# Patient Record
Sex: FEMALE | Race: WHITE | Employment: OTHER | URBAN - METROPOLITAN AREA
[De-identification: names, ages, dates, MRNs, and addresses within clinical notes are randomized per-mention and may not be internally consistent; named-entity substitution may affect disease eponyms.]

---

## 2017-01-11 ENCOUNTER — HOSPITAL ENCOUNTER (OUTPATIENT)
Dept: MAMMOGRAPHY | Age: 67
Discharge: HOME | End: 2017-01-11
Attending: FAMILY MEDICINE
Payer: MEDICARE

## 2017-01-11 DIAGNOSIS — Z12.39 SCREENING FOR MALIGNANT NEOPLASM OF BREAST: ICD-10-CM

## 2017-01-11 PROCEDURE — 77067 SCR MAMMO BI INCL CAD: CPT

## 2017-03-03 PROBLEM — I25.10 CORONARY ARTERY DISEASE INVOLVING NATIVE CORONARY ARTERY OF NATIVE HEART WITHOUT ANGINA PECTORIS: Status: ACTIVE | Noted: 2017-03-03

## 2017-03-13 ENCOUNTER — APPOINTMENT (RX ONLY)
Dept: URBAN - METROPOLITAN AREA CLINIC 349 | Facility: CLINIC | Age: 67
Setting detail: DERMATOLOGY
End: 2017-03-13

## 2017-03-13 DIAGNOSIS — D22 MELANOCYTIC NEVI: ICD-10-CM

## 2017-03-13 DIAGNOSIS — Z85.828 PERSONAL HISTORY OF OTHER MALIGNANT NEOPLASM OF SKIN: ICD-10-CM

## 2017-03-13 DIAGNOSIS — Z71.89 OTHER SPECIFIED COUNSELING: ICD-10-CM

## 2017-03-13 DIAGNOSIS — B00.1 HERPESVIRAL VESICULAR DERMATITIS: ICD-10-CM

## 2017-03-13 DIAGNOSIS — L82.1 OTHER SEBORRHEIC KERATOSIS: ICD-10-CM

## 2017-03-13 DIAGNOSIS — L57.0 ACTINIC KERATOSIS: ICD-10-CM

## 2017-03-13 PROBLEM — D22.62 MELANOCYTIC NEVI OF LEFT UPPER LIMB, INCLUDING SHOULDER: Status: ACTIVE | Noted: 2017-03-13

## 2017-03-13 PROBLEM — D22.71 MELANOCYTIC NEVI OF RIGHT LOWER LIMB, INCLUDING HIP: Status: ACTIVE | Noted: 2017-03-13

## 2017-03-13 PROBLEM — D22.5 MELANOCYTIC NEVI OF TRUNK: Status: ACTIVE | Noted: 2017-03-13

## 2017-03-13 PROBLEM — D22.72 MELANOCYTIC NEVI OF LEFT LOWER LIMB, INCLUDING HIP: Status: ACTIVE | Noted: 2017-03-13

## 2017-03-13 PROBLEM — D22.61 MELANOCYTIC NEVI OF RIGHT UPPER LIMB, INCLUDING SHOULDER: Status: ACTIVE | Noted: 2017-03-13

## 2017-03-13 PROCEDURE — ? COUNSELING

## 2017-03-13 PROCEDURE — ? LIQUID NITROGEN

## 2017-03-13 PROCEDURE — 99203 OFFICE O/P NEW LOW 30 MIN: CPT | Mod: 25

## 2017-03-13 PROCEDURE — 17000 DESTRUCT PREMALG LESION: CPT

## 2017-03-13 PROCEDURE — 17003 DESTRUCT PREMALG LES 2-14: CPT

## 2017-03-13 PROCEDURE — ? BODY PHOTOGRAPHY

## 2017-03-13 ASSESSMENT — LOCATION SIMPLE DESCRIPTION DERM
LOCATION SIMPLE: LEFT POSTERIOR THIGH
LOCATION SIMPLE: LOWER BACK
LOCATION SIMPLE: ABDOMEN
LOCATION SIMPLE: RIGHT CHEEK
LOCATION SIMPLE: CHEST
LOCATION SIMPLE: LEFT LIP
LOCATION SIMPLE: RIGHT FOREARM
LOCATION SIMPLE: RIGHT UPPER BACK
LOCATION SIMPLE: RIGHT POSTERIOR THIGH
LOCATION SIMPLE: LEFT CHEEK
LOCATION SIMPLE: LEFT WRIST
LOCATION SIMPLE: LEFT FOREARM
LOCATION SIMPLE: RIGHT LOWER BACK
LOCATION SIMPLE: UPPER BACK

## 2017-03-13 ASSESSMENT — LOCATION ZONE DERM
LOCATION ZONE: ARM
LOCATION ZONE: LEG
LOCATION ZONE: FACE
LOCATION ZONE: TRUNK
LOCATION ZONE: LIP

## 2017-03-13 ASSESSMENT — LOCATION DETAILED DESCRIPTION DERM
LOCATION DETAILED: LEFT DORSAL WRIST
LOCATION DETAILED: RIGHT PROXIMAL DORSAL FOREARM
LOCATION DETAILED: LEFT MEDIAL SUPERIOR CHEST
LOCATION DETAILED: RIGHT MEDIAL SUPERIOR CHEST
LOCATION DETAILED: RIGHT PROXIMAL POSTERIOR THIGH
LOCATION DETAILED: SUBXIPHOID
LOCATION DETAILED: RIGHT INFERIOR MEDIAL MIDBACK
LOCATION DETAILED: RIGHT SUPERIOR CENTRAL BUCCAL CHEEK
LOCATION DETAILED: SUPERIOR THORACIC SPINE
LOCATION DETAILED: LEFT LOWER CUTANEOUS LIP
LOCATION DETAILED: SUPERIOR LUMBAR SPINE
LOCATION DETAILED: RIGHT MEDIAL UPPER BACK
LOCATION DETAILED: RIGHT DISTAL DORSAL FOREARM
LOCATION DETAILED: RIGHT LATERAL SUPERIOR CHEST
LOCATION DETAILED: LEFT SUPERIOR CENTRAL BUCCAL CHEEK
LOCATION DETAILED: LEFT PROXIMAL POSTERIOR THIGH
LOCATION DETAILED: LEFT PROXIMAL DORSAL FOREARM
LOCATION DETAILED: MIDDLE STERNUM

## 2017-03-13 ASSESSMENT — PAIN INTENSITY VAS: HOW INTENSE IS YOUR PAIN 0 BEING NO PAIN, 10 BEING THE MOST SEVERE PAIN POSSIBLE?: NO PAIN

## 2017-03-13 NOTE — PROCEDURE: LIQUID NITROGEN
Number Of Freeze-Thaw Cycles: 2 freeze-thaw cycles
Duration Of Freeze Thaw-Cycle (Seconds): 0
Detail Level: Detailed
Consent: The patient's consent was obtained including but not limited to risks of crusting, scabbing, blistering, scarring, darker or lighter pigmentary change, recurrence, incomplete removal and infection.
Post-Care Instructions: I reviewed with the patient in detail post-care instructions. Patient is to wear sunprotection, and avoid picking at any of the treated lesions. Pt may apply Vaseline to crusted or scabbing areas.
Render Post-Care Instructions In Note?: no

## 2017-03-13 NOTE — PROCEDURE: BODY PHOTOGRAPHY
Consent: Written consent obtained, risks reviewed for whole body photography. Patient understands that photograph costs may not be covered by insurance, and patient is ultimately responsible for payment.
Was The Entire Body Photographed (Cannot Bill Unless Entire Body Photographed)?: No
Whole Body Statement: The whole body was photographed today.
Detail Level: Detailed
Reason For Photography: The patient is obtaining whole body photography to observe existing suspicious moles and or monitor for the appearance of any new lesions.
Number Of Photographs (Optional- Will Not Render If 0): 1

## 2017-06-12 ENCOUNTER — HOSPITAL ENCOUNTER (OUTPATIENT)
Dept: SURGERY | Age: 67
Discharge: HOME | End: 2017-06-12
Attending: SURGERY

## 2017-06-12 VITALS
TEMPERATURE: 97.6 F | DIASTOLIC BLOOD PRESSURE: 83 MMHG | BODY MASS INDEX: 25.74 KG/M2 | WEIGHT: 164 LBS | SYSTOLIC BLOOD PRESSURE: 131 MMHG | HEIGHT: 67 IN | RESPIRATION RATE: 18 BRPM | HEART RATE: 71 BPM | OXYGEN SATURATION: 97 %

## 2017-06-12 RX ORDER — PREDNISONE 20 MG/1
20 TABLET ORAL
COMMUNITY
End: 2018-08-21 | Stop reason: ALTCHOICE

## 2017-06-12 NOTE — PERIOP NOTES
, anesthesia, reviewed Mag of 2.4 on 5/18/17 and aware pcp decreased daily Mg supplement to once a week. No new orders received. OK to proceed.

## 2017-06-12 NOTE — PERIOP NOTES
Patient verified name, , and surgery as listed in [facility]. Type 1B surgery, PAT walk-in assessment complete. Labs per surgeon: No orders received at this time. Orders will be put in [facility] by surgeon. Labs per anesthesia protocol: None. EKG: None per anesthesia protocol. Patient's Magnesium 2.4 on 17-anesthesia to review result. Patient's PCP subsequently decreased daily Magnesium supplement to once a week. Copy of patient's stent card placed on chart. Hibiclens and instructions given per hospital policy. Patient provided with handouts including Guide to Surgery, Pain Management, Hand Hygiene, Blood Transfusion Education, and [facility] Brochure. Patient answered medical/surgical history questions at their best of ability. All prior to admission medications documented in [facility]. Original medication prescription bottle NOT visualized during patient appointment. Patient instructed to hold all vitamins 7 days prior to surgery and NSAIDS 5 days prior to surgery, patient verbalized understanding. Medications to be held: Vitamin C, Vitamin D, Vitamin B12, and Meloxicam.     Patient instructed to continue previous medications as prescribed prior to surgery and to take the following medications the day of surgery according to anesthesia guidelines with a small sip of water: Amlodipine, 81 mg ASA, Buspar, Omeprazole, Prednisone, and Tramadol if needed. Patient taught back and verbalized understanding.

## 2017-06-13 ENCOUNTER — ANESTHESIA (OUTPATIENT)
Dept: SURGERY | Age: 67
End: 2017-06-13
Payer: MEDICARE

## 2017-06-13 ENCOUNTER — HOSPITAL ENCOUNTER (OUTPATIENT)
Age: 67
Setting detail: OUTPATIENT SURGERY
Discharge: HOME | End: 2017-06-13
Attending: SURGERY | Admitting: SURGERY
Payer: MEDICARE

## 2017-06-13 ENCOUNTER — ANESTHESIA EVENT (OUTPATIENT)
Dept: SURGERY | Age: 67
End: 2017-06-13
Payer: MEDICARE

## 2017-06-13 VITALS
RESPIRATION RATE: 16 BRPM | BODY MASS INDEX: 25.9 KG/M2 | SYSTOLIC BLOOD PRESSURE: 138 MMHG | HEART RATE: 67 BPM | HEIGHT: 67 IN | TEMPERATURE: 97.3 F | OXYGEN SATURATION: 95 % | DIASTOLIC BLOOD PRESSURE: 91 MMHG | WEIGHT: 165 LBS

## 2017-06-13 PROCEDURE — 74011250636 HC RX REV CODE- 250/636

## 2017-06-13 PROCEDURE — 76010000138 HC OR TIME 0.5 TO 1 HR: Performed by: SURGERY

## 2017-06-13 PROCEDURE — 77030031139 HC SUT VCRL2 J&J -A: Performed by: SURGERY

## 2017-06-13 PROCEDURE — 76210000020 HC REC RM PH II FIRST 0.5 HR: Performed by: SURGERY

## 2017-06-13 PROCEDURE — 77030032490 HC SLV COMPR SCD KNE COVD -B: Performed by: SURGERY

## 2017-06-13 PROCEDURE — 77030011640 HC PAD GRND REM COVD -A: Performed by: SURGERY

## 2017-06-13 PROCEDURE — 74011250636 HC RX REV CODE- 250/636: Performed by: ANESTHESIOLOGY

## 2017-06-13 PROCEDURE — 76210000006 HC OR PH I REC 0.5 TO 1 HR: Performed by: SURGERY

## 2017-06-13 PROCEDURE — 74011250637 HC RX REV CODE- 250/637: Performed by: ANESTHESIOLOGY

## 2017-06-13 PROCEDURE — 76060000033 HC ANESTHESIA 1 TO 1.5 HR: Performed by: SURGERY

## 2017-06-13 PROCEDURE — 77030018836 HC SOL IRR NACL ICUM -A: Performed by: SURGERY

## 2017-06-13 PROCEDURE — 77030010514 HC APPL CLP LIG COVD -B: Performed by: SURGERY

## 2017-06-13 PROCEDURE — 77030028843 HC ELECTRD CAUT TIP MEGA -B: Performed by: SURGERY

## 2017-06-13 PROCEDURE — 74011000250 HC RX REV CODE- 250: Performed by: SURGERY

## 2017-06-13 PROCEDURE — 77030020782 HC GWN BAIR PAWS FLX 3M -B: Performed by: ANESTHESIOLOGY

## 2017-06-13 PROCEDURE — 88305 TISSUE EXAM BY PATHOLOGIST: CPT | Performed by: SURGERY

## 2017-06-13 PROCEDURE — 74011000250 HC RX REV CODE- 250: Performed by: ANESTHESIOLOGY

## 2017-06-13 RX ORDER — SODIUM CHLORIDE, SODIUM LACTATE, POTASSIUM CHLORIDE, CALCIUM CHLORIDE 600; 310; 30; 20 MG/100ML; MG/100ML; MG/100ML; MG/100ML
INJECTION, SOLUTION INTRAVENOUS
Status: DISCONTINUED | OUTPATIENT
Start: 2017-06-13 | End: 2017-06-13 | Stop reason: HOSPADM

## 2017-06-13 RX ORDER — SODIUM CHLORIDE 0.9 % (FLUSH) 0.9 %
5-10 SYRINGE (ML) INJECTION AS NEEDED
Status: DISCONTINUED | OUTPATIENT
Start: 2017-06-13 | End: 2017-06-14 | Stop reason: HOSPADM

## 2017-06-13 RX ORDER — LIDOCAINE HYDROCHLORIDE 10 MG/ML
0.1 INJECTION INFILTRATION; PERINEURAL AS NEEDED
Status: DISCONTINUED | OUTPATIENT
Start: 2017-06-13 | End: 2017-06-13 | Stop reason: HOSPADM

## 2017-06-13 RX ORDER — OXYCODONE AND ACETAMINOPHEN 5; 325 MG/1; MG/1
1 TABLET ORAL AS NEEDED
Status: DISCONTINUED | OUTPATIENT
Start: 2017-06-13 | End: 2017-06-14 | Stop reason: HOSPADM

## 2017-06-13 RX ORDER — HYDROMORPHONE HYDROCHLORIDE 2 MG/ML
0.5 INJECTION, SOLUTION INTRAMUSCULAR; INTRAVENOUS; SUBCUTANEOUS
Status: DISCONTINUED | OUTPATIENT
Start: 2017-06-13 | End: 2017-06-14 | Stop reason: HOSPADM

## 2017-06-13 RX ORDER — FAMOTIDINE 20 MG/1
20 TABLET, FILM COATED ORAL ONCE
Status: COMPLETED | OUTPATIENT
Start: 2017-06-13 | End: 2017-06-13

## 2017-06-13 RX ORDER — SODIUM CHLORIDE, SODIUM LACTATE, POTASSIUM CHLORIDE, CALCIUM CHLORIDE 600; 310; 30; 20 MG/100ML; MG/100ML; MG/100ML; MG/100ML
75 INJECTION, SOLUTION INTRAVENOUS CONTINUOUS
Status: DISCONTINUED | OUTPATIENT
Start: 2017-06-13 | End: 2017-06-13 | Stop reason: HOSPADM

## 2017-06-13 RX ORDER — FENTANYL CITRATE 50 UG/ML
INJECTION, SOLUTION INTRAMUSCULAR; INTRAVENOUS AS NEEDED
Status: DISCONTINUED | OUTPATIENT
Start: 2017-06-13 | End: 2017-06-13 | Stop reason: HOSPADM

## 2017-06-13 RX ORDER — BUPIVACAINE HYDROCHLORIDE AND EPINEPHRINE 2.5; 5 MG/ML; UG/ML
INJECTION, SOLUTION EPIDURAL; INFILTRATION; INTRACAUDAL; PERINEURAL AS NEEDED
Status: DISCONTINUED | OUTPATIENT
Start: 2017-06-13 | End: 2017-06-13 | Stop reason: HOSPADM

## 2017-06-13 RX ORDER — SODIUM CHLORIDE, SODIUM LACTATE, POTASSIUM CHLORIDE, CALCIUM CHLORIDE 600; 310; 30; 20 MG/100ML; MG/100ML; MG/100ML; MG/100ML
75 INJECTION, SOLUTION INTRAVENOUS CONTINUOUS
Status: DISCONTINUED | OUTPATIENT
Start: 2017-06-13 | End: 2017-06-14 | Stop reason: HOSPADM

## 2017-06-13 RX ORDER — NALOXONE HYDROCHLORIDE 0.4 MG/ML
0.2 INJECTION, SOLUTION INTRAMUSCULAR; INTRAVENOUS; SUBCUTANEOUS AS NEEDED
Status: DISCONTINUED | OUTPATIENT
Start: 2017-06-13 | End: 2017-06-14 | Stop reason: HOSPADM

## 2017-06-13 RX ORDER — OXYCODONE AND ACETAMINOPHEN 5; 325 MG/1; MG/1
2 TABLET ORAL
Qty: 40 TAB | Refills: 0 | Status: SHIPPED | OUTPATIENT
Start: 2017-06-13 | End: 2017-08-14

## 2017-06-13 RX ORDER — MIDAZOLAM HYDROCHLORIDE 1 MG/ML
2 INJECTION, SOLUTION INTRAMUSCULAR; INTRAVENOUS
Status: DISCONTINUED | OUTPATIENT
Start: 2017-06-13 | End: 2017-06-13 | Stop reason: HOSPADM

## 2017-06-13 RX ORDER — MIDAZOLAM HYDROCHLORIDE 1 MG/ML
INJECTION, SOLUTION INTRAMUSCULAR; INTRAVENOUS AS NEEDED
Status: DISCONTINUED | OUTPATIENT
Start: 2017-06-13 | End: 2017-06-13 | Stop reason: HOSPADM

## 2017-06-13 RX ORDER — PROPOFOL 10 MG/ML
INJECTION, EMULSION INTRAVENOUS
Status: DISCONTINUED | OUTPATIENT
Start: 2017-06-13 | End: 2017-06-13 | Stop reason: HOSPADM

## 2017-06-13 RX ADMIN — MIDAZOLAM HYDROCHLORIDE 2 MG: 1 INJECTION, SOLUTION INTRAMUSCULAR; INTRAVENOUS at 11:26

## 2017-06-13 RX ADMIN — PROPOFOL 50 MCG/KG/MIN: 10 INJECTION, EMULSION INTRAVENOUS at 11:43

## 2017-06-13 RX ADMIN — FENTANYL CITRATE 75 MCG: 50 INJECTION, SOLUTION INTRAMUSCULAR; INTRAVENOUS at 11:39

## 2017-06-13 RX ADMIN — FAMOTIDINE 20 MG: 20 TABLET, FILM COATED ORAL at 10:47

## 2017-06-13 RX ADMIN — SODIUM CHLORIDE, SODIUM LACTATE, POTASSIUM CHLORIDE, CALCIUM CHLORIDE: 600; 310; 30; 20 INJECTION, SOLUTION INTRAVENOUS at 11:34

## 2017-06-13 RX ADMIN — FENTANYL CITRATE 25 MCG: 50 INJECTION, SOLUTION INTRAMUSCULAR; INTRAVENOUS at 11:52

## 2017-06-13 RX ADMIN — SODIUM CHLORIDE, SODIUM LACTATE, POTASSIUM CHLORIDE, AND CALCIUM CHLORIDE 75 ML/HR: 600; 310; 30; 20 INJECTION, SOLUTION INTRAVENOUS at 10:58

## 2017-06-13 RX ADMIN — MIDAZOLAM HYDROCHLORIDE 1 MG: 1 INJECTION, SOLUTION INTRAMUSCULAR; INTRAVENOUS at 11:54

## 2017-06-13 RX ADMIN — MIDAZOLAM HYDROCHLORIDE 1 MG: 1 INJECTION, SOLUTION INTRAMUSCULAR; INTRAVENOUS at 11:52

## 2017-06-13 RX ADMIN — LIDOCAINE HYDROCHLORIDE 0.1 ML: 10 INJECTION, SOLUTION INFILTRATION; PERINEURAL at 10:58

## 2017-06-13 RX ADMIN — OXYCODONE HYDROCHLORIDE AND ACETAMINOPHEN 1 TABLET: 5; 325 TABLET ORAL at 12:51

## 2017-06-13 NOTE — BRIEF OP NOTE
BRIEF OPERATIVE NOTE    Date of Procedure: 6/13/2017   Preoperative Diagnosis: Nonintractable headache, unspecified chronicity pattern, unspecified headache type [R51]  Postoperative Diagnosis: Nonintractable headache, unspecified chronicity pattern, unspecified headache type [R51]    Procedure(s):  TEMPORAL ARTERY BIOPSY RIGHT  Surgeon(s) and Role:     * Chapo Christensen DO - Primary         Assistant Staff:       Surgical Staff:  Circ-1: Cheri Severs, RN  Circ-Relief: Cruz Perea RN  Scrub Tech-1: [facility]  Event Time In   Incision Start 1210   Incision Close 1224     Anesthesia: MAC   Estimated Blood Loss: none  Specimens:   ID Type Source Tests Collected by Time Destination   1 : RIGHT TEMPORAL ARTERY Preservative Artery  Chapo Christensen DO 6/13/2017 1217 Pathology      Findings: Right temporal artery excised.   5cm   Complications: none  Implants: * No implants in log [address]

## 2017-06-13 NOTE — H&P (VIEW-ONLY)
Michael Funez, DO  [address] [address]  Phone [phone]   Fax [phone]      Date of visit: 2017     Primary/Requesting provider: Inga Calabrese MD    Chief Complaint   Patient presents with    Follow-up                 Date: 2017      Name: Jessy Wooten      MRN: [id number]       : 1950       Age: 77 y.o. Sex: female        PCP: Inga Calabrese MD       CC:    Chief Complaint   Patient presents with    Follow-up       HPI:     Jessy Wooten is a 77 y.o. female who presents for evaluation of headaches and to schedule right temporal artery biopsy. The patient complains of headaches that began approximately 2 weeks ago. She was seen by her primary care doctor. Headaches were severe and right-sided. She states that her pain was 4 out of 10. She denies any associated nausea or vomiting or fever. She states that she has been taking prednisone and that seems to make the headaches better. She cannot identify any exacerbating factors that may cause these headaches. Prior to these headaches she denies any previous episodes. She is here to discuss temporal artery biopsy. Isabella Ibarra PMH:    Past Medical History:   Diagnosis Date    Affective psychosis ([facility] [address] 2006    Arthritis     CAD (coronary artery disease)     no angina cp or govea.  no orthopnea or pnd    Chest pain 2006     no cp but feels\" anxious\" for 10 seconds at a time- no further cp. no govea    Cholelithiasis 2006    Chronic depression 10/15/2014    Chronic lumbar pain 10/15/2014    Claudication ([facility] [address] 2013    Coronary atherosclerosis of native coronary vessel 2006    Dyslipidemia 10/15/2014    Fibromyalgia 10/15/2014    HIPOLITO (generalized anxiety disorder) 10/15/2014    GERD (gastroesophageal reflux disease) 10/15/2014    History of complete eye exam 2016    History of dental examination 2016    Hypertension     Malaise and fatigue 2008    Palpitations 2/5/2016    Polyarthralgia 10/15/2014    Shingles 2006    10-12x    Tobacco use disorder 112/2006       PSH:    Past Surgical History:   Procedure Laterality Date    CARDIAC SURG PROCEDURE UNLIST      stent    HX CHOLECYSTECTOMY      IMPLANT BREAST SILICONE/EQ Bilateral     35 yrs silicone       MEDS:    Current Outpatient Prescriptions   Medication Sig    magnesium 250 mg tab Take 1 Tab by mouth daily.  rosuvastatin (CRESTOR) 5 mg tablet Take 1 Tab by mouth daily.  omeprazole (PRILOSEC) 20 mg capsule TAKE 1 CAPSULE TWICE DAILY  Indications: gastroesophageal reflux disease    amLODIPine (NORVASC) 5 mg tablet Take 1 Tab by mouth daily.  meloxicam (MOBIC) 7.5 mg tablet Take  by mouth daily.  aspirin delayed-release 81 mg tablet Take  by mouth daily.  ascorbic acid, vitamin C, (VITAMIN C) 500 mg tablet Take  by mouth.  cholecalciferol (VITAMIN D3) 1,000 unit cap Take  by mouth daily.  cyanocobalamin (VITAMIN B-12) 1,000 mcg tablet Take 1,000 mcg by mouth daily.  busPIRone (BUSPAR) 10 mg tablet Take 10 mg by mouth three (3) times daily.  tramadol (ULTRAM) 50 mg tablet Take 50 mg by mouth every six (6) hours as needed for Pain. No current facility-administered medications for this visit.         ALLERGIES:      Allergies   Allergen Reactions    Antibiotic [Cozmm-Earkp-Hkkhrig-Pramoxine] Unknown (comments)    Biaxin [Clarithromycin] Unknown (comments)    Ceclor [Cefaclor] Unknown (comments)    Cephalexin Unknown (comments)       SH:    Social History   Substance Use Topics    Smoking status: Former Smoker     Packs/day: 0.50     Years: 25.00     Quit date: 9/21/2004    Smokeless tobacco: Never Used      Comment: SMOKED FOR 20 TO 30 YEARS; STOPPED 10 YEARS AGO (TAKEN 10/27/2015)    Alcohol use No       FH:    Family History   Problem Relation Age of Onset    Hypertension Other     High Cholesterol Other     Diabetes Other     Glaucoma Other     Heart Attack Other     Stroke Other     Hypertension Mother     High Cholesterol Mother     Heart Attack Mother     Hypertension Sister     Depression Sister     High Cholesterol Sister     Hypertension Brother     High Cholesterol Brother     Heart Attack Brother     No Known Problems Maternal Aunt     Breast Cancer Neg Hx          Review of Systems   Constitutional: Negative. Eyes: Positive for pain. Right eye pain   Respiratory: Negative. Negative for cough, hemoptysis, sputum production and shortness of breath. Cardiovascular: Negative. Negative for chest pain, palpitations, orthopnea, claudication, leg swelling and PND. Gastrointestinal: Negative. Negative for abdominal pain, blood in stool, constipation, diarrhea, heartburn, nausea and vomiting. Genitourinary: Negative. Musculoskeletal: Positive for joint pain and myalgias. Skin: Negative. Neurological: Positive for focal weakness and headaches. Endo/Heme/Allergies: Negative. Psychiatric/Behavioral: Negative. Negative for depression, hallucinations, substance abuse and suicidal ideas. The patient is not nervous/anxious. Physical Exam:     Visit Vitals    /75    Pulse 77    Ht 5' 7\" (1.702 m)    Wt 164 lb (74.4 kg)    BMI 25.69 kg/m2         Physical Exam   Constitutional: She is oriented to person, place, and time and well-developed, well-nourished, and in no distress. HENT:   Head: Normocephalic and atraumatic. Mouth/Throat: Oropharynx is clear and moist.   Eyes: EOM are normal. Pupils are equal, round, and reactive to light. Neck: Normal range of motion. Neck supple. No tracheal deviation present. No thyromegaly present. Cardiovascular: Normal rate, regular rhythm and normal heart sounds. No murmur heard. Pulmonary/Chest: Effort normal and breath sounds normal. No respiratory distress. She has no wheezes. She has no rales. Abdominal: Soft.  Bowel sounds are normal. She exhibits no distension and no mass. There is no tenderness. There is no rebound and no guarding. Musculoskeletal: Normal range of motion. She exhibits no edema. Neurological: She is alert and oriented to person, place, and time. Skin: Skin is warm and dry. No erythema. Psychiatric: Mood and judgment normal.       Assessment/Plan:  Woody Fonseca is a 77 y.o. female who has signs and symptoms consistent with headaches and possible temporal arteritis. We will plan a temporal artery biopsy tomorrow. I discussed the procedure in detail with the patient today. I explained to her that this is only a biopsy and not a curative procedure for her headaches. I discussed the risks and benefits potential comp occasions including risk and benefits associated with anesthesia. We will proceed with temporal artery biopsy tomorrow. ICD-10-CM ICD-9-CM    1. Temporal arteritis (HCC) M31.6 446.5        I went through the risks of bleeding, infection and anesthesia.      Counseling time:not applicable    Signed: Bautista Nielson DO   6/12/2017  8:17 AM

## 2017-06-13 NOTE — INTERVAL H&P NOTE
H&P Update:  Daryle Mow was seen and examined. History and physical has been reviewed. The patient has been examined.  There have been no significant clinical changes since the completion of the originally dated History and Physical.    Signed By: Abel Randall DO     June 13, 2017 11:15 AM

## 2017-06-13 NOTE — DISCHARGE INSTRUCTIONS
·   .  Post op instructions:  1. May shower only, no tub bathing, no hot tub, no swimming. 2. Keep incision clean with regular soap and water. 3. No lifting over 10 lbs. 4. Regular diet as tolerated. 5. May remove topical dressing on Day #2. Kim Rogers steri strips until seen in Dr. Nielson's office at follow up appointment. 6. No driving on pain medicines.   7. Resume home medicines as usual.    Tyron Nielson, DO

## 2017-06-13 NOTE — ANESTHESIA PREPROCEDURE EVALUATION
Anesthetic History   No history of anesthetic complications            Review of Systems / Medical History  Patient summary reviewed, nursing notes reviewed and pertinent labs reviewed    Pulmonary                Comments: Quit smoking 13 years ago   Neuro/Psych         Headaches     Cardiovascular    Hypertension: well controlled          CAD, cardiac stents and hyperlipidemia    Exercise tolerance: >4 METS  Comments: Stents times two in 2006   GI/Hepatic/Renal     GERD: well controlled           Endo/Other        Arthritis     Other Findings              Physical Exam    Airway  Mallampati: II  TM Distance: 4 - 6 cm  Neck ROM: normal range of motion   Mouth opening: Normal     Cardiovascular               Dental    Dentition: Lower partial plate     Pulmonary  Breath sounds clear to auscultation               Abdominal  GI exam deferred       Other Findings            Anesthetic Plan    ASA: 3  Anesthesia type: total IV anesthesia          Induction: Intravenous  Anesthetic plan and risks discussed with: Patient

## 2017-06-13 NOTE — OP NOTES
[address]   OPERATIVE REPORT       Name:  Diane Grover   MR#:  [id number]   :  1950   Account #:  [de-identified]   Date of Adm:  2017       DATE OF PROCEDURE: 2017     PREOPERATIVE DIAGNOSIS: Temporal arteritis. POSTOPERATIVE DIAGNOSIS: Temporal arteritis. PROCEDURE: Right temporal artery biopsy. ANESTHESIA: MAC. SURGEON: Iva Coleman DO.    ASSISTANT: None. COMPLICATIONS: None. DISPOSITION: Stable. COUNTS: Sponge count, needle count, instrument count, all   correct x3. DESCRIPTION OF PROCEDURE: This is a 69-year-old female with   headaches and temporal arteritis. She is prepared for biopsy of   the right temporal artery. Consent was obtained by describing   the procedure to the patient, including potential complications   to include infection, bleeding and pain. Consent was obtained,   placed upon the chart. She was administered no antibiotics. Preoperatively, she was taken to the operative suite, placed in a   supine position and MAC was administered without complications. Following this, using an arterial Doppler, the temporal artery   was identified and marked. The hair was then shaved back,   exposing the skin and then she was prepped and draped in the   usual sterile fashion. Time-out was taken to confirm the   patient's name, proper procedure. Following this, 0.25% Marcaine with epinephrine was used to   anesthetize the skin and subcutaneous tissue and then a #15   scalpel blade was used to make a 4 cm incision and Bovie   cauterization with a [place] tip cautery was used to dissect   down through subcutaneous tissue where the temporal artery was   identified. We skeletonized the artery and then placed Ligaclips   proximally and distally and divided the temporal artery, taking   approximately 1 1/2 inches or 4-5 cm of artery. This will be   sent to Pathology for analysis.  At this point, we copiously   irrigated with saline until clear. We ensured hemostasis using   spot cauterization. We then reapproximated the subcutaneous tissue with 3-0 Vicryl   in simple running fashion. Mastisol and Steri-Strips placed atop   the incision. Sterile dressing applied. The patient will be extubated, transferred to recovery stable. FINDINGS: A right temporal artery biopsy was performed without   immediate complications. NICOLE PATTERSON  [address] , DO      DD / Nicki Puentes   D:  06/13/2017   12:30   T:  06/13/2017   12:52   Job #:  [id number]

## 2017-06-13 NOTE — IP AVS SNAPSHOT
[address] 
[phone] Patient: Chuy Da Silva MRN: [id number] [facility]:[birth year] You are allergic to the following Allergen Reactions Antibiotic (Ypoxb-Odlxe-Ufesxlt-Pramoxine) Unknown (comments) Patient unsure Biaxin (Clarithromycin) Unknown (comments) Patient unsure Ceclor (Cefaclor) Unknown (comments) Patient unsure Cephalexin Nausea and Vomiting Recent Documentation Height Weight BMI OB Status Smoking Status 1.702 m 74.8 kg 25.84 kg/m2 Postmenopausal Former Smoker Emergency Contacts Name Discharge Info Relation Home Work Mobile Thiago [place] (Niece) DISCHARGE CAREGIVER [3] Other Relative [6] [phone] About your hospitalization You were admitted on:  June 13, 2017 You last received care in the:  [facility] PACU You were discharged on:  June 13, 2017 Unit phone number:  [phone] Why you were hospitalized Your primary diagnosis was:  Not on File Providers Seen During Your Hospitalizations Provider Role Specialty Primary office phone Estuardo Panda DO Attending Provider General Surgery [phone] Your Primary Care Physician (PCP) Primary Care Physician Office Phone Office Fax Daiana Sanabria [phone] Follow-up Information Follow up With Details Comments Contact Info Waqas Bateman MD   [address] [place] [address] 
[address] [facility] [address] 
[phone] Damion Nielson DO Schedule an appointment as soon as possible for a visit in 1 week(s)  [address] [facility] [address] [facility] [id number] [phone] Your Appointments Wednesday June 28, 2017  2:30 PM EDT Global Post Op with Damion Nielson DO  
[facility] ([facility]) [address] [place] [address] [address] [place] [id number] [phone] Current Discharge Medication List  
  
START taking these medications Dose & Instructions Dispensing Information Comments Morning Noon Evening Bedtime  
 oxyCODONE-acetaminophen 5-325 mg per tablet Commonly known as:  PERCOCET Your last dose was: Your next dose is:    
   
   
 Dose:  2 Tab Take 2 Tabs by mouth every four (4) hours as needed for Pain. Max Daily Amount: 12 Tabs. Quantity:  40 Tab Refills:  0 CONTINUE these medications which have CHANGED Dose & Instructions Dispensing Information Comments Morning Noon Evening Bedtime  
 amLODIPine 5 mg tablet Commonly known as:  Laurel Qureshi What changed:  additional instructions Your last dose was: Your next dose is:    
   
   
 Dose:  5 mg Take 1 Tab by mouth daily. Quantity:  90 Tab Refills:  3  
     
   
   
   
  
 magnesium 250 mg Tab What changed:  when to take this Your last dose was: Your next dose is:    
   
   
 Dose:  250 mg Take 1 Tab by mouth daily. Quantity:  30 Tab Refills:  0  
     
   
   
   
  
 omeprazole 20 mg capsule Commonly known as:  PRILOSEC What changed:  additional instructions Your last dose was: Your next dose is: TAKE 1 CAPSULE TWICE DAILY  Indications: gastroesophageal reflux disease Quantity:  180 Cap Refills:  3  
     
   
   
   
  
 rosuvastatin 5 mg tablet Commonly known as:  CRESTOR What changed:  when to take this Your last dose was: Your next dose is:    
   
   
 Dose:  5 mg Take 1 Tab by mouth daily. Quantity:  90 Tab Refills:  3 CONTINUE these medications which have NOT CHANGED Dose & Instructions Dispensing Information Comments Morning Noon Evening Bedtime  
 aspirin delayed-release 81 mg tablet Your last dose was: Your next dose is: Take  by mouth daily. Take DOS per anesthesia protocol. Refills:  0  
     
   
   
   
  
 busPIRone 10 mg tablet Commonly known as:  BUSPAR Your last dose was: Your next dose is:    
   
   
 Dose:  10 mg Take 10 mg by mouth three (3) times daily. Take DOS per anesthesia protocol. Refills:  0  
     
   
   
   
  
 meloxicam 7.5 mg tablet Commonly known as:  MOBIC Your last dose was: Your next dose is:    
   
   
 Dose:  7.5 mg Take 7.5 mg by mouth two (2) times a day. Refills:  0  
     
   
   
   
  
 predniSONE 20 mg tablet Commonly known as:  Rolene Ply Your last dose was: Your next dose is:    
   
   
 Dose:  20 mg Take 20 mg by mouth daily (with breakfast). Two tablets every morning. Take DOS per anesthesia protocol. Refills:  0  
     
   
   
   
  
 traMADol 50 mg tablet Commonly known as:  ULTRAM  
   
Your last dose was: Your next dose is:    
   
   
 Dose:  50 mg Take 50 mg by mouth every six (6) hours as needed for Pain. Take DOS if needed per anesthesia protocol. Refills:  0  
     
   
   
   
  
 VITAMIN B-12 1,000 mcg tablet Generic drug:  cyanocobalamin Your last dose was: Your next dose is:    
   
   
 Dose:  1000 mcg Take 1,000 mcg by mouth daily. Refills:  0  
     
   
   
   
  
 VITAMIN C 500 mg tablet Generic drug:  ascorbic acid (vitamin C) Your last dose was: Your next dose is:    
   
   
 Dose:  500 mg Take 500 mg by mouth daily. Refills:  0  
     
   
   
   
  
 VITAMIN D3 1,000 unit Cap Generic drug:  cholecalciferol Your last dose was: Your next dose is: Take  by mouth daily. Refills:  0 Where to Get Your Medications Information on where to get these meds will be given to you by the nurse or doctor. ! Ask your nurse or doctor about these medications oxyCODONE-acetaminophen 5-325 mg per tablet Discharge Instructions · Jakob Felix Post op instructions: 
1. May shower only, no tub bathing, no hot tub, no swimming. 2. Keep incision clean with regular soap and water. 3. No lifting over 10 lbs. 4. Regular diet as tolerated. 5. May remove topical dressing on Day #2. Katlynmina Brendas steri strips until seen in Dr. Nielson's office at follow up appointment. 6. No driving on pain medicines. 7. Resume home medicines as usual. 
 
Merari Nielson, DO Discharge Orders None Introducing [facility]! [employer] introduces [facility] patient portal. Now you can access parts of your medical record, email your doctor's office, and request medication refills online. 1. In your internet browser, go to [email or web address] 2. Click on the First Time User? Click Here link in the Sign In box. You will see the New Member Sign Up page. 3. Enter your [facility] Access Code exactly as it appears below. You will not need to use this code after youve completed the sign-up process. If you do not sign up before the expiration date, you must request a new code. · [facility] Access Code: [email or web address] Expires: 8/16/2017  9:13 AM 
 
4. Enter the last four digits of your Social Security Number (xxxx) and Date of Birth (mm/dd/yyyy) as indicated and click Submit. You will be taken to the next sign-up page. 5. Create a [facility] ID. This will be your [facility] login ID and cannot be changed, so think of one that is secure and easy to remember. 6. Create a [facility] password. You can change your password at any time. 7. Enter your Password Reset Question and Answer. This can be used at a later time if you forget your password. 8. Enter your e-mail address. You will receive e-mail notification when new information is available in [address] 9. Click Sign Up. You can now view and download portions of your medical record. 10. Click the Download Summary menu link to download a portable copy of your medical information. If you have questions, please visit the Frequently Asked Questions section of the [employer] website. Remember, MyChart is NOT to be used for urgent needs. For medical emergencies, dial 911. Now available from your iPhone and Android! General Information Please provide this summary of care documentation to your next provider. Patient Signature:  ____________________________________________________________ Date:  ____________________________________________________________  
  
[facility] Provider Signature:  ____________________________________________________________ Date:  ____________________________________________________________

## 2017-06-13 NOTE — ANESTHESIA POSTPROCEDURE EVALUATION
Post-Anesthesia Evaluation and Assessment    Patient: Kanu Saenztingham MRN: [id number]  SSN: [id number]    YOB: 1950  Age: 77 y.o. Sex: female       Cardiovascular Function/Vital Signs  Visit Vitals    BP (!) 138/91    Pulse 67    Temp 36.3 °C (97.3 °F)    Resp 16    Ht 5' 7\" (1.702 m)    Wt 74.8 kg (165 lb)    SpO2 95%    BMI 25.84 kg/m2       Patient is status post total IV anesthesia anesthesia for Procedure(s):  TEMPORAL ARTERY BIOPSY RIGHT. Nausea/Vomiting: None    Postoperative hydration reviewed and adequate. Pain:  Pain Scale 1: Numeric (0 - 10) (06/13/17 1305)  Pain Intensity 1: 2 (06/13/17 1305)   Managed    Neurological Status:   Neuro (WDL): Within Defined Limits (06/13/17 1305)  Neuro  Neurologic State: Drowsy (06/13/17 1235)   At baseline    Mental Status and Level of Consciousness: Arousable    Pulmonary Status:   O2 Device: Room air (06/13/17 1305)   Adequate oxygenation and airway patent    Complications related to anesthesia: None    Post-anesthesia assessment completed.  No concerns    Signed By: Carmen Vigil MD     June 13, 2017

## 2017-06-14 NOTE — ANESTHESIA POSTPROCEDURE EVALUATION
Post-Anesthesia Evaluation and Assessment    Patient: Spencer Sal MRN: [id number]  SSN: [id number]    YOB: 1950  Age: 77 y.o. Sex: female       Cardiovascular Function/Vital Signs  Visit Vitals    BP (!) 138/91    Pulse 67    Temp 36.3 °C (97.3 °F)    Resp 16    Ht 5' 7\" (1.702 m)    Wt 74.8 kg (165 lb)    SpO2 95%    BMI 25.84 kg/m2       Patient is status post total IV anesthesia anesthesia for Procedure(s):  TEMPORAL ARTERY BIOPSY RIGHT. Nausea/Vomiting: None    Postoperative hydration reviewed and adequate. Pain:  Pain Scale 1: Numeric (0 - 10) (06/13/17 1305)  Pain Intensity 1: 2 (06/13/17 1305)   Managed    Neurological Status:   Neuro (WDL): Within Defined Limits (06/13/17 1305)  Neuro  Neurologic State: Drowsy (06/13/17 1235)   At baseline    Mental Status and Level of Consciousness: Arousable    Pulmonary Status:   O2 Device: Room air (06/13/17 1305)   Adequate oxygenation and airway patent    Complications related to anesthesia: None    Post-anesthesia assessment completed.  No concerns    Signed By: Blayne De La Rosa MD     June 14, 2017

## 2017-08-22 ENCOUNTER — HOSPITAL ENCOUNTER (OUTPATIENT)
Dept: SURGERY | Age: 67
Discharge: HOME | End: 2017-08-22

## 2017-08-22 VITALS — HEIGHT: 67 IN | WEIGHT: 170 LBS | BODY MASS INDEX: 26.68 KG/M2

## 2017-08-22 NOTE — PERIOP NOTES
Patient verified name, , and procedure. Type: 1a; abbreviated assessment per anesthesia guidelines  Labs per surgeon: none  Labs per anesthesia: none    Instructed pt that they will be notified via office/GI LAB for time of arrival to GI lab. Follow diet and prep instructions per Dr Lydia Edwards instructions as follows: You will be on a clear liquid diet the day before your procedure and you may have any of the following clear liquids:   Water.  Strained fruit juices, without pulp, including apple, orange, white grape, or white cranberry.  Limeade or lemonade.  Coffee or tea (do not use any non-dairy creamer.)   Chicken broth.  Gelatin desserts, without added fruit or toppings (no red or purple gelatin.)  You should NOT:   Do NOT drink any milk products or use any milk products in coffee or tea.  Do NOT drink anything with red or purple dye.  Do NOT drink any alcoholic beverages. Bath or shower the night before and the am of surgery with non-moisturizing soap. No lotions, oils, powders, cologne on skin. No make up, eye make up or jewelry. Wear loose fitting comfortable, clean clothing. Must have adult present in building the entire time and MUST bring someone with you or arrange for someone to drive you home after the test.      You may take medications up to 3 hours prior to your procedure with sips of water only. Medications to take Prilosec, 81 mg Aspirin, Norvasc, Buspar, Prednisone, Tramadol, patient to hold Meloxicam hold for 5 days prior to procedure. The following discharge instructions reviewed with patient: medication given during procedure may cause drowsiness for several hours, therefore, do not drive or operate machinery for remainder of the day, no alcohol on the day of your procedure, resume regular diet and activity unless otherwise directed, you may experience abdominal distention for several hours that is relieved by the passage of gas.  Contact your physician if you have any of the following: fever or chills, severe abdominal pain or excessive amount of bleeding or a large amount when having a bowel movement.  Occasional specks of blood with bowel movement would not be unusual.

## 2017-08-27 ENCOUNTER — ANESTHESIA EVENT (OUTPATIENT)
Dept: ENDOSCOPY | Age: 67
End: 2017-08-27
Payer: MEDICARE

## 2017-08-27 RX ORDER — SODIUM CHLORIDE, SODIUM LACTATE, POTASSIUM CHLORIDE, CALCIUM CHLORIDE 600; 310; 30; 20 MG/100ML; MG/100ML; MG/100ML; MG/100ML
100 INJECTION, SOLUTION INTRAVENOUS CONTINUOUS
Status: CANCELLED | OUTPATIENT
Start: 2017-08-27

## 2017-08-27 RX ORDER — SODIUM CHLORIDE 0.9 % (FLUSH) 0.9 %
5-10 SYRINGE (ML) INJECTION AS NEEDED
Status: CANCELLED | OUTPATIENT
Start: 2017-08-27

## 2017-08-28 ENCOUNTER — HOSPITAL ENCOUNTER (OUTPATIENT)
Age: 67
Setting detail: OUTPATIENT SURGERY
Discharge: HOME | End: 2017-08-28
Attending: SURGERY | Admitting: SURGERY
Payer: MEDICARE

## 2017-08-28 ENCOUNTER — ANESTHESIA (OUTPATIENT)
Dept: ENDOSCOPY | Age: 67
End: 2017-08-28
Payer: MEDICARE

## 2017-08-28 VITALS
TEMPERATURE: 98.2 F | HEART RATE: 88 BPM | OXYGEN SATURATION: 97 % | SYSTOLIC BLOOD PRESSURE: 125 MMHG | DIASTOLIC BLOOD PRESSURE: 73 MMHG | RESPIRATION RATE: 16 BRPM

## 2017-08-28 PROCEDURE — 76040000025: Performed by: SURGERY

## 2017-08-28 PROCEDURE — 76060000032 HC ANESTHESIA 0.5 TO 1 HR: Performed by: SURGERY

## 2017-08-28 PROCEDURE — 77030009426 HC FCPS BIOP ENDOSC BSC -B: Performed by: SURGERY

## 2017-08-28 PROCEDURE — 88305 TISSUE EXAM BY PATHOLOGIST: CPT | Performed by: SURGERY

## 2017-08-28 PROCEDURE — 74011250636 HC RX REV CODE- 250/636

## 2017-08-28 PROCEDURE — 74011250636 HC RX REV CODE- 250/636: Performed by: ANESTHESIOLOGY

## 2017-08-28 PROCEDURE — 74011250637 HC RX REV CODE- 250/637: Performed by: ANESTHESIOLOGY

## 2017-08-28 RX ORDER — GUAIFENESIN 100 MG/5ML
81 LIQUID (ML) ORAL DAILY
Status: DISCONTINUED | OUTPATIENT
Start: 2017-08-28 | End: 2017-08-28 | Stop reason: HOSPADM

## 2017-08-28 RX ORDER — PROPOFOL 10 MG/ML
INJECTION, EMULSION INTRAVENOUS AS NEEDED
Status: DISCONTINUED | OUTPATIENT
Start: 2017-08-28 | End: 2017-08-28 | Stop reason: HOSPADM

## 2017-08-28 RX ORDER — SODIUM CHLORIDE, SODIUM LACTATE, POTASSIUM CHLORIDE, CALCIUM CHLORIDE 600; 310; 30; 20 MG/100ML; MG/100ML; MG/100ML; MG/100ML
100 INJECTION, SOLUTION INTRAVENOUS CONTINUOUS
Status: DISCONTINUED | OUTPATIENT
Start: 2017-08-28 | End: 2017-08-28 | Stop reason: HOSPADM

## 2017-08-28 RX ADMIN — PROPOFOL 50 MG: 10 INJECTION, EMULSION INTRAVENOUS at 11:08

## 2017-08-28 RX ADMIN — ASPIRIN 81 MG CHEWABLE TABLET 81 MG: 81 TABLET CHEWABLE at 09:45

## 2017-08-28 RX ADMIN — PROPOFOL 20 MG: 10 INJECTION, EMULSION INTRAVENOUS at 11:19

## 2017-08-28 RX ADMIN — SODIUM CHLORIDE, SODIUM LACTATE, POTASSIUM CHLORIDE, AND CALCIUM CHLORIDE 100 ML/HR: 600; 310; 30; 20 INJECTION, SOLUTION INTRAVENOUS at 10:12

## 2017-08-28 RX ADMIN — PROPOFOL 20 MG: 10 INJECTION, EMULSION INTRAVENOUS at 11:15

## 2017-08-28 RX ADMIN — PROPOFOL 50 MG: 10 INJECTION, EMULSION INTRAVENOUS at 11:06

## 2017-08-28 RX ADMIN — PROPOFOL 50 MG: 10 INJECTION, EMULSION INTRAVENOUS at 11:13

## 2017-08-28 RX ADMIN — PROPOFOL 50 MG: 10 INJECTION, EMULSION INTRAVENOUS at 11:10

## 2017-08-28 RX ADMIN — PROPOFOL 30 MG: 10 INJECTION, EMULSION INTRAVENOUS at 11:17

## 2017-08-28 NOTE — H&P
[address] [facility]    8/28/2017    Date of Admission:  8/28/2017      Subjective:     Patient is a 77 y.o.  female presents for screening colonoscopy. The patient reports no problems. She has had two previous colonoscopies where two polyps were removed each time. She reports no changes since her last colonoscopy 7 years ago. She denies rectal bleeding. Denies changes in bowel habits. Denies unusual wt loss or appetite changes. Denies family history of colon cancer. Patient Active Problem List    Diagnosis Date Noted    Coronary artery disease involving native coronary artery of native heart without angina pectoris 03/03/2017    Essential hypertension 11/08/2016    Precordial pain 10/25/2016    CAD (coronary artery disease)     Palpitations 02/05/2016    Lumbago 05/11/2015    Chronic depression 10/15/2014    Fibromyalgia 10/15/2014    Dyslipidemia 10/15/2014    HIPOLITO (generalized anxiety disorder) 10/15/2014    Chronic lumbar pain 10/15/2014    Polyarthralgia 10/15/2014    GERD (gastroesophageal reflux disease) 10/15/2014     Past Medical History:   Diagnosis Date    Affective psychosis ([facility] [address] 07/2006    Arthritis     OA    CAD (coronary artery disease)     no angina cp or mosquera. no orthopnea or pnd    Chest pain 07/2006     no cp but feels\" anxious\" for 10 seconds at a time- no further cp. No MOSQUERA. Followed by [facility]-Dr. Warren Jackman.  Cholelithiasis 09/2006    Chronic depression 10/15/2014    Chronic lumbar pain 10/15/2014    Claudication ([facility] [address] 06/2013    Coronary atherosclerosis of native coronary vessel 09/2006    Per Cardiology note (2/8/17) \"Stable. Continue current medical therapy. \"     Dyslipidemia 10/15/2014    managed with medication     Fibromyalgia 10/15/2014    HIPOLITO (generalized anxiety disorder) 10/15/2014    managed with medication     GERD (gastroesophageal reflux disease) 10/15/2014    managed with medication     History of complete eye exam 04/01/2016    History of dental examination 06/01/2016    History of placement of stent in LAD coronary artery 08/29/2006    Two heart stents. Patient takes daily 81 mg ASA.  Hypertension     managed with medication     Low magnesium level     Patient's daily Magnesium supplement decreased to once a week by PCP due to Magnesium of 2.4 on 5/18/17    Malaise and fatigue 05/2008    Palpitations 02/05/2016    Stable     Polyarthralgia 10/15/2014    Shingles 2006    10-12x    Tobacco use disorder 112/2006      Past Surgical History:   Procedure Laterality Date    BREAST SURGERY PROCEDURE UNLISTED      implants    CARDIAC SURG PROCEDURE UNLIST  08/29/2006    2 cardiac stents    HX CHOLECYSTECTOMY  2005    HX TONSILLECTOMY      as child    IMPLANT BREAST SILICONE/EQ Bilateral     35 yrs silicone      Prior to Admission Medications   Prescriptions Last Dose Informant Patient Reported? Taking? amLODIPine (NORVASC) 5 mg tablet 8/26/2017  No No   Sig: Take 1 Tab by mouth daily. Patient taking differently: Take 5 mg by mouth daily. Take / use AM day of surgery  per anesthesia protocols. Indications: hypertension   ascorbic acid, vitamin C, (VITAMIN C) 500 mg tablet   Yes No   Sig: Take 500 mg by mouth daily. aspirin delayed-release 81 mg tablet 8/21/2017 at Unknown time  Yes Yes   Sig: Take  by mouth daily. Take DOS per anesthesia protocol. busPIRone (BUSPAR) 10 mg tablet 8/26/2017  Yes No   Sig: Take 10 mg by mouth as needed. Take DOS per anesthesia protocol. chlorzoxazone (PARAFON FORTE) 500 mg tablet 8/26/2017  Yes No   Sig: Take 500 mg by mouth nightly. Indications: MUSCLE SPASM   cholecalciferol (VITAMIN D3) 1,000 unit cap 8/28/2017  Yes No   Sig: Take 5,000 Units by mouth every other day. cyanocobalamin (VITAMIN B-12) 1,000 mcg tablet 8/21/2017 at Unknown time  Yes Yes   Sig: Take 1,000 mcg by mouth daily. magnesium 250 mg tab 8/21/2017 at Unknown time  No Yes   Sig: Take 1 Tab by mouth daily.    Patient taking differently: Take 250 mg by mouth every other day. meloxicam (MOBIC) 7.5 mg tablet 8/21/2017 at Unknown time  Yes Yes   Sig: Take 7.5 mg by mouth two (2) times a day. omeprazole (PRILOSEC) 20 mg capsule 8/26/2017  No No   Sig: TAKE 1 CAPSULE TWICE DAILY  Indications: gastroesophageal reflux disease   Patient taking differently: Take 20 mg by mouth daily. TAKE 1 CAPSULE TWICE DAILY; Take / use AM day of surgery  per anesthesia protocols. Indications: gastroesophageal reflux disease   predniSONE (DELTASONE) 20 mg tablet 8/26/2017  Yes No   Sig: Take 20 mg by mouth daily (with breakfast). Take 1 tablet daily; Take DOS per anesthesia protocol. rosuvastatin (CRESTOR) 5 mg tablet 8/26/2017  No No   Sig: Take 1 Tab by mouth daily. Patient taking differently: Take 5 mg by mouth nightly. Indications: hyperlipidemia   tramadol (ULTRAM) 50 mg tablet 8/26/2017  Yes No   Sig: Take 50 mg by mouth every six (6) hours as needed for Pain. Take DOS if needed per anesthesia protocol.   Indications: FIBROMYALGIA      Facility-Administered Medications: None     Allergies   Allergen Reactions    Antibiotic [Lzyca-Boptf-Ihuppxm-Pramoxine] Unknown (comments)     Patient unsure     Biaxin [Clarithromycin] Unknown (comments)     Patient unsure     Ceclor [Cefaclor] Unknown (comments)     Patient unsure     Cephalexin Nausea and Vomiting      Social History   Substance Use Topics    Smoking status: Former Smoker     Packs/day: 0.50     Years: 25.00     Quit date: 9/21/2004    Smokeless tobacco: Never Used      Comment: SMOKED FOR 20 TO 30 YEARS; STOPPED 10 YEARS AGO (TAKEN 10/27/2015)    Alcohol use No      Social History     Social History Narrative     Family History   Problem Relation Age of Onset    Hypertension Other     High Cholesterol Other     Diabetes Other     Glaucoma Other     Heart Attack Other     Stroke Other     Hypertension Mother     High Cholesterol Mother     Heart Attack Mother    [facility] Hypertension Sister     Depression Sister     High Cholesterol Sister     Hypertension Brother     High Cholesterol Brother     Heart Attack Brother     No Known Problems Maternal Aunt     Breast Cancer Neg Hx         Current Facility-Administered Medications   Medication Dose Route Frequency    lactated Ringers infusion  100 mL/hr IntraVENous CONTINUOUS    aspirin chewable tablet 81 mg  81 mg Oral DAILY       Review of Systems  A comprehensive review of systems was negative except for that written in the HPI. Objective:     Vitals:    08/28/17 0909   BP: 138/90   Pulse: 88   Temp: 98.2 °F (36.8 °C)   SpO2: 96%     PHYSICAL EXAM   Physical Examination: General appearance - alert, well appearing, and in no distress  Mental status - alert, oriented to person, place, and time  Eyes - pupils equal and reactive, extraocular eye movements intact  Nose - normal and patent, no erythema, discharge or polyps  Neck - supple, no significant adenopathy  Chest - clear to auscultation, no wheezes, rales or rhonchi, symmetric air entry  Heart - normal rate, regular rhythm, normal S1, S2, no murmurs, rubs, clicks or gallops  Abdomen - soft, nontender, nondistended, no masses or organomegaly  Neurological - alert, oriented, normal speech, no focal findings or movement disorder noted  Musculoskeletal - no joint tenderness, deformity or swelling  Extremities - peripheral pulses normal, no pedal edema, no clubbing or cyanosis  Skin - normal coloration and turgor, no rashes, no suspicious skin lesions noted      No results for input(s): WBC, HGB, HCT, PLT, HGBEXT, HCTEXT, PLTEXT in the last 72 hours. No results for input(s): NA, K, CL, GLU, CO2, BUN, CREA, MG, PHOS, TROIQ, INR, BNPP in the last 72 hours. No lab exists for component: TROIP, INREXT  No results for input(s): PH, PCO2, PO2, HCO3 in the last 72 hours.     Assessment:     Hospital Problems  Date Reviewed: 8/15/2017    None        Plan:   Screening colonoscopy.       Vimal Nielson, DO

## 2017-08-28 NOTE — IP AVS SNAPSHOT
[address] [facility] 
 
 
 [address] 
[phone] Patient: Laura Boles MRN: [id number] NBU:76/2/[id number] You are allergic to the following Allergen Reactions Antibiotic (Btwdz-Uyaak-Brnyyit-Pramoxine) Unknown (comments) Patient unsure Biaxin (Clarithromycin) Unknown (comments) Patient unsure Ceclor (Cefaclor) Unknown (comments) Patient unsure Cephalexin Nausea and Vomiting Recent Documentation OB Status Smoking Status Postmenopausal Former Smoker Emergency Contacts Name Discharge Info Relation Home Work Mobile Jasbir Rivera (Niece) DISCHARGE CAREGIVER [3] Other Relative [6] [phone] About your hospitalization You were admitted on:  August 28, 2017 You last received care in the:  [facility] [place] 1 PREOP You were discharged on:  August 28, 2017 Unit phone number:  [phone] Why you were hospitalized Your primary diagnosis was:  Not on File Providers Seen During Your Hospitalizations Provider Role Specialty Primary office phone Sheba Lindsey DO Attending Provider General Surgery [phone] Your Primary Care Physician (PCP) Primary Care Physician Office Phone Office Fax Diazmilly Castro [phone] Follow-up Information Follow up With Details Comments Contact Info Truman Nielson DO   [facility] [address] [id number] [phone] Your Appointments Monday August 28, 2017 COLONOSCOPY with Truman Nielson DO  
[facility] ENDOSCOPY ([facility] [address] [facility]) [address]  
[phone] Current Discharge Medication List  
  
ASK your doctor about these medications Dose & Instructions Dispensing Information Comments Morning Noon Evening Bedtime amLODIPine 5 mg tablet Commonly known as:  Carl Amaya Your last dose was: Your next dose is:    
   
   
 Dose:  5 mg Take 1 Tab by mouth daily. Quantity:  90 Tab Refills:  3  
     
   
   
   
  
 aspirin delayed-release 81 mg tablet Your last dose was: Your next dose is: Take  by mouth daily. Take DOS per anesthesia protocol. Refills:  0  
     
   
   
   
  
 busPIRone 10 mg tablet Commonly known as:  BUSPAR Your last dose was: Your next dose is:    
   
   
 Dose:  10 mg Take 10 mg by mouth as needed. Take DOS per anesthesia protocol. Refills:  0  
     
   
   
   
  
 chlorzoxazone 500 mg tablet Commonly known as:  Vanessa Chappell Your last dose was: Your next dose is:    
   
   
 Dose:  500 mg Take 500 mg by mouth nightly. Indications: MUSCLE SPASM Refills:  0  
     
   
   
   
  
 magnesium 250 mg Tab Your last dose was: Your next dose is:    
   
   
 Dose:  250 mg Take 1 Tab by mouth daily. Quantity:  30 Tab Refills:  0  
     
   
   
   
  
 meloxicam 7.5 mg tablet Commonly known as:  MOBIC Your last dose was: Your next dose is:    
   
   
 Dose:  7.5 mg Take 7.5 mg by mouth two (2) times a day. Refills:  0  
     
   
   
   
  
 omeprazole 20 mg capsule Commonly known as:  PRILOSEC Your last dose was: Your next dose is: TAKE 1 CAPSULE TWICE DAILY  Indications: gastroesophageal reflux disease Quantity:  180 Cap Refills:  3  
     
   
   
   
  
 predniSONE 20 mg tablet Commonly known as:  Lisa Smoker Your last dose was: Your next dose is:    
   
   
 Dose:  20 mg Take 20 mg by mouth daily (with breakfast). Take 1 tablet daily; Take DOS per anesthesia protocol. Refills:  0  
     
   
   
   
  
 rosuvastatin 5 mg tablet Commonly known as:  CRESTOR Your last dose was: Your next dose is: Dose:  5 mg Take 1 Tab by mouth daily. Quantity:  90 Tab Refills:  3  
     
   
   
   
  
 traMADol 50 mg tablet Commonly known as:  ULTRAM  
   
Your last dose was: Your next dose is:    
   
   
 Dose:  50 mg Take 50 mg by mouth every six (6) hours as needed for Pain. Take DOS if needed per anesthesia protocol. Indications: FIBROMYALGIA Refills:  0  
     
   
   
   
  
 VITAMIN B-12 1,000 mcg tablet Generic drug:  cyanocobalamin Your last dose was: Your next dose is:    
   
   
 Dose:  1000 mcg Take 1,000 mcg by mouth daily. Refills:  0  
     
   
   
   
  
 VITAMIN C 500 mg tablet Generic drug:  ascorbic acid (vitamin C) Your last dose was: Your next dose is:    
   
   
 Dose:  500 mg Take 500 mg by mouth daily. Refills:  0  
     
   
   
   
  
 VITAMIN D3 1,000 unit Cap Generic drug:  cholecalciferol Your last dose was: Your next dose is:    
   
   
 Dose:  5000 Units Take 5,000 Units by mouth every other day. Refills:  0 Discharge Instructions Gastrointestinal Colonoscopy/Flexible Sigmoidoscopy Lower Exam Discharge Instructions 1. Call your doctor for any problems or questions. 2. Contact the doctors office for follow up appointment as directed 3. Medication may cause drowsiness for several hours, therefore, do not drive or operate machinery for remainder of the day. 4. No alcohol today. 5. Ordinarily, you may resume regular diet and activity after exam unless otherwise specified by your physician. 6. Because of air put into your colon during exam, you may experience some abdominal distension, relieved by the passage of gas, for several hours. 7. Contact your physician if you have any of the following: 
a. Excessive amount of bleeding  large amount when having a bowel movement.   Occasional specks of blood with bowel movement would not be unusual. 
 b. Severe abdominal pain 
c. Fever or Chills 8. Polyp Removal  follow these additional instructions 
a. Clear liquid diet for the next meal (jell-o, broth, clear drinks) b. Soft diet for 24 hours, then resume regular diet  
c. Take Metamucil  1 tablespoon in juice every morning for 3 days 
d. No Aspirin, Advil, Aleve, Nuprin, Ibuprofen, or medications that contain these drugs for 2 weeks. Discharge Orders None Introducing [facility]! [employer] introduces [employer] patient portal. Now you can access parts of your medical record, email your doctor's office, and request medication refills online. 1. In your internet browser, go to [email or web address] 2. Click on the First Time User? Click Here link in the Sign In box. You will see the New Member Sign Up page. 3. Enter your [employer] Access Code exactly as it appears below. You will not need to use this code after youve completed the sign-up process. If you do not sign up before the expiration date, you must request a new code. · [employer] Access Code: [email or web address] Expires: 11/21/2017 12:00 PM 
 
4. Enter the last four digits of your Social Security Number (xxxx) and Date of Birth (mm/dd/yyyy) as indicated and click Submit. You will be taken to the next sign-up page. 5. Create a [employer] ID. This will be your [employer] login ID and cannot be changed, so think of one that is secure and easy to remember. 6. Create a [employer] password. You can change your password at any time. 7. Enter your Password Reset Question and Answer. This can be used at a later time if you forget your password. 8. Enter your e-mail address. You will receive e-mail notification when new information is available in [address] 9. Click Sign Up. You can now view and download portions of your medical record. 10. Click the Download Summary menu link to download a portable copy of your medical information. If you have questions, please visit the Frequently Asked Questions section of the MyChart website. Remember, MyChart is NOT to be used for urgent needs. For medical emergencies, dial 911. Now available from your iPhone and Android! General Information Please provide this summary of care documentation to your next provider. Patient Signature:  ____________________________________________________________ Date:  ____________________________________________________________  
  
Hudson Mince Provider Signature:  ____________________________________________________________ Date:  ____________________________________________________________

## 2017-08-28 NOTE — ANESTHESIA POSTPROCEDURE EVALUATION
Post-Anesthesia Evaluation and Assessment    Patient: Cole Maria MRN: [id number]  SSN: [id number]    YOB: 1950  Age: 77 y.o. Sex: female       Cardiovascular Function/Vital Signs  Visit Vitals    /73    Pulse 88    Temp 36.8 °C (98.2 °F)    Resp 16    SpO2 97%       Patient is status post total IV anesthesia anesthesia for Procedure(s):  COLONOSCOPY/ BMI=26  ENDOSCOPIC POLYPECTOMY. Nausea/Vomiting: None    Postoperative hydration reviewed and adequate. Pain:  Pain Scale 1: Numeric (0 - 10) (08/28/17 1150)  Pain Intensity 1: 2 (08/28/17 1150)   Managed    Neurological Status: At baseline    Mental Status and Level of Consciousness: Arousable    Pulmonary Status:   O2 Device: Room air (08/28/17 1150)   Adequate oxygenation and airway patent    Complications related to anesthesia: None    Post-anesthesia assessment completed.  No concerns    Signed By: Hector Self MD     August 28, 2017

## 2017-08-28 NOTE — PROCEDURES
Procedure in Detail:  Informed consent was obtained for the procedure. The patient was placed in the left lateral decubitus position and sedation was induced by anesthesia. The [id number] was inserted into the rectum and advanced under direct vision to the cecum, which was identified by the ileocecal valve and appendiceal orifice. The quality of the colonic preparation was excellent. A careful inspection was made as the colonoscope was withdrawn, including a retroflexed view of the rectum; findings and interventions are described below. Appropriate photodocumentation was obtained. Findings:   ANUS: Anal exam reveals no masses or hemorrhoids, sphincter tone is normal.   RECTUM: Rectal exam reveals no masses or hemorrhoids. SIGMOID COLON: The mucosa is normal with good vascular pattern and without ulcers, diverticula, and polyps. DESCENDING COLON: The mucosa is normal with good vascular pattern and without ulcers, diverticula, and polyps. SPLENIC FLEXURE: The splenic flexure is normal.   TRANSVERSE COLON: The mucosa is normal with good vascular pattern and without ulcers, diverticula, and polyps. HEPATIC FLEXURE: The hepatic flexure is normal.   ASCENDING COLON: The mucosa is normal with good vascular pattern and without ulcers, diverticula, and polyps. CECUM: The appendiceal orifice appears normal. The ileocecal valve appears normal.   TERMINAL ILEUM: The terminal ileum was not entered. Ascending Colon:   - Protruding lesions:     -Sessile Polyp(s) size 3 mm removed by polypectomy (cold biopsy)      Specimens: Specimens were collected and sent to pathology. Complications: None; patient tolerated the procedure well. \    EBL - none    Recommendations:   - Await pathology.      Signed By: Agueda Graff DO                        August 28, 2017

## 2017-08-28 NOTE — DISCHARGE INSTRUCTIONS
Gastrointestinal Colonoscopy/Flexible Sigmoidoscopy  Lower Exam Discharge Instructions    1. Call your doctor for any problems or questions. 2. Contact the doctors office for follow up appointment as directed  3. Medication may cause drowsiness for several hours, therefore, do not drive or operate machinery for remainder of the day. 4. No alcohol today. 5. Ordinarily, you may resume regular diet and activity after exam unless otherwise specified by your physician. 6. Because of air put into your colon during exam, you may experience some abdominal distension, relieved by the passage of gas, for several hours. 7. Contact your physician if you have any of the following:  a. Excessive amount of bleeding - large amount when having a bowel movement. Occasional specks of blood with bowel movement would not be unusual.  b. Severe abdominal pain  c. Fever or Chills  8. Polyp Removal - follow these additional instructions  a. Clear liquid diet for the next meal (jell-o, broth, clear drinks)  b. Soft diet for 24 hours, then resume regular diet   c. Take Metamucil - 1 tablespoon in juice every morning for 3 days  d. No Aspirin, Advil, Aleve, Nuprin, Ibuprofen, or medications that contain these drugs for 2 weeks.

## 2017-08-28 NOTE — PROGRESS NOTES
's pre-procedural visit requested by patient. Conveyed care and concern for patient. Offered prayer as requested.      Radha Catherine MDiv, BS  Board Certified

## 2017-09-13 ENCOUNTER — APPOINTMENT (RX ONLY)
Dept: URBAN - METROPOLITAN AREA CLINIC 349 | Facility: CLINIC | Age: 67
Setting detail: DERMATOLOGY
End: 2017-09-13

## 2017-09-13 DIAGNOSIS — L82.1 OTHER SEBORRHEIC KERATOSIS: ICD-10-CM

## 2017-09-13 DIAGNOSIS — D22 MELANOCYTIC NEVI: ICD-10-CM | Status: STABLE

## 2017-09-13 DIAGNOSIS — Z85.828 PERSONAL HISTORY OF OTHER MALIGNANT NEOPLASM OF SKIN: ICD-10-CM

## 2017-09-13 DIAGNOSIS — Z85.820 PERSONAL HISTORY OF MALIGNANT MELANOMA OF SKIN: ICD-10-CM

## 2017-09-13 PROBLEM — D22.62 MELANOCYTIC NEVI OF LEFT UPPER LIMB, INCLUDING SHOULDER: Status: ACTIVE | Noted: 2017-09-13

## 2017-09-13 PROBLEM — D22.72 MELANOCYTIC NEVI OF LEFT LOWER LIMB, INCLUDING HIP: Status: ACTIVE | Noted: 2017-09-13

## 2017-09-13 PROBLEM — D22.71 MELANOCYTIC NEVI OF RIGHT LOWER LIMB, INCLUDING HIP: Status: ACTIVE | Noted: 2017-09-13

## 2017-09-13 PROBLEM — D22.61 MELANOCYTIC NEVI OF RIGHT UPPER LIMB, INCLUDING SHOULDER: Status: ACTIVE | Noted: 2017-09-13

## 2017-09-13 PROBLEM — D22.5 MELANOCYTIC NEVI OF TRUNK: Status: ACTIVE | Noted: 2017-09-13

## 2017-09-13 PROBLEM — D04.62 CARCINOMA IN SITU OF SKIN OF LEFT UPPER LIMB, INCLUDING SHOULDER: Status: ACTIVE | Noted: 2017-09-13

## 2017-09-13 PROCEDURE — ? BIOPSY BY SHAVE METHOD

## 2017-09-13 PROCEDURE — A4550 SURGICAL TRAYS: HCPCS

## 2017-09-13 PROCEDURE — ? COUNSELING

## 2017-09-13 PROCEDURE — ? MEDICAL PHOTOGRAPHY REVIEW

## 2017-09-13 PROCEDURE — 88305 TISSUE EXAM BY PATHOLOGIST: CPT

## 2017-09-13 PROCEDURE — 11100: CPT

## 2017-09-13 PROCEDURE — 99214 OFFICE O/P EST MOD 30 MIN: CPT | Mod: 25

## 2017-09-13 PROCEDURE — ? PATHOLOGY BILLING

## 2017-09-13 PROCEDURE — ? OTHER

## 2017-09-13 ASSESSMENT — LOCATION DETAILED DESCRIPTION DERM
LOCATION DETAILED: RIGHT INFERIOR MEDIAL MIDBACK
LOCATION DETAILED: RIGHT MEDIAL UPPER BACK
LOCATION DETAILED: SUPERIOR LUMBAR SPINE
LOCATION DETAILED: NASAL DORSUM
LOCATION DETAILED: LEFT PROXIMAL DORSAL FOREARM
LOCATION DETAILED: LEFT PROXIMAL POSTERIOR THIGH
LOCATION DETAILED: RIGHT PROXIMAL POSTERIOR THIGH
LOCATION DETAILED: RIGHT PROXIMAL DORSAL FOREARM
LOCATION DETAILED: RIGHT INFERIOR MEDIAL UPPER BACK
LOCATION DETAILED: RIGHT DISTAL CALF

## 2017-09-13 ASSESSMENT — LOCATION SIMPLE DESCRIPTION DERM
LOCATION SIMPLE: RIGHT POSTERIOR THIGH
LOCATION SIMPLE: LOWER BACK
LOCATION SIMPLE: LEFT POSTERIOR THIGH
LOCATION SIMPLE: RIGHT LOWER BACK
LOCATION SIMPLE: RIGHT UPPER BACK
LOCATION SIMPLE: NOSE
LOCATION SIMPLE: LEFT FOREARM
LOCATION SIMPLE: RIGHT CALF
LOCATION SIMPLE: RIGHT FOREARM

## 2017-09-13 ASSESSMENT — LOCATION ZONE DERM
LOCATION ZONE: NOSE
LOCATION ZONE: ARM
LOCATION ZONE: LEG
LOCATION ZONE: TRUNK

## 2017-09-13 NOTE — HPI: MELANOMA F/U (HISTORY OF MALIGNANT MELANOMA)
What Is The Reason For Today's Visit?: History of Melanoma
Year Excised?: 30 years ago per patient
Additional History: History provided by the patient. Patient stated this was treated by Dr. Chadwick.

## 2017-09-13 NOTE — PROCEDURE: OTHER
Other (Free Text): History provided by the patient. Will request records today from Dr. Chadwick.
Detail Level: Detailed
Note Text (......Xxx Chief Complaint.): This diagnosis correlates with the

## 2017-09-13 NOTE — PROCEDURE: BIOPSY BY SHAVE METHOD
Bill 65251 For Specimen Handling/Conveyance To Laboratory?: no
Notification Instructions: Patient will be notified of biopsy results. However, patient instructed to call the office if not contacted within 2 weeks. After the procedure, the patient was oriented to person, place, and time. Patient denied feeling dizzy, queasy, and and declined further observation after initial 5 minute observation time.
Bill For Surgical Tray: yes
Billing Type: Third-Party Bill
Type Of Destruction Used: Curettage
Anesthesia Type: 1% lidocaine with epinephrine
Electrodesiccation Text: The wound bed was treated with electrodesiccation after the biopsy was performed.
Hemostasis: Aluminum Chloride
Curettage Text: The wound bed was treated with curettage after the biopsy was performed.
Size Of Lesion In Cm: 0
Detail Level: Detailed
Biopsy Method: Dermablade
Dressing: bandage
Cryotherapy Text: The wound bed was treated with cryotherapy after the biopsy was performed.
Consent: Written consent was obtained and risks were reviewed including but not limited to scarring, infection, bleeding, scabbing, incomplete removal, nerve damage and allergy to anesthesia.
Biopsy Type: H and E
Electrodesiccation And Curettage Text: The wound bed was treated with electrodesiccation and curettage after the biopsy was performed.
Post-Care Instructions: I reviewed with the patient in detail post-care instructions. Patient is to keep the biopsy site dry overnight, and then apply Vaseline  daily until healed. Patient may apply hydrogen peroxide soaks to remove any crusting. After the procedure, the patient was oriented to person, place, and time. Patient denied feeling dizzy, queasy, and and declined further observation after initial 5 minute observation time.
Accession #: [id number]
Silver Nitrate Text: The wound bed was treated with silver nitrate after the biopsy was performed.
Wound Care: Vaseline
Anesthesia Volume In Cc (Will Not Render If 0): 0.5

## 2017-09-13 NOTE — PROCEDURE: MEDICAL PHOTOGRAPHY REVIEW
Detail Level: Generalized
Number Of Photographs Reviewed: 1
Review Findings: no new or changing lesions

## 2017-10-18 ENCOUNTER — APPOINTMENT (RX ONLY)
Dept: URBAN - METROPOLITAN AREA CLINIC 349 | Facility: CLINIC | Age: 67
Setting detail: DERMATOLOGY
End: 2017-10-18

## 2017-10-18 DIAGNOSIS — L57.0 ACTINIC KERATOSIS: ICD-10-CM

## 2017-10-18 DIAGNOSIS — L70.0 ACNE VULGARIS: ICD-10-CM

## 2017-10-18 DIAGNOSIS — Z41.9 ENCOUNTER FOR PROCEDURE FOR PURPOSES OTHER THAN REMEDYING HEALTH STATE, UNSPECIFIED: ICD-10-CM

## 2017-10-18 DIAGNOSIS — L82.0 INFLAMED SEBORRHEIC KERATOSIS: ICD-10-CM

## 2017-10-18 PROBLEM — D04.62 CARCINOMA IN SITU OF SKIN OF LEFT UPPER LIMB, INCLUDING SHOULDER: Status: ACTIVE | Noted: 2017-10-18

## 2017-10-18 PROCEDURE — ? LIQUID NITROGEN

## 2017-10-18 PROCEDURE — 17110 DESTRUCTION B9 LES UP TO 14: CPT

## 2017-10-18 PROCEDURE — 99213 OFFICE O/P EST LOW 20 MIN: CPT | Mod: 25

## 2017-10-18 PROCEDURE — 17263 DSTRJ MAL LES T/A/L 2.1-3.0: CPT | Mod: 59

## 2017-10-18 PROCEDURE — ? CURETTAGE AND DESTRUCTION

## 2017-10-18 PROCEDURE — ? PRESCRIPTION

## 2017-10-18 PROCEDURE — A4550 SURGICAL TRAYS: HCPCS

## 2017-10-18 PROCEDURE — 17000 DESTRUCT PREMALG LESION: CPT | Mod: 59

## 2017-10-18 PROCEDURE — ? COUNSELING

## 2017-10-18 PROCEDURE — 17003 DESTRUCT PREMALG LES 2-14: CPT

## 2017-10-18 RX ORDER — CLINDAMYCIN PHOSPHATE 1 %
GEL (GRAM) TOPICAL
Qty: 1 | Refills: 2 | Status: ERX | COMMUNITY
Start: 2017-10-18

## 2017-10-18 RX ORDER — EFLORNITHINE HYDROCHLORIDE 139 MG/G
CREAM TOPICAL
Qty: 1 | Refills: 2 | Status: ERX | COMMUNITY
Start: 2017-10-18

## 2017-10-18 RX ADMIN — EFLORNITHINE HYDROCHLORIDE: 139 CREAM TOPICAL at 12:26

## 2017-10-18 RX ADMIN — Medication: at 12:30

## 2017-10-18 ASSESSMENT — LOCATION DETAILED DESCRIPTION DERM
LOCATION DETAILED: LEFT MEDIAL FOREHEAD
LOCATION DETAILED: LEFT INFERIOR CENTRAL MALAR CHEEK
LOCATION DETAILED: LEFT PROXIMAL DORSAL FOREARM
LOCATION DETAILED: RIGHT DISTAL CALF
LOCATION DETAILED: RIGHT INFERIOR CENTRAL MALAR CHEEK
LOCATION DETAILED: LEFT DISTAL DORSAL FOREARM

## 2017-10-18 ASSESSMENT — PAIN INTENSITY VAS: HOW INTENSE IS YOUR PAIN 0 BEING NO PAIN, 10 BEING THE MOST SEVERE PAIN POSSIBLE?: NO PAIN

## 2017-10-18 ASSESSMENT — LOCATION ZONE DERM
LOCATION ZONE: LEG
LOCATION ZONE: FACE
LOCATION ZONE: ARM

## 2017-10-18 ASSESSMENT — LOCATION SIMPLE DESCRIPTION DERM
LOCATION SIMPLE: LEFT FOREHEAD
LOCATION SIMPLE: LEFT FOREARM
LOCATION SIMPLE: LEFT CHEEK
LOCATION SIMPLE: RIGHT CHEEK
LOCATION SIMPLE: RIGHT CALF

## 2017-10-18 ASSESSMENT — SEVERITY ASSESSMENT OVERALL AMONG ALL PATIENTS
IN YOUR EXPERIENCE, AMONG ALL PATIENTS YOU HAVE SEEN WITH THIS CONDITION, HOW SEVERE IS THIS PATIENT'S CONDITION?: FEW INFLAMMATORY LESIONS, SOME NONINFLAMMATORY

## 2017-10-18 NOTE — PROCEDURE: LIQUID NITROGEN
Medical Necessity Clause: This procedure was medically necessary because the lesions that were treated were:
Post-Care Instructions: I reviewed with the patient in detail post-care instructions. Patient is to wear sunprotection, and avoid picking at any of the treated lesions. Pt may apply Vaseline to crusted or scabbing areas.
Render Post-Care Instructions In Note?: yes
Duration Of Freeze Thaw-Cycle (Seconds): 3
Render Post-Care Instructions In Note?: no
Medical Necessity Information: It is in your best interest to select a reason for this procedure from the list below. All of these items fulfill various CMS LCD requirements except the new and changing color options.
Detail Level: Detailed
Number Of Freeze-Thaw Cycles: 2 freeze-thaw cycles
Consent: The patient's consent was obtained including but not limited to risks of crusting, scabbing, blistering, scarring, darker or lighter pigmentary change, recurrence, incomplete removal and infection.

## 2017-10-18 NOTE — PROCEDURE: CURETTAGE AND DESTRUCTION
Post-Care Instructions: I reviewed with the patient in detail post-care instructions. Patient is to keep the area dry for 48 hours, and not to engage in any swimming until the area is healed. Should the patient develop any fevers, chills, bleeding, severe pain patient will contact the office immediately.
Anesthesia Type: 1% lidocaine with epinephrine
What Was Performed First?: Curettage
Cautery Type: electrodesiccation
Size Of Lesion In Cm: 2.2
Bill For Surgical Tray: yes
Detail Level: Detailed
Number Of Curettages: 3
Consent was obtained from the patient. The risks, benefits and alternatives to therapy were discussed in detail. Specifically, the risks of infection, scarring, bleeding, prolonged wound healing, nerve injury, incomplete removal, allergy to anesthesia and recurrence were addressed. Alternatives to ED&C, such as: surgical removal and XRT were also discussed.  Prior to the procedure, the treatment site was clearly identified and confirmed by the patient. All components of Universal Protocol/PAUSE Rule completed.
Bill As A Line Item Or As Units: Line Item
Additional Information: (Optional): The wound was cleaned, and a pressure dressing was applied.  The patient received detailed post-op instructions.

## 2018-01-22 ENCOUNTER — HOSPITAL ENCOUNTER (OUTPATIENT)
Dept: CT IMAGING | Age: 68
Discharge: HOME | End: 2018-01-22
Attending: FAMILY MEDICINE
Payer: MEDICARE

## 2018-01-22 DIAGNOSIS — R10.13 EPIGASTRIC PAIN: ICD-10-CM

## 2018-01-22 PROCEDURE — 74011000258 HC RX REV CODE- 258: Performed by: FAMILY MEDICINE

## 2018-01-22 PROCEDURE — 74011636320 HC RX REV CODE- 636/320: Performed by: FAMILY MEDICINE

## 2018-01-22 PROCEDURE — 74177 CT ABD & PELVIS W/CONTRAST: CPT

## 2018-01-22 PROCEDURE — 82565 ASSAY OF CREATININE: CPT | Performed by: FAMILY MEDICINE

## 2018-01-22 RX ORDER — SODIUM CHLORIDE 0.9 % (FLUSH) 0.9 %
10 SYRINGE (ML) INJECTION
Status: COMPLETED | OUTPATIENT
Start: 2018-01-22 | End: 2018-01-22

## 2018-01-22 RX ADMIN — IOPAMIDOL 100 ML: 755 INJECTION, SOLUTION INTRAVENOUS at 10:49

## 2018-01-22 RX ADMIN — DIATRIZOATE MEGLUMINE AND DIATRIZOATE SODIUM 15 ML: 660; 100 LIQUID ORAL; RECTAL at 10:49

## 2018-01-22 RX ADMIN — SODIUM CHLORIDE 100 ML: 900 INJECTION, SOLUTION INTRAVENOUS at 10:49

## 2018-01-22 RX ADMIN — Medication 10 ML: at 10:49

## 2018-01-23 LAB — CREAT BLD-MCNC: 0.7 MG/DL

## 2018-01-31 PROBLEM — M31.6 TEMPORAL ARTERITIS (HCC): Status: ACTIVE | Noted: 2018-01-31

## 2018-03-13 RX ORDER — CLINDAMYCIN PHOSPHATE 1 %
GEL (GRAM) TOPICAL
Qty: 1 | Refills: 2 | Status: ERX

## 2018-03-27 ENCOUNTER — HOSPITAL ENCOUNTER (OUTPATIENT)
Dept: GENERAL RADIOLOGY | Age: 68
Discharge: HOME | End: 2018-03-27
Payer: MEDICARE

## 2018-03-27 DIAGNOSIS — M25.561 PAIN, JOINT, KNEE, RIGHT: ICD-10-CM

## 2018-03-27 PROCEDURE — 73560 X-RAY EXAM OF KNEE 1 OR 2: CPT

## 2018-04-09 ENCOUNTER — HOSPITAL ENCOUNTER (OUTPATIENT)
Dept: MAMMOGRAPHY | Age: 68
Discharge: HOME | End: 2018-04-09
Attending: FAMILY MEDICINE
Payer: MEDICARE

## 2018-04-09 DIAGNOSIS — Z12.31 ENCOUNTER FOR SCREENING MAMMOGRAM FOR MALIGNANT NEOPLASM OF BREAST: ICD-10-CM

## 2018-04-09 PROCEDURE — 77067 SCR MAMMO BI INCL CAD: CPT

## 2018-04-17 ENCOUNTER — APPOINTMENT (RX ONLY)
Dept: URBAN - METROPOLITAN AREA CLINIC 349 | Facility: CLINIC | Age: 68
Setting detail: DERMATOLOGY
End: 2018-04-17

## 2018-04-17 DIAGNOSIS — D22 MELANOCYTIC NEVI: ICD-10-CM | Status: STABLE

## 2018-04-17 DIAGNOSIS — Z85.828 PERSONAL HISTORY OF OTHER MALIGNANT NEOPLASM OF SKIN: ICD-10-CM

## 2018-04-17 DIAGNOSIS — L57.0 ACTINIC KERATOSIS: ICD-10-CM

## 2018-04-17 DIAGNOSIS — L91.0 HYPERTROPHIC SCAR: ICD-10-CM

## 2018-04-17 DIAGNOSIS — F42.4 EXCORIATION (SKIN-PICKING) DISORDER: ICD-10-CM

## 2018-04-17 DIAGNOSIS — Z41.9 ENCOUNTER FOR PROCEDURE FOR PURPOSES OTHER THAN REMEDYING HEALTH STATE, UNSPECIFIED: ICD-10-CM

## 2018-04-17 DIAGNOSIS — Z85.820 PERSONAL HISTORY OF MALIGNANT MELANOMA OF SKIN: ICD-10-CM

## 2018-04-17 DIAGNOSIS — L70.0 ACNE VULGARIS: ICD-10-CM | Status: WELL CONTROLLED

## 2018-04-17 PROBLEM — D22.62 MELANOCYTIC NEVI OF LEFT UPPER LIMB, INCLUDING SHOULDER: Status: ACTIVE | Noted: 2018-04-17

## 2018-04-17 PROBLEM — D22.72 MELANOCYTIC NEVI OF LEFT LOWER LIMB, INCLUDING HIP: Status: ACTIVE | Noted: 2018-04-17

## 2018-04-17 PROBLEM — D22.71 MELANOCYTIC NEVI OF RIGHT LOWER LIMB, INCLUDING HIP: Status: ACTIVE | Noted: 2018-04-17

## 2018-04-17 PROBLEM — S80.922A UNSPECIFIED SUPERFICIAL INJURY OF LEFT LOWER LEG, INITIAL ENCOUNTER: Status: ACTIVE | Noted: 2018-04-17

## 2018-04-17 PROBLEM — D22.5 MELANOCYTIC NEVI OF TRUNK: Status: ACTIVE | Noted: 2018-04-17

## 2018-04-17 PROBLEM — D22.61 MELANOCYTIC NEVI OF RIGHT UPPER LIMB, INCLUDING SHOULDER: Status: ACTIVE | Noted: 2018-04-17

## 2018-04-17 PROCEDURE — ? MEDICAL PHOTOGRAPHY REVIEW

## 2018-04-17 PROCEDURE — 17000 DESTRUCT PREMALG LESION: CPT

## 2018-04-17 PROCEDURE — 11900 INJECT SKIN LESIONS </W 7: CPT | Mod: 59

## 2018-04-17 PROCEDURE — 17003 DESTRUCT PREMALG LES 2-14: CPT

## 2018-04-17 PROCEDURE — ? INTRALESIONAL KENALOG

## 2018-04-17 PROCEDURE — 99214 OFFICE O/P EST MOD 30 MIN: CPT | Mod: 25

## 2018-04-17 PROCEDURE — ? LIQUID NITROGEN

## 2018-04-17 PROCEDURE — ? PRESCRIPTION

## 2018-04-17 PROCEDURE — ? COUNSELING

## 2018-04-17 PROCEDURE — ? OTHER

## 2018-04-17 ASSESSMENT — LOCATION SIMPLE DESCRIPTION DERM
LOCATION SIMPLE: LEFT PRETIBIAL REGION
LOCATION SIMPLE: RIGHT POSTERIOR THIGH
LOCATION SIMPLE: RIGHT CHEEK
LOCATION SIMPLE: LEFT FOREARM
LOCATION SIMPLE: RIGHT UPPER BACK
LOCATION SIMPLE: NOSE
LOCATION SIMPLE: LEFT CHEEK
LOCATION SIMPLE: RIGHT FOREARM
LOCATION SIMPLE: LEFT POSTERIOR THIGH
LOCATION SIMPLE: RIGHT CLAVICULAR SKIN
LOCATION SIMPLE: LOWER BACK
LOCATION SIMPLE: RIGHT LOWER BACK
LOCATION SIMPLE: LEFT HAND
LOCATION SIMPLE: LEFT UPPER ARM

## 2018-04-17 ASSESSMENT — LOCATION DETAILED DESCRIPTION DERM
LOCATION DETAILED: RIGHT PROXIMAL DORSAL FOREARM
LOCATION DETAILED: RIGHT MEDIAL UPPER BACK
LOCATION DETAILED: LEFT INFERIOR CENTRAL MALAR CHEEK
LOCATION DETAILED: RIGHT CLAVICULAR SKIN
LOCATION DETAILED: LEFT RADIAL DORSAL HAND
LOCATION DETAILED: LEFT LATERAL PROXIMAL PRETIBIAL REGION
LOCATION DETAILED: RIGHT INFERIOR MEDIAL MIDBACK
LOCATION DETAILED: LEFT ANTERIOR DISTAL UPPER ARM
LOCATION DETAILED: LEFT PROXIMAL DORSAL FOREARM
LOCATION DETAILED: LEFT DORSAL MIDDLE FINGER METACARPOPHALANGEAL JOINT
LOCATION DETAILED: RIGHT INFERIOR CENTRAL MALAR CHEEK
LOCATION DETAILED: RIGHT PROXIMAL POSTERIOR THIGH
LOCATION DETAILED: SUPERIOR LUMBAR SPINE
LOCATION DETAILED: LEFT PROXIMAL POSTERIOR THIGH
LOCATION DETAILED: NASAL DORSUM

## 2018-04-17 ASSESSMENT — LOCATION ZONE DERM
LOCATION ZONE: FACE
LOCATION ZONE: ARM
LOCATION ZONE: LEG
LOCATION ZONE: NOSE
LOCATION ZONE: HAND
LOCATION ZONE: TRUNK

## 2018-04-17 NOTE — PROCEDURE: OTHER
Detail Level: Detailed
Other (Free Text): Patient is also using tretinoin
Note Text (......Xxx Chief Complaint.): This diagnosis correlates with the
Other (Free Text): History provided by the patient. Will request records today from Dr. Chadwick.
Other (Free Text): Patient did not get vaniqa due to cost

## 2018-04-17 NOTE — PROCEDURE: COUNSELING
Topical Clindamycin Counseling: Patient counseled that this medication may cause skin irritation or allergic reactions.  In the event of skin irritation, the patient was advised to reduce the amount of the drug applied or use it less frequently.   The patient verbalized understanding of the proper use and possible adverse effects of clindamycin.  All of the patient's questions and concerns were addressed.
Isotretinoin Counseling: Patient should get monthly blood tests, not donate blood, not drive at night if vision affected, not share medication, and not undergo elective surgery for 6 months after tx completed. Side effects reviewed, pt to contact office should one occur.
Minocycline Pregnancy And Lactation Text: This medication is Pregnancy Category D and not consider safe during pregnancy. It is also excreted in breast milk.
Tetracycline Counseling: Patient counseled regarding possible photosensitivity and increased risk for sunburn.  Patient instructed to avoid sunlight, if possible.  When exposed to sunlight, patients should wear protective clothing, sunglasses, and sunscreen.  The patient was instructed to call the office immediately if the following severe adverse effects occur:  hearing changes, easy bruising/bleeding, severe headache, or vision changes.  The patient verbalized understanding of the proper use and possible adverse effects of tetracycline.  All of the patient's questions and concerns were addressed. Patient understands to avoid pregnancy while on therapy due to potential birth defects.
Azithromycin Pregnancy And Lactation Text: This medication is considered safe during pregnancy and is also secreted in breast milk.
Azithromycin Counseling:  I discussed with the patient the risks of azithromycin including but not limited to GI upset, allergic reaction, drug rash, diarrhea, and yeast infections.
High Dose Vitamin A Pregnancy And Lactation Text: High dose vitamin A therapy is contraindicated during pregnancy and breast feeding.
Erythromycin Counseling:  I discussed with the patient the risks of erythromycin including but not limited to GI upset, allergic reaction, drug rash, diarrhea, increase in liver enzymes, and yeast infections.
Spironolactone Counseling: Patient advised regarding risks of diarrhea, abdominal pain, hyperkalemia, birth defects (for female patients), liver toxicity and renal toxicity. The patient may need blood work to monitor liver and kidney function and potassium levels while on therapy. The patient verbalized understanding of the proper use and possible adverse effects of spironolactone.  All of the patient's questions and concerns were addressed.
Isotretinoin Pregnancy And Lactation Text: This medication is Pregnancy Category X and is considered extremely dangerous during pregnancy. It is unknown if it is excreted in breast milk.
Detail Level: Generalized
Tazorac Counseling:  Patient advised that medication is irritating and drying.  Patient may need to apply sparingly and wash off after an hour before eventually leaving it on overnight.  The patient verbalized understanding of the proper use and possible adverse effects of tazorac.  All of the patient's questions and concerns were addressed.
Detail Level: Detailed
Tazorac Pregnancy And Lactation Text: This medication is not safe during pregnancy. It is unknown if this medication is excreted in breast milk.
Topical Sulfur Applications Pregnancy And Lactation Text: This medication is Pregnancy Category C and has an unknown safety profile during pregnancy. It is unknown if this topical medication is excreted in breast milk.
Minocycline Counseling: Patient advised regarding possible photosensitivity and discoloration of the teeth, skin, lips, tongue and gums.  Patient instructed to avoid sunlight, if possible.  When exposed to sunlight, patients should wear protective clothing, sunglasses, and sunscreen.  The patient was instructed to call the office immediately if the following severe adverse effects occur:  hearing changes, easy bruising/bleeding, severe headache, or vision changes.  The patient verbalized understanding of the proper use and possible adverse effects of minocycline.  All of the patient's questions and concerns were addressed.
Topical Retinoid counseling:  Patient advised to apply a pea-sized amount only at bedtime and wait 30 minutes after washing their face before applying.  If too drying, patient may add a non-comedogenic moisturizer. The patient verbalized understanding of the proper use and possible adverse effects of retinoids.  All of the patient's questions and concerns were addressed.
Benzoyl Peroxide Counseling: Patient counseled that medicine may cause skin irritation and bleach clothing.  In the event of skin irritation, the patient was advised to reduce the amount of the drug applied or use it less frequently.   The patient verbalized understanding of the proper use and possible adverse effects of benzoyl peroxide.  All of the patient's questions and concerns were addressed.
Dapsone Counseling: I discussed with the patient the risks of dapsone including but not limited to hemolytic anemia, agranulocytosis, rashes, methemoglobinemia, kidney failure, peripheral neuropathy, headaches, GI upset, and liver toxicity.  Patients who start dapsone require monitoring including baseline LFTs and weekly CBCs for the first month, then every month thereafter.  The patient verbalized understanding of the proper use and possible adverse effects of dapsone.  All of the patient's questions and concerns were addressed.
Doxycycline Counseling:  Patient counseled regarding possible photosensitivity and increased risk for sunburn.  Patient instructed to avoid sunlight, if possible.  When exposed to sunlight, patients should wear protective clothing, sunglasses, and sunscreen.  The patient was instructed to call the office immediately if the following severe adverse effects occur:  hearing changes, easy bruising/bleeding, severe headache, or vision changes.  The patient verbalized understanding of the proper use and possible adverse effects of doxycycline.  All of the patient's questions and concerns were addressed.
Erythromycin Pregnancy And Lactation Text: This medication is Pregnancy Category B and is considered safe during pregnancy. It is also excreted in breast milk.
High Dose Vitamin A Counseling: Side effects reviewed, pt to contact office should one occur.
Birth Control Pills Pregnancy And Lactation Text: This medication should be avoided if pregnant and for the first 30 days post-partum.
Spironolactone Pregnancy And Lactation Text: This medication can cause feminization of the male fetus and should be avoided during pregnancy. The active metabolite is also found in breast milk.
Bactrim Pregnancy And Lactation Text: This medication is Pregnancy Category D and is known to cause fetal risk.  It is also excreted in breast milk.
Benzoyl Peroxide Pregnancy And Lactation Text: This medication is Pregnancy Category C. It is unknown if benzoyl peroxide is excreted in breast milk.
Dapsone Pregnancy And Lactation Text: This medication is Pregnancy Category C and is not considered safe during pregnancy or breast feeding.
Topical Clindamycin Pregnancy And Lactation Text: This medication is Pregnancy Category B and is considered safe during pregnancy. It is unknown if it is excreted in breast milk.
Doxycycline Pregnancy And Lactation Text: This medication is Pregnancy Category D and not consider safe during pregnancy. It is also excreted in breast milk but is considered safe for shorter treatment courses.
Bactrim Counseling:  I discussed with the patient the risks of sulfa antibiotics including but not limited to GI upset, allergic reaction, drug rash, diarrhea, dizziness, photosensitivity, and yeast infections.  Rarely, more serious reactions can occur including but not limited to aplastic anemia, agranulocytosis, methemoglobinemia, blood dyscrasias, liver or kidney failure, lung infiltrates or desquamative/blistering drug rashes.
Topical Sulfur Applications Counseling: Topical Sulfur Counseling: Patient counseled that this medication may cause skin irritation or allergic reactions.  In the event of skin irritation, the patient was advised to reduce the amount of the drug applied or use it less frequently.   The patient verbalized understanding of the proper use and possible adverse effects of topical sulfur application.  All of the patient's questions and concerns were addressed.
Topical Retinoid Pregnancy And Lactation Text: This medication is Pregnancy Category C. It is unknown if this medication is excreted in breast milk.
Birth Control Pills Counseling: Birth Control Pill Counseling: I discussed with the patient the potential side effects of OCPs including but not limited to increased risk of stroke, heart attack, thrombophlebitis, deep venous thrombosis, hepatic adenomas, breast changes, GI upset, headaches, and depression.  The patient verbalized understanding of the proper use and possible adverse effects of OCPs. All of the patient's questions and concerns were addressed.

## 2018-05-03 ENCOUNTER — HOSPITAL ENCOUNTER (OUTPATIENT)
Dept: MRI IMAGING | Age: 68
Discharge: HOME | End: 2018-05-03
Attending: INTERNAL MEDICINE
Payer: MEDICARE

## 2018-05-03 DIAGNOSIS — M25.561 PAIN, JOINT, KNEE, RIGHT: ICD-10-CM

## 2018-05-03 PROCEDURE — 73721 MRI JNT OF LWR EXTRE W/O DYE: CPT

## 2018-06-04 ENCOUNTER — HOSPITAL ENCOUNTER (OUTPATIENT)
Dept: MRI IMAGING | Age: 68
Discharge: HOME | End: 2018-06-04
Attending: INTERNAL MEDICINE
Payer: MEDICARE

## 2018-06-04 DIAGNOSIS — M54.2 CERVICALGIA: ICD-10-CM

## 2018-06-04 PROCEDURE — 72141 MRI NECK SPINE W/O DYE: CPT

## 2018-06-20 ENCOUNTER — HOSPITAL ENCOUNTER (OUTPATIENT)
Dept: PHYSICAL THERAPY | Age: 68
Discharge: HOME | End: 2018-06-20
Payer: MEDICARE

## 2018-06-20 PROCEDURE — 97140 MANUAL THERAPY 1/> REGIONS: CPT

## 2018-06-20 PROCEDURE — 97014 ELECTRIC STIMULATION THERAPY: CPT

## 2018-06-20 PROCEDURE — 97161 PT EVAL LOW COMPLEX 20 MIN: CPT

## 2018-06-20 PROCEDURE — G8984 CARRY CURRENT STATUS: HCPCS

## 2018-06-20 PROCEDURE — G8985 CARRY GOAL STATUS: HCPCS

## 2018-06-20 NOTE — THERAPY EVALUATION
Manju Roy  : 1950  Payor: CARE IMPROVEMENT PLUS / [place] / Product Type: Managed Care Medicare /  [address] [place] [address]  at [address] , [address], [address] [place] [address]  [phone]   [phone]       OUTPATIENT PHYSICAL THERAPY:Initial Assessment 2018    ICD-10: Treatment Diagnosis: Pain in right shoulder (M25.511), Stiffness of right shoulder, not elsewhere classified (M25.611), Cervicalgia (M54.2)  Precautions/Allergies:   Antibiotic [kwoyg-uhmod-kbiiueh-pramoxine]; Biaxin [clarithromycin]; Ceclor [cefaclor]; and Cephalexin   Fall Risk Score: 0 (? 5 = High Risk)  MD Orders: PT eval and treat MEDICAL/REFERRING DIAGNOSIS:  Other cervical disc degeneration, unspecified cervical region [M50.30]   DATE OF ONSET: 2-3 months  REFERRING PHYSICIAN: Margareth Vasquez MD  RETURN PHYSICIAN APPOINTMENT: none specified     ASSESSMENT:  Ms. Montez Villanueva presents with 2-3 month hx of R shoulder and neck area pain, insidious onset. Pt demonstrates decreased ROM and strength, mild postural dysfunction, muscle tightness/tenderness and pain with end-range shoulder motions. Pt is likely experiencing joint arthritis, cervical arthritis and foraminal narrowing were found on MRI, and chronic RTC tendinosis is another possibility. Pt's pain and impairments are causing her to have difficulty sleeping and using her arm to lift anything overhead. Pt will benefit from skilled PT to address impairments and maximize her function. PROBLEM LIST (Impacting functional limitations):  1. Decreased Strength  2. Decreased ADL/Functional Activities  3. Increased Pain  4. Decreased Flexibility/Joint Mobility INTERVENTIONS PLANNED:  1. Cold  2. Cryotherapy  3. Electrical Stimulation  4. Heat  5. Home Exercise Program (HEP)  6. Manual Therapy  7. Neuromuscular Re-education/Strengthening  8. Range of Motion (ROM)  9. Therapeutic Activites  10.  Therapeutic Exercise/Strengthening   TREATMENT PLAN:  Effective Dates: 6/20/2018 TO 9/18/2018 (90 days). Frequency/Duration: 2 times a week for 6 weeks    GOALS: (Goals have been discussed and agreed upon with patient.)  Short-Term Functional Goals: Time Frame: 3 weeks  1. Pt will be I and compliant with initial HEP  2. Pt will report at least 25% reduction in symptoms  3. Pt will report at least 25% improvement in sleep quality  Discharge Goals: Time Frame: 6 weeks  1. Pt will demo at least 150 degrees of shoulder flexion and abduction with minimal pain for ability to reach for objects overhead  2. Pt will demo 5/5 B UE strength for ability to carry heavy objects and perform yardwork  3. Pt will be able to perform housework and yardwork with max 15% modification or restriction  4. Pt will score no more than 19/55 on Quick DASH disability scale indicating improved function  5. Pt will report sleep disturbances directly related to shoulder pain no more than 25% of the time  6. Pt will be I and compliant with long term HEP and symptom management  Rehabilitation Potential For Stated Goals: Good  Regarding Francisca Olmos's therapy, I certify that the treatment plan above will be carried out by a therapist or under their direction. Thank you for this referral,  Michael Freed, PT                 The information in this section was collected on 6/20/18 (except where otherwise noted). HISTORY:   History of Present Injury/Illness (Reason for Referral): Christy Johnson presents with R sided neck and shoulder pain for about the past 2-3 months, insidious onset. Pt reports is painful all the time like a toothache, but the intensity fluctuates. Doing housework and yardwork can make it worse, however it is always there.   Pt had MRI, results copied from chart as follows:   \"Multilevel cervical spondylosis with moderate right neural foraminal narrowing  at C4-5, severe left and mild right neural foraminal narrowing at C5-6, and  severe left and mild right neural foraminal stenosis at C6-7.\"    Pain location: R shoulder, and side of neck  Pain levels - Current: 5/10  At worst: 8/10 Description: Throbbing, aching, constant   Increases pain: Using arm   Decreases pain: medication    Past Medical History/Comorbidities:   Ms. Mela Harada  has a past medical history of Affective psychosis ([facility] [address] (07/2006); Arthritis; CAD (coronary artery disease); Chest pain (07/2006); Cholelithiasis (09/2006); Chronic depression (10/15/2014); Chronic lumbar pain (10/15/2014); Claudication [facility]) (06/2013); Coronary atherosclerosis of native coronary vessel (09/2006); Dyslipidemia (10/15/2014); Fibromyalgia (10/15/2014); HIPOLITO (generalized anxiety disorder) (10/15/2014); GERD (gastroesophageal reflux disease) (10/15/2014); History of complete eye exam (04/01/2016); History of dental examination (06/01/2016); History of placement of stent in LAD coronary artery (08/29/2006); Hypertension; Low magnesium level; Malaise and fatigue (05/2008); Palpitations (02/05/2016); Polyarthralgia (10/15/2014); Shingles (2006); and Tobacco use disorder (112/2006). Ms. Mela Harada  has a past surgical history that includes hx cholecystectomy (2005); pr cardiac surg procedure unlist (08/29/2006); hx tonsillectomy; pr breast surgery procedure unlisted; colonoscopy (N/A, 8/28/2017); and implant breast silicone/eq (Bilateral). Social History/Living Environment:   Lives alone    Prior Level of Function/Work/Activity: January Brito, helps take care of her sister    Current Medications:       Current Outpatient Prescriptions:     rosuvastatin (CRESTOR) 5 mg tablet, TAKE 1 TABLET BY MOUTH  DAILY, Disp: 90 Tab, Rfl: 3    omeprazole (PRILOSEC) 20 mg capsule, Take 1 Cap by mouth two (2) times a day., Disp: 180 Cap, Rfl: 3    amLODIPine (NORVASC) 5 mg tablet, Take 1 Tab by mouth daily. , Disp: 90 Tab, Rfl: 3    clonazePAM (KLONOPIN) 0.5 mg tablet, Take 1 Tab by mouth nightly as needed.  Max Daily Amount: 0.5 mg., Disp: 90 Tab, Rfl: 1    predniSONE (DELTASONE) 20 mg tablet, Take 20 mg by mouth daily (with breakfast). Take 1 tablet daily; Take DOS per anesthesia protocol., Disp: , Rfl:     meloxicam (MOBIC) 7.5 mg tablet, Take 7.5 mg by mouth two (2) times a day., Disp: , Rfl:     aspirin delayed-release 81 mg tablet, Take  by mouth daily. Take DOS per anesthesia protocol., Disp: , Rfl:     ascorbic acid, vitamin C, (VITAMIN C) 500 mg tablet, Take 500 mg by mouth daily. , Disp: , Rfl:     cholecalciferol (VITAMIN D3) 1,000 unit cap, Take 5,000 Units by mouth daily. , Disp: , Rfl:     busPIRone (BUSPAR) 10 mg tablet, Take 10 mg by mouth as needed. Take DOS per anesthesia protocol., Disp: , Rfl:     tramadol (ULTRAM) 50 mg tablet, Take 10 mg by mouth every six (6) hours as needed for Pain. Take DOS if needed per anesthesia protocol. Indications: FIBROMYALGIA, Disp: , Rfl:    Date Last Reviewed:  6/20/2018     Number of Personal Factors/Comorbidities that affect the Plan of Care: 0: LOW COMPLEXITY   EXAMINATION:   Observation/Postural Assessment: Forward head, increased thoracic kyphosis    Palpation: Tender to R upper trap and scapular border, tightened and shortened sub-occipitals noted    ROM:   Shoulder AROM R L   Flexion 145 160   Abduction 135 160   ER 60 80   IR WNL WNL   Extension 58 70     Strength:   Shoulder  R L   Flexion 4+ 5   Abduction 4+ 5   ER 4+ 5   IR 5 5     Elbow  R L   Flexion 5 5   Extension 5 5       Special Tests: Barry-Kg and Neer's test painful on R grossly in the R shoulder    Neurological Screen: Sensation intact and equal bilaterally UEs       Body Structures Involved:  1. Joints  2. Muscles Body Functions Affected:  1. Sensory/Pain  2. Neuromusculoskeletal  3. Movement Related Activities and Participation Affected:  1. General Tasks and Demands  2. Self Care  3. Domestic Life  4. Interpersonal Interactions and Relationships  5.  Community, Social and [place] Number of elements that affect the Plan of Care: 4+: HIGH COMPLEXITY   CLINICAL PRESENTATION:   Presentation: Stable and uncomplicated: LOW COMPLEXITY   CLINICAL DECISION MAKING:   Outcome Measure: Tool Used: Disabilities of the Arm, Shoulder and Hand (DASH) Questionnaire - Quick Version  Score:  Initial: 28/55  Most Recent: X/55 (Date: -- )   Interpretation of Score: The DASH is designed to measure the activities of daily living in person's with upper extremity dysfunction or pain. Each section is scored on a 1-5 scale, 5 representing the greatest disability. The scores of each section are added together for a total score of 55. Score 11 12-19 20-28 29-37 38-45 46-54 55   Modifier CH CI CJ CK CL CM CN     ? Carrying, Moving, and Handling Objects:     - CURRENT STATUS: CJ - 20%-39% impaired, limited or restricted    - GOAL STATUS: CI - 1%-19% impaired, limited or restricted    - D/C STATUS:  ---------------To be determined---------------    Medical Necessity:   · Patient demonstrates good rehab potential due to higher previous functional level. Reason for Services/Other Comments:  · Patient continues to require skilled intervention due to decreased range of motion and increased R shoulder pain contributing to decreased functional status. Use of outcome tool(s) and clinical judgement create a POC that gives a: Clear prediction of patient's progress: LOW COMPLEXITY            TREATMENT:   (In addition to Assessment/Re-Assessment sessions the following treatments were rendered)      Present Symptoms/Complaints - 6/20/2018:  See hx  Pain: Initial:   Pain Intensity 1: 5  Post Session:  Decreased slightly       Therapeutic Exercise: (0 minutes):  Exercises per grid below to improve mobility, strength and balance. Required moderate visual, verbal and tactile cues to promote proper body alignment, promote proper body posture and promote proper body mechanics.   Progressed resistance, range and repetitions as indicated. Date:  6/20/18 Date:   Date:     Activity/Exercise Parameters Parameters Parameters   Pt education Findings, anatomy/pathology, POC, HEP                                             Manual Therapy: (10 minutes): was utilized and necessary because of the patient's restricted joint motion and restricted motion of soft tissue.   - Manual stretching shoulder abduction on R  - Gentle joint mobs inferior/posterior grade I - II R shoulder  - Sub-occipital and R upper trap release    Modalities (15 min): Performed to increase circulation, decrease pain and muscle spasm and improve mobility.  - H-wave e-stim to R shoulder joint, high frequency, intensity 2.0    Treatment/Session Assessment:  Pt understood educational interventions and agreed with plan. · Compliance with Program/Exercises: Will assess as treatment progresses. · Recommendations/Intent for next treatment session: Next visit will focus on advancements to more challenging activities.   · Variance from plan of care: None    Total Treatment Duration:  PT Patient Time In/Time Out  Time In: 1500  Time Out: [facility], PT

## 2018-06-20 NOTE — PROGRESS NOTES
Ambulatory/Rehab Services H2 Model Falls Risk Assessment    Risk Factor Pts. ·   Confusion/Disorientation/Impulsivity  []    4 ·   Symptomatic Depression  []   2 ·   Altered Elimination  []   1 ·   Dizziness/Vertigo  []   1 ·   Gender (Male)  []   1 ·   Any administered antiepileptics (anticonvulsants):  []   2 ·   Any administered benzodiazepines:  []   1 ·   Visual Impairment (specify):  []   1 ·   Portable Oxygen Use  []   1 ·   Orthostatic ? BP  []   1 ·   History of Recent Falls (within 3 mos.)  []   5     Ability to Rise from Chair (choose one) Pts. ·   Ability to rise in a single movement  []   0 ·   Pushes up, successful in one attempt  []   1 ·   Multiple attempts, but successful  []   3 ·   Unable to rise without assistance  []   4   Total: (5 or greater = High Risk) 0     Falls Prevention Plan:   []                Physical Limitations to Exercise (specify):   []                Mobility Assistance Device (type):   []                Exercise/Equipment Adaptation (specify):    ©2010 [facility] [address] Patent #[id number].  Federal Law prohibits the replication, distribution or use without written permission from [facility] [employer]

## 2018-06-27 ENCOUNTER — HOSPITAL ENCOUNTER (OUTPATIENT)
Dept: PHYSICAL THERAPY | Age: 68
Discharge: HOME | End: 2018-06-27
Payer: MEDICARE

## 2018-06-27 PROCEDURE — 97140 MANUAL THERAPY 1/> REGIONS: CPT

## 2018-06-27 PROCEDURE — 97110 THERAPEUTIC EXERCISES: CPT

## 2018-06-27 NOTE — PROGRESS NOTES
Lukasz Qureshi  : 1950  Payor: CARE IMPROVEMENT PLUS [place] / Product Type: Managed Care Medicare /  [address] [place] [address]  at [address] , [address], [address] [facility] [address]  [phone]   [phone]       OUTPATIENT PHYSICAL THERAPY:Daily Note 2018    ICD-10: Treatment Diagnosis: Pain in right shoulder (M25.511), Stiffness of right shoulder, not elsewhere classified (M25.611), Cervicalgia (M54.2)  Precautions/Allergies:   Antibiotic [uvrqk-zgudh-ybzftlf-pramoxine]; Biaxin [clarithromycin]; Ceclor [cefaclor]; and Cephalexin   Fall Risk Score: 0 (? 5 = High Risk)  MD Orders: PT eval and treat MEDICAL/REFERRING DIAGNOSIS:  Other cervical disc degeneration, unspecified cervical region [M50.30]   DATE OF ONSET: 2-3 months  REFERRING PHYSICIAN: Victorina Hilliard MD  RETURN PHYSICIAN APPOINTMENT: none specified     ASSESSMENT:  Ms. Flory Miner presents with 2-3 month hx of R shoulder and neck area pain, insidious onset. Pt demonstrates decreased ROM and strength, mild postural dysfunction, muscle tightness/tenderness and pain with end-range shoulder motions. Pt is likely experiencing joint arthritis, cervical arthritis and foraminal narrowing were found on MRI, and chronic RTC tendinosis is another possibility. Pt's pain and impairments are causing her to have difficulty sleeping and using her arm to lift anything overhead. Pt will benefit from skilled PT to address impairments and maximize her function. PROBLEM LIST (Impacting functional limitations):  1. Decreased Strength  2. Decreased ADL/Functional Activities  3. Increased Pain  4. Decreased Flexibility/Joint Mobility INTERVENTIONS PLANNED:  1. Cold  2. Cryotherapy  3. Electrical Stimulation  4. Heat  5. Home Exercise Program (HEP)  6. Manual Therapy  7. Neuromuscular Re-education/Strengthening  8. Range of Motion (ROM)  9. Therapeutic Activites  10.  Therapeutic Exercise/Strengthening TREATMENT PLAN:  Effective Dates: 6/20/2018 TO 9/18/2018 (90 days). Frequency/Duration: 2 times a week for 6 weeks    GOALS: (Goals have been discussed and agreed upon with patient.)  Short-Term Functional Goals: Time Frame: 3 weeks  1. Pt will be I and compliant with initial HEP  2. Pt will report at least 25% reduction in symptoms  3. Pt will report at least 25% improvement in sleep quality  Discharge Goals: Time Frame: 6 weeks  1. Pt will demo at least 150 degrees of shoulder flexion and abduction with minimal pain for ability to reach for objects overhead  2. Pt will demo 5/5 B UE strength for ability to carry heavy objects and perform yardwork  3. Pt will be able to perform housework and yardwork with max 15% modification or restriction  4. Pt will score no more than 19/55 on Quick DASH disability scale indicating improved function  5. Pt will report sleep disturbances directly related to shoulder pain no more than 25% of the time  6. Pt will be I and compliant with long term HEP and symptom management  Rehabilitation Potential For Stated Goals: Good                The information in this section was collected on 6/20/18 (except where otherwise noted). HISTORY:   History of Present Injury/Illness (Reason for Referral): Sandra Mcdowell presents with R sided neck and shoulder pain for about the past 2-3 months, insidious onset. Pt reports is painful all the time like a toothache, but the intensity fluctuates. Doing housework and yardwork can make it worse, however it is always there.   Pt had MRI, results copied from chart as follows:   \"Multilevel cervical spondylosis with moderate right neural foraminal narrowing  at C4-5, severe left and mild right neural foraminal narrowing at C5-6, and  severe left and mild right neural foraminal stenosis at C6-7.\"    Pain location: R shoulder, and side of neck  Pain levels - Current: 5/10  At worst: 8/10 Description: Throbbing, aching, constant   Increases pain: Using arm   Decreases pain: medication    Past Medical History/Comorbidities:   Ms. Yordan Hebert  has a past medical history of Affective psychosis ([facility] [address] (07/2006); Arthritis; CAD (coronary artery disease); Chest pain (07/2006); Cholelithiasis (09/2006); Chronic depression (10/15/2014); Chronic lumbar pain (10/15/2014); Claudication [facility]) (06/2013); Coronary atherosclerosis of native coronary vessel (09/2006); Dyslipidemia (10/15/2014); Fibromyalgia (10/15/2014); HIPOLITO (generalized anxiety disorder) (10/15/2014); GERD (gastroesophageal reflux disease) (10/15/2014); History of complete eye exam (04/01/2016); History of dental examination (06/01/2016); History of placement of stent in LAD coronary artery (08/29/2006); Hypertension; Low magnesium level; Malaise and fatigue (05/2008); Palpitations (02/05/2016); Polyarthralgia (10/15/2014); Shingles (2006); and Tobacco use disorder (112/2006). Ms. Yordan Hebert  has a past surgical history that includes hx cholecystectomy (2005); pr cardiac surg procedure unlist (08/29/2006); hx tonsillectomy; pr breast surgery procedure unlisted; colonoscopy (N/A, 8/28/2017); and implant breast silicone/eq (Bilateral). Social History/Living Environment:   Lives alone    Prior Level of Function/Work/Activity: Inna Ramírez, helps take care of her sister    Current Medications:       Current Outpatient Prescriptions:     rosuvastatin (CRESTOR) 5 mg tablet, TAKE 1 TABLET BY MOUTH  DAILY, Disp: 90 Tab, Rfl: 3    omeprazole (PRILOSEC) 20 mg capsule, Take 1 Cap by mouth two (2) times a day., Disp: 180 Cap, Rfl: 3    amLODIPine (NORVASC) 5 mg tablet, Take 1 Tab by mouth daily. , Disp: 90 Tab, Rfl: 3    clonazePAM (KLONOPIN) 0.5 mg tablet, Take 1 Tab by mouth nightly as needed. Max Daily Amount: 0.5 mg., Disp: 90 Tab, Rfl: 1    predniSONE (DELTASONE) 20 mg tablet, Take 20 mg by mouth daily (with breakfast). Take 1 tablet daily;  Take DOS per anesthesia protocol., Disp: , Rfl:     meloxicam (MOBIC) 7.5 mg tablet, Take 7.5 mg by mouth two (2) times a day., Disp: , Rfl:     aspirin delayed-release 81 mg tablet, Take  by mouth daily. Take DOS per anesthesia protocol., Disp: , Rfl:     ascorbic acid, vitamin C, (VITAMIN C) 500 mg tablet, Take 500 mg by mouth daily. , Disp: , Rfl:     cholecalciferol (VITAMIN D3) 1,000 unit cap, Take 5,000 Units by mouth daily. , Disp: , Rfl:     busPIRone (BUSPAR) 10 mg tablet, Take 10 mg by mouth as needed. Take DOS per anesthesia protocol., Disp: , Rfl:     tramadol (ULTRAM) 50 mg tablet, Take 10 mg by mouth every six (6) hours as needed for Pain. Take DOS if needed per anesthesia protocol. Indications: FIBROMYALGIA, Disp: , Rfl:    Date Last Reviewed:  6/27/2018     EXAMINATION:   Observation/Postural Assessment: Forward head, increased thoracic kyphosis    Palpation: Tender to R upper trap and scapular border, tightened and shortened sub-occipitals noted    ROM:   Shoulder AROM R L   Flexion 145 160   Abduction 135 160   ER 60 80   IR WNL WNL   Extension 58 70     Strength:   Shoulder  R L   Flexion 4+ 5   Abduction 4+ 5   ER 4+ 5   IR 5 5     Elbow  R L   Flexion 5 5   Extension 5 5       Special Tests: Barry-Kg and Neer's test painful on R grossly in the R shoulder    Neurological Screen: Sensation intact and equal bilaterally UEs       CLINICAL DECISION MAKING:   Outcome Measure: Tool Used: Disabilities of the Arm, Shoulder and Hand (DASH) Questionnaire - Quick Version  Score:  Initial: 28/55  Most Recent: X/55 (Date: -- )   Interpretation of Score: The DASH is designed to measure the activities of daily living in person's with upper extremity dysfunction or pain. Each section is scored on a 1-5 scale, 5 representing the greatest disability. The scores of each section are added together for a total score of 55. Score 11 12-19 20-28 29-37 38-45 46-54 55   Modifier CH CI CJ CK CL CM CN     ?  Carrying, Moving, and Handling Objects:     - CURRENT STATUS: CJ - 20%-39% impaired, limited or restricted    - GOAL STATUS: CI - 1%-19% impaired, limited or restricted    - D/C STATUS:  ---------------To be determined---------------    Medical Necessity:   · Patient demonstrates good rehab potential due to higher previous functional level. Reason for Services/Other Comments:  · Patient continues to require skilled intervention due to decreased range of motion and increased R shoulder pain contributing to decreased functional status. TREATMENT:   (In addition to Assessment/Re-Assessment sessions the following treatments were rendered)      Present Symptoms/Complaints - 6/27/2018:  Pt reports she thinks she feels a little better. Pain: Initial:   Pain Intensity 1: 6  Post Session:  Decreased slightly       Therapeutic Exercise: (20 minutes):  Exercises per grid below to improve mobility, strength and balance. Required moderate visual, verbal and tactile cues to promote proper body alignment, promote proper body posture and promote proper body mechanics. Progressed resistance, range and repetitions as indicated.      Date:  6/20/18 Date:  6/27/18 Date:     Activity/Exercise Parameters Parameters Parameters   Pt education Findings, anatomy/pathology, POC, HEP     UBE  5/5    Sidelying abduction  30x    Sidelying shld ER  30x                          Manual Therapy: (25 minutes): was utilized and necessary because of the patient's restricted joint motion and restricted motion of soft tissue.   - Manual stretching shoulder abduction on R  - Gentle joint mobs inferior/posterior grade I - II R shoulder  - Gentle distraction R shoulder  - Sub-occipital and R upper trap release    Modalities (0 min): Performed to increase circulation, decrease pain and muscle spasm and improve mobility.  - H-wave e-stim to R shoulder joint, high frequency, intensity 2.0    Treatment/Session Assessment:  Pt reported she felt better with less pain after tx although she reported 5/10 pain and said 5-6/10 pain at the beginning of the session. Pt demonstrates full PROM of R shoulder abduction though active ROM is limited. · Compliance with Program/Exercises: Will assess as treatment progresses. · Recommendations/Intent for next treatment session: Next visit will focus on advancements to more challenging activities.   · Variance from plan of care: None    Total Treatment Duration:  PT Patient Time In/Time Out  Time In: 1115  Time Out: 3601 Kevin Fam PT

## 2018-07-02 ENCOUNTER — HOSPITAL ENCOUNTER (OUTPATIENT)
Dept: PHYSICAL THERAPY | Age: 68
Discharge: HOME | End: 2018-07-02
Payer: MEDICARE

## 2018-07-02 PROCEDURE — 97140 MANUAL THERAPY 1/> REGIONS: CPT

## 2018-07-02 PROCEDURE — 97110 THERAPEUTIC EXERCISES: CPT

## 2018-07-02 NOTE — PROGRESS NOTES
Mell Shah  : 1950  Payor: CARE IMPROVEMENT PLUS / [place] / Product Type: Managed Care Medicare /  [facility] [place]  at [facility]  [address], [address], [address] [facility] [address]  [phone]   [phone]       OUTPATIENT PHYSICAL THERAPY:Daily Note 2018    ICD-10: Treatment Diagnosis: Pain in right shoulder (M25.511), Stiffness of right shoulder, not elsewhere classified (M25.611), Cervicalgia (M54.2)  Precautions/Allergies:   Antibiotic [xizdm-cfups-vhztcbl-pramoxine]; Biaxin [clarithromycin]; Ceclor [cefaclor]; and Cephalexin   Fall Risk Score: 0 (? 5 = High Risk)  MD Orders: PT eval and treat MEDICAL/REFERRING DIAGNOSIS:  Other cervical disc degeneration, unspecified cervical region [M50.30]   DATE OF ONSET: 2-3 months  REFERRING PHYSICIAN: Fabien Hampton MD  RETURN PHYSICIAN APPOINTMENT: none specified     ASSESSMENT:  Ms. Omar Crane presents with 2-3 month hx of R shoulder and neck area pain, insidious onset. Pt demonstrates decreased ROM and strength, mild postural dysfunction, muscle tightness/tenderness and pain with end-range shoulder motions. Pt is likely experiencing joint arthritis, cervical arthritis and foraminal narrowing were found on MRI, and chronic RTC tendinosis is another possibility. Pt's pain and impairments are causing her to have difficulty sleeping and using her arm to lift anything overhead. Pt will benefit from skilled PT to address impairments and maximize her function. PROBLEM LIST (Impacting functional limitations):  1. Decreased Strength  2. Decreased ADL/Functional Activities  3. Increased Pain  4. Decreased Flexibility/Joint Mobility INTERVENTIONS PLANNED:  1. Cold  2. Cryotherapy  3. Electrical Stimulation  4. Heat  5. Home Exercise Program (HEP)  6. Manual Therapy  7. Neuromuscular Re-education/Strengthening  8. Range of Motion (ROM)  9. Therapeutic Activites  10.  Therapeutic Exercise/Strengthening TREATMENT PLAN:  Effective Dates: 6/20/2018 TO 9/18/2018 (90 days). Frequency/Duration: 2 times a week for 6 weeks    GOALS: (Goals have been discussed and agreed upon with patient.)  Short-Term Functional Goals: Time Frame: 3 weeks  1. Pt will be I and compliant with initial HEP  2. Pt will report at least 25% reduction in symptoms  3. Pt will report at least 25% improvement in sleep quality  Discharge Goals: Time Frame: 6 weeks  1. Pt will demo at least 150 degrees of shoulder flexion and abduction with minimal pain for ability to reach for objects overhead  2. Pt will demo 5/5 B UE strength for ability to carry heavy objects and perform yardwork  3. Pt will be able to perform housework and yardwork with max 15% modification or restriction  4. Pt will score no more than 19/55 on Quick DASH disability scale indicating improved function  5. Pt will report sleep disturbances directly related to shoulder pain no more than 25% of the time  6. Pt will be I and compliant with long term HEP and symptom management  Rehabilitation Potential For Stated Goals: Good                The information in this section was collected on 6/20/18 (except where otherwise noted). HISTORY:   History of Present Injury/Illness (Reason for Referral): Yomaira Byers presents with R sided neck and shoulder pain for about the past 2-3 months, insidious onset. Pt reports is painful all the time like a toothache, but the intensity fluctuates. Doing housework and yardwork can make it worse, however it is always there.   Pt had MRI, results copied from chart as follows:   \"Multilevel cervical spondylosis with moderate right neural foraminal narrowing  at C4-5, severe left and mild right neural foraminal narrowing at C5-6, and  severe left and mild right neural foraminal stenosis at C6-7.\"    Pain location: R shoulder, and side of neck  Pain levels - Current: 5/10  At worst: 8/10 Description: Throbbing, aching, constant   Increases pain: Using arm   Decreases pain: medication    Past Medical History/Comorbidities:   Ms. Juan Diego Bernal  has a past medical history of Affective psychosis ([facility] [address] (07/2006); Arthritis; CAD (coronary artery disease); Chest pain (07/2006); Cholelithiasis (09/2006); Chronic depression (10/15/2014); Chronic lumbar pain (10/15/2014); Claudication [facility]) (06/2013); Coronary atherosclerosis of native coronary vessel (09/2006); Dyslipidemia (10/15/2014); Fibromyalgia (10/15/2014); HIPOLIOT (generalized anxiety disorder) (10/15/2014); GERD (gastroesophageal reflux disease) (10/15/2014); History of complete eye exam (04/01/2016); History of dental examination (06/01/2016); History of placement of stent in LAD coronary artery (08/29/2006); Hypertension; Low magnesium level; Malaise and fatigue (05/2008); Palpitations (02/05/2016); Polyarthralgia (10/15/2014); Shingles (2006); and Tobacco use disorder (112/2006). Ms. Juan Diego Bernal  has a past surgical history that includes hx cholecystectomy (2005); pr cardiac surg procedure unlist (08/29/2006); hx tonsillectomy; pr breast surgery procedure unlisted; colonoscopy (N/A, 8/28/2017); and implant breast silicone/eq (Bilateral). Social History/Living Environment:   Lives alone    Prior Level of Function/Work/Activity: [employer], helps take care of her sister    Current Medications:       Current Outpatient Prescriptions:     rosuvastatin (CRESTOR) 5 mg tablet, TAKE 1 TABLET BY MOUTH  DAILY, Disp: 90 Tab, Rfl: 3    omeprazole (PRILOSEC) 20 mg capsule, Take 1 Cap by mouth two (2) times a day., Disp: 180 Cap, Rfl: 3    amLODIPine (NORVASC) 5 mg tablet, Take 1 Tab by mouth daily. , Disp: 90 Tab, Rfl: 3    clonazePAM (KLONOPIN) 0.5 mg tablet, Take 1 Tab by mouth nightly as needed. Max Daily Amount: 0.5 mg., Disp: 90 Tab, Rfl: 1    predniSONE (DELTASONE) 20 mg tablet, Take 20 mg by mouth daily (with breakfast). Take 1 tablet daily;  Take DOS per anesthesia protocol., Disp: , Rfl:     meloxicam (MOBIC) 7.5 mg tablet, Take 7.5 mg by mouth two (2) times a day., Disp: , Rfl:     aspirin delayed-release 81 mg tablet, Take  by mouth daily. Take DOS per anesthesia protocol., Disp: , Rfl:     ascorbic acid, vitamin C, (VITAMIN C) 500 mg tablet, Take 500 mg by mouth daily. , Disp: , Rfl:     cholecalciferol (VITAMIN D3) 1,000 unit cap, Take 5,000 Units by mouth daily. , Disp: , Rfl:     busPIRone (BUSPAR) 10 mg tablet, Take 10 mg by mouth as needed. Take DOS per anesthesia protocol., Disp: , Rfl:     tramadol (ULTRAM) 50 mg tablet, Take 10 mg by mouth every six (6) hours as needed for Pain. Take DOS if needed per anesthesia protocol. Indications: FIBROMYALGIA, Disp: , Rfl:    Date Last Reviewed:  7/2/2018     EXAMINATION:   Observation/Postural Assessment: Forward head, increased thoracic kyphosis    Palpation: Tender to R upper trap and scapular border, tightened and shortened sub-occipitals noted    ROM:   Shoulder AROM R L   Flexion 145 160   Abduction 135 160   ER 60 80   IR WNL WNL   Extension 58 70     Strength:   Shoulder  R L   Flexion 4+ 5   Abduction 4+ 5   ER 4+ 5   IR 5 5     Elbow  R L   Flexion 5 5   Extension 5 5       Special Tests: Barry-Kg and Neer's test painful on R grossly in the R shoulder    Neurological Screen: Sensation intact and equal bilaterally UEs       CLINICAL DECISION MAKING:   Outcome Measure: Tool Used: Disabilities of the Arm, Shoulder and Hand (DASH) Questionnaire - Quick Version  Score:  Initial: 28/55  Most Recent: X/55 (Date: -- )   Interpretation of Score: The DASH is designed to measure the activities of daily living in person's with upper extremity dysfunction or pain. Each section is scored on a 1-5 scale, 5 representing the greatest disability. The scores of each section are added together for a total score of 55. Score 11 12-19 20-28 29-37 38-45 46-54 55   Modifier CH CI CJ CK CL CM CN     ?  Carrying, Moving, and Handling Objects:     - CURRENT STATUS: CJ - 20%-39% impaired, limited or restricted    - GOAL STATUS: CI - 1%-19% impaired, limited or restricted    - D/C STATUS:  ---------------To be determined---------------    Medical Necessity:   · Patient demonstrates good rehab potential due to higher previous functional level. Reason for Services/Other Comments:  · Patient continues to require skilled intervention due to decreased range of motion and increased R shoulder pain contributing to decreased functional status. TREATMENT:   (In addition to Assessment/Re-Assessment sessions the following treatments were rendered)      Present Symptoms/Complaints - 7/2/2018:  Pt reports her arm hurts. Pain: Initial:   Pain Intensity 1: 5  Post Session:  Decreased slightly       Therapeutic Exercise: (15 minutes):  Exercises per grid below to improve mobility, strength and balance. Required moderate visual, verbal and tactile cues to promote proper body alignment, promote proper body posture and promote proper body mechanics. Progressed resistance, range and repetitions as indicated.      Date:  6/20/18 Date:  6/27/18 Date:  7/2/18   Activity/Exercise Parameters Parameters Parameters   Pt education Findings, anatomy/pathology, POC, HEP     UBE  5/5 L2.0 5/5   Sidelying abduction  30x 30x   Sidelying shld ER  30x 30x                         Manual Therapy: (25 minutes): was utilized and necessary because of the patient's restricted joint motion and restricted motion of soft tissue.   - Manual stretching shoulder abduction on R  - Gentle joint mobs inferior/posterior grade I - II R shoulder  - Gentle distraction R shoulder  - Sub-occipital and R upper trap release  - 1st rib mobs on R 3 x 5     Modalities (0 min): Performed to increase circulation, decrease pain and muscle spasm and improve mobility.  - H-wave e-stim to R shoulder joint, high frequency, intensity 2.0    Treatment/Session Assessment:  Pt reported her upper trap felt better after treatment. 1st rib was very tender and somewhat hypomobile therefore rib mobs were added today. · Compliance with Program/Exercises: Will assess as treatment progresses. · Recommendations/Intent for next treatment session: Next visit will focus on advancements to more challenging activities.   · Variance from plan of care: None    Total Treatment Duration:  PT Patient Time In/Time Out  Time In: 1115  Time Out: 3601 Kevin Fam PT

## 2018-07-05 ENCOUNTER — HOSPITAL ENCOUNTER (OUTPATIENT)
Dept: PHYSICAL THERAPY | Age: 68
Discharge: HOME | End: 2018-07-05
Payer: MEDICARE

## 2018-07-05 PROCEDURE — 97140 MANUAL THERAPY 1/> REGIONS: CPT

## 2018-07-05 PROCEDURE — 97110 THERAPEUTIC EXERCISES: CPT

## 2018-07-05 NOTE — PROGRESS NOTES
Yomaira Byers  : 1950  Payor: CARE IMPROVEMENT PLUS [place] / Product Type: Managed Care Medicare /  [address] [place] [address]  at [address]  [address], [address] [facility] [address]  [phone]   [phone]       OUTPATIENT PHYSICAL THERAPY:Daily Note 2018    ICD-10: Treatment Diagnosis: Pain in right shoulder (M25.511), Stiffness of right shoulder, not elsewhere classified (M25.611), Cervicalgia (M54.2)  Precautions/Allergies:   Antibiotic [mzlur-gmwol-xdxkiow-pramoxine]; Biaxin [clarithromycin]; Ceclor [cefaclor]; and Cephalexin   Fall Risk Score: 0 (? 5 = High Risk)  MD Orders: PT eval and treat MEDICAL/REFERRING DIAGNOSIS:  Other cervical disc degeneration, unspecified cervical region [M50.30]   DATE OF ONSET: 2-3 months  REFERRING PHYSICIAN: Damion Mcdonnell MD  RETURN PHYSICIAN APPOINTMENT: none specified     ASSESSMENT:  Ms. Cox  presents with 2-3 month hx of R shoulder and neck area pain, insidious onset. Pt demonstrates decreased ROM and strength, mild postural dysfunction, muscle tightness/tenderness and pain with end-range shoulder motions. Pt is likely experiencing joint arthritis, cervical arthritis and foraminal narrowing were found on MRI, and chronic RTC tendinosis is another possibility. Pt's pain and impairments are causing her to have difficulty sleeping and using her arm to lift anything overhead. Pt will benefit from skilled PT to address impairments and maximize her function. PROBLEM LIST (Impacting functional limitations):  1. Decreased Strength  2. Decreased ADL/Functional Activities  3. Increased Pain  4. Decreased Flexibility/Joint Mobility INTERVENTIONS PLANNED:  1. Cold  2. Cryotherapy  3. Electrical Stimulation  4. Heat  5. Home Exercise Program (HEP)  6. Manual Therapy  7. Neuromuscular Re-education/Strengthening  8. Range of Motion (ROM)  9. Therapeutic Activites  10.  Therapeutic Exercise/Strengthening TREATMENT PLAN:  Effective Dates: 6/20/2018 TO 9/18/2018 (90 days). Frequency/Duration: 2 times a week for 6 weeks    GOALS: (Goals have been discussed and agreed upon with patient.)  Short-Term Functional Goals: Time Frame: 3 weeks  1. Pt will be I and compliant with initial HEP  2. Pt will report at least 25% reduction in symptoms  3. Pt will report at least 25% improvement in sleep quality  Discharge Goals: Time Frame: 6 weeks  1. Pt will demo at least 150 degrees of shoulder flexion and abduction with minimal pain for ability to reach for objects overhead  2. Pt will demo 5/5 B UE strength for ability to carry heavy objects and perform yardwork  3. Pt will be able to perform housework and yardwork with max 15% modification or restriction  4. Pt will score no more than 19/55 on Quick DASH disability scale indicating improved function  5. Pt will report sleep disturbances directly related to shoulder pain no more than 25% of the time  6. Pt will be I and compliant with long term HEP and symptom management  Rehabilitation Potential For Stated Goals: Good                The information in this section was collected on 6/20/18 (except where otherwise noted). HISTORY:   History of Present Injury/Illness (Reason for Referral): Yomaira Byers presents with R sided neck and shoulder pain for about the past 2-3 months, insidious onset. Pt reports is painful all the time like a toothache, but the intensity fluctuates. Doing housework and yardwork can make it worse, however it is always there.   Pt had MRI, results copied from chart as follows:   \"Multilevel cervical spondylosis with moderate right neural foraminal narrowing  at C4-5, severe left and mild right neural foraminal narrowing at C5-6, and  severe left and mild right neural foraminal stenosis at C6-7.\"    Pain location: R shoulder, and side of neck  Pain levels - Current: 5/10  At worst: 8/10 Description: Throbbing, aching, constant   Increases pain: Using arm   Decreases pain: medication    Past Medical History/Comorbidities:   Ms. Santhosh Suarez  has a past medical history of Affective psychosis ([facility] [address] (07/2006); Arthritis; CAD (coronary artery disease); Chest pain (07/2006); Cholelithiasis (09/2006); Chronic depression (10/15/2014); Chronic lumbar pain (10/15/2014); Claudication [facility]) (06/2013); Coronary atherosclerosis of native coronary vessel (09/2006); Dyslipidemia (10/15/2014); Fibromyalgia (10/15/2014); HIPOLITO (generalized anxiety disorder) (10/15/2014); GERD (gastroesophageal reflux disease) (10/15/2014); History of complete eye exam (04/01/2016); History of dental examination (06/01/2016); History of placement of stent in LAD coronary artery (08/29/2006); Hypertension; Low magnesium level; Malaise and fatigue (05/2008); Palpitations (02/05/2016); Polyarthralgia (10/15/2014); Shingles (2006); and Tobacco use disorder (112/2006). Ms. Santhosh Suarez  has a past surgical history that includes hx cholecystectomy (2005); pr cardiac surg procedure unlist (08/29/2006); hx tonsillectomy; pr breast surgery procedure unlisted; colonoscopy (N/A, 8/28/2017); and implant breast silicone/eq (Bilateral). Social History/Living Environment:   Lives alone    Prior Level of Function/Work/Activity: [employer], helps take care of her sister    Current Medications:       Current Outpatient Prescriptions:     rosuvastatin (CRESTOR) 5 mg tablet, TAKE 1 TABLET BY MOUTH  DAILY, Disp: 90 Tab, Rfl: 3    omeprazole (PRILOSEC) 20 mg capsule, Take 1 Cap by mouth two (2) times a day., Disp: 180 Cap, Rfl: 3    amLODIPine (NORVASC) 5 mg tablet, Take 1 Tab by mouth daily. , Disp: 90 Tab, Rfl: 3    clonazePAM (KLONOPIN) 0.5 mg tablet, Take 1 Tab by mouth nightly as needed. Max Daily Amount: 0.5 mg., Disp: 90 Tab, Rfl: 1    predniSONE (DELTASONE) 20 mg tablet, Take 20 mg by mouth daily (with breakfast). Take 1 tablet daily;  Take DOS per anesthesia protocol., Disp: , Rfl:     meloxicam (MOBIC) 7.5 mg tablet, Take 7.5 mg by mouth two (2) times a day., Disp: , Rfl:     aspirin delayed-release 81 mg tablet, Take  by mouth daily. Take DOS per anesthesia protocol., Disp: , Rfl:     ascorbic acid, vitamin C, (VITAMIN C) 500 mg tablet, Take 500 mg by mouth daily. , Disp: , Rfl:     cholecalciferol (VITAMIN D3) 1,000 unit cap, Take 5,000 Units by mouth daily. , Disp: , Rfl:     busPIRone (BUSPAR) 10 mg tablet, Take 10 mg by mouth as needed. Take DOS per anesthesia protocol., Disp: , Rfl:     tramadol (ULTRAM) 50 mg tablet, Take 10 mg by mouth every six (6) hours as needed for Pain. Take DOS if needed per anesthesia protocol. Indications: FIBROMYALGIA, Disp: , Rfl:    Date Last Reviewed:  7/5/2018     EXAMINATION:   Observation/Postural Assessment: Forward head, increased thoracic kyphosis    Palpation: Tender to R upper trap and scapular border, tightened and shortened sub-occipitals noted    ROM:   Shoulder AROM R L   Flexion 145 160   Abduction 135 160   ER 60 80   IR WNL WNL   Extension 58 70     Strength:   Shoulder  R L   Flexion 4+ 5   Abduction 4+ 5   ER 4+ 5   IR 5 5     Elbow  R L   Flexion 5 5   Extension 5 5       Special Tests: Barry-Kg and Neer's test painful on R grossly in the R shoulder    Neurological Screen: Sensation intact and equal bilaterally UEs       CLINICAL DECISION MAKING:   Outcome Measure: Tool Used: Disabilities of the Arm, Shoulder and Hand (DASH) Questionnaire - Quick Version  Score:  Initial: 28/55  Most Recent: X/55 (Date: -- )   Interpretation of Score: The DASH is designed to measure the activities of daily living in person's with upper extremity dysfunction or pain. Each section is scored on a 1-5 scale, 5 representing the greatest disability. The scores of each section are added together for a total score of 55. Score 11 12-19 20-28 29-37 38-45 46-54 55   Modifier CH CI CJ CK CL CM CN     ?  Carrying, Moving, and Handling Objects:     - CURRENT STATUS: CJ - 20%-39% impaired, limited or restricted    - GOAL STATUS: CI - 1%-19% impaired, limited or restricted    - D/C STATUS:  ---------------To be determined---------------    Medical Necessity:   · Patient demonstrates good rehab potential due to higher previous functional level. Reason for Services/Other Comments:  · Patient continues to require skilled intervention due to decreased range of motion and increased R shoulder pain contributing to decreased functional status. TREATMENT:   (In addition to Assessment/Re-Assessment sessions the following treatments were rendered)      Present Symptoms/Complaints - 7/5/2018:  Pt reports her shoulder still aches. Treatment last time helped a little. Pain: Initial:   Pain Intensity 1: 5  Post Session:  Decreased slightly       Therapeutic Exercise: (30 minutes):  Exercises per grid below to improve mobility, strength and balance. Required moderate visual, verbal and tactile cues to promote proper body alignment, promote proper body posture and promote proper body mechanics. Progressed resistance, range and repetitions as indicated.      Date:  6/20/18 Date:  6/27/18 Date:  7/2/18 Date:  7/5/18   Activity/Exercise Parameters Parameters Parameters    Pt education Findings, anatomy/pathology, POC, HEP      UBE  5/5 L2.0 5/5 L2.5 5/5   Sidelying abduction  30x 30x    Sidelying shld ER  30x 30x    Wand flexion    1# 20x   Pulley    10s holds x10   Shld ER with band    RTB 30x   Standing shld abduction    1# 3 x 10       Manual Therapy: (15 minutes): was utilized and necessary because of the patient's restricted joint motion and restricted motion of soft tissue.   - Manual stretching shoulder abduction on R (NT)  - Gentle joint mobs inferior/posterior grade I - II R shoulder (NT)  - Gentle distraction R shoulder (NT)  - R upper trap release  - 1st rib mobs on R 3 x 5     Modalities (0 min): Performed to increase circulation, decrease pain and muscle spasm and improve mobility.  - H-wave e-stim to R shoulder joint, high frequency, intensity 2.0    Treatment/Session Assessment:  Pt can reach full shoulder ROM with pulley and close to full flexion ROM with wand exercise. Gave handout with HEP and instructed to do every day. · Compliance with Program/Exercises: Will assess as treatment progresses. · Recommendations/Intent for next treatment session: Next visit will focus on advancements to more challenging activities.   · Variance from plan of care: None    Total Treatment Duration:  PT Patient Time In/Time Out  Time In: 1115  Time Out: 1041 Matt Lindo, PT

## 2018-07-09 ENCOUNTER — HOSPITAL ENCOUNTER (OUTPATIENT)
Dept: PHYSICAL THERAPY | Age: 68
Discharge: HOME | End: 2018-07-09
Payer: MEDICARE

## 2018-07-09 PROCEDURE — 97012 MECHANICAL TRACTION THERAPY: CPT

## 2018-07-09 PROCEDURE — 97140 MANUAL THERAPY 1/> REGIONS: CPT

## 2018-07-09 PROCEDURE — 97110 THERAPEUTIC EXERCISES: CPT

## 2018-07-09 NOTE — PROGRESS NOTES
Eliseo Friedman  : 1950  Payor: CARE IMPROVEMENT PLUS / [place] / Product Type: Managed Care Medicare /  [address]  at [facility]  [address], [address], [address] [address]  [phone]   [phone]       OUTPATIENT PHYSICAL THERAPY:Daily Note 2018    ICD-10: Treatment Diagnosis: Pain in right shoulder (M25.511), Stiffness of right shoulder, not elsewhere classified (M25.611), Cervicalgia (M54.2)  Precautions/Allergies:   Antibiotic [[id number]-ixezlut-pramoxine]; Biaxin [clarithromycin]; Ceclor [cefaclor]; and Cephalexin   Fall Risk Score: 0 (? 5 = High Risk)  MD Orders: PT eval and treat MEDICAL/REFERRING DIAGNOSIS:  Other cervical disc degeneration, unspecified cervical region [M50.30]   DATE OF ONSET: 2-3 months  REFERRING PHYSICIAN: Gustavo Gutierrez MD  RETURN PHYSICIAN APPOINTMENT: none specified     ASSESSMENT:  Ms. Santhosh Suarez presents with 2-3 month hx of R shoulder and neck area pain, insidious onset. Pt demonstrates decreased ROM and strength, mild postural dysfunction, muscle tightness/tenderness and pain with end-range shoulder motions. Pt is likely experiencing joint arthritis, cervical arthritis and foraminal narrowing were found on MRI, and chronic RTC tendinosis is another possibility. Pt's pain and impairments are causing her to have difficulty sleeping and using her arm to lift anything overhead. Pt will benefit from skilled PT to address impairments and maximize her function. PROBLEM LIST (Impacting functional limitations):  1. Decreased Strength  2. Decreased ADL/Functional Activities  3. Increased Pain  4. Decreased Flexibility/Joint Mobility INTERVENTIONS PLANNED:  1. Cold  2. Cryotherapy  3. Electrical Stimulation  4. Heat  5. Home Exercise Program (HEP)  6. Manual Therapy  7. Neuromuscular Re-education/Strengthening  8. Range of Motion (ROM)  9. Therapeutic Activites  10.  Therapeutic Exercise/Strengthening TREATMENT PLAN:  Effective Dates: 6/20/2018 TO 9/18/2018 (90 days). Frequency/Duration: 2 times a week for 6 weeks    GOALS: (Goals have been discussed and agreed upon with patient.)  Short-Term Functional Goals: Time Frame: 3 weeks  1. Pt will be I and compliant with initial HEP  2. Pt will report at least 25% reduction in symptoms  3. Pt will report at least 25% improvement in sleep quality  Discharge Goals: Time Frame: 6 weeks  1. Pt will demo at least 150 degrees of shoulder flexion and abduction with minimal pain for ability to reach for objects overhead  2. Pt will demo 5/5 B UE strength for ability to carry heavy objects and perform yardwork  3. Pt will be able to perform housework and yardwork with max 15% modification or restriction  4. Pt will score no more than 19/55 on Quick DASH disability scale indicating improved function  5. Pt will report sleep disturbances directly related to shoulder pain no more than 25% of the time  6. Pt will be I and compliant with long term HEP and symptom management  Rehabilitation Potential For Stated Goals: Good                The information in this section was collected on 6/20/18 (except where otherwise noted). HISTORY:   History of Present Injury/Illness (Reason for Referral): Lukasz Qureshi presents with R sided neck and shoulder pain for about the past 2-3 months, insidious onset. Pt reports is painful all the time like a toothache, but the intensity fluctuates. Doing housework and yardwork can make it worse, however it is always there.   Pt had MRI, results copied from chart as follows:   \"Multilevel cervical spondylosis with moderate right neural foraminal narrowing  at C4-5, severe left and mild right neural foraminal narrowing at C5-6, and  severe left and mild right neural foraminal stenosis at C6-7.\"    Pain location: R shoulder, and side of neck  Pain levels - Current: 5/10  At worst: 8/10 Description: Throbbing, aching, constant   Increases pain: Using arm   Decreases pain: medication    Past Medical History/Comorbidities:   Ms. Jamila Koch  has a past medical history of Affective psychosis ([facility] [address] (07/2006); Arthritis; CAD (coronary artery disease); Chest pain (07/2006); Cholelithiasis (09/2006); Chronic depression (10/15/2014); Chronic lumbar pain (10/15/2014); Claudication [facility]) (06/2013); Coronary atherosclerosis of native coronary vessel (09/2006); Dyslipidemia (10/15/2014); Fibromyalgia (10/15/2014); HIPOLITO (generalized anxiety disorder) (10/15/2014); GERD (gastroesophageal reflux disease) (10/15/2014); History of complete eye exam (04/01/2016); History of dental examination (06/01/2016); History of placement of stent in LAD coronary artery (08/29/2006); Hypertension; Low magnesium level; Malaise and fatigue (05/2008); Palpitations (02/05/2016); Polyarthralgia (10/15/2014); Shingles (2006); and Tobacco use disorder (112/2006). Ms. Jamila Koch  has a past surgical history that includes hx cholecystectomy (2005); pr cardiac surg procedure unlist (08/29/2006); hx tonsillectomy; pr breast surgery procedure unlisted; colonoscopy (N/A, 8/28/2017); and implant breast silicone/eq (Bilateral). 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 , Disp: 90 Tab, Rfl: 3    clonazePAM (KLONOPIN) 0.5 mg tablet, Take 1 Tab by mouth nightly as needed. Max Daily Amount: 0.5 mg., Disp: 90 Tab, Rfl: 1    predniSONE (DELTASONE) 20 mg tablet, Take 20 mg by mouth daily (with breakfast). Take 1 tablet daily;  Take DOS per anesthesia protocol., Disp: , Rfl:     meloxicam (MOBIC) 7.5 mg tablet, Take 7.5 mg by mouth two (2) times a day., Disp: , Rfl:     aspirin delayed-release 81 mg tablet, Take  by mouth daily. Take DOS per anesthesia protocol., Disp: , Rfl:     ascorbic acid, vitamin C, (VITAMIN C) 500 mg tablet, Take 500 mg by mouth daily. , Disp: , Rfl:     cholecalciferol (VITAMIN D3) 1,000 unit cap, Take 5,000 Units by mouth daily. , Disp: , Rfl:     busPIRone (BUSPAR) 10 mg tablet, Take 10 mg by mouth as needed. Take DOS per anesthesia protocol., Disp: , Rfl:     tramadol (ULTRAM) 50 mg tablet, Take 10 mg by mouth every six (6) hours as needed for Pain. Take DOS if needed per anesthesia protocol. Indications: FIBROMYALGIA, Disp: , Rfl:    Date Last Reviewed:  7/9/2018     EXAMINATION:   Observation/Postural Assessment: Forward head, increased thoracic kyphosis    Palpation: Tender to R upper trap and scapular border, tightened and shortened sub-occipitals noted    ROM:   Shoulder AROM R L   Flexion 145 160   Abduction 135 160   ER 60 80   IR WNL WNL   Extension 58 70     Strength:   Shoulder  R L   Flexion 4+ 5   Abduction 4+ 5   ER 4+ 5   IR 5 5     Elbow  R L   Flexion 5 5   Extension 5 5       Special Tests: Barry-Kg and Neer's test painful on R grossly in the R shoulder    Neurological Screen: Sensation intact and equal bilaterally UEs       CLINICAL DECISION MAKING:   Outcome Measure: Tool Used: Disabilities of the Arm, Shoulder and Hand (DASH) Questionnaire - Quick Version  Score:  Initial: 28/55  Most Recent: X/55 (Date: -- )   Interpretation of Score: The DASH is designed to measure the activities of daily living in person's with upper extremity dysfunction or pain. Each section is scored on a 1-5 scale, 5 representing the greatest disability. The scores of each section are added together for a total score of 55. Score 11 12-19 20-28 29-37 38-45 46-54 55   Modifier CH CI CJ CK CL CM CN     ?  Carrying, Moving, and Handling Objects:     - CURRENT STATUS: CJ - 20%-39% impaired, limited or restricted    - GOAL STATUS: CI - 1%-19% impaired, limited or restricted    - D/C STATUS:  ---------------To be determined---------------    Medical Necessity:   · Patient demonstrates good rehab potential due to higher previous functional level. Reason for Services/Other Comments:  · Patient continues to require skilled intervention due to decreased range of motion and increased R shoulder pain contributing to decreased functional status. TREATMENT:   (In addition to Assessment/Re-Assessment sessions the following treatments were rendered)      Present Symptoms/Complaints - 7/9/2018:  Pt reports that for a few days after last session she felt good. But yesterday her R side of her neck, shoulder, shoulder blade area, and the side of her face have been very painful. She reports that this is how her pain is; it will be okay for a while and then all of a sudden it will come on again. Pain: Initial:   Pain Intensity 1: 5  Post Session:  Decreased slightly       Therapeutic Exercise: (10 minutes):  Exercises per grid below to improve mobility, strength and balance. Required moderate visual, verbal and tactile cues to promote proper body alignment, promote proper body posture and promote proper body mechanics. Progressed resistance, range and repetitions as indicated.      Date:  6/20/18 Date:  6/27/18 Date:  7/2/18 Date:  7/5/18 Date:  7/9/18   Activity/Exercise Parameters Parameters Parameters     Pt education Findings, anatomy/pathology, POC, HEP       UBE  5/5 L2.0 5/5 L2.5 5/5 L1 5/5   Sidelying abduction  30x 30x     Sidelying shld ER  30x 30x     Wand flexion    1# 20x    Pulley    10s holds x10    Shld ER with band    RTB 30x    Standing shld abduction    1# 3 x 10        Manual Therapy: (15 minutes): was utilized and necessary because of the patient's restricted joint motion and restricted motion of soft tissue.   - Sub-occipital release with manual traction    Traction (20 min): Cervical mechanical traction, 15-18 psi    Treatment/Session Assessment:  Pt's MRI shows degenerative changes particularly in lower C-spine. Her shoulder blade symptoms correlate with referred pain pattern of C6 and C7, and her pain may be associated with her neck. Traction was performed today to address this and we will assess the efficacy next session. · Compliance with Program/Exercises: Will assess as treatment progresses. · Recommendations/Intent for next treatment session: Next visit will focus on advancements to more challenging activities.   · Variance from plan of care: None    Total Treatment Duration:  PT Patient Time In/Time Out  Time In: 1100  Time Out: [address], PT

## 2018-07-12 ENCOUNTER — HOSPITAL ENCOUNTER (OUTPATIENT)
Dept: PHYSICAL THERAPY | Age: 68
Discharge: HOME | End: 2018-07-12
Payer: MEDICARE

## 2018-07-12 PROCEDURE — 97012 MECHANICAL TRACTION THERAPY: CPT

## 2018-07-12 PROCEDURE — 97140 MANUAL THERAPY 1/> REGIONS: CPT

## 2018-07-12 PROCEDURE — 97110 THERAPEUTIC EXERCISES: CPT

## 2018-07-12 NOTE — PROGRESS NOTES
Hernando Kang  : 1950  Payor: CARE IMPROVEMENT PLUS / [place] / Product Type: Managed Care Medicare /  [address] [place] [address]  at [address]  [address], [address], [address] [place] [address]  [phone]   [phone]       OUTPATIENT PHYSICAL THERAPY:Daily Note 2018    ICD-10: Treatment Diagnosis: Pain in right shoulder (M25.511), Stiffness of right shoulder, not elsewhere classified (M25.611), Cervicalgia (M54.2)  Precautions/Allergies:   Antibiotic [[id number]-sqloi-eihxxcg-pramoxine]; Biaxin [clarithromycin]; Ceclor [cefaclor]; and Cephalexin   Fall Risk Score: 0 (? 5 = High Risk)  MD Orders: PT eval and treat MEDICAL/REFERRING DIAGNOSIS:  Other cervical disc degeneration, unspecified cervical region [M50.30]   DATE OF ONSET: 2-3 months  REFERRING PHYSICIAN: Tigre Scott MD  RETURN PHYSICIAN APPOINTMENT: none specified     ASSESSMENT:  Ms. Abel Sarmiento presents with 2-3 month hx of R shoulder and neck area pain, insidious onset. Pt demonstrates decreased ROM and strength, mild postural dysfunction, muscle tightness/tenderness and pain with end-range shoulder motions. Pt is likely experiencing joint arthritis, cervical arthritis and foraminal narrowing were found on MRI, and chronic RTC tendinosis is another possibility. Pt's pain and impairments are causing her to have difficulty sleeping and using her arm to lift anything overhead. Pt will benefit from skilled PT to address impairments and maximize her function. PROBLEM LIST (Impacting functional limitations):  1. Decreased Strength  2. Decreased ADL/Functional Activities  3. Increased Pain  4. Decreased Flexibility/Joint Mobility INTERVENTIONS PLANNED:  1. Cold  2. Cryotherapy  3. Electrical Stimulation  4. Heat  5. Home Exercise Program (HEP)  6. Manual Therapy  7. Neuromuscular Re-education/Strengthening  8. Range of Motion (ROM)  9. Therapeutic Activites  10.  Therapeutic Exercise/Strengthening TREATMENT PLAN:  Effective Dates: 6/20/2018 TO 9/18/2018 (90 days). Frequency/Duration: 2 times a week for 6 weeks    GOALS: (Goals have been discussed and agreed upon with patient.)  Short-Term Functional Goals: Time Frame: 3 weeks  1. Pt will be I and compliant with initial HEP  2. Pt will report at least 25% reduction in symptoms  3. Pt will report at least 25% improvement in sleep quality  Discharge Goals: Time Frame: 6 weeks  1. Pt will demo at least 150 degrees of shoulder flexion and abduction with minimal pain for ability to reach for objects overhead  2. Pt will demo 5/5 B UE strength for ability to carry heavy objects and perform yardwork  3. Pt will be able to perform housework and yardwork with max 15% modification or restriction  4. Pt will score no more than 19/55 on Quick DASH disability scale indicating improved function  5. Pt will report sleep disturbances directly related to shoulder pain no more than 25% of the time  6. Pt will be I and compliant with long term HEP and symptom management  Rehabilitation Potential For Stated Goals: Good                The information in this section was collected on 6/20/18 (except where otherwise noted). HISTORY:   History of Present Injury/Illness (Reason for Referral): Norbert Chris presents with R sided neck and shoulder pain for about the past 2-3 months, insidious onset. Pt reports is painful all the time like a toothache, but the intensity fluctuates. Doing housework and yardwork can make it worse, however it is always there.   Pt had MRI, results copied from chart as follows:   \"Multilevel cervical spondylosis with moderate right neural foraminal narrowing  at C4-5, severe left and mild right neural foraminal narrowing at C5-6, and  severe left and mild right neural foraminal stenosis at C6-7.\"    Pain location: R shoulder, and side of neck  Pain levels - Current: 5/10  At worst: 8/10 Description: Throbbing, aching, constant   Increases pain: Using arm   Decreases pain: medication    Past Medical History/Comorbidities:   Ms. Santhosh Suarez  has a past medical history of Affective psychosis ([facility] [address] (07/2006); Arthritis; CAD (coronary artery disease); Chest pain (07/2006); Cholelithiasis (09/2006); Chronic depression (10/15/2014); Chronic lumbar pain (10/15/2014); Claudication [facility]) (06/2013); Coronary atherosclerosis of native coronary vessel (09/2006); Dyslipidemia (10/15/2014); Fibromyalgia (10/15/2014); HIPOLITO (generalized anxiety disorder) (10/15/2014); GERD (gastroesophageal reflux disease) (10/15/2014); History of complete eye exam (04/01/2016); History of dental examination (06/01/2016); History of placement of stent in LAD coronary artery (08/29/2006); Hypertension; Low magnesium level; Malaise and fatigue (05/2008); Palpitations (02/05/2016); Polyarthralgia (10/15/2014); Shingles (2006); and Tobacco use disorder (112/2006). Ms. Santhosh Suarez  has a past surgical history that includes hx cholecystectomy (2005); pr cardiac surg procedure unlist (08/29/2006); hx tonsillectomy; pr breast surgery procedure unlisted; colonoscopy (N/A, 8/28/2017); and implant breast silicone/eq (Bilateral). Social History/Living Environment:   Lives alone    Prior Level of Function/Work/Activity: Cameron Alcantar, helps take care of her sister    Current Medications:       Current Outpatient Prescriptions:     rosuvastatin (CRESTOR) 5 mg tablet, TAKE 1 TABLET BY MOUTH  DAILY, Disp: 90 Tab, Rfl: 3    omeprazole (PRILOSEC) 20 mg capsule, Take 1 Cap by mouth two (2) times a day., Disp: 180 Cap, Rfl: 3    amLODIPine (NORVASC) 5 mg tablet, Take 1 Tab by mouth daily. , Disp: 90 Tab, Rfl: 3    clonazePAM (KLONOPIN) 0.5 mg tablet, Take 1 Tab by mouth nightly as needed. Max Daily Amount: 0.5 mg., Disp: 90 Tab, Rfl: 1    predniSONE (DELTASONE) 20 mg tablet, Take 20 mg by mouth daily (with breakfast). Take 1 tablet daily;  Take DOS per anesthesia protocol., Disp: , Rfl:     meloxicam (MOBIC) 7.5 mg tablet, Take 7.5 mg by mouth two (2) times a day., Disp: , Rfl:     aspirin delayed-release 81 mg tablet, Take  by mouth daily. Take DOS per anesthesia protocol., Disp: , Rfl:     ascorbic acid, vitamin C, (VITAMIN C) 500 mg tablet, Take 500 mg by mouth daily. , Disp: , Rfl:     cholecalciferol (VITAMIN D3) 1,000 unit cap, Take 5,000 Units by mouth daily. , Disp: , Rfl:     busPIRone (BUSPAR) 10 mg tablet, Take 10 mg by mouth as needed. Take DOS per anesthesia protocol., Disp: , Rfl:     tramadol (ULTRAM) 50 mg tablet, Take 10 mg by mouth every six (6) hours as needed for Pain. Take DOS if needed per anesthesia protocol. Indications: FIBROMYALGIA, Disp: , Rfl:    Date Last Reviewed:  7/12/2018     EXAMINATION:   Observation/Postural Assessment: Forward head, increased thoracic kyphosis    Palpation: Tender to R upper trap and scapular border, tightened and shortened sub-occipitals noted    ROM:   Shoulder AROM R L   Flexion 145 160   Abduction 135 160   ER 60 80   IR WNL WNL   Extension 58 70     Strength:   Shoulder  R L   Flexion 4+ 5   Abduction 4+ 5   ER 4+ 5   IR 5 5     Elbow  R L   Flexion 5 5   Extension 5 5       Special Tests: Barry-Kg and Neer's test painful on R grossly in the R shoulder    Neurological Screen: Sensation intact and equal bilaterally UEs       CLINICAL DECISION MAKING:   Outcome Measure: Tool Used: Disabilities of the Arm, Shoulder and Hand (DASH) Questionnaire - Quick Version  Score:  Initial: 28/55  Most Recent: X/55 (Date: -- )   Interpretation of Score: The DASH is designed to measure the activities of daily living in person's with upper extremity dysfunction or pain. Each section is scored on a 1-5 scale, 5 representing the greatest disability. The scores of each section are added together for a total score of 55. Score 11 12-19 20-28 29-37 38-45 46-54 55   Modifier CH CI CJ CK CL CM CN     ?  Carrying, Moving, and Handling Objects:     - CURRENT STATUS: CJ - 20%-39% impaired, limited or restricted    - GOAL STATUS: CI - 1%-19% impaired, limited or restricted    - D/C STATUS:  ---------------To be determined---------------    Medical Necessity:   · Patient demonstrates good rehab potential due to higher previous functional level. Reason for Services/Other Comments:  · Patient continues to require skilled intervention due to decreased range of motion and increased R shoulder pain contributing to decreased functional status. TREATMENT:   (In addition to Assessment/Re-Assessment sessions the following treatments were rendered)      Present Symptoms/Complaints - 7/12/2018:  Pt reports her neck and shoulder are feeling better since last session. Pain: Initial:   Pain Intensity 1: 2  Post Session:  Decreased slightly       Therapeutic Exercise: (10 minutes):  Exercises per grid below to improve mobility, strength and balance. Required moderate visual, verbal and tactile cues to promote proper body alignment, promote proper body posture and promote proper body mechanics. Progressed resistance, range and repetitions as indicated. Date:  6/20/18 Date:  6/27/18 Date:  7/2/18 Date:  7/5/18 Date:  7/9/18 Date:  7/12/18   Activity/Exercise Parameters Parameters Parameters      Pt education Findings, anatomy/pathology, POC, HEP        UBE  5/5 L2.0 5/5 L2.5 5/5 L1 5/5 L2.5 5/5   Sidelying abduction  30x 30x      Sidelying shld ER  30x 30x      Wand flexion    1# 20x     Pulley    10s holds x10     Shld ER with band    RTB 30x     Standing shld abduction    1# 3 x 10         Manual Therapy: (15 minutes): was utilized and necessary because of the patient's restricted joint motion and restricted motion of soft tissue.   - Sub-occipital release with manual traction    Traction (20 min): Cervical mechanical traction, 15-18 psi    Treatment/Session Assessment:  Pt cont to have good response to manual and traction interventions.   Shoulder mobility is good; neck mobility is improving. · Compliance with Program/Exercises: Will assess as treatment progresses. · Recommendations/Intent for next treatment session: Next visit will focus on advancements to more challenging activities.   · Variance from plan of care: None    Total Treatment Duration:  PT Patient Time In/Time Out  Time In: 1115  Time Out: [address] PT

## 2018-07-16 ENCOUNTER — HOSPITAL ENCOUNTER (OUTPATIENT)
Dept: PHYSICAL THERAPY | Age: 68
Discharge: HOME | End: 2018-07-16
Payer: MEDICARE

## 2018-07-16 PROCEDURE — 97140 MANUAL THERAPY 1/> REGIONS: CPT

## 2018-07-16 PROCEDURE — 97012 MECHANICAL TRACTION THERAPY: CPT

## 2018-07-16 PROCEDURE — 97110 THERAPEUTIC EXERCISES: CPT

## 2018-07-16 NOTE — PROGRESS NOTES
Sherwin Multani  : 1950  Payor: CARE IMPROVEMENT PLUS / [place] / Product Type: Managed Care Medicare /  [facility] [place]  at [facility]  [address], [address], [address] [place] [id number]  [phone]   [phone]       OUTPATIENT PHYSICAL THERAPY:Daily Note 2018    ICD-10: Treatment Diagnosis: Pain in right shoulder (M25.511), Stiffness of right shoulder, not elsewhere classified (M25.611), Cervicalgia (M54.2)  Precautions/Allergies:   Antibiotic [bqflp-hxdwr-pwiufhb-pramoxine]; Biaxin [clarithromycin]; Ceclor [cefaclor]; and Cephalexin   Fall Risk Score: 0 (? 5 = High Risk)  MD Orders: PT eval and treat MEDICAL/REFERRING DIAGNOSIS:  Other cervical disc degeneration, unspecified cervical region [M50.30]   DATE OF ONSET: 2-3 months  REFERRING PHYSICIAN: Olivia Chcako MD  RETURN PHYSICIAN APPOINTMENT: none specified     ASSESSMENT:  Ms. Yo Clements presents with 2-3 month hx of R shoulder and neck area pain, insidious onset. Pt demonstrates decreased ROM and strength, mild postural dysfunction, muscle tightness/tenderness and pain with end-range shoulder motions. Pt is likely experiencing joint arthritis, cervical arthritis and foraminal narrowing were found on MRI, and chronic RTC tendinosis is another possibility. Pt's pain and impairments are causing her to have difficulty sleeping and using her arm to lift anything overhead. Pt will benefit from skilled PT to address impairments and maximize her function. PROBLEM LIST (Impacting functional limitations):  1. Decreased Strength  2. Decreased ADL/Functional Activities  3. Increased Pain  4. Decreased Flexibility/Joint Mobility INTERVENTIONS PLANNED:  1. Cold  2. Cryotherapy  3. Electrical Stimulation  4. Heat  5. Home Exercise Program (HEP)  6. Manual Therapy  7. Neuromuscular Re-education/Strengthening  8. Range of Motion (ROM)  9. Therapeutic Activites  10.  Therapeutic Exercise/Strengthening TREATMENT PLAN:  Effective Dates: 6/20/2018 TO 9/18/2018 (90 days). Frequency/Duration: 2 times a week for 6 weeks    GOALS: (Goals have been discussed and agreed upon with patient.)  Short-Term Functional Goals: Time Frame: 3 weeks  1. Pt will be I and compliant with initial HEP  2. Pt will report at least 25% reduction in symptoms  3. Pt will report at least 25% improvement in sleep quality  Discharge Goals: Time Frame: 6 weeks  1. Pt will demo at least 150 degrees of shoulder flexion and abduction with minimal pain for ability to reach for objects overhead  2. Pt will demo 5/5 B UE strength for ability to carry heavy objects and perform yardwork  3. Pt will be able to perform housework and yardwork with max 15% modification or restriction  4. Pt will score no more than 19/55 on Quick DASH disability scale indicating improved function  5. Pt will report sleep disturbances directly related to shoulder pain no more than 25% of the time  6. Pt will be I and compliant with long term HEP and symptom management  Rehabilitation Potential For Stated Goals: Good                The information in this section was collected on 6/20/18 (except where otherwise noted). HISTORY:   History of Present Injury/Illness (Reason for Referral): Sherwin Multani presents with R sided neck and shoulder pain for about the past 2-3 months, insidious onset. Pt reports is painful all the time like a toothache, but the intensity fluctuates. Doing housework and yardwork can make it worse, however it is always there.   Pt had MRI, results copied from chart as follows:   \"Multilevel cervical spondylosis with moderate right neural foraminal narrowing  at C4-5, severe left and mild right neural foraminal narrowing at C5-6, and  severe left and mild right neural foraminal stenosis at C6-7.\"    Pain location: R shoulder, and side of neck  Pain levels - Current: 5/10  At worst: 8/10 Description: Throbbing, aching, constant   Increases pain: Using arm   Decreases pain: medication    Past Medical History/Comorbidities:   Ms. Zac Carrion  has a past medical history of Affective psychosis ([facility] [address] (07/2006); Arthritis; CAD (coronary artery disease); Chest pain (07/2006); Cholelithiasis (09/2006); Chronic depression (10/15/2014); Chronic lumbar pain (10/15/2014); Claudication [facility]) (06/2013); Coronary atherosclerosis of native coronary vessel (09/2006); Dyslipidemia (10/15/2014); Fibromyalgia (10/15/2014); HIPOLITO (generalized anxiety disorder) (10/15/2014); GERD (gastroesophageal reflux disease) (10/15/2014); History of complete eye exam (04/01/2016); History of dental examination (06/01/2016); History of placement of stent in LAD coronary artery (08/29/2006); Hypertension; Low magnesium level; Malaise and fatigue (05/2008); Palpitations (02/05/2016); Polyarthralgia (10/15/2014); Shingles (2006); and Tobacco use disorder (112/2006). Ms. Zac Carrion  has a past surgical history that includes hx cholecystectomy (2005); pr cardiac surg procedure unlist (08/29/2006); hx tonsillectomy; pr breast surgery procedure unlisted; colonoscopy (N/A, 8/28/2017); and implant breast silicone/eq (Bilateral). Social History/Living Environment:   Lives alone    Prior Level of Function/Work/Activity: [employer], helps take care of her sister    Current Medications:       Current Outpatient Prescriptions:     rosuvastatin (CRESTOR) 5 mg tablet, TAKE 1 TABLET BY MOUTH  DAILY, Disp: 90 Tab, Rfl: 3    omeprazole (PRILOSEC) 20 mg capsule, Take 1 Cap by mouth two (2) times a day., Disp: 180 Cap, Rfl: 3    amLODIPine (NORVASC) 5 mg tablet, Take 1 Tab by mouth daily. , Disp: 90 Tab, Rfl: 3    clonazePAM (KLONOPIN) 0.5 mg tablet, Take 1 Tab by mouth nightly as needed. Max Daily Amount: 0.5 mg., Disp: 90 Tab, Rfl: 1    predniSONE (DELTASONE) 20 mg tablet, Take 20 mg by mouth daily (with breakfast). Take 1 tablet daily;  Take DOS per anesthesia protocol., Disp: , Rfl:     meloxicam (MOBIC) 7.5 mg tablet, Take 7.5 mg by mouth two (2) times a day., Disp: , Rfl:     aspirin delayed-release 81 mg tablet, Take  by mouth daily. Take DOS per anesthesia protocol., Disp: , Rfl:     ascorbic acid, vitamin C, (VITAMIN C) 500 mg tablet, Take 500 mg by mouth daily. , Disp: , Rfl:     cholecalciferol (VITAMIN D3) 1,000 unit cap, Take 5,000 Units by mouth daily. , Disp: , Rfl:     busPIRone (BUSPAR) 10 mg tablet, Take 10 mg by mouth as needed. Take DOS per anesthesia protocol., Disp: , Rfl:     tramadol (ULTRAM) 50 mg tablet, Take 10 mg by mouth every six (6) hours as needed for Pain. Take DOS if needed per anesthesia protocol. Indications: FIBROMYALGIA, Disp: , Rfl:    Date Last Reviewed:  7/16/2018     EXAMINATION:   Observation/Postural Assessment: Forward head, increased thoracic kyphosis    Palpation: Tender to R upper trap and scapular border, tightened and shortened sub-occipitals noted    ROM:   Shoulder AROM R L   Flexion 145 160   Abduction 135 160   ER 60 80   IR WNL WNL   Extension 58 70     Strength:   Shoulder  R L   Flexion 4+ 5   Abduction 4+ 5   ER 4+ 5   IR 5 5     Elbow  R L   Flexion 5 5   Extension 5 5       Special Tests: Barry-Kg and Neer's test painful on R grossly in the R shoulder    Neurological Screen: Sensation intact and equal bilaterally UEs       CLINICAL DECISION MAKING:   Outcome Measure: Tool Used: Disabilities of the Arm, Shoulder and Hand (DASH) Questionnaire - Quick Version  Score:  Initial: 28/55  Most Recent: X/55 (Date: -- )   Interpretation of Score: The DASH is designed to measure the activities of daily living in person's with upper extremity dysfunction or pain. Each section is scored on a 1-5 scale, 5 representing the greatest disability. The scores of each section are added together for a total score of 55. Score 11 12-19 20-28 29-37 38-45 46-54 55   Modifier CH CI CJ CK CL CM CN     ?  Carrying, Moving, and Handling Objects:     - CURRENT STATUS: CJ - 20%-39% impaired, limited or restricted    - GOAL STATUS: CI - 1%-19% impaired, limited or restricted    - D/C STATUS:  ---------------To be determined---------------    Medical Necessity:   · Patient demonstrates good rehab potential due to higher previous functional level. Reason for Services/Other Comments:  · Patient continues to require skilled intervention due to decreased range of motion and increased R shoulder pain contributing to decreased functional status. TREATMENT:   (In addition to Assessment/Re-Assessment sessions the following treatments were rendered)      Present Symptoms/Complaints - 7/16/2018:  Pt reports her neck and shoulder continue to feel feel better. Pain: Initial:   Pain Intensity 1: 1  Post Session:  Decreased slightly       Therapeutic Exercise: (10 minutes):  Exercises per grid below to improve mobility, strength and balance. Required moderate visual, verbal and tactile cues to promote proper body alignment, promote proper body posture and promote proper body mechanics. Progressed resistance, range and repetitions as indicated. Date:  6/20/18 Date:  6/27/18 Date:  7/2/18 Date:  7/5/18 Date:  7/9/18 Date:  7/12/18 Date:  7/16/18   Activity/Exercise Parameters Parameters Parameters       Pt education Findings, anatomy/pathology, POC, HEP         UBE  5/5 L2.0 5/5 L2.5 5/5 L1 5/5 L2.5 5/5 L4.0 5/5   Sidelying abduction  30x 30x       Sidelying shld ER  30x 30x       Wand flexion    1# 20x      Pulley    10s holds x10      Shld ER with band    RTB 30x      Standing shld abduction    1# 3 x 10          Manual Therapy: (15 minutes): was utilized and necessary because of the patient's restricted joint motion and restricted motion of soft tissue.   - Sub-occipital release with manual traction    Traction (20 min): Cervical mechanical traction, 15-18 psi    Treatment/Session Assessment:  Pt cont to have good response to manual and traction interventions. Shoulder mobility is good; neck mobility is improving. · Compliance with Program/Exercises: Will assess as treatment progresses. · Recommendations/Intent for next treatment session: Next visit will focus on advancements to more challenging activities.   · Variance from plan of care: None    Total Treatment Duration:  PT Patient Time In/Time Out  Time In: 1115  Time Out: 1041 Matt Lindo, PT

## 2018-07-18 ENCOUNTER — APPOINTMENT (OUTPATIENT)
Dept: PHYSICAL THERAPY | Age: 68
End: 2018-07-18
Payer: MEDICARE

## 2018-07-19 ENCOUNTER — HOSPITAL ENCOUNTER (OUTPATIENT)
Dept: PHYSICAL THERAPY | Age: 68
Discharge: HOME | End: 2018-07-19
Payer: MEDICARE

## 2018-07-19 PROCEDURE — 97140 MANUAL THERAPY 1/> REGIONS: CPT

## 2018-07-19 PROCEDURE — 97014 ELECTRIC STIMULATION THERAPY: CPT

## 2018-07-19 PROCEDURE — 97110 THERAPEUTIC EXERCISES: CPT

## 2018-07-19 NOTE — PROGRESS NOTES
Kashmir Cornejo  : 1950  Payor: CARE IMPROVEMENT PLUS / [place] / Product Type: Managed Care Medicare /  [address] [place] [address]  at [address]  [facility], [address], [address] [address]  [phone]   [phone]       OUTPATIENT PHYSICAL THERAPY:Daily Note 2018    ICD-10: Treatment Diagnosis: Pain in right shoulder (M25.511), Stiffness of right shoulder, not elsewhere classified (M25.611), Cervicalgia (M54.2)  Precautions/Allergies:   Antibiotic [loccj-dlssn-lioypue-pramoxine]; Biaxin [clarithromycin]; Ceclor [cefaclor]; and Cephalexin   Fall Risk Score: 0 (? 5 = High Risk)  MD Orders: PT eval and treat MEDICAL/REFERRING DIAGNOSIS:  Other cervical disc degeneration, unspecified cervical region [M50.30]   DATE OF ONSET: 2-3 months  REFERRING PHYSICIAN: Verner Keys, MD  RETURN PHYSICIAN APPOINTMENT: none specified     ASSESSMENT:  Ms. Broderick Mondragon presents with 2-3 month hx of R shoulder and neck area pain, insidious onset. Pt demonstrates decreased ROM and strength, mild postural dysfunction, muscle tightness/tenderness and pain with end-range shoulder motions. Pt is likely experiencing joint arthritis, cervical arthritis and foraminal narrowing were found on MRI, and chronic RTC tendinosis is another possibility. Pt's pain and impairments are causing her to have difficulty sleeping and using her arm to lift anything overhead. Pt will benefit from skilled PT to address impairments and maximize her function. PROBLEM LIST (Impacting functional limitations):  1. Decreased Strength  2. Decreased ADL/Functional Activities  3. Increased Pain  4. Decreased Flexibility/Joint Mobility INTERVENTIONS PLANNED:  1. Cold  2. Cryotherapy  3. Electrical Stimulation  4. Heat  5. Home Exercise Program (HEP)  6. Manual Therapy  7. Neuromuscular Re-education/Strengthening  8. Range of Motion (ROM)  9. Therapeutic Activites  10.  Therapeutic Exercise/Strengthening TREATMENT PLAN:  Effective Dates: 6/20/2018 TO 9/18/2018 (90 days). Frequency/Duration: 2 times a week for 6 weeks    GOALS: (Goals have been discussed and agreed upon with patient.)  Short-Term Functional Goals: Time Frame: 3 weeks  1. Pt will be I and compliant with initial HEP  2. Pt will report at least 25% reduction in symptoms  3. Pt will report at least 25% improvement in sleep quality  Discharge Goals: Time Frame: 6 weeks  1. Pt will demo at least 150 degrees of shoulder flexion and abduction with minimal pain for ability to reach for objects overhead  2. Pt will demo 5/5 B UE strength for ability to carry heavy objects and perform yardwork  3. Pt will be able to perform housework and yardwork with max 15% modification or restriction  4. Pt will score no more than 19/55 on Quick DASH disability scale indicating improved function  5. Pt will report sleep disturbances directly related to shoulder pain no more than 25% of the time  6. Pt will be I and compliant with long term HEP and symptom management  Rehabilitation Potential For Stated Goals: Good                The information in this section was collected on 6/20/18 (except where otherwise noted). HISTORY:   History of Present Injury/Illness (Reason for Referral): Nubia Garcia presents with R sided neck and shoulder pain for about the past 2-3 months, insidious onset. Pt reports is painful all the time like a toothache, but the intensity fluctuates. Doing housework and yardwork can make it worse, however it is always there.   Pt had MRI, results copied from chart as follows:   \"Multilevel cervical spondylosis with moderate right neural foraminal narrowing  at C4-5, severe left and mild right neural foraminal narrowing at C5-6, and  severe left and mild right neural foraminal stenosis at C6-7.\"    Pain location: R shoulder, and side of neck  Pain levels - Current: 5/10  At worst: 8/10 Description: Throbbing, aching, constant   Increases pain: Using arm   Decreases pain: medication    Past Medical History/Comorbidities:   Ms. Jamila Koch  has a past medical history of Affective psychosis ([facility] [address] (07/2006); Arthritis; CAD (coronary artery disease); Chest pain (07/2006); Cholelithiasis (09/2006); Chronic depression (10/15/2014); Chronic lumbar pain (10/15/2014); Claudication [facility]) (06/2013); Coronary atherosclerosis of native coronary vessel (09/2006); Dyslipidemia (10/15/2014); Fibromyalgia (10/15/2014); HIPOLITO (generalized anxiety disorder) (10/15/2014); GERD (gastroesophageal reflux disease) (10/15/2014); History of complete eye exam (04/01/2016); History of dental examination (06/01/2016); History of placement of stent in LAD coronary artery (08/29/2006); Hypertension; Low magnesium level; Malaise and fatigue (05/2008); Palpitations (02/05/2016); Polyarthralgia (10/15/2014); Shingles (2006); and Tobacco use disorder (112/2006). Ms. Jamila Koch  has a past surgical history that includes hx cholecystectomy (2005); pr cardiac surg procedure unlist (08/29/2006); hx tonsillectomy; pr breast surgery procedure unlisted; colonoscopy (N/A, 8/28/2017); and implant breast silicone/eq (Bilateral). 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 , Disp: 90 Tab, Rfl: 3    clonazePAM (KLONOPIN) 0.5 mg tablet, Take 1 Tab by mouth nightly as needed. Max Daily Amount: 0.5 mg., Disp: 90 Tab, Rfl: 1    predniSONE (DELTASONE) 20 mg tablet, Take 20 mg by mouth daily (with breakfast). Take 1 tablet daily;  Take DOS per anesthesia protocol., Disp: , Rfl:     meloxicam (MOBIC) 7.5 mg tablet, Take 7.5 mg by mouth two (2) times a day., Disp: , Rfl:     aspirin delayed-release 81 mg tablet, Take  by mouth daily. Take DOS per anesthesia protocol., Disp: , Rfl:     ascorbic acid, vitamin C, (VITAMIN C) 500 mg tablet, Take 500 mg by mouth daily. , Disp: , Rfl:     cholecalciferol (VITAMIN D3) 1,000 unit cap, Take 5,000 Units by mouth daily. , Disp: , Rfl:     busPIRone (BUSPAR) 10 mg tablet, Take 10 mg by mouth as needed. Take DOS per anesthesia protocol., Disp: , Rfl:     tramadol (ULTRAM) 50 mg tablet, Take 10 mg by mouth every six (6) hours as needed for Pain. Take DOS if needed per anesthesia protocol. Indications: FIBROMYALGIA, Disp: , Rfl:    Date Last Reviewed:  7/19/2018     EXAMINATION:   Observation/Postural Assessment: Forward head, increased thoracic kyphosis    Palpation: Tender to R upper trap and scapular border, tightened and shortened sub-occipitals noted    ROM:   Shoulder AROM R L   Flexion 145 160   Abduction 135 160   ER 60 80   IR WNL WNL   Extension 58 70     Strength:   Shoulder  R L   Flexion 4+ 5   Abduction 4+ 5   ER 4+ 5   IR 5 5     Elbow  R L   Flexion 5 5   Extension 5 5       Special Tests: Barry-Kg and Neer's test painful on R grossly in the R shoulder    Neurological Screen: Sensation intact and equal bilaterally UEs       CLINICAL DECISION MAKING:   Outcome Measure: Tool Used: Disabilities of the Arm, Shoulder and Hand (DASH) Questionnaire - Quick Version  Score:  Initial: 28/55  Most Recent: X/55 (Date: -- )   Interpretation of Score: The DASH is designed to measure the activities of daily living in person's with upper extremity dysfunction or pain. Each section is scored on a 1-5 scale, 5 representing the greatest disability. The scores of each section are added together for a total score of 55. Score 11 12-19 20-28 29-37 38-45 46-54 55   Modifier CH CI CJ CK CL CM CN     ?  Carrying, Moving, and Handling Objects:     - CURRENT STATUS: CJ - 20%-39% impaired, limited or restricted    - GOAL STATUS: CI - 1%-19% impaired, limited or restricted    - D/C STATUS:  ---------------To be determined---------------    Medical Necessity:   · Patient demonstrates good rehab potential due to higher previous functional level. Reason for Services/Other Comments:  · Patient continues to require skilled intervention due to decreased range of motion and increased R shoulder pain contributing to decreased functional status. TREATMENT:   (In addition to Assessment/Re-Assessment sessions the following treatments were rendered)      Present Symptoms/Complaints - 7/19/2018:  Pt reports her neck and shoulder continue to feel feel better. Pain: Initial:   Pain Intensity 1: 1  Post Session:  Decreased slightly       Therapeutic Exercise: (10 minutes):  Exercises per grid below to improve mobility, strength and balance. Required moderate visual, verbal and tactile cues to promote proper body alignment, promote proper body posture and promote proper body mechanics. Progressed resistance, range and repetitions as indicated. Date:  6/27/18 Date:  7/2/18 Date:  7/5/18 Date:  7/9/18 Date:  7/12/18 Date:  7/16/18 Date:  7/19/18   Activity/Exercise Parameters Parameters        Pt education          UBE 5/5 L2.0 5/5 L2.5 5/5 L1 5/5 L2.5 5/5 L4.0 5/5 L4.0 5/5   Sidelying abduction 30x 30x        Sidelying shld ER 30x 30x        Wand flexion   1# 20x       Pulley   10s holds x10       Shld ER with band   RTB 30x       Standing shld abduction   1# 3 x 10           Manual Therapy: (15 minutes): was utilized and necessary because of the patient's restricted joint motion and restricted motion of soft tissue.   - STM R upper trap with trigger point release    Traction (20 min): Cervical mechanical traction, 15-18 psi    Treatment/Session Assessment:  Pt cont to have good response to manual and traction interventions. · Compliance with Program/Exercises:  Will assess as treatment progresses. · Recommendations/Intent for next treatment session: Next visit will focus on advancements to more challenging activities.   · Variance from plan of care: None    Total Treatment Duration:  PT Patient Time In/Time Out  Time In: 1110  Time Out: 3601 Kevin Fam, PT

## 2018-07-23 ENCOUNTER — HOSPITAL ENCOUNTER (OUTPATIENT)
Dept: PHYSICAL THERAPY | Age: 68
Discharge: HOME | End: 2018-07-23
Payer: MEDICARE

## 2018-07-23 PROCEDURE — 97110 THERAPEUTIC EXERCISES: CPT

## 2018-07-23 PROCEDURE — 97012 MECHANICAL TRACTION THERAPY: CPT

## 2018-07-23 PROCEDURE — 97140 MANUAL THERAPY 1/> REGIONS: CPT

## 2018-07-23 NOTE — PROGRESS NOTES
Nubia Garcia  : 1950  Payor: CARE IMPROVEMENT PLUS / [place] / Product Type: Managed Care Medicare /  [address] [place] [address]  at [address] , [address], [address] [place] [address]  [phone]   [phone]       OUTPATIENT PHYSICAL THERAPY:Daily Note 2018    ICD-10: Treatment Diagnosis: Pain in right shoulder (M25.511), Stiffness of right shoulder, not elsewhere classified (M25.611), Cervicalgia (M54.2)  Precautions/Allergies:   Antibiotic [lhyeo-lxjkq-yxjkbzv-pramoxine]; Biaxin [clarithromycin]; Ceclor [cefaclor]; and Cephalexin   Fall Risk Score: 0 (? 5 = High Risk)  MD Orders: PT eval and treat MEDICAL/REFERRING DIAGNOSIS:  Other cervical disc degeneration, unspecified cervical region [M50.30]   DATE OF ONSET: 2-3 months  REFERRING PHYSICIAN: Kailey Wood MD  RETURN PHYSICIAN APPOINTMENT: none specified     ASSESSMENT:  Ms. Brielle Mckeon presents with 2-3 month hx of R shoulder and neck area pain, insidious onset. Pt demonstrates decreased ROM and strength, mild postural dysfunction, muscle tightness/tenderness and pain with end-range shoulder motions. Pt is likely experiencing joint arthritis, cervical arthritis and foraminal narrowing were found on MRI, and chronic RTC tendinosis is another possibility. Pt's pain and impairments are causing her to have difficulty sleeping and using her arm to lift anything overhead. Pt will benefit from skilled PT to address impairments and maximize her function. PROBLEM LIST (Impacting functional limitations):  1. Decreased Strength  2. Decreased ADL/Functional Activities  3. Increased Pain  4. Decreased Flexibility/Joint Mobility INTERVENTIONS PLANNED:  1. Cold  2. Cryotherapy  3. Electrical Stimulation  4. Heat  5. Home Exercise Program (HEP)  6. Manual Therapy  7. Neuromuscular Re-education/Strengthening  8. Range of Motion (ROM)  9. Therapeutic Activites  10.  Therapeutic Exercise/Strengthening TREATMENT PLAN:  Effective Dates: 6/20/2018 TO 9/18/2018 (90 days). Frequency/Duration: 2 times a week for 6 weeks    GOALS: (Goals have been discussed and agreed upon with patient.)  Short-Term Functional Goals: Time Frame: 3 weeks  1. Pt will be I and compliant with initial HEP  2. Pt will report at least 25% reduction in symptoms  3. Pt will report at least 25% improvement in sleep quality  Discharge Goals: Time Frame: 6 weeks  1. Pt will demo at least 150 degrees of shoulder flexion and abduction with minimal pain for ability to reach for objects overhead  2. Pt will demo 5/5 B UE strength for ability to carry heavy objects and perform yardwork  3. Pt will be able to perform housework and yardwork with max 15% modification or restriction  4. Pt will score no more than 19/55 on Quick DASH disability scale indicating improved function  5. Pt will report sleep disturbances directly related to shoulder pain no more than 25% of the time  6. Pt will be I and compliant with long term HEP and symptom management  Rehabilitation Potential For Stated Goals: Good                The information in this section was collected on 6/20/18 (except where otherwise noted). HISTORY:   History of Present Injury/Illness (Reason for Referral): Richard Otero presents with R sided neck and shoulder pain for about the past 2-3 months, insidious onset. Pt reports is painful all the time like a toothache, but the intensity fluctuates. Doing housework and yardwork can make it worse, however it is always there.   Pt had MRI, results copied from chart as follows:   \"Multilevel cervical spondylosis with moderate right neural foraminal narrowing  at C4-5, severe left and mild right neural foraminal narrowing at C5-6, and  severe left and mild right neural foraminal stenosis at C6-7.\"    Pain location: R shoulder, and side of neck  Pain levels - Current: 5/10  At worst: 8/10 Description: Throbbing, aching, constant   Increases pain: Using arm   Decreases pain: medication    Past Medical History/Comorbidities:   Ms. Sajan Echeverria  has a past medical history of Affective psychosis ([facility] [address] (07/2006); Arthritis; CAD (coronary artery disease); Chest pain (07/2006); Cholelithiasis (09/2006); Chronic depression (10/15/2014); Chronic lumbar pain (10/15/2014); Claudication [facility]) (06/2013); Coronary atherosclerosis of native coronary vessel (09/2006); Dyslipidemia (10/15/2014); Fibromyalgia (10/15/2014); HIPOLITO (generalized anxiety disorder) (10/15/2014); GERD (gastroesophageal reflux disease) (10/15/2014); History of complete eye exam (04/01/2016); History of dental examination (06/01/2016); History of placement of stent in LAD coronary artery (08/29/2006); Hypertension; Low magnesium level; Malaise and fatigue (05/2008); Palpitations (02/05/2016); Polyarthralgia (10/15/2014); Shingles (2006); and Tobacco use disorder (112/2006). Ms. Sajan Echeverria  has a past surgical history that includes hx cholecystectomy (2005); pr cardiac surg procedure unlist (08/29/2006); hx tonsillectomy; pr breast surgery procedure unlisted; colonoscopy (N/A, 8/28/2017); and implant breast silicone/eq (Bilateral). Social History/Living Environment:   Lives alone    Prior Level of Function/Work/Activity: Delores Arash, helps take care of her sister    Current Medications:       Current Outpatient Prescriptions:     rosuvastatin (CRESTOR) 5 mg tablet, TAKE 1 TABLET BY MOUTH  DAILY, Disp: 90 Tab, Rfl: 3    omeprazole (PRILOSEC) 20 mg capsule, Take 1 Cap by mouth two (2) times a day., Disp: 180 Cap, Rfl: 3    amLODIPine (NORVASC) 5 mg tablet, Take 1 Tab by mouth daily. , Disp: 90 Tab, Rfl: 3    clonazePAM (KLONOPIN) 0.5 mg tablet, Take 1 Tab by mouth nightly as needed. Max Daily Amount: 0.5 mg., Disp: 90 Tab, Rfl: 1    predniSONE (DELTASONE) 20 mg tablet, Take 20 mg by mouth daily (with breakfast). Take 1 tablet daily;  Take DOS per anesthesia protocol., Disp: , Rfl:     meloxicam (MOBIC) 7.5 mg tablet, Take 7.5 mg by mouth two (2) times a day., Disp: , Rfl:     aspirin delayed-release 81 mg tablet, Take  by mouth daily. Take DOS per anesthesia protocol., Disp: , Rfl:     ascorbic acid, vitamin C, (VITAMIN C) 500 mg tablet, Take 500 mg by mouth daily. , Disp: , Rfl:     cholecalciferol (VITAMIN D3) 1,000 unit cap, Take 5,000 Units by mouth daily. , Disp: , Rfl:     busPIRone (BUSPAR) 10 mg tablet, Take 10 mg by mouth as needed. Take DOS per anesthesia protocol., Disp: , Rfl:     tramadol (ULTRAM) 50 mg tablet, Take 10 mg by mouth every six (6) hours as needed for Pain. Take DOS if needed per anesthesia protocol. Indications: FIBROMYALGIA, Disp: , Rfl:    Date Last Reviewed:  7/23/2018     EXAMINATION:   Observation/Postural Assessment: Forward head, increased thoracic kyphosis    Palpation: Tender to R upper trap and scapular border, tightened and shortened sub-occipitals noted    ROM:   Shoulder AROM R L   Flexion 145 160   Abduction 135 160   ER 60 80   IR WNL WNL   Extension 58 70     Strength:   Shoulder  R L   Flexion 4+ 5   Abduction 4+ 5   ER 4+ 5   IR 5 5     Elbow  R L   Flexion 5 5   Extension 5 5       Special Tests: Barry-Kg and Neer's test painful on R grossly in the R shoulder    Neurological Screen: Sensation intact and equal bilaterally UEs       CLINICAL DECISION MAKING:   Outcome Measure: Tool Used: Disabilities of the Arm, Shoulder and Hand (DASH) Questionnaire - Quick Version  Score:  Initial: 28/55  Most Recent: X/55 (Date: -- )   Interpretation of Score: The DASH is designed to measure the activities of daily living in person's with upper extremity dysfunction or pain. Each section is scored on a 1-5 scale, 5 representing the greatest disability. The scores of each section are added together for a total score of 55. Score 11 12-19 20-28 29-37 38-45 46-54 55   Modifier CH CI CJ CK CL CM CN     ?  Carrying, Moving, and Handling Objects:     - CURRENT STATUS: CJ - 20%-39% impaired, limited or restricted    - GOAL STATUS: CI - 1%-19% impaired, limited or restricted    - D/C STATUS:  ---------------To be determined---------------    Medical Necessity:   · Patient demonstrates good rehab potential due to higher previous functional level. Reason for Services/Other Comments:  · Patient continues to require skilled intervention due to decreased range of motion and increased R shoulder pain contributing to decreased functional status. TREATMENT:   (In addition to Assessment/Re-Assessment sessions the following treatments were rendered)      Present Symptoms/Complaints - 7/23/2018:  Pt reports her neck and shoulder continue to feel feel better. She says she is having some pain/pulling in her L upper arm but she thinks it was due to lifting a heavy case of soda at the store last week. Pain: Initial:   Pain Intensity 1: 1  Post Session:  Decreased slightly       Therapeutic Exercise: (12 minutes):  Exercises per grid below to improve mobility, strength and balance. Required moderate visual, verbal and tactile cues to promote proper body alignment, promote proper body posture and promote proper body mechanics. Progressed resistance, range and repetitions as indicated.      Date:  7/2/18 Date:  7/5/18 Date:  7/9/18 Date:  7/12/18 Date:  7/16/18 Date:  7/19/18 Date:  7/23/18   Activity/Exercise Parameters         Pt education       Instructions for HEP   UBE L2.0 5/5 L2.5 5/5 L1 5/5 L2.5 5/5 L4.0 5/5 L4.0 5/5 L4.0 5/5   Sidelying abduction 30x         Sidelying shld ER 30x         Wand flexion  1# 20x        Pulley  10s holds x10        Shld ER with band  RTB 30x        Standing shld abduction  1# 3 x 10            Manual Therapy: (13 minutes): was utilized and necessary because of the patient's restricted joint motion and restricted motion of soft tissue.   - STM R upper trap with trigger point release    Traction (20 min): Cervical mechanical traction, 15-18 psi    Treatment/Session Assessment:  Pt has good full range of motion in cervical spine and shoulder elevation. Slightly decreased in IR behind her back on the R and extension. Gave pt handout for HEP for shoulders. Will plan to DC next session. · Compliance with Program/Exercises: Will assess as treatment progresses. · Recommendations/Intent for next treatment session: DC to HEP  · Variance from plan of care: None    Access Code: [email or web address]   URL: [email or web address]   Exercises   Shoulder Horizontal Abduction - Thumbs Up - 10 reps - 3 sets - 1x daily   Standing Shoulder Internal Rotation Stretch Behind Back - 10 reps - 10s hold - 1x daily   Standing Shoulder Extension with Dowel - 10 reps - 1x daily     Total Treatment Duration:  PT Patient Time In/Time Out  Time In: 1110  Time Out: Naheed Flanagan

## 2018-07-25 ENCOUNTER — HOSPITAL ENCOUNTER (OUTPATIENT)
Dept: PHYSICAL THERAPY | Age: 68
Discharge: HOME | End: 2018-07-25
Payer: MEDICARE

## 2018-07-25 PROCEDURE — G8986 CARRY D/C STATUS: HCPCS

## 2018-07-25 PROCEDURE — 97110 THERAPEUTIC EXERCISES: CPT

## 2018-07-25 PROCEDURE — G8985 CARRY GOAL STATUS: HCPCS

## 2018-07-25 NOTE — THERAPY DISCHARGE
Abdiaziz Barragan  : 1950  Payor: CARE IMPROVEMENT PLUS [place] / Product Type: Managed Care Medicare /  [address]  at [address]  [address], [address], [facility] [address]  [phone]   [phone]       OUTPATIENT PHYSICAL [address] Note and Discharge 2018    ICD-10: Treatment Diagnosis: Pain in right shoulder (M25.511), Stiffness of right shoulder, not elsewhere classified (M25.611), Cervicalgia (M54.2)  Precautions/Allergies:   Antibiotic [ehprx-fawev-hegdxhs-pramoxine]; Biaxin [clarithromycin]; Ceclor [cefaclor]; and Cephalexin   Fall Risk Score: 0 (? 5 = High Risk)  MD Orders: PT eval and treat MEDICAL/REFERRING DIAGNOSIS:  Other cervical disc degeneration, unspecified cervical region [M50.30]   DATE OF ONSET: 2-3 months  REFERRING PHYSICIAN: Alphonso Garcia MD  RETURN PHYSICIAN APPOINTMENT: none specified     ASSESSMENT:  Ms. Lilly Heath participated in 10 visits of PT meeting most of her goals. She reports she is over 80% better and her pain and discomfort is drastically reduced. She has improved strength and range of motion in the neck and R shoulder. She reports still having some pain, not every day, in the side of her cheek and face and she is going to mention it to her doctor. Pt understands HEP well and is discharged from PT. TREATMENT PLAN:  Effective Dates: 2018 TO 2018 (90 days). Frequency/Duration: 2 times a week for 6 weeks    GOALS: (Goals have been discussed and agreed upon with patient.)  Short-Term Functional Goals: Time Frame: 3 weeks  1. Pt will be I and compliant with initial HEP - met  2. Pt will report at least 25% reduction in symptoms - met  3. Pt will report at least 25% improvement in sleep quality - met  Discharge Goals: Time Frame: 6 weeks  1. Pt will demo at least 150 degrees of shoulder flexion and abduction with minimal pain for ability to reach for objects overhead - met  2.  Pt will demo 5/5 B UE strength for ability to carry heavy objects and perform yardwork - met  3. Pt will be able to perform housework and yardwork with max 15% modification or restriction - met  4. Pt will score no more than 19/55 on Quick DASH disability scale indicating improved function - partially met, decreased from 28/55 to 24/55  5. Pt will report sleep disturbances directly related to shoulder pain no more than 25% of the time - met  6. Pt will be I and compliant with long term HEP and symptom management - met              The information in this section was collected on 6/20/18 (except where otherwise noted). HISTORY:   History of Present Injury/Illness (Reason for Referral): Marta Lucero presents with R sided neck and shoulder pain for about the past 2-3 months, insidious onset. Pt reports is painful all the time like a toothache, but the intensity fluctuates. Doing housework and yardwork can make it worse, however it is always there. Pt had MRI, results copied from chart as follows:   \"Multilevel cervical spondylosis with moderate right neural foraminal narrowing  at C4-5, severe left and mild right neural foraminal narrowing at C5-6, and  severe left and mild right neural foraminal stenosis at C6-7.\"    Pain location: R shoulder, and side of neck  Pain levels - Current: 5/10  At worst: 8/10 Description: Throbbing, aching, constant   Increases pain: Using arm   Decreases pain: medication    Past Medical History/Comorbidities:   Ms. Sajan Echeverria  has a past medical history of Affective psychosis ([facility] [address] (07/2006); Arthritis; CAD (coronary artery disease); Chest pain (07/2006); Cholelithiasis (09/2006); Chronic depression (10/15/2014); Chronic lumbar pain (10/15/2014); Claudication [facility]) (06/2013); Coronary atherosclerosis of native coronary vessel (09/2006); Dyslipidemia (10/15/2014);  Fibromyalgia (10/15/2014); HIPOLITO (generalized anxiety disorder) (10/15/2014); GERD (gastroesophageal reflux disease) (10/15/2014); History of complete eye exam (04/01/2016); History of dental examination (06/01/2016); History of placement of stent in LAD coronary artery (08/29/2006); Hypertension; Low magnesium level; Malaise and fatigue (05/2008); Palpitations (02/05/2016); Polyarthralgia (10/15/2014); Shingles (2006); and Tobacco use disorder (112/2006). Ms. Serene Hodges  has a past surgical history that includes hx cholecystectomy (2005); pr cardiac surg procedure unlist (08/29/2006); hx tonsillectomy; pr breast surgery procedure unlisted; colonoscopy (N/A, 8/28/2017); and implant breast silicone/eq (Bilateral). Social History/Living Environment:   Lives alone    Prior Level of Function/Work/Activity: Carolina Martinez, helps take care of her sister    Current Medications:       Current Outpatient Prescriptions:     rosuvastatin (CRESTOR) 5 mg tablet, TAKE 1 TABLET BY MOUTH  DAILY, Disp: 90 Tab, Rfl: 3    omeprazole (PRILOSEC) 20 mg capsule, Take 1 Cap by mouth two (2) times a day., Disp: 180 Cap, Rfl: 3    amLODIPine (NORVASC) 5 mg tablet, Take 1 Tab by mouth daily. , Disp: 90 Tab, Rfl: 3    clonazePAM (KLONOPIN) 0.5 mg tablet, Take 1 Tab by mouth nightly as needed. Max Daily Amount: 0.5 mg., Disp: 90 Tab, Rfl: 1    predniSONE (DELTASONE) 20 mg tablet, Take 20 mg by mouth daily (with breakfast). Take 1 tablet daily; Take DOS per anesthesia protocol., Disp: , Rfl:     meloxicam (MOBIC) 7.5 mg tablet, Take 7.5 mg by mouth two (2) times a day., Disp: , Rfl:     aspirin delayed-release 81 mg tablet, Take  by mouth daily. Take DOS per anesthesia protocol., Disp: , Rfl:     ascorbic acid, vitamin C, (VITAMIN C) 500 mg tablet, Take 500 mg by mouth daily. , Disp: , Rfl:     cholecalciferol (VITAMIN D3) 1,000 unit cap, Take 5,000 Units by mouth daily. , Disp: , Rfl:     busPIRone (BUSPAR) 10 mg tablet, Take 10 mg by mouth as needed.  Take DOS per anesthesia protocol., Disp: , Rfl:     tramadol (ULTRAM) 50 mg tablet, Take 10 mg by mouth every six (6) hours as needed for Pain. Take DOS if needed per anesthesia protocol. Indications: FIBROMYALGIA, Disp: , Rfl:    Date Last Reviewed:  7/25/2018     EXAMINATION:   Observation/Postural Assessment: Forward head, increased thoracic kyphosis    Palpation: Tender to R upper trap and scapular border, tightened and shortened sub-occipitals noted    ROM:   Shoulder AROM R L   Flexion 145 160   Abduction 135 160   ER 60 80   IR WNL WNL   Extension 58 70     Strength:   Shoulder  R L   Flexion 4+ 5   Abduction 4+ 5   ER 4+ 5   IR 5 5     Elbow  R L   Flexion 5 5   Extension 5 5       Special Tests: Barry-Kg and Neer's test painful on R grossly in the R shoulder    Neurological Screen: Sensation intact and equal bilaterally UEs       CLINICAL DECISION MAKING:   Outcome Measure: Tool Used: Disabilities of the Arm, Shoulder and Hand (DASH) Questionnaire - Quick Version  Score:  Initial: 28/55  Most Recent: 24/55 (Date: 7/25/18 )   Interpretation of Score: The DASH is designed to measure the activities of daily living in person's with upper extremity dysfunction or pain. Each section is scored on a 1-5 scale, 5 representing the greatest disability. The scores of each section are added together for a total score of 55. Score 11 12-19 20-28 29-37 38-45 46-54 55   Modifier CH CI CJ CK CL CM CN     ? Carrying, Moving, and Handling Objects:     - CURRENT STATUS: CJ - 20%-39% impaired, limited or restricted    - GOAL STATUS: CI - 1%-19% impaired, limited or restricted    - D/C STATUS:  CJ - 20%-39% impaired, limited or restricted     **Pt reports feeling over 80% improved despite only improving 4 points on Quick DASH. **              TREATMENT:   (In addition to Assessment/Re-Assessment sessions the following treatments were rendered)      Present Symptoms/Complaints - 7/25/2018:  Pt reports she is at least 80% better since starting therapy.   She has a little bit of remaining soreness in her shoulder and sometimes her neck but she thinks it is because of lifting/carrying things and doing things around the house. Pain: Initial:   Pain Intensity 1: 1  Post Session: 1/10       Therapeutic Exercise: (40 minutes):  Exercises per grid below to improve mobility, strength and balance. Required moderate visual, verbal and tactile cues to promote proper body alignment, promote proper body posture and promote proper body mechanics. Progressed resistance, range and repetitions as indicated. Date:  7/5/18 Date:  7/9/18 Date:  7/12/18 Date:  7/16/18 Date:  7/19/18 Date:  7/23/18 Date:  7/25/18   Activity/Exercise          Pt education      Instructions for HEP    UBE L2.5 5/5 L1 5/5 L2.5 5/5 L4.0 5/5 L4.0 5/5 L4.0 5/5 L4.0 5/5   Wand flexion 1# 20x         Pulley 10s holds x10         Shld ER with band RTB 30x         Standing shld abduction 1# 3 x 10      Full AROM 10x B   Shld extension       10x B   Shld IR behind back       AAROM w/ other hand 10x, R   Cerv rotation       5s holds x10 B   UT stretch       30s hold B     Treatment/Session Assessment:  Pt understands HEP well. Access Code: [email or web address]   URL: [email or web address]   Exercises   Shoulder Horizontal Abduction - Thumbs Up - 10 reps - 3 sets - 1x daily   Standing Shoulder Internal Rotation Stretch Behind Back - 10 reps - 10s hold - 1x daily   Standing Shoulder Extension with Dowel - 10 reps - 1x daily   Standing Cervical Rotation AROM - 10 reps - 5s hold   Seated Cervical Sidebending Stretch - 1 reps - 20s hold       Total Treatment Duration: 40 min  PT Patient Time In/Time Out  Time In: 1105  Time Out: [address] [facility]

## 2018-07-30 ENCOUNTER — APPOINTMENT (OUTPATIENT)
Dept: PHYSICAL THERAPY | Age: 68
End: 2018-07-30
Payer: MEDICARE

## 2018-08-27 ENCOUNTER — HOSPITAL ENCOUNTER (OUTPATIENT)
Dept: LAB | Age: 68
Discharge: HOME | End: 2018-08-27
Payer: MEDICARE

## 2018-08-27 DIAGNOSIS — I25.10 CORONARY ARTERY DISEASE INVOLVING NATIVE CORONARY ARTERY OF NATIVE HEART WITHOUT ANGINA PECTORIS: ICD-10-CM

## 2018-08-27 LAB
ANION GAP SERPL CALC-SCNC: 11 MMOL/L
BASOPHILS # BLD: 0.1 K/UL (ref 0–0.2)
BASOPHILS NFR BLD: 1 % (ref 0–2)
BUN SERPL-MCNC: 10 MG/DL (ref 8–23)
CALCIUM SERPL-MCNC: 8.8 MG/DL (ref 8.3–10.4)
CHLORIDE SERPL-SCNC: 103 MMOL/L (ref 98–107)
CO2 SERPL-SCNC: 27 MMOL/L (ref 21–32)
CREAT SERPL-MCNC: 0.8 MG/DL (ref 0.6–1)
DIFFERENTIAL METHOD BLD: ABNORMAL
EOSINOPHIL # BLD: 0.1 K/UL (ref 0–0.8)
EOSINOPHIL NFR BLD: 1 % (ref 0.5–7.8)
ERYTHROCYTE [DISTWIDTH] IN BLOOD BY AUTOMATED COUNT: 11.9 %
GLUCOSE SERPL-MCNC: 115 MG/DL (ref 65–100)
HCT VFR BLD AUTO: 42.3 % (ref 35.8–46.3)
HGB BLD-MCNC: 14.2 G/DL (ref 11.7–15.4)
IMM GRANULOCYTES # BLD: 0 K/UL (ref 0–0.5)
IMM GRANULOCYTES NFR BLD AUTO: 0 % (ref 0–5)
INR PPP: 1
LYMPHOCYTES # BLD: 1.7 K/UL (ref 0.5–4.6)
LYMPHOCYTES NFR BLD: 17 % (ref 13–44)
MCH RBC QN AUTO: 31.4 PG (ref 26.1–32.9)
MCHC RBC AUTO-ENTMCNC: 33.6 G/DL (ref 31.4–35)
MCV RBC AUTO: 93.6 FL (ref 79.6–97.8)
MONOCYTES # BLD: 0.7 K/UL (ref 0.1–1.3)
MONOCYTES NFR BLD: 7 % (ref 4–12)
NEUTS SEG # BLD: 7.4 K/UL (ref 1.7–8.2)
NEUTS SEG NFR BLD: 73 % (ref 43–78)
NRBC # BLD: 0 K/UL (ref 0–0.2)
PLATELET # BLD AUTO: 395 K/UL (ref 150–450)
PMV BLD AUTO: 9.2 FL (ref 9.4–12.3)
POTASSIUM SERPL-SCNC: 4.1 MMOL/L (ref 3.5–5.1)
PROTHROMBIN TIME: 12.9 SEC (ref 11.5–14.5)
RBC # BLD AUTO: 4.52 M/UL (ref 4.05–5.2)
SODIUM SERPL-SCNC: 141 MMOL/L (ref 136–145)
WBC # BLD AUTO: 10.1 K/UL (ref 4.3–11.1)

## 2018-08-27 PROCEDURE — 36415 COLL VENOUS BLD VENIPUNCTURE: CPT

## 2018-08-27 PROCEDURE — 80048 BASIC METABOLIC PNL TOTAL CA: CPT

## 2018-08-27 PROCEDURE — 85025 COMPLETE CBC W/AUTO DIFF WBC: CPT

## 2018-08-27 PROCEDURE — 85610 PROTHROMBIN TIME: CPT

## 2018-09-11 RX ORDER — PREDNISONE 5 MG/1
1 TABLET ORAL 3 TIMES DAILY
COMMUNITY
End: 2019-03-27 | Stop reason: ALTCHOICE

## 2018-09-11 NOTE — PROGRESS NOTES
Patient pre-assessment complete for [facility] poss with Dr Luc Coker scheduled for 18 at 8am, arrival time 6am. Patient verified using . Patient instructed to bring all home medications in labeled bottles on the day of procedure. NPO status reinforced. Patient informed to take a full dose aspirin 325mg  or 81 mg x 4 on the day of procedure. Instructed they can take all other medications excluding vitamins & supplements. Patient verbalizes understanding of all instructions & denies any questions at this time. Zoë Sequeira

## 2018-09-12 ENCOUNTER — HOSPITAL ENCOUNTER (OUTPATIENT)
Dept: CARDIAC CATH/INVASIVE PROCEDURES | Age: 68
Discharge: HOME | End: 2018-09-12
Attending: INTERNAL MEDICINE | Admitting: INTERNAL MEDICINE
Payer: MEDICARE

## 2018-09-12 VITALS
SYSTOLIC BLOOD PRESSURE: 157 MMHG | WEIGHT: 165 LBS | BODY MASS INDEX: 25.9 KG/M2 | OXYGEN SATURATION: 97 % | DIASTOLIC BLOOD PRESSURE: 85 MMHG | HEART RATE: 73 BPM | HEIGHT: 67 IN | RESPIRATION RATE: 18 BRPM

## 2018-09-12 LAB
ANION GAP SERPL CALC-SCNC: 7 MMOL/L (ref 7–16)
ATRIAL RATE: 71 BPM
BUN SERPL-MCNC: 9 MG/DL (ref 8–23)
CALCIUM SERPL-MCNC: 8.8 MG/DL (ref 8.3–10.4)
CALCULATED P AXIS, ECG09: 61 DEGREES
CALCULATED R AXIS, ECG10: 50 DEGREES
CALCULATED T AXIS, ECG11: 16 DEGREES
CHLORIDE SERPL-SCNC: 103 MMOL/L (ref 98–107)
CO2 SERPL-SCNC: 31 MMOL/L (ref 21–32)
CREAT SERPL-MCNC: 0.76 MG/DL (ref 0.6–1)
DIAGNOSIS, 93000: NORMAL
ERYTHROCYTE [DISTWIDTH] IN BLOOD BY AUTOMATED COUNT: 11.9 %
GLUCOSE SERPL-MCNC: 96 MG/DL (ref 65–100)
HCT VFR BLD AUTO: 41.7 % (ref 35.8–46.3)
HGB BLD-MCNC: 13.7 G/DL (ref 11.7–15.4)
INR PPP: 1
MCH RBC QN AUTO: 31.5 PG (ref 26.1–32.9)
MCHC RBC AUTO-ENTMCNC: 32.9 G/DL (ref 31.4–35)
MCV RBC AUTO: 95.9 FL (ref 79.6–97.8)
NRBC # BLD: 0 K/UL (ref 0–0.2)
P-R INTERVAL, ECG05: 134 MS
PLATELET # BLD AUTO: 399 K/UL (ref 150–450)
PMV BLD AUTO: 9.5 FL (ref 9.4–12.3)
POTASSIUM SERPL-SCNC: 3.5 MMOL/L (ref 3.5–5.1)
PROTHROMBIN TIME: 12.8 SEC (ref 11.5–14.5)
Q-T INTERVAL, ECG07: 428 MS
QRS DURATION, ECG06: 82 MS
QTC CALCULATION (BEZET), ECG08: 465 MS
RBC # BLD AUTO: 4.35 M/UL (ref 4.05–5.2)
SODIUM SERPL-SCNC: 141 MMOL/L (ref 136–145)
VENTRICULAR RATE, ECG03: 71 BPM
WBC # BLD AUTO: 9 K/UL (ref 4.3–11.1)

## 2018-09-12 PROCEDURE — 92928 PRQ TCAT PLMT NTRAC ST 1 LES: CPT

## 2018-09-12 PROCEDURE — 74011250636 HC RX REV CODE- 250/636

## 2018-09-12 PROCEDURE — C1769 GUIDE WIRE: HCPCS

## 2018-09-12 PROCEDURE — 74011250636 HC RX REV CODE- 250/636: Performed by: INTERNAL MEDICINE

## 2018-09-12 PROCEDURE — 74011000250 HC RX REV CODE- 250: Performed by: INTERNAL MEDICINE

## 2018-09-12 PROCEDURE — C1874 STENT, COATED/COV W/DEL SYS: HCPCS

## 2018-09-12 PROCEDURE — 74011636320 HC RX REV CODE- 636/320: Performed by: INTERNAL MEDICINE

## 2018-09-12 PROCEDURE — C1894 INTRO/SHEATH, NON-LASER: HCPCS

## 2018-09-12 PROCEDURE — 99153 MOD SED SAME PHYS/QHP EA: CPT

## 2018-09-12 PROCEDURE — 77030015766

## 2018-09-12 PROCEDURE — 85610 PROTHROMBIN TIME: CPT

## 2018-09-12 PROCEDURE — C1725 CATH, TRANSLUMIN NON-LASER: HCPCS

## 2018-09-12 PROCEDURE — 93005 ELECTROCARDIOGRAM TRACING: CPT | Performed by: INTERNAL MEDICINE

## 2018-09-12 PROCEDURE — C1887 CATHETER, GUIDING: HCPCS

## 2018-09-12 PROCEDURE — 99152 MOD SED SAME PHYS/QHP 5/>YRS: CPT

## 2018-09-12 PROCEDURE — 93458 L HRT ARTERY/VENTRICLE ANGIO: CPT

## 2018-09-12 PROCEDURE — 74011250637 HC RX REV CODE- 250/637: Performed by: INTERNAL MEDICINE

## 2018-09-12 PROCEDURE — 77030019569 HC BND COMPR RAD TERU -B

## 2018-09-12 PROCEDURE — 77030004534 HC CATH ANGI DX INFN CARD -A

## 2018-09-12 PROCEDURE — 85027 COMPLETE CBC AUTOMATED: CPT

## 2018-09-12 PROCEDURE — 74011000258 HC RX REV CODE- 258: Performed by: INTERNAL MEDICINE

## 2018-09-12 PROCEDURE — 80048 BASIC METABOLIC PNL TOTAL CA: CPT

## 2018-09-12 RX ORDER — SODIUM CHLORIDE 9 MG/ML
75 INJECTION, SOLUTION INTRAVENOUS CONTINUOUS
Status: DISCONTINUED | OUTPATIENT
Start: 2018-09-12 | End: 2018-09-12 | Stop reason: HOSPADM

## 2018-09-12 RX ORDER — ONDANSETRON 2 MG/ML
4 INJECTION INTRAMUSCULAR; INTRAVENOUS
Status: DISCONTINUED | OUTPATIENT
Start: 2018-09-12 | End: 2018-09-12 | Stop reason: HOSPADM

## 2018-09-12 RX ORDER — MIDAZOLAM HYDROCHLORIDE 1 MG/ML
.5-2 INJECTION, SOLUTION INTRAMUSCULAR; INTRAVENOUS
Status: DISCONTINUED | OUTPATIENT
Start: 2018-09-12 | End: 2018-09-12 | Stop reason: HOSPADM

## 2018-09-12 RX ORDER — ATORVASTATIN CALCIUM 40 MG/1
40 TABLET, FILM COATED ORAL
Qty: 30 TAB | Refills: 11 | OUTPATIENT
Start: 2018-09-12 | End: 2018-09-12

## 2018-09-12 RX ORDER — GUAIFENESIN 100 MG/5ML
81 LIQUID (ML) ORAL DAILY
Status: DISCONTINUED | OUTPATIENT
Start: 2018-09-12 | End: 2018-09-12 | Stop reason: SDUPTHER

## 2018-09-12 RX ORDER — GUAIFENESIN 100 MG/5ML
81-324 LIQUID (ML) ORAL ONCE
Status: DISCONTINUED | OUTPATIENT
Start: 2018-09-12 | End: 2018-09-12 | Stop reason: HOSPADM

## 2018-09-12 RX ORDER — ATORVASTATIN CALCIUM 40 MG/1
40 TABLET, FILM COATED ORAL
Status: DISCONTINUED | OUTPATIENT
Start: 2018-09-12 | End: 2018-09-12

## 2018-09-12 RX ORDER — SODIUM CHLORIDE 0.9 % (FLUSH) 0.9 %
5-10 SYRINGE (ML) INJECTION EVERY 8 HOURS
Status: DISCONTINUED | OUTPATIENT
Start: 2018-09-12 | End: 2018-09-12 | Stop reason: HOSPADM

## 2018-09-12 RX ORDER — LORAZEPAM 1 MG/1
1 TABLET ORAL
Status: DISCONTINUED | OUTPATIENT
Start: 2018-09-12 | End: 2018-09-12 | Stop reason: HOSPADM

## 2018-09-12 RX ORDER — NITROGLYCERIN 0.4 MG/1
0.4 TABLET SUBLINGUAL
Qty: 25 TAB | Refills: 11 | OUTPATIENT
Start: 2018-09-12 | End: 2021-01-28 | Stop reason: SDUPTHER

## 2018-09-12 RX ORDER — DIAZEPAM 5 MG/1
5 TABLET ORAL ONCE
Status: DISCONTINUED | OUTPATIENT
Start: 2018-09-12 | End: 2018-09-12 | Stop reason: HOSPADM

## 2018-09-12 RX ORDER — LIDOCAINE HYDROCHLORIDE 10 MG/ML
10-30 INJECTION INFILTRATION; PERINEURAL ONCE
Status: COMPLETED | OUTPATIENT
Start: 2018-09-12 | End: 2018-09-12

## 2018-09-12 RX ORDER — MORPHINE SULFATE 2 MG/ML
2 INJECTION, SOLUTION INTRAMUSCULAR; INTRAVENOUS
Status: DISCONTINUED | OUTPATIENT
Start: 2018-09-12 | End: 2018-09-12 | Stop reason: HOSPADM

## 2018-09-12 RX ORDER — HEPARIN SODIUM 200 [USP'U]/100ML
3 INJECTION, SOLUTION INTRAVENOUS CONTINUOUS
Status: DISCONTINUED | OUTPATIENT
Start: 2018-09-12 | End: 2018-09-12 | Stop reason: HOSPADM

## 2018-09-12 RX ORDER — FENTANYL CITRATE 50 UG/ML
25-75 INJECTION, SOLUTION INTRAMUSCULAR; INTRAVENOUS
Status: DISCONTINUED | OUTPATIENT
Start: 2018-09-12 | End: 2018-09-12 | Stop reason: HOSPADM

## 2018-09-12 RX ORDER — SODIUM CHLORIDE 0.9 % (FLUSH) 0.9 %
5-10 SYRINGE (ML) INJECTION AS NEEDED
Status: DISCONTINUED | OUTPATIENT
Start: 2018-09-12 | End: 2018-09-12 | Stop reason: HOSPADM

## 2018-09-12 RX ORDER — BIVALIRUDIN 250 MG/5ML
0.75 INJECTION, POWDER, LYOPHILIZED, FOR SOLUTION INTRAVENOUS ONCE
Status: COMPLETED | OUTPATIENT
Start: 2018-09-12 | End: 2018-09-12

## 2018-09-12 RX ORDER — GUAIFENESIN 100 MG/5ML
81 LIQUID (ML) ORAL DAILY
Status: DISCONTINUED | OUTPATIENT
Start: 2018-09-13 | End: 2018-09-12 | Stop reason: HOSPADM

## 2018-09-12 RX ORDER — NITROGLYCERIN 0.4 MG/1
0.4 TABLET SUBLINGUAL
Status: DISCONTINUED | OUTPATIENT
Start: 2018-09-12 | End: 2018-09-12 | Stop reason: HOSPADM

## 2018-09-12 RX ORDER — ACETAMINOPHEN 325 MG/1
650 TABLET ORAL
Status: DISCONTINUED | OUTPATIENT
Start: 2018-09-12 | End: 2018-09-12 | Stop reason: HOSPADM

## 2018-09-12 RX ORDER — MAG HYDROX/ALUMINUM HYD/SIMETH 200-200-20
30 SUSPENSION, ORAL (FINAL DOSE FORM) ORAL
Status: COMPLETED | OUTPATIENT
Start: 2018-09-12 | End: 2018-09-12

## 2018-09-12 RX ORDER — ROSUVASTATIN CALCIUM 5 MG/1
5 TABLET, COATED ORAL
COMMUNITY
End: 2018-09-25 | Stop reason: SDUPTHER

## 2018-09-12 RX ADMIN — FENTANYL CITRATE 25 MCG: 50 INJECTION, SOLUTION INTRAMUSCULAR; INTRAVENOUS at 09:58

## 2018-09-12 RX ADMIN — HEPARIN SODIUM 3 UNITS/HR: 200 INJECTION, SOLUTION INTRAVENOUS at 09:47

## 2018-09-12 RX ADMIN — HEPARIN SODIUM 2 ML: 10000 INJECTION, SOLUTION INTRAVENOUS; SUBCUTANEOUS at 10:01

## 2018-09-12 RX ADMIN — TICAGRELOR 180 MG: 90 TABLET ORAL at 10:25

## 2018-09-12 RX ADMIN — IOPAMIDOL 114 ML: 755 INJECTION, SOLUTION INTRAVENOUS at 10:24

## 2018-09-12 RX ADMIN — LIDOCAINE HYDROCHLORIDE 5 ML: 10 INJECTION, SOLUTION INFILTRATION; PERINEURAL at 10:00

## 2018-09-12 RX ADMIN — FENTANYL CITRATE 25 MCG: 50 INJECTION, SOLUTION INTRAMUSCULAR; INTRAVENOUS at 09:00

## 2018-09-12 RX ADMIN — BIVALIRUDIN 56 MG: 250 INJECTION, POWDER, LYOPHILIZED, FOR SOLUTION INTRAVENOUS at 10:11

## 2018-09-12 RX ADMIN — SODIUM CHLORIDE 75 ML/HR: 900 INJECTION, SOLUTION INTRAVENOUS at 07:37

## 2018-09-12 RX ADMIN — ONDANSETRON 4 MG: 2 INJECTION INTRAMUSCULAR; INTRAVENOUS at 12:05

## 2018-09-12 RX ADMIN — MIDAZOLAM HYDROCHLORIDE 1 MG: 1 INJECTION, SOLUTION INTRAMUSCULAR; INTRAVENOUS at 11:00

## 2018-09-12 RX ADMIN — NITROGLYCERIN 0.1 MG: 200 INJECTION, SOLUTION INTRAVENOUS at 10:22

## 2018-09-12 RX ADMIN — BIVALIRUDIN 1.75 MG/KG/HR: 250 INJECTION, POWDER, LYOPHILIZED, FOR SOLUTION INTRAVENOUS at 10:11

## 2018-09-12 RX ADMIN — MIDAZOLAM HYDROCHLORIDE 1 MG: 1 INJECTION, SOLUTION INTRAMUSCULAR; INTRAVENOUS at 10:00

## 2018-09-12 RX ADMIN — ALUMINUM HYDROXIDE, MAGNESIUM HYDROXIDE, AND SIMETHICONE 30 ML: 200; 200; 20 SUSPENSION ORAL at 10:25

## 2018-09-12 NOTE — DISCHARGE SUMMARY
[facility] Cardiology Discharge Summary     Patient ID:  Kashmir Cornejo  [id number]  79 y.o.  1950    Admit date: 9/12/2018    Discharge date and time:  9-    Admitting Physician: Tk Rodrigez MD     Discharge Physician: Leah Miranda PA-C/Dr. Timothy Walton    Admission Diagnoses: CAD (coronary artery disease) [I25.10]    Discharge Diagnoses:   Patient Active Problem List    Diagnosis Date Noted    Temporal arteritis ([address] 01/31/2018    Coronary artery disease involving native coronary artery of native heart without angina pectoris 03/03/2017    Essential hypertension 11/08/2016    Chronic depression 10/15/2014    Fibromyalgia 10/15/2014    Dyslipidemia 10/15/2014    HIPOLITO (generalized anxiety disorder) 10/15/2014    Chronic lumbar pain 10/15/2014    Polyarthralgia 10/15/2014    GERD (gastroesophageal reflux disease) 10/15/2014       Cardiology Procedures this admission:  Left heart catheterization with PCI  Consults: None    Hospital Course: Pt was admitted with complaints of chest pain. [place] [facility] [place] [facility] [place]. [place] [facility] [place] taken to the cath lab by Dr. Timothy Walton. Pt was found to have a OM2 stenosis that was stented with a 2.25x18 mm Resolute Morley JILLIAN with 0% residual stenosis. Pt tolerated the procedure well and was taken to the recovery area for same day DC per Dr. Timothy Walton. After TR band was removed, Pt was up feeling well without any complaints of CP, SOB or palpitations. Pt's right radial cath site was clean, dry and intact without hematoma or bruit. Pt's labs were WNL. Pt was seen and examined by Dr. Timothy Walton and determined stable and ready for discharge. Pt was instructed on the importance of taking aspirin and Brilinta everyday without missing a dose. Pt will need to take dual antiplatelet therapy for at least one year. Pt was also started on Statin.   DISPOSITION: The patient is being discharged home in stable condition on a low saturated fat, low cholesterol and low salt diet. Pt is instructed to advance activities as tolerated limited to fatigue or shortness of breath. Pt is instructed to do no heavy lifting, straining, stooping or squatting for 5 days. Pt is instructed to watch cath site for bleeding/oozing, if seen Pt is instructed to apply firm pressure with clean cloth and call office at [phone]. Pt is instructed to watch for signs of infection which include increasing area of redness around site, fever/hot to touch or purulent drainage. Pt is instructed not to soak in a tub bath for 1 week, but it is okay to shower. Pt is instructed to call office or return to ER for immediate evaluation of any shortness of breath or chest pain not relieved by NTG. Follow up with [address] [facility] [address] Carlos Ca on Wednesday 10/10 at 3:30 PM [place] office  Pt is being referred to out patient cardiac rehab. Discharge Exam:   Visit Vitals    /77    Pulse 70    Resp 16    Ht 5' 7\" (1.702 m)    Wt 74.8 kg (165 lb)    SpO2 94%    Breastfeeding No    BMI 25.84 kg/m2   Pt has been seen by Dr. Carlos Ca: see his progress note for exam details.     Recent Results (from the past 24 hour(s))   CBC W/O DIFF    Collection Time: 09/12/18  7:26 AM   Result Value Ref Range    WBC 9.0 4.3 - 11.1 K/uL    RBC 4.35 4.05 - 5.2 M/uL    HGB 13.7 11.7 - 15.4 g/dL    HCT 41.7 35.8 - 46.3 %    MCV 95.9 79.6 - 97.8 FL    MCH 31.5 26.1 - 32.9 PG    MCHC 32.9 31.4 - 35.0 g/dL    RDW 11.9 %    PLATELET 980 872 - 713 K/uL    MPV 9.5 9.4 - 12.3 FL    ABSOLUTE NRBC 0.00 0.0 - 0.2 K/uL   METABOLIC PANEL, BASIC    Collection Time: 09/12/18  7:26 AM   Result Value Ref Range    Sodium 141 136 - 145 mmol/L    Potassium 3.5 3.5 - 5.1 mmol/L    Chloride 103 98 - 107 mmol/L    CO2 31 21 - 32 mmol/L    Anion gap 7 7 - 16 mmol/L    Glucose 96 65 - 100 mg/dL    BUN 9 8 - 23 MG/DL    Creatinine 0.76 0.6 - 1.0 MG/DL    GFR est AA >60 >60 ml/min/1.73m2    GFR est non-AA >60 >60 ml/min/1.73m2 Calcium 8.8 8.3 - 10.4 MG/DL   PROTHROMBIN TIME + INR    Collection Time: 09/12/18  7:26 AM   Result Value Ref Range    Prothrombin time 12.8 11.5 - 14.5 sec    INR 1.0     EKG, 12 LEAD, INITIAL    Collection Time: 09/12/18 11:01 AM   Result Value Ref Range    Ventricular Rate 71 BPM    Atrial Rate 71 BPM    P-R Interval 134 ms    QRS Duration 82 ms    Q-T Interval 428 ms    QTC Calculation (Bezet) 465 ms    Calculated P Axis 61 degrees    Calculated R Axis 50 degrees    Calculated T Axis 16 degrees    Diagnosis       Normal sinus rhythm  Nonspecific T wave abnormality  Abnormal ECG  When compared with ECG of 21-SEP-2014 10:03,  Nonspecific T wave abnormality now evident in Lateral leads           Patient Instructions:   Current Discharge Medication List      START taking these medications    Details   nitroglycerin (NITROSTAT) 0.4 mg SL tablet 1 Tab by SubLINGual route every five (5) minutes as needed for Chest Pain. Up to 3 doses. Qty: 25 Tab, Refills: 11      ticagrelor (BRILINTA) 90 mg tablet Take 1 Tab by mouth every twelve (12) hours every twelve (12) hours. Qty: 60 Tab, Refills: 11      atorvastatin (LIPITOR) 40 mg tablet Take 1 Tab by mouth nightly. Qty: 30 Tab, Refills: 11         CONTINUE these medications which have NOT CHANGED    Details   predniSONE (DELTASONE) 5 mg tablet Take 5 mg by mouth daily. omeprazole (PRILOSEC) 20 mg capsule Take 1 Cap by mouth two (2) times a day. Qty: 180 Cap, Refills: 3      amLODIPine (NORVASC) 5 mg tablet Take 1 Tab by mouth daily. Qty: 90 Tab, Refills: 3      aspirin delayed-release 81 mg tablet Take  by mouth daily. Take DOS per anesthesia protocol. busPIRone (BUSPAR) 10 mg tablet Take 10 mg by mouth as needed. clonazePAM (KLONOPIN) 0.5 mg tablet Take 1 Tab by mouth nightly as needed.  Max Daily Amount: 0.5 mg.  Qty: 90 Tab, Refills: 1    Associated Diagnoses: Chronic depression               Signed:  Ephraim Mike PA-C  9/12/2018  11:05 AM

## 2018-09-12 NOTE — PROGRESS NOTES
Report received from Mayelin Malik Cath Lab RN. Procedural findings communicated. Intra procedural  medication administration reviewed. Progression of care discussed. Patient received into [address] post sheath removal.  
 
Access site without bleeding or swelling yes Dressing dry and intact yes Patient instructed to limit movement to right upper extremity Routine post procedural vital signs and site assessment initiated yes

## 2018-09-12 NOTE — PROCEDURES
Brief Cardiac Procedure Note    Patient: Abdiaziz Barragan MRN: [id number]  SSN: [id number]    YOB: 1950  Age: 79 y.o. Sex: female      Date of Procedure: 9/12/2018     Pre-procedure Diagnosis: Typical Angina    Post-procedure Diagnosis: Coronary Artery Disease    Reason for Procedure: New Onset Angina < or = 2 Months    Procedure: Left Heart Catheterization with Percutaneous Coronary Intervention    Brief Description of Procedure: lhc via right radial, pci om, tr    Performed By: Sharon Aquino MD     Assistants: none    Anesthesia: Moderate Sedation    Estimated Blood Loss: Less than 10 mL      Specimens: None    Implants: None    Findings: ada om2, nl lvef    Complications: None    Recommendations: Continue medical therapy.  Alexandre, same day dc      Signed By: Sharon Aquino MD     September 12, 2018

## 2018-09-12 NOTE — ROUTINE PROCESS
TRANSFER - OUT REPORT: 
 
[facility] with PCI Dr. Andrés Jennings 
RRA access Versed 2mg, fentanyl 50mcg, brilinta 180mg, mylanta 30ml 
angiomax for anticoagulation, stopped @ 1030 One stent in OM 
TR band placed right wrist with 12ml air @ 1025. No bleeding or hematoma noted Verbal report given to [facility] RN(name) on Yomaira Byers  being transferred to cpru(unit) for routine progression of care Report consisted of patients Situation, Background, Assessment and  
Recommendations(SBAR). Information from the following report(s) Procedure Summary was reviewed with the receiving nurse. Lines:  
Peripheral IV 09/12/18 Left Antecubital (Active) Peripheral IV 09/12/18 Right Antecubital (Active) Opportunity for questions and clarification was provided. Patient transported with: 
 Epuls

## 2018-09-12 NOTE — DISCHARGE INSTRUCTIONS
POST PCI/STENT DISCHARGE INSTRUCTIONS    1. Check puncture site frequently for swelling or bleeding. If there is any bleeding, lie down and apply pressure over the area with a clean towel or washcloth. Notify your doctor for any redness, swelling, drainage, or oozing from the puncture site and call 911. Notify your doctor for any fever or chills. 1. If the extremity becomes cold, numb, or painful call [facility] Cardiology at [phone].    2. Activity should be limited for the next 48-72 hours. No heavy lifting (anything over 10 pounds) for 3 days. No pushing or pulling with your right arm for the next three days. 3. You may resume your usual diet. Drink more fluids than usual.    4. Have a responsible person drive you home and stay with you for at least 24 hours after your heart catheterization/angiography. No driving for 24 hours. 5. You may remove bandage from your right wrist in 24 hours. You may shower in 24 hours. No tub baths, hot tubs, or swimming for 1 week. Do not place any lotions, creams, powders, or ointments over puncture site for 1 week. You may place a clean band-aid over the puncture site each day for 5 days. Change daily. YOU ARE BEING DISCHARGED ON TWO ANTI-PLATELET MEDICATIONS    1- Aspirin 81 mg    2- Brilinta 90 mg    THESE MEDICATIONS  ARE VERY IMPORTANT TO YOUR RECOVERY AND  MUST BE TAKEN EXACTLY AS PRESCRIBED & NOT STOPPED FOR ANY REASON. THESE MAY ONLY BE STOPPED WITH AN ORDER FROM YOUR CARDIOLOGIST. PLEASE HAVE THESE FILLED IMMEDIATELY AND START TAKING tonight    YOU ARE ALSO BEING DISCHARGED ON A MEDICATION FOR CHOLESTEROL MANAGEMENT. 1- Crestor 5 mg      You have also been referred to [facility]. Someone from Cardiac Rehab will be calling you to set up a follow up appointment. If they have not called you within a week,  please call [phone].               Percutaneous Coronary Intervention: What to Expect at [facility]     Percutaneous coronary intervention (PCI) is the name for procedures that are used to open a narrowed or blocked coronary artery. The two most common PCI procedures are coronary angioplasty and coronary stent placement. Your groin or arm may have a bruise and feel sore for a day or two after a percutaneous coronary intervention (PCI). You can do light activities around the house, but nothing strenuous for several days. This care sheet gives you a general idea about how long it will take for you to recover. But each person recovers at a different pace. Follow the steps below to get better as quickly as possible. How can you care for yourself at home? Activity  · Do not do strenuous exercise and do not lift, pull, or push anything heavy until your doctor says it is okay. This may be for a day or two. You can walk around the house and do light activity, such as cooking. · You may shower 24 to 48 hours after the procedure, if your doctor okays it. Pat the incision dry. Do not take a bath for 1 week, or until your doctor tells you it is okay. · If the catheter was placed in your groin, try not to walk up stairs for the first couple of days. · If the catheter was placed in your arm near your wrist, do not bend your wrist deeply for the first couple of days. Be careful using your hand to get into and out of a chair or bed. · If your doctor recommends it, get more exercise. Walking is a good choice. Bit by bit, increase the amount you walk every day. Try for at least 30 minutes on most days of the week. Diet  · Drink plenty of fluids to help your body flush out the dye. If you have kidney, heart, or liver disease and have to limit fluids, talk with your doctor before you increase the amount of fluids you drink. · Keep eating a heart-healthy diet that has lots of fruits, vegetables, and whole grains. If you have not been eating this way, talk to your doctor. You also may want to talk to a dietitian.  This expert can help you to learn about healthy foods and plan meals. Medicines  · Your doctor will tell you if and when you can restart your medicines. He or she will also give you instructions about taking any new medicines. · If you take blood thinners, such as warfarin (Coumadin), clopidogrel (Plavix), or aspirin, be sure to talk to your doctor. He or she will tell you if and when to start taking those medicines again. Make sure that you understand exactly what your doctor wants you to do. · Your doctor will prescribe blood-thinning medicines. You will likely take aspirin plus another antiplatelet, such as clopidogrel (Plavix). It is very important that you take these medicines exactly as directed. These medicines help keep the coronary artery open and reduce your risk of a heart attack. · Call your doctor if you think you are having a problem with your medicine. Care of the catheter site  · For 1 or 2 days, keep a bandage over the spot where the catheter was inserted. The bandage probably will fall off in this time. · Put ice or a cold pack on the area for 10 to 20 minutes at a time to help with soreness or swelling. Put a thin cloth between the ice and your skin. Follow-up care is a key part of your treatment and safety. Be sure to make and go to all appointments, and call your doctor if you are having problems. It's also a good idea to know your test results and keep a list of the medicines you take. When should you call for help? Call 911 anytime you think you may need emergency care. For example, call if:  · You passed out (lost consciousness). · You have severe trouble breathing. · You have sudden chest pain and shortness of breath, or you cough up blood. · You have symptoms of a heart attack, such as:  ¨ Chest pain or pressure. ¨ Sweating. ¨ Shortness of breath. ¨ Nausea or vomiting. ¨ Pain that spreads from the chest to the neck, jaw, or one or both shoulders or arms. ¨ Dizziness or lightheadedness.   ¨ A fast or uneven pulse. After calling 911, chew 1 adult-strength aspirin. Wait for an ambulance. Do not try to drive yourself. · You have been diagnosed with angina, and you have angina symptoms that do not go away with rest or are not getting better within 5 minutes after you take one dose of nitroglycerin. Call your doctor now or seek immediate medical care if:  · You are bleeding from the area where the catheter was put in your artery. · You have a fast-growing, painful lump at the catheter site. · You have signs of infection, such as:  ¨ Increased pain, swelling, warmth, or redness. ¨ Red streaks leading from the catheter site. ¨ Pus draining from the catheter site. ¨ A fever. · Your leg or arm looks blue or feels cold, numb, or tingly. Watch closely for changes in your health, and be sure to contact your doctor if you have any problems. Where can you learn more? Go to CISSOID.be  Enter K348 in the search box to learn more about \"Percutaneous Coronary Intervention: What to Expect at Home. \"   © 9584-9771 Healthwise, Incorporated. Care instructions adapted under license by Gina Sims (which disclaims liability or warranty for this information). This care instruction is for use with your licensed healthcare professional. If you have questions about a medical condition or this instruction, always ask your healthcare professional. [address] any warranty or liability for your use of this information. Content Version: 36.0.225568; Current as of: May 22, 2015         Cardiac Rehabilitation: Care Instructions  Your Care Instructions    Cardiac rehabilitation is a program for people who have a heart problem, such as a heart attack, heart failure, or a heart valve disease. The program includes exercise, lifestyle changes, education, and emotional support. Cardiac rehab can help you improve the quality of your life through better overall health.  It can help you lose weight and feel better about yourself. On your cardiac rehab team, you may have your doctor, a nurse specialist, a dietitian, and a physical therapist. They will design your cardiac rehab program specifically for you. You will learn how to reduce your risk for heart problems, how to manage stress, and how to eat a heart-healthy diet. By the end of the program, you will be ready to maintain a healthier lifestyle on your own. Follow-up care is a key part of your treatment and safety. Be sure to make and go to all appointments, and call your doctor if you are having problems. It's also a good idea to know your test results and keep a list of the medicines you take. How can you care for yourself at home? · Take your medicines exactly as prescribed. Call your doctor if you think you are having a problem with your medicine. You will get more details on the specific medicines your doctor prescribes. · Weigh yourself every day if your doctor tells you to. Watch for sudden weight gain. Weigh yourself on the same scale with the same amount of clothing at the same time of day. · Plan your meals so that you are eating heart-healthy foods. ¨ Eat a variety of foods daily. Fresh fruits and vegetables and whole-grains are good choices. ¨ Limit your fat intake, especially saturated and trans fat. ¨ Limit salt (sodium). ¨ Increase fiber in your diet. ¨ Limit alcohol. · Learn how to take your pulse so that you can track your heart rate during exercise. · Always check with your doctor before you begin a new exercise program.  · Warm up before you exercise and cool down afterward for at least 15 minutes each. This will help your heart gradually prepare for and recover from exercise and avoid pushing your heart too hard. · Stop exercising if you have any unusual discomfort, such as chest pain. · Do not smoke. Smoking can make heart problems worse.  If you need help quitting, talk to your doctor about stop-smoking programs and medicines. These can increase your chances of quitting for good. When should you call for help? Call 911 anytime you think you may need emergency care. For example, call if:  · You have severe trouble breathing. · You cough up pink, foamy mucus and you have trouble breathing. · You have symptoms of a heart attack. These may include:  ¨ Chest pain or pressure, or a strange feeling in the chest.  ¨ Sweating. ¨ Shortness of breath. ¨ Nausea or vomiting. ¨ Pain, pressure, or a strange feeling in the back, neck, jaw, or upper belly or in one or both shoulders or arms. ¨ Lightheadedness or sudden weakness. ¨ A fast or irregular heartbeat. After you call 911, the  may tell you to chew 1 adult-strength or 2 to 4 low-dose aspirin. Wait for an ambulance. Do not try to drive yourself. · You have angina symptoms (such as chest pain or pressure) that do not go away with rest or are not getting better within 5 minutes after you take a dose of nitroglycerin. · You have symptoms of a stroke. These may include:  ¨ Sudden numbness, tingling, weakness, or loss of movement in your face, arm, or leg, especially on only one side of your body. ¨ Sudden vision changes. ¨ Sudden trouble speaking. ¨ Sudden confusion or trouble understanding simple statements. ¨ Sudden problems with walking or balance. ¨ A sudden, severe headache that is different from past headaches. · You passed out (lost consciousness). Call your doctor now or seek immediate medical care if:  · You have new or increased shortness of breath. · You are dizzy or lightheaded, or you feel like you may faint. · You gain weight suddenly, such as 3 pounds or more in 2 to 3 days. (Your doctor may suggest a different range of weight gain.)  · You have increased swelling in your legs, ankles, or feet. Watch closely for changes in your health, and be sure to contact your doctor if you have any problems. Where can you learn more?   Go to http://nicolás-gonsalo.info/. Enter R743 in the search box to learn more about \"Cardiac Rehabilitation: Care Instructions. \"  Current as of: May 5, 2016  Content Version: 11.1  © 5135-2501 [employer]. Care instructions adapted under license by Stream (which disclaims liability or warranty for this information). If you have questions about a medical condition or this instruction, always ask your healthcare professional. [address] any warranty or liability for your use of this information. MyChart Activation    Thank you for requesting access to [employer]. Please follow the instructions below to securely access and download your online medical record. [employer] allows you to send messages to your doctor, view your test results, renew your prescriptions, schedule appointments, and more. How Do I Sign Up? 1. In your internet browser, go to [email or web address]. 2. Click on the First Time User? Click Here link in the Sign In box. You will see the New Member Sign Up page. 3. Enter your [employer] Access Code exactly as it appears below. You will not need to use this code after youve completed the sign-up process. If you do not sign up before the expiration date, you must request a new code. [employer] Access Code: [email or web address]  Expires: 10/17/2018 10:45 AM (This is the date your [employer] access code will )    4. Enter the last four digits of your Social Security Number (xxxx) and Date of Birth (mm/dd/yyyy) as indicated and click Submit. You will be taken to the next sign-up page. 5. Create a [employer] ID. This will be your [employer] login ID and cannot be changed, so think of one that is secure and easy to remember. 6. Create a [employer] password. You can change your password at any time. 7. Enter your Password Reset Question and Answer. This can be used at a later time if you forget your password.    8. Enter your e-mail address. You will receive e-mail notification when new information is available in [address] 9. Click Sign Up. You can now view and download portions of your medical record. 10. Click the Download Summary menu link to download a portable copy of your medical information. Additional Information    If you have questions, please visit the Frequently Asked Questions section of the [employer] website at [email or web address]. [employer]. [email or web address] Remember, [employer] is NOT to be used for urgent needs. For medical emergencies, dial 911.

## 2018-09-12 NOTE — PROGRESS NOTES
Right radial band removed after deflation completed. No bleeding or hematoma. Site redressed with sterile gauze covered with tegaderm.

## 2018-09-12 NOTE — PROGRESS NOTES
Discharge Same Day as PCI Teaching Discharge teaching completed. Patient instructed on right radial  site care, including symptoms to report to MD, medication changes & Follow up Care to include: 1- Patient is being treated with 2 separate anti-platelet medications including aspirin 81 mg & Brilinta 90 mg. It is very important that these medications are filled today started tonight. Patient instructed on the importance of these medications & that these medications must not be stopped for any reason or without talking to the doctor first. 
2-  Patient is also being discharged on a medication for cholesterol management (ie-statin) Crestor 5 mg and instructed on the importance of continuing this medication as well. 3- Patient received a referral for Cardiac [facility]hab [facility], contact information was faxed to Cardiac rehab & patient given telephone number to contact ([phone]) Cardiac Rehab if they have not heard from them within 10 days. yes

## 2018-09-12 NOTE — PROGRESS NOTES
Discharge instructions reviewed with patient and sister. Verbalize understanding. Written instructions given.

## 2018-09-12 NOTE — PROCEDURES
[address] [facility] CATH    Faustino Paniagua  MR#: [id number]  : 1950  ACCOUNT #: [de-identified]   DATE OF SERVICE: 2018    CLINICAL INFORMATION:  Patient with worsening chest pain consistent with [place] class 4 angina with known coronary artery disease, referred for catheterization. PROCEDURE DETAILS:  After informed consent was obtained, a 6-Irish sheath was placed in the right radial artery. Radial cocktail was given by protocol. A 5-Irish Tiger catheter and angled pigtail were used for diagnostic angiography. Angioplasty and stenting of the second obtuse marginal branch was performed with a 6-Irish XB 3.0 guide, a Prowater wire, and Angiomax for anticoagulation. A TR band was placed at the end of the procedure. Conscious sedation was given beginning at 9:55, concluding at 10:20, with a total of 2 mg of Versed and 50 mcg of fentanyl, by Marco A Alex RN. Vital signs and saturation were stable throughout. FINDINGS:  The left main coronary artery is in normal anatomic position and free of significant disease, and bifurcates into the LAD and circumflex system. There is no disease in left main. The LAD has a 50% smooth narrowing in a tortuous segment of the proximal vessel, then there is a 40-50% narrowing. There is a stent in the distal segment that has mild in-stent restenosis up to 20-30%. The apical LAD is free of significant disease. The circumflex is a left dominant system with 2 mid-vessel obtuse marginal branches, the first of which has a stent with in-stent restenosis up to 50%, and then the second vessel has a 95% narrowing within the proximal segment of the vessel. The small distal obtuse marginals and left PDA are small. The left PDA has a 60-70% narrowing, but is too small for percutaneous intervention.     The right coronary artery is a small nondominant vessel with 70-80% narrowing, too small for percutaneous intervention and felt best managed medically. Left ventriculogram reveals normal LV systolic function. Ejection fraction is 55%. Left ventricular end-diastolic pressure is 15 mmHg with an opening aortic pressure of 129/73 and no gradient across the aortic valve. After Angiomax was given, a Prowater wire was placed in the distal obtuse marginal branch. The lesion was predilated with a 2.25 x 12 NC Trek, stented with a 2.25 x 18 Gurinder drug-eluting stent and then postdilated with the same 2.25 balloon with inflations to 12 atmospheres. There was 0% residual and TREVOR 3 flow. CONCLUSION:  1. Successful angioplasty and stenting of the second obtuse marginal branch. 2.  Diffuse small-vessel disease not amenable to percutaneous intervention. 3.  Preserved left ventricular systolic function. 4.  Patient was loaded with Brilinta and will be considered for same-day discharge protocol if she is stable after the procedure.       MD ARMANDO [place] / RAJANI  D: 09/12/2018 10:44     T: 09/12/2018 11:49  JOB #: [id number]

## 2018-09-12 NOTE — DISCHARGE SUMMARY
[facility] Discharge Summary     Patient ID:  Carroll Burnett  [id number]  79 y.o.  1950    Admit date: 9/12/2018    Discharge date and time:  9-    Admitting Physician: Ysabel Dutton MD     Discharge Physician: Lori Kebede PA-C/Dr. Bharti Wilcox    Admission Diagnoses: CAD (coronary artery disease) [I25.10]    Discharge Diagnoses:   Patient Active Problem List    Diagnosis Date Noted    Temporal arteritis ([address] 01/31/2018    Coronary artery disease involving native coronary artery of native heart without angina pectoris 03/03/2017    Essential hypertension 11/08/2016    Chronic depression 10/15/2014    Fibromyalgia 10/15/2014    Dyslipidemia 10/15/2014    HIPOLITO (generalized anxiety disorder) 10/15/2014    Chronic lumbar pain 10/15/2014    Polyarthralgia 10/15/2014    GERD (gastroesophageal reflux disease) 10/15/2014       Cardiology Procedures this admission:  Left heart catheterization with PCI  Consults: None    Hospital Course: Pt was admitted with complaints of chest pain. Pt was scheduled for an AM Admission LHC at [facility] on 9-12-18. Pt came in to [facility] and was taken to the cath lab by Dr. Bharti Wilcox. Pt was found to have a OM2 stenosis that was stented with a 2.25x18 mm Resolute [place] JILLIAN with 0% residual stenosis. Pt tolerated the procedure well and was taken to the recovery area for same day DC per Dr. Bharti Wilcox. After TR band was removed, Pt was up feeling well without any complaints of CP, SOB or palpitations. Pt's right radial cath site was clean, dry and intact without hematoma or bruit. Pt's labs were WNL. Pt was seen and examined by Dr. Bharti Wilcox and determined stable and ready for discharge. Pt was instructed on the importance of taking aspirin and Brilinta everyday without missing a dose. Pt will need to take dual antiplatelet therapy for at least one year. Pt was continued on home Crestor.   DISPOSITION: The patient is being discharged home in stable condition on a low saturated fat, low cholesterol and low salt diet. Pt is instructed to advance activities as tolerated limited to fatigue or shortness of breath. Pt is instructed to do no heavy lifting, straining, stooping or squatting for 5 days. Pt is instructed to watch cath site for bleeding/oozing, if seen Pt is instructed to apply firm pressure with clean cloth and call office at [phone]. Pt is instructed to watch for signs of infection which include increasing area of redness around site, fever/hot to touch or purulent drainage. Pt is instructed not to soak in a tub bath for 1 week, but it is okay to shower. Pt is instructed to call office or return to ER for immediate evaluation of any shortness of breath or chest pain not relieved by NTG. Follow up with [facility] Dr. Osvaldo Joel on Wednesday 10/10 at 3:30 PM [place] office  Pt is being referred to out patient cardiac rehab. Discharge Exam:   Visit Vitals    /70    Pulse 72    Resp 23    Ht 5' 7\" (1.702 m)    Wt 74.8 kg (165 lb)    SpO2 96%    Breastfeeding No    BMI 25.84 kg/m2   Pt has been seen by Dr. Osvaldo Joel: see his progress note for exam details.     Recent Results (from the past 24 hour(s))   CBC W/O DIFF    Collection Time: 09/12/18  7:26 AM   Result Value Ref Range    WBC 9.0 4.3 - 11.1 K/uL    RBC 4.35 4.05 - 5.2 M/uL    HGB 13.7 11.7 - 15.4 g/dL    HCT 41.7 35.8 - 46.3 %    MCV 95.9 79.6 - 97.8 FL    MCH 31.5 26.1 - 32.9 PG    MCHC 32.9 31.4 - 35.0 g/dL    RDW 11.9 %    PLATELET 216 518 - 810 K/uL    MPV 9.5 9.4 - 12.3 FL    ABSOLUTE NRBC 0.00 0.0 - 0.2 K/uL   METABOLIC PANEL, BASIC    Collection Time: 09/12/18  7:26 AM   Result Value Ref Range    Sodium 141 136 - 145 mmol/L    Potassium 3.5 3.5 - 5.1 mmol/L    Chloride 103 98 - 107 mmol/L    CO2 31 21 - 32 mmol/L    Anion gap 7 7 - 16 mmol/L    Glucose 96 65 - 100 mg/dL    BUN 9 8 - 23 MG/DL    Creatinine 0.76 0.6 - 1.0 MG/DL    GFR est AA >60 >60 ml/min/1.73m2    GFR est non-AA >60 >60 ml/min/1.73m2 Calcium 8.8 8.3 - 10.4 MG/DL   PROTHROMBIN TIME + INR    Collection Time: 09/12/18  7:26 AM   Result Value Ref Range    Prothrombin time 12.8 11.5 - 14.5 sec    INR 1.0     EKG, 12 LEAD, INITIAL    Collection Time: 09/12/18 11:01 AM   Result Value Ref Range    Ventricular Rate 71 BPM    Atrial Rate 71 BPM    P-R Interval 134 ms    QRS Duration 82 ms    Q-T Interval 428 ms    QTC Calculation (Bezet) 465 ms    Calculated P Axis 61 degrees    Calculated R Axis 50 degrees    Calculated T Axis 16 degrees    Diagnosis       Normal sinus rhythm  Nonspecific T wave abnormality  Abnormal ECG  When compared with ECG of 21-SEP-2014 10:03,  Nonspecific T wave abnormality now evident in Lateral leads  Confirmed by TRESSA SALINAS (), Ly Munoz ([id number]) on 9/12/2018 12:58:47 PM           Patient Instructions:   Current Discharge Medication List      START taking these medications    Details   nitroglycerin (NITROSTAT) 0.4 mg SL tablet 1 Tab by SubLINGual route every five (5) minutes as needed for Chest Pain. Up to 3 doses. Qty: 25 Tab, Refills: 11      ticagrelor (BRILINTA) 90 mg tablet Take 1 Tab by mouth every twelve (12) hours every twelve (12) hours. Qty: 60 Tab, Refills: 11         CONTINUE these medications which have NOT CHANGED    Details   rosuvastatin (CRESTOR) 5 mg tablet Take 5 mg by mouth nightly. predniSONE (DELTASONE) 5 mg tablet Take 5 mg by mouth daily. omeprazole (PRILOSEC) 20 mg capsule Take 1 Cap by mouth two (2) times a day. Qty: 180 Cap, Refills: 3      amLODIPine (NORVASC) 5 mg tablet Take 1 Tab by mouth daily. Qty: 90 Tab, Refills: 3      aspirin delayed-release 81 mg tablet Take  by mouth daily. Take DOS per anesthesia protocol. busPIRone (BUSPAR) 10 mg tablet Take 10 mg by mouth as needed. clonazePAM (KLONOPIN) 0.5 mg tablet Take 1 Tab by mouth nightly as needed.  Max Daily Amount: 0.5 mg.  Qty: 90 Tab, Refills: 1    Associated Diagnoses: Chronic depression             Signed:  Laly Mckeon FLAKITA  9/12/2018  11:05 AM

## 2018-09-12 NOTE — IP AVS SNAPSHOT
[address] [id number] Patient: Norbert Chris MRN: [id number] Discharge Summary 9/12/2018 Norbert Chris MRN[de-identified]  [id number] Admission Information Provider Pager Service Admission Date Expected D/C Date Nate Honeycutt MD  CARDIAC CATH LAB 9/12/2018 9/12/2018 Actual LOS Patient Class 0 days OUTPATIENT Follow-up Information Follow up With Details Comments Contact Info Nate Honeycutt MD On 10/10/2018 Wednesday 10/10 at 3:30 PM [place] office [address] [facility] [address] 
[phone] My Medications TAKE these medications as instructed Instructions Each Dose to Equal  
 Morning Noon Evening Bedtime  
 amLODIPine 5 mg tablet Commonly known as:  [place] Citron Your last dose was: Your next dose is: Take 1 Tab by mouth daily. 5 mg  
    
   
   
   
  
 aspirin delayed-release 81 mg tablet Your last dose was: Your next dose is: Take  by mouth daily. Take DOS per anesthesia protocol. busPIRone 10 mg tablet Commonly known as:  BUSPAR Your last dose was: Your next dose is: Take 10 mg by mouth as needed. 10 mg  
    
   
   
   
  
 clonazePAM 0.5 mg tablet Commonly known as:  Jansen Altes Your last dose was: Your next dose is: Take 1 Tab by mouth nightly as needed. Max Daily Amount: 0.5 mg.  
 0.5 mg  
    
   
   
   
  
 CRESTOR 5 mg tablet Generic drug:  rosuvastatin Your last dose was: Your next dose is: Take 5 mg by mouth nightly. 5 mg  
    
   
   
   
  
 nitroglycerin 0.4 mg SL tablet Commonly known as:  NITROSTAT Your last dose was: Your next dose is: 1 Tab by SubLINGual route every five (5) minutes as needed for Chest Pain. Up to 3 doses. 0.4 mg  
    
   
   
   
  
 omeprazole 20 mg capsule Commonly known as:  PRILOSEC Your last dose was: Your next dose is: Take 1 Cap by mouth two (2) times a day. 20 mg  
    
   
   
   
  
 predniSONE 5 mg tablet Commonly known as:  Cynthia Guillermo Your last dose was: Your next dose is: Take 5 mg by mouth daily. 5 mg  
    
   
   
   
  
 ticagrelor 90 mg tablet Commonly known as:  Racquel-Paz [employer] Your last dose was: Your next dose is: Take 1 Tab by mouth every twelve (12) hours every twelve (12) hours. 90 mg Where to Get Your Medications Information on where to get these meds will be given to you by the nurse or doctor. ! Ask your nurse or doctor about these medications  
  nitroglycerin 0.4 mg SL tablet  
 ticagrelor 90 mg tablet General Information Please provide this summary of care documentation to your next provider. Allergies Unspecified:  Antibiotic [Hikqm-uwdgf-skbfyhg-pramoxine]; Biaxin [Clarithromycin]; Ceclor [Cefaclor]; Cephalexin Current Immunizations  Never Reviewed Name Date Influenza Vaccine 10/6/2016, 10/1/2015, 10/3/2014, 12/17/2013, 10/5/2011 Influenza Vaccine (Quad) Mdck Pf 10/5/2017 Pneumococcal Conjugate (PCV-13) 3/7/2018 Pneumococcal Polysaccharide (PPSV-23) 2/10/2017 Discharge Instructions Discharge Instructions POST PCI/STENT DISCHARGE INSTRUCTIONS 1. Check puncture site frequently for swelling or bleeding. If there is any bleeding, lie down and apply pressure over the area with a clean towel or washcloth. Notify your doctor for any redness, swelling, drainage, or oozing from the puncture site and call 911. Notify your doctor for any fever or chills. 1. If the extremity becomes cold, numb, or painful call [facility] at [phone]. 
 
2. Activity should be limited for the next 48-72 hours. No heavy lifting (anything over 10 pounds) for 3 days. No pushing or pulling with your right arm for the next three days. 3. You may resume your usual diet. Drink more fluids than usual. 
 
4. Have a responsible person drive you home and stay with you for at least 24 hours after your heart catheterization/angiography. No driving for 24 hours. 5. You may remove bandage from your right wrist in 24 hours. You may shower in 24 hours. No tub baths, hot tubs, or swimming for 1 week. Do not place any lotions, creams, powders, or ointments over puncture site for 1 week. You may place a clean band-aid over the puncture site each day for 5 days. Change daily. YOU ARE BEING DISCHARGED ON TWO ANTI-PLATELET MEDICATIONS 1- Aspirin 81 mg 
 
2- Brilinta 90 mg 
 
THESE MEDICATIONS  ARE VERY IMPORTANT TO YOUR RECOVERY AND  MUST BE TAKEN EXACTLY AS PRESCRIBED & NOT STOPPED FOR ANY REASON. THESE MAY ONLY BE STOPPED WITH AN ORDER FROM YOUR CARDIOLOGIST. PLEASE HAVE THESE FILLED IMMEDIATELY AND START TAKING tonight YOU ARE ALSO BEING DISCHARGED ON A MEDICATION FOR CHOLESTEROL MANAGEMENT. 1- Crestor 5 mg You have also been referred to [facility]. Someone from Cardiac Rehab will be calling you to set up a follow up appointment. If they have not called you within a week,  please call [phone]. Percutaneous Coronary Intervention: What to Expect at [facility] Your Recovery Percutaneous coronary intervention (PCI) is the name for procedures that are used to open a narrowed or blocked coronary artery. The two most common PCI procedures are coronary angioplasty and coronary stent placement. Your groin or arm may have a bruise and feel sore for a day or two after a percutaneous coronary intervention (PCI).  You can do light activities around the house, but nothing strenuous for several days. This care sheet gives you a general idea about how long it will take for you to recover. But each person recovers at a different pace. Follow the steps below to get better as quickly as possible. How can you care for yourself at home? Activity · Do not do strenuous exercise and do not lift, pull, or push anything heavy until your doctor says it is okay. This may be for a day or two. You can walk around the house and do light activity, such as cooking. · You may shower 24 to 48 hours after the procedure, if your doctor okays it. Pat the incision dry. Do not take a bath for 1 week, or until your doctor tells you it is okay. · If the catheter was placed in your groin, try not to walk up stairs for the first couple of days. · If the catheter was placed in your arm near your wrist, do not bend your wrist deeply for the first couple of days. Be careful using your hand to get into and out of a chair or bed. · If your doctor recommends it, get more exercise. Walking is a good choice. Bit by bit, increase the amount you walk every day. Try for at least 30 minutes on most days of the week. Diet · Drink plenty of fluids to help your body flush out the dye. If you have kidney, heart, or liver disease and have to limit fluids, talk with your doctor before you increase the amount of fluids you drink. · Keep eating a heart-healthy diet that has lots of fruits, vegetables, and whole grains. If you have not been eating this way, talk to your doctor. You also may want to talk to a dietitian. This expert can help you to learn about healthy foods and plan meals. Medicines · Your doctor will tell you if and when you can restart your medicines. He or she will also give you instructions about taking any new medicines. · If you take blood thinners, such as warfarin (Coumadin), clopidogrel (Plavix), or aspirin, be sure to talk to your doctor.  He or she will tell you if and when to start taking those medicines again. Make sure that you understand exactly what your doctor wants you to do. · Your doctor will prescribe blood-thinning medicines. You will likely take aspirin plus another antiplatelet, such as clopidogrel (Plavix). It is very important that you take these medicines exactly as directed. These medicines help keep the coronary artery open and reduce your risk of a heart attack. · Call your doctor if you think you are having a problem with your medicine. Care of the catheter site · For 1 or 2 days, keep a bandage over the spot where the catheter was inserted. The bandage probably will fall off in this time. · Put ice or a cold pack on the area for 10 to 20 minutes at a time to help with soreness or swelling. Put a thin cloth between the ice and your skin. Follow-up care is a key part of your treatment and safety. Be sure to make and go to all appointments, and call your doctor if you are having problems. It's also a good idea to know your test results and keep a list of the medicines you take. When should you call for help? Call 911 anytime you think you may need emergency care. For example, call if: 
· You passed out (lost consciousness). · You have severe trouble breathing. · You have sudden chest pain and shortness of breath, or you cough up blood. · You have symptoms of a heart attack, such as: ¨ Chest pain or pressure. ¨ Sweating. ¨ Shortness of breath. ¨ Nausea or vomiting. ¨ Pain that spreads from the chest to the neck, jaw, or one or both shoulders or arms. ¨ Dizziness or lightheadedness. ¨ A fast or uneven pulse. After calling 911, chew 1 adult-strength aspirin. Wait for an ambulance. Do not try to drive yourself. · You have been diagnosed with angina, and you have angina symptoms that do not go away with rest or are not getting better within 5 minutes after you take one dose of nitroglycerin. Call your doctor now or seek immediate medical care if: 
· You are bleeding from the area where the catheter was put in your artery. · You have a fast-growing, painful lump at the catheter site. · You have signs of infection, such as: 
¨ Increased pain, swelling, warmth, or redness. ¨ Red streaks leading from the catheter site. ¨ Pus draining from the catheter site. ¨ A fever. · Your leg or arm looks blue or feels cold, numb, or tingly. Watch closely for changes in your health, and be sure to contact your doctor if you have any problems. Where can you learn more? Go to LoanLogics.be Enter H275 in the search box to learn more about \"Percutaneous Coronary Intervention: What to Expect at Home. \"  
© 9417-2170 Healthwise, Incorporated. Care instructions adapted under license by Dina Peña (which disclaims liability or warranty for this information). This care instruction is for use with your licensed healthcare professional. If you have questions about a medical condition or this instruction, always ask your healthcare professional. [address] [address] any warranty or liability for your use of this information. Content Version: 84.9.151763; Current as of: May 22, 2015 Cardiac Rehabilitation: Care Instructions Your Care Instructions Cardiac rehabilitation is a program for people who have a heart problem, such as a heart attack, heart failure, or a heart valve disease. The program includes exercise, lifestyle changes, education, and emotional support. Cardiac rehab can help you improve the quality of your life through better overall health. It can help you lose weight and feel better about yourself. On your cardiac rehab team, you may have your doctor, a nurse specialist, a dietitian, and a physical therapist. They will design your cardiac rehab program specifically for you.  You will learn how to reduce your risk for heart problems, how to manage stress, and how to eat a heart-healthy diet. By the end of the program, you will be ready to maintain a healthier lifestyle on your own. Follow-up care is a key part of your treatment and safety. Be sure to make and go to all appointments, and call your doctor if you are having problems. It's also a good idea to know your test results and keep a list of the medicines you take. How can you care for yourself at home? · Take your medicines exactly as prescribed. Call your doctor if you think you are having a problem with your medicine. You will get more details on the specific medicines your doctor prescribes. · Weigh yourself every day if your doctor tells you to. Watch for sudden weight gain. Weigh yourself on the same scale with the same amount of clothing at the same time of day. · Plan your meals so that you are eating heart-healthy foods. ¨ Eat a variety of foods daily. Fresh fruits and vegetables and whole-grains are good choices. ¨ Limit your fat intake, especially saturated and trans fat. ¨ Limit salt (sodium). ¨ Increase fiber in your diet. ¨ Limit alcohol. · Learn how to take your pulse so that you can track your heart rate during exercise. · Always check with your doctor before you begin a new exercise program. 
· Warm up before you exercise and cool down afterward for at least 15 minutes each. This will help your heart gradually prepare for and recover from exercise and avoid pushing your heart too hard. · Stop exercising if you have any unusual discomfort, such as chest pain. · Do not smoke. Smoking can make heart problems worse. If you need help quitting, talk to your doctor about stop-smoking programs and medicines. These can increase your chances of quitting for good. When should you call for help? Call 911 anytime you think you may need emergency care. For example, call if: 
· You have severe trouble breathing. · You cough up pink, foamy mucus and you have trouble breathing. · You have symptoms of a heart attack. These may include: ¨ Chest pain or pressure, or a strange feeling in the chest. 
¨ Sweating. ¨ Shortness of breath. ¨ Nausea or vomiting. ¨ Pain, pressure, or a strange feeling in the back, neck, jaw, or upper belly or in one or both shoulders or arms. ¨ Lightheadedness or sudden weakness. ¨ A fast or irregular heartbeat. After you call 911, the  may tell you to chew 1 adult-strength or 2 to 4 low-dose aspirin. Wait for an ambulance. Do not try to drive yourself. · You have angina symptoms (such as chest pain or pressure) that do not go away with rest or are not getting better within 5 minutes after you take a dose of nitroglycerin. · You have symptoms of a stroke. These may include: 
¨ Sudden numbness, tingling, weakness, or loss of movement in your face, arm, or leg, especially on only one side of your body. ¨ Sudden vision changes. ¨ Sudden trouble speaking. ¨ Sudden confusion or trouble understanding simple statements. ¨ Sudden problems with walking or balance. ¨ A sudden, severe headache that is different from past headaches. · You passed out (lost consciousness). Call your doctor now or seek immediate medical care if: 
· You have new or increased shortness of breath. · You are dizzy or lightheaded, or you feel like you may faint. · You gain weight suddenly, such as 3 pounds or more in 2 to 3 days. (Your doctor may suggest a different range of weight gain.) · You have increased swelling in your legs, ankles, or feet. Watch closely for changes in your health, and be sure to contact your doctor if you have any problems. Where can you learn more? Go to http://nicolás-gonsalo.info/. Enter H415 in the search box to learn more about \"Cardiac Rehabilitation: Care Instructions. \" Current as of: May 5, 2016 Content Version: 11.1 © 0285-6642 Healthwise, Incorporated. Care instructions adapted under license by [employer] (which disclaims liability or warranty for this information). If you have questions about a medical condition or this instruction, always ask your healthcare professional. [address] any warranty or liability for your use of this information. [employer] Activation Thank you for requesting access to [employer]. Please follow the instructions below to securely access and download your online medical record. [employer] allows you to send messages to your doctor, view your test results, renew your prescriptions, schedule appointments, and more. How Do I Sign Up? 1. In your internet browser, go to [email or web address]. 2. Click on the First Time User? Click Here link in the Sign In box. You will see the New Member Sign Up page. 3. Enter your [employer] Access Code exactly as it appears below. You will not need to use this code after youve completed the sign-up process. If you do not sign up before the expiration date, you must request a new code. [employer] Access Code: [email or web address] Expires: 10/17/2018 10:45 AM (This is the date your [employer] access code will ) 4. Enter the last four digits of your Social Security Number (xxxx) and Date of Birth (mm/dd/yyyy) as indicated and click Submit. You will be taken to the next sign-up page. 5. Create a [employer] ID. This will be your [employer] login ID and cannot be changed, so think of one that is secure and easy to remember. 6. Create a [employer] password. You can change your password at any time. 7. Enter your Password Reset Question and Answer. This can be used at a later time if you forget your password. 8. Enter your e-mail address. You will receive e-mail notification when new information is available in [address] 9. Click Sign Up. You can now view and download portions of your medical record. 10. Click the Download Summary menu link to download a portable copy of your medical information. Additional Information If you have questions, please visit the Frequently Asked Questions section of the [employer] website at [email or web address]. Remember, MyChart is NOT to be used for urgent needs. For medical emergencies, dial 911. Discharge Orders Procedure Order Date Status Priority Quantity Spec Type Associated Dx REFERRAL TO CARDIAC REHAB 09/12/18 1103 Normal Routine 1    
  
` Patient Signature:  ____________________________________________________________ Date:  ____________________________________________________________  
  
 Katie Hero Provider Signature:  ____________________________________________________________ Date:  ____________________________________________________________

## 2018-09-12 NOTE — PROGRESS NOTES
Patient received to [address] [place] [address] room # 10  Ambulatory from [facility]. Patient scheduled for [facility] today with Dr Amelie Narayan. Procedure reviewed & questions answered, voiced good understanding consent obtained & placed on chart. All medications and medical history reviewed. Will prep patient per orders. Patient & family updated on plan of care. The patient has a fraility score of 3-MANAGING WELL, based on patient A&Ox3, patient able to perform ADL's independently, patient able to ambulate to room without difficulty.

## 2018-09-12 NOTE — PROGRESS NOTES
Discharged to home accompanied by sister. Left unit via wheelchair. Right radial site without bleeding or hematoma.

## 2018-09-13 LAB
ATRIAL RATE: 74 BPM
CALCULATED P AXIS, ECG09: 57 DEGREES
CALCULATED R AXIS, ECG10: 43 DEGREES
CALCULATED T AXIS, ECG11: 20 DEGREES
DIAGNOSIS, 93000: NORMAL
P-R INTERVAL, ECG05: 130 MS
Q-T INTERVAL, ECG07: 422 MS
QRS DURATION, ECG06: 82 MS
QTC CALCULATION (BEZET), ECG08: 468 MS
VENTRICULAR RATE, ECG03: 74 BPM

## 2018-09-13 NOTE — PROGRESS NOTES
SAME DAY DISCHARGE FOLLOW UP PHONE CALL 
 
 
     NAME : Nori Leyva           : 1950 DATE/TIME:   18 at  9:30am                          MRN# [id number] 1. Ensure that the patient has had their new prescriptions filled & ask if they have taken their anti-platelet & aspirin that day. Pt reports that she took her brilinta last night & this am & has also taken her asa. Reinforced the importance of continuing these medications & to not stop them for any reason without talking to her cardiologist first. Voiced good understanding 2. Ask about access site. Have the patient assess for any signs of a hematoma. Ask about any pain at access site. Reports right radial without bleeding, hematoma, bruising, swelling, pain, numbness or tingling. Was in process of removing bandage & cleaning the site when I called. 3. Ask the patient if they had experienced any chest pain or shortness of breath. States \"I feel good\" denies any chest pain or shortness of breath, reports mild headache but states \"overall I feel good\" Reinforced management of chest pain, NTG use, and when to notify MD vs Call 911. Voiced good understanding.  
 
 
 
 
Samuel Sinclair RN 
18 
1:18pm

## 2018-09-18 ENCOUNTER — HOSPITAL ENCOUNTER (OUTPATIENT)
Dept: CARDIAC REHAB | Age: 68
Discharge: HOME | End: 2018-09-18

## 2018-09-18 NOTE — CARDIO/PULMONARY
Dear Dr. Jean Ward: Thank you for referring your patient, Theresa Juarez (: 1950), to the Cardiopulmonary Rehabilitation Program at [facility].  Mrs. Olmos is a good candidate for the Cardiac Rehab Program and should see improvements with regular participation. We will be addressing appropriate interventions for modifiable risk factors with your patient during the next 12 weeks. We will contact you with any issues or concerns that may arise, or you can follow your patients progress through [address] [place] [address] at any time. A final summary will be available on [facility] when the program is completed. Again, thank you for your referral. If we can be of further assistance, please feel free to contact the Cardiopulmonary Rehab staff at [phone].     Sincerely,    ANTHONY Mayers, RN  Cardiopulmonary Rehabilitation Nurse  [facility]

## 2018-09-18 NOTE — CARDIO/PULMONARY
Dear Dr. Chiquita Flores:    Your patient, Ivanna Gtz (: 1950), has taken the [facility] Depression Scale (DEVONTE-D) as part of her admission to the Cardiac Rehab program. The results of this test indicate that she may be experiencing stress and/or depression. In our discussion, when asked if she was experiencing anxiety, depression, panic attacks or poor coping with daily life, she said all of those. She also indicated that she is interested in discussing this with you and in attending our stress management class. Our medication list shows that she is taking Buspar daily and Klonopin as needed. She states that when she feels anxious, taking Klonopin as needed has been helpful for her. It is our program protocol to contact the patient's PCP if the DEVONTE-D score is greater than 16; her score was 19. We want you to be aware of her score and our discussion today. Thank you for your support and attention to this matter.     Angie Medina, SUMANTHN, RN  Cardiopulmonary Rehabilitation

## 2018-09-25 ENCOUNTER — HOSPITAL ENCOUNTER (OUTPATIENT)
Dept: CARDIAC REHAB | Age: 68
End: 2018-09-25

## 2018-09-25 PROBLEM — E78.00 PURE HYPERCHOLESTEROLEMIA: Status: ACTIVE | Noted: 2018-09-25

## 2018-09-26 ENCOUNTER — APPOINTMENT (OUTPATIENT)
Dept: CARDIAC REHAB | Age: 68
End: 2018-09-26

## 2018-09-28 ENCOUNTER — APPOINTMENT (OUTPATIENT)
Dept: CARDIAC REHAB | Age: 68
End: 2018-09-28

## 2018-10-01 ENCOUNTER — APPOINTMENT (OUTPATIENT)
Dept: CARDIAC REHAB | Age: 68
End: 2018-10-01
Payer: MEDICARE

## 2018-10-02 ENCOUNTER — HOSPITAL ENCOUNTER (OUTPATIENT)
Dept: CARDIAC REHAB | Age: 68
Discharge: HOME | End: 2018-10-02
Payer: MEDICARE

## 2018-10-02 ENCOUNTER — HOSPITAL ENCOUNTER (OUTPATIENT)
Dept: PHYSICAL THERAPY | Age: 68
Discharge: HOME | End: 2018-10-02
Attending: FAMILY MEDICINE
Payer: MEDICARE

## 2018-10-02 VITALS — BODY MASS INDEX: 26.43 KG/M2 | HEIGHT: 67 IN | WEIGHT: 168.4 LBS

## 2018-10-02 DIAGNOSIS — M54.50 ACUTE MIDLINE LOW BACK PAIN WITHOUT SCIATICA: ICD-10-CM

## 2018-10-02 PROCEDURE — 97161 PT EVAL LOW COMPLEX 20 MIN: CPT

## 2018-10-02 PROCEDURE — 97110 THERAPEUTIC EXERCISES: CPT

## 2018-10-02 PROCEDURE — G8978 MOBILITY CURRENT STATUS: HCPCS

## 2018-10-02 PROCEDURE — 93798 PHYS/QHP OP CAR RHAB W/ECG: CPT

## 2018-10-02 PROCEDURE — G8979 MOBILITY GOAL STATUS: HCPCS

## 2018-10-02 NOTE — PROGRESS NOTES
Ambulatory/Rehab Services H2 Model Falls Risk Assessment    Risk Factor Pts. ·   Confusion/Disorientation/Impulsivity  []    4 ·   Symptomatic Depression  []   2 ·   Altered Elimination  []   1 ·   Dizziness/Vertigo  []   1 ·   Gender (Male)  []   1 ·   Any administered antiepileptics (anticonvulsants):  []   2 ·   Any administered benzodiazepines:  []   1 ·   Visual Impairment (specify):  []   1 ·   Portable Oxygen Use  []   1 ·   Orthostatic ? BP  []   1 ·   History of Recent Falls (within 3 mos.)  []   5     Ability to Rise from Chair (choose one) Pts. ·   Ability to rise in a single movement  []   0 ·   Pushes up, successful in one attempt  [x]   1 ·   Multiple attempts, but successful  []   3 ·   Unable to rise without assistance  []   4   Total: (5 or greater = High Risk) 1     Falls Prevention Plan:   []                Physical Limitations to Exercise (specify):   []                Mobility Assistance Device (type):   []                Exercise/Equipment Adaptation (specify):    ©2010 [facility] Patent #[id number].  Federal Law prohibits the replication, distribution or use without written permission from [facility] PF

## 2018-10-02 NOTE — THERAPY EVALUATION
Taylor Casey  : 1950  Payor: CARE IMPROVEMENT PLUS / Plan[place] [place] / Product Type: Managed Care Medicare /    [address] [place] [address]  at [address]  [address], [address], [place]  [phone]   [phone]         OUTPATIENT PHYSICAL THERAPY:Initial Assessment and Daily Note 10/2/2018    ICD-10: Treatment Diagnosis: Low back pain (M54.5); Unsteadiness on feet (R26.81)  Precautions/Allergies:   Antibiotic [npbxf-zcaen-qbvzrwh-pramoxine]; Biaxin [clarithromycin]; Ceclor [cefaclor]; and Cephalexin   Fall Risk Score: 2 (? 5 = High Risk)  MD Orders: PT eval and tx MEDICAL/REFERRING DIAGNOSIS:  Acute midline low back pain without sciatica [M54.5]   DATE OF ONSET: Not sure  REFERRING PHYSICIAN: Eric Augustin MD  RETURN PHYSICIAN APPOINTMENT: None scheduled     INITIAL ASSESSMENT:  Ms. Megan Diaz presents to PT eval w/ c/o LB pain and LE weakness. 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 She displayed tightness in her hips, decreased LE strength, and decreased balance. She will benefit from continued skilled PT services to improve her deficits listed below and to progress towards her PLOF. PROBLEM LIST (Impacting functional limitations):  1. Decreased Strength  2. Decreased ADL/Functional Activities  3. Decreased Transfer Abilities  4. Decreased Balance  5. Increased Pain  6. Decreased Flexibility/Joint Mobility  7. Decreased [place] with Home Exercise Program INTERVENTIONS PLANNED:  1. Balance Exercise  2. Cold  3. Heat  4. Home Exercise Program (HEP)  5. Manual Therapy  6. Range of Motion (ROM)  7. Therapeutic Exercise/Strengthening   TREATMENT PLAN:  Effective Dates: 10/2/2018 TO 2018 (60 days). Frequency/Duration: 2 times a week for 60 Days  GOALS: (Goals have been discussed and agreed upon with patient.)  Discharge Goals: Time Frame: 5 weeks  1.  Pt will be independent w/ HEP in order to improve outcomes and decrease pain. 2. Pt will report pain of 3/10 or less while performing ADLs in order to improve functional mobility. 3. Pt will tolerate walking for 10 minutes w/ pain of 3/10 or less in order to return to PLOF. 4. Pt will have Oswestry score of 16 or less in order to report decreased pain and decreased functional impairments. 5. Pt will have L LE strength of 4+/5 or greater in order to increase her safety w/ functional mobility. Rehabilitation Potential For Stated Goals: Good  Regarding Gold Olmos's therapy, I certify that the treatment plan above will be carried out by a therapist or under their direction. Thank you for this referral,  Bebe Christine PT     Referring Physician Signature: Samuel Traore MD              Date                    The information in this section was collected on 10/2/18 (except where otherwise noted). HISTORY:   History of Present Injury/Illness (Reason for Referral):  Pt is unsure of how her back pain started. 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 Past MRI shows disc bulges in lumbar spine. Pt also notes leg pain. It is of note that she is doing cardiac rehab. Past Medical History/Comorbidities:   Ms. Ria Han  has a past medical history of Affective psychosis ([facility] [address] (07/2006); Arthritis; CAD (coronary artery disease); CAD (coronary artery disease) (09/12/2018); Chest pain (07/2006); Cholelithiasis (09/2006); Chronic depression (10/15/2014); Chronic lumbar pain (10/15/2014); Claudication [facility]) (06/2013); Coronary atherosclerosis of native coronary vessel (09/2006); Dyslipidemia (10/15/2014); Fibromyalgia (10/15/2014); HIPOLITO (generalized anxiety disorder) (10/15/2014); GERD (gastroesophageal reflux disease) (10/15/2014); History of complete eye exam (04/01/2016); History of dental examination (06/01/2016); History of placement of stent in LAD coronary artery (08/29/2006); Hypertension;  Low magnesium level; Malaise and fatigue (05/2008); Palpitations (02/05/2016); Polyarthralgia (10/15/2014); Shingles (2006); and Tobacco use disorder (112/2006). She also has no past medical history of Adverse effect of anesthesia; Aneurysm ([address] Arrhythmia; Asthma; Autoimmune disease ([address] Cancer ([address] Chronic kidney disease; Chronic obstructive pulmonary disease ([address] Chronic pain; Coagulation disorder ([address] Diabetes ([address] Difficult intubation; Endocarditis; Heart failure ([address] Liver disease; Malignant hyperthermia due to anesthesia; Morbid obesity ([address] Nausea & vomiting; Nicotine vapor product user; Non-nicotine vapor product user; Pseudocholinesterase deficiency; PUD (peptic ulcer disease); Rheumatic fever; Seizures ([address] Sleep apnea; Stroke [facility]); Thromboembolus ([address] or Thyroid disease. Ms. Bundy Adjutant  has a past surgical history that includes hx cholecystectomy (2005); colonoscopy (N/A, 8/28/2017); implant breast silicone/eq (Bilateral); pr cardiac surg procedure unlist (08/29/2006); pr left heart cath,percutaneous (09/12/2018); pr breast surgery procedure unlisted (1980); hx tonsillectomy (1976); and hx other surgical.  Social History/Living Environment:    Pt lives alone in 1 story home w/ walk in shower  Prior Level of Function/Work/Activity:  Pt is retired, Does house work at home, is independent   Dominant Side:         RIGHT  Current Medications:       Current Outpatient Prescriptions:     rosuvastatin (CRESTOR) 10 mg tablet, Take 0.5 Tabs by mouth nightly., Disp: 90 Tab, Rfl: 3    ezetimibe (ZETIA) 10 mg tablet, Take 1 Tab by mouth daily. , Disp: 90 Tab, Rfl: 3    nitroglycerin (NITROSTAT) 0.4 mg SL tablet, 1 Tab by SubLINGual route every five (5) minutes as needed for Chest Pain. Up to 3 doses. , Disp: 25 Tab, Rfl: 11    ticagrelor (BRILINTA) 90 mg tablet, Take 1 Tab by mouth every twelve (12) hours every twelve (12) hours. , Disp: 60 Tab, Rfl: 11    predniSONE (DELTASONE) 5 mg tablet, Take 5 mg by mouth daily. , Disp: , Rfl:    omeprazole (PRILOSEC) 20 mg capsule, Take 1 Cap by mouth two (2) times a day. (Patient taking differently: Take 20 mg by mouth daily.), Disp: 180 Cap, Rfl: 3    amLODIPine (NORVASC) 5 mg tablet, Take 1 Tab by mouth daily. , Disp: 90 Tab, Rfl: 3    clonazePAM (KLONOPIN) 0.5 mg tablet, Take 1 Tab by mouth nightly as needed. Max Daily Amount: 0.5 mg., Disp: 90 Tab, Rfl: 1    aspirin delayed-release 81 mg tablet, Take  by mouth daily. Take DOS per anesthesia protocol., Disp: , Rfl:     busPIRone (BUSPAR) 10 mg tablet, Take 10 mg by mouth as needed. , Disp: , Rfl:    Date Last Reviewed:  10/2/2018    Number of Personal Factors/Comorbidities that affect the Plan of Care: 3+: HIGH COMPLEXITY   EXAMINATION:   Observation/Orthostatic Postural Assessment: Forward head, decreased lordosis, rounded shoulders   Palpation:          Tender to B piriformis muscles and LB muscles   Special Tests:           - tender to entire LB vertebrae   Functional Mobility:         Gait/Ambulation:  Independent, antalgic        Transfers:  Pushes up        Bed Mobility:  Independent  Balance:          Decreased on BLEs   Sensation:         Intact to BLEs w/ light touch    Joint/Muscle ROM Strength Updates   Hip flexion WFL 4-/5 L, 4/5 R    Hip extension Decreased 4-/5 B    Hip abduction WFL 4-/5 L, 4/5 R    Knee extension [facility]    Knee flexion [facility]    DF [facility] WFL         Body Structures Involved:  1. Nerves  2. Bones  3. Joints  4. Muscles Body Functions Affected:  1. Sensory/Pain  2. Neuromusculoskeletal  3. Movement Related Activities and Participation Affected:  1. General Tasks and Demands  2. Mobility  3. Self Care  4. Domestic Life  5. Interpersonal Interactions and Relationships  6.  Community, Social and [place]   Number of elements (examined above) that affect the Plan of Care: 3: MODERATE COMPLEXITY   CLINICAL PRESENTATION:   Presentation: Stable and uncomplicated: LOW COMPLEXITY   CLINICAL DECISION MAKING: Outcome Measure: Tool Used: Modified Oswestry Low Back Pain Questionnaire  Score:  Initial: 20/50  Most Recent: X/50 (Date: -- )   Interpretation of Score: Each section is scored on a 0-5 scale, 5 representing the greatest disability. The scores of each section are added together for a total score of 50. Score 0 1-10 11-20 21-30 31-40 41-49 50   Modifier CH CI CJ CK CL CM CN     ? Mobility - Walking and Moving Around:     - CURRENT STATUS: CJ - 20%-39% impaired, limited or restricted    - GOAL STATUS: CJ - 20%-39% impaired, limited or restricted    - D/C STATUS:  ---------------To be determined---------------    Medical Necessity:   · Patient is expected to demonstrate progress in strength, range of motion, balance and coordination to increase independence with functional tasks. Reason for Services/Other Comments:  · Patient continues to demonstrate capacity to improve strength, ROM, balance, mobility which will increase independence. Use of outcome tool(s) and clinical judgement create a POC that gives a: Clear prediction of patient's progress: LOW COMPLEXITY            TREATMENT:   (In addition to Assessment/Re-Assessment sessions the following treatments were rendered)  Pre-treatment Symptoms/Complaints:  See above. Pain: Initial:     5/10 Post Session:  4/10     THERAPEUTIC EXERCISE: (25 minutes):  Exercises per grid below to improve mobility, strength, balance and coordination. Required minimal verbal and tactile cues to promote proper body alignment, promote proper body posture and promote proper body mechanics. Progressed resistance, range, repetitions and complexity of movement as indicated.    Date:  10/2/18 Date:   Date:     Activity/Exercise Parameters Parameters Parameters   Bridge 10x2     Cat camel 10x     Lumbar extension 10x5     Prone lying 2 mins     NuStep 3 mins, 1.5                       Treatment/Session Assessment:    · Response to Treatment:  Pt had decreased pain following therex this session. She had increased strength and decreased pain following lumbar extension. · Compliance with Program/Exercises: Will assess as treatment progresses. · Recommendations/Intent for next treatment session: \"Next visit will focus on advancements to more challenging activities\". [employer] Portal  Access Code: [email or web address]   URL: [email or web address]   Date: 10/02/2018   Prepared by: Raheem Lowry     Exercises   Cat-Camel - 10 reps - 2 sets - 5 hold - 1x daily - 3x weekly   Supine Bridge - 10 reps - 2 sets - 5 hold - 1x daily - 3x weekly   Standing Lumbar Extension with Counter - 10 reps - 1 sets - 5 hold - 5x daily - 7x weekly     Variance from POC: none  PT Patient Time In/Time Out  Time In: 1115  Time Out: 1157  Treatment time: 8 [facility],

## 2018-10-03 ENCOUNTER — HOSPITAL ENCOUNTER (OUTPATIENT)
Dept: CARDIAC REHAB | Age: 68
Discharge: HOME | End: 2018-10-03
Payer: MEDICARE

## 2018-10-03 PROCEDURE — 93798 PHYS/QHP OP CAR RHAB W/ECG: CPT

## 2018-10-05 ENCOUNTER — HOSPITAL ENCOUNTER (OUTPATIENT)
Dept: CARDIAC REHAB | Age: 68
Discharge: HOME | End: 2018-10-05
Payer: MEDICARE

## 2018-10-05 PROCEDURE — 93798 PHYS/QHP OP CAR RHAB W/ECG: CPT

## 2018-10-08 ENCOUNTER — HOSPITAL ENCOUNTER (OUTPATIENT)
Dept: CARDIAC REHAB | Age: 68
Discharge: HOME | End: 2018-10-08
Payer: MEDICARE

## 2018-10-08 PROCEDURE — 93798 PHYS/QHP OP CAR RHAB W/ECG: CPT

## 2018-10-12 ENCOUNTER — HOSPITAL ENCOUNTER (OUTPATIENT)
Dept: CARDIAC REHAB | Age: 68
Discharge: HOME | End: 2018-10-12
Payer: MEDICARE

## 2018-10-12 VITALS — WEIGHT: 168.4 LBS | BODY MASS INDEX: 26.38 KG/M2

## 2018-10-12 PROCEDURE — 93798 PHYS/QHP OP CAR RHAB W/ECG: CPT

## 2018-10-15 ENCOUNTER — HOSPITAL ENCOUNTER (OUTPATIENT)
Dept: CARDIAC REHAB | Age: 68
Discharge: HOME | End: 2018-10-15
Payer: MEDICARE

## 2018-10-15 ENCOUNTER — HOSPITAL ENCOUNTER (OUTPATIENT)
Dept: PHYSICAL THERAPY | Age: 68
Discharge: HOME | End: 2018-10-15
Attending: FAMILY MEDICINE
Payer: MEDICARE

## 2018-10-15 PROCEDURE — 97110 THERAPEUTIC EXERCISES: CPT | Performed by: PHYSICAL THERAPIST

## 2018-10-15 PROCEDURE — 93798 PHYS/QHP OP CAR RHAB W/ECG: CPT

## 2018-10-15 PROCEDURE — 97140 MANUAL THERAPY 1/> REGIONS: CPT | Performed by: PHYSICAL THERAPIST

## 2018-10-15 NOTE — PROGRESS NOTES
Brien Archer  : 1950  Payor: CARE IMPROVEMENT PLUS / Plan[place] / Product Type: Managed Care Medicare /    [address] [place] [address]  at [address]  [address], [address], [address]  [phone]   [phone]         OUTPATIENT PHYSICAL THERAPY:Daily Note 10/15/2018    ICD-10: Treatment Diagnosis: Low back pain (M54.5); Unsteadiness on feet (R26.81)  Precautions/Allergies:   Antibiotic [lefxs-zfpxo-acugdev-pramoxine]; Biaxin [clarithromycin]; Ceclor [cefaclor]; and Cephalexin   Fall Risk Score: 2 (? 5 = High Risk)  MD Orders: PT eval and tx MEDICAL/REFERRING DIAGNOSIS:  Low back pain [M54.5]   DATE OF ONSET: Not sure  REFERRING PHYSICIAN: Saba Zuluaga MD  RETURN PHYSICIAN APPOINTMENT: None scheduled     INITIAL ASSESSMENT:  Ms. Susie Hurd presents to PT eval w/ c/o LB pain and LE weakness. 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 She displayed tightness in her hips, decreased LE strength, and decreased balance. She will benefit from continued skilled PT services to improve her deficits listed below and to progress towards her PLOF. PROBLEM LIST (Impacting functional limitations):  1. Decreased Strength  2. Decreased ADL/Functional Activities  3. Decreased Transfer Abilities  4. Decreased Balance  5. Increased Pain  6. Decreased Flexibility/Joint Mobility  7. Decreased [place] with Home Exercise Program INTERVENTIONS PLANNED:  1. Balance Exercise  2. Cold  3. Heat  4. Home Exercise Program (HEP)  5. Manual Therapy  6. Range of Motion (ROM)  7. Therapeutic Exercise/Strengthening   TREATMENT PLAN:  Effective Dates: 10/2/2018 TO 2018 (60 days). Frequency/Duration: 2 times a week for 60 Days  GOALS: (Goals have been discussed and agreed upon with patient.)  Discharge Goals: Time Frame: 5 weeks  1. Pt will be independent w/ HEP in order to improve outcomes and decrease pain.    2. Pt will report pain of 3/10 or less while performing ADLs in order to improve functional mobility. 3. Pt will tolerate walking for 10 minutes w/ pain of 3/10 or less in order to return to PLOF. 4. Pt will have Oswestry score of 16 or less in order to report decreased pain and decreased functional impairments. 5. Pt will have L LE strength of 4+/5 or greater in order to increase her safety w/ functional mobility. Rehabilitation Potential For Stated Goals: Good  Regarding Martha Olmos's therapy, I certify that the treatment plan above will be carried out by a therapist or under their direction. Thank you for this referral,  Valente Rodriguez, PT                 The information in this section was collected on 10/2/18 (except where otherwise noted). HISTORY:   History of Present Injury/Illness (Reason for Referral):  Pt is unsure of how her back pain started. 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 Past MRI shows disc bulges in lumbar spine. Pt also notes leg pain. It is of note that she is doing cardiac rehab. Past Medical History/Comorbidities:   Ms. Merian Aschoff  has a past medical history of Affective psychosis ([facility] [address] (07/2006); Arthritis; CAD (coronary artery disease); CAD (coronary artery disease) (09/12/2018); Chest pain (07/2006); Cholelithiasis (09/2006); Chronic depression (10/15/2014); Chronic lumbar pain (10/15/2014); Claudication [facility]) (06/2013); Coronary atherosclerosis of native coronary vessel (09/2006); Dyslipidemia (10/15/2014); Fibromyalgia (10/15/2014); HIPOLITO (generalized anxiety disorder) (10/15/2014); GERD (gastroesophageal reflux disease) (10/15/2014); History of complete eye exam (04/01/2016); History of dental examination (06/01/2016); History of placement of stent in LAD coronary artery (08/29/2006); Hypertension; Low magnesium level; Malaise and fatigue (05/2008); Palpitations (02/05/2016); Polyarthralgia (10/15/2014); Shingles (2006); and Tobacco use disorder (112/2006).  She also has no past medical history of Adverse effect of anesthesia; Aneurysm ([facility] [address] Arrhythmia; Asthma; Autoimmune disease ([facility] [address] Cancer ([facility] [address] Chronic kidney disease; Chronic obstructive pulmonary disease ([facility] [address] Chronic pain; Coagulation disorder ([facility] [address] Diabetes ([facility] [address] Difficult intubation; Endocarditis; Heart failure ([facility] [address] Liver disease; Malignant hyperthermia due to anesthesia; Morbid obesity ([facility] [address] Nausea & vomiting; Nicotine vapor product user; Non-nicotine vapor product user; Pseudocholinesterase deficiency; PUD (peptic ulcer disease); Rheumatic fever; Seizures ([facility] [address] Sleep apnea; Stroke [facility]); Thromboembolus ([facility] [address] or Thyroid disease. Ms. Jeri Arredondo  has a past surgical history that includes hx cholecystectomy (2005); colonoscopy (N/A, 8/28/2017); implant breast silicone/eq (Bilateral); pr cardiac surg procedure unlist (08/29/2006); pr left heart cath,percutaneous (09/12/2018); pr breast surgery procedure unlisted (1980); hx tonsillectomy (1976); and hx other surgical.  Social History/Living Environment:    Pt lives alone in 1 story home w/ walk in shower  Prior Level of Function/Work/Activity:  Pt is retired, Does house work at home, is independent   Dominant Side:         RIGHT  Current Medications:       Current Outpatient Prescriptions:     rosuvastatin (CRESTOR) 10 mg tablet, Take 0.5 Tabs by mouth nightly. (Patient taking differently: Take 5 mg by mouth two (2) times a day.), Disp: 90 Tab, Rfl: 3    ezetimibe (ZETIA) 10 mg tablet, Take 1 Tab by mouth daily. , Disp: 90 Tab, Rfl: 3    nitroglycerin (NITROSTAT) 0.4 mg SL tablet, 1 Tab by SubLINGual route every five (5) minutes as needed for Chest Pain. Up to 3 doses. , Disp: 25 Tab, Rfl: 11    ticagrelor (BRILINTA) 90 mg tablet, Take 1 Tab by mouth every twelve (12) hours every twelve (12) hours. , Disp: 60 Tab, Rfl: 11    predniSONE (DELTASONE) 5 mg tablet, Take 5 mg by mouth daily. , Disp: , Rfl:     omeprazole (PRILOSEC) 20 mg capsule, Take 1 Cap by mouth two (2) times a day. (Patient taking differently: Take 20 mg by mouth daily.), Disp: 180 Cap, Rfl: 3    amLODIPine (NORVASC) 5 mg tablet, Take 1 Tab by mouth daily. , Disp: 90 Tab, Rfl: 3    clonazePAM (KLONOPIN) 0.5 mg tablet, Take 1 Tab by mouth nightly as needed. Max Daily Amount: 0.5 mg., Disp: 90 Tab, Rfl: 1    aspirin delayed-release 81 mg tablet, Take  by mouth daily. Take DOS per anesthesia protocol., Disp: , Rfl:     busPIRone (BUSPAR) 10 mg tablet, Take 10 mg by mouth as needed. , Disp: , Rfl:    Date Last Reviewed:  10/15/2018    EXAMINATION:   Observation/Orthostatic Postural Assessment: Forward head, decreased lordosis, rounded shoulders   Palpation:          Tender to B piriformis muscles and LB muscles   Special Tests:           - tender to entire LB vertebrae   Functional Mobility:         Gait/Ambulation:  Independent, antalgic        Transfers:  Pushes up        Bed Mobility:  Independent  Balance:          Decreased on BLEs   Sensation:         Intact to BLEs w/ light touch    Joint/Muscle ROM Strength Updates   Hip flexion WFL 4-/5 L, 4/5 R    Hip extension Decreased 4-/5 B    Hip abduction WFL 4-/5 L, 4/5 R    Knee extension [facility]    Knee flexion [facility]    DF [facility] WFL         Body Structures Involved:  1. Nerves  2. Bones  3. Joints  4. Muscles Body Functions Affected:  1. Sensory/Pain  2. Neuromusculoskeletal  3. Movement Related Activities and Participation Affected:  1. General Tasks and Demands  2. Mobility  3. Self Care  4. Domestic Life  5. Interpersonal Interactions and Relationships  6. Community, Social and Civic Life   CLINICAL PRESENTATION:   CLINICAL DECISION MAKING:   Outcome Measure: Tool Used: Modified Oswestry Low Back Pain Questionnaire  Score:  Initial: 20/50  Most Recent: X/50 (Date: -- )   Interpretation of Score: Each section is scored on a 0-5 scale, 5 representing the greatest disability. The scores of each section are added together for a total score of 50.     Score 0 1-10 11-20 21-30 31-40 41-49 50   Modifier CH CI CJ CK CL CM CN     ? Mobility - Walking and Moving Around:     - CURRENT STATUS: CJ - 20%-39% impaired, limited or restricted    - GOAL STATUS: CJ - 20%-39% impaired, limited or restricted    - D/C STATUS:  ---------------To be determined---------------    Medical Necessity:   · Patient is expected to demonstrate progress in strength, range of motion, balance and coordination to increase independence with functional tasks. Reason for Services/Other Comments:  · Patient continues to demonstrate capacity to improve strength, ROM, balance, mobility which will increase independence. TREATMENT:   (In addition to Assessment/Re-Assessment sessions the following treatments were rendered)  Pre-treatment Symptoms/Complaints:  Pt reports frustration that her back pain continues and is painful with standing and walking. Pain: Initial:     4-5/10 Post Session:  4/10     THERAPEUTIC EXERCISE: (35 minutes):  Exercises per grid below to improve mobility, strength, balance and coordination. Required minimal verbal and tactile cues to promote proper body alignment, promote proper body posture and promote proper body mechanics. Progressed resistance, range, repetitions and complexity of movement as indicated. MANUAL THERAPY: (15minutes):Muscle Energy Technique (MET) utilized to correct pelvic obliquity (R HS/L quads) x 2 rounds x 8 reps, STM/MFR w/ use of the stick to B posterior hips (piriformis) and B TFL/GT/ITB to aide with improving tissue mobility to decrease pain.        Date:  10/2/18 Date:  10/15/18 Date:     Activity/Exercise Parameters Parameters Parameters   Bridge 10x2 W/ TAs and add sq, 10 x 5sec    Cat camel 10x     Lumbar extension 10x5     Prone lying 2 mins     NuStep 3 mins, 1.5 10min, L2    Seated forward flexion  5x    TAs  10 x 5sec    TAs w/ add sq  10 x 5sec    Supine hip ABD  RTB, 20x    LTR  20x B in pain free range    SKTC   10 x 10sec B                Treatment/Session Assessment: Pt with R anterior innominate rotation on entry today that was easily corrected with HEP. B piriformis/TFL/ITB remain tight and tender, L > R today. This is due likely to muscle imbalances caused from malalignment. · Response to Treatment:  Pt with improved standing posture and gait mobility/fluidity and a min decrease in LB pain/stiffness after treatment. · Compliance with Program/Exercises: Pt reports compliance with HEP 2-3x/week. · Recommendations/Intent for next treatment session: \"Next visit will focus on advancements to more challenging activities\". Try foam rolling activities to promote extension. [employer] Portal  Access Code: [email or web address]   URL: [email or web address]   Date: 10/02/2018   Prepared by: Raheem Lowry     Exercises   Cat-Camel - 10 reps - 2 sets - 5 hold - 1x daily - 3x weekly   Supine Bridge - 10 reps - 2 sets - 5 hold - 1x daily - 3x weekly   Standing Lumbar Extension with Counter - 10 reps - 1 sets - 5 hold - 5x daily - 7x weekly     Variance from POC: none    Future Appointments  Date Time Provider Sarina Cunningham   10/17/2018 10:00 AM U[facility]   10/17/2018 11:00 AM [facility]   10/19/2018 10:00 AM GUS [facility]   10/22/2018 10:00 AM GUS [facility]   10/22/2018 11:00 AM [facility]   10/24/2018 10:00 AM GUS [facility]   10/24/2018 11:00 AM [facility] Inches, [facility]   10/26/2018 10:00 AM GUS [facility]   10/29/2018 10:00 AM GUS [facility]   10/29/2018 11:00 AM Raheem Inches[facility]   10/31/2018 10:00 AM GUS [facility]   10/31/2018 11:00 AM Raheem Inches, PT [facility]   11/2/2018 10:00 [facility]   11/5/2018 10:00 AM [facility]   11/5/2018 11:00 AM [facility]   11/7/2018 9:00 AM Jake Hicks RD [facility]   11/7/2018 10:00 AM Maudine Reveal, RN [facility]   11/7/2018 11:00 AM [facility]   11/9/2018 10:00 AM Maudine Reveal, RN [facility]   11/12/2018 10:00 AM [facility]   11/12/2018 11:00 AM [facility] Cres[facility]   11/14/2018 10:00 AM [facility]   11/14/2018 11:00 AM [facility] Cresco[facility]   11/16/2018 10:00 AM [facility]audine Reveal[facility]   11/19/2018 10:00 AM Maudine Reveal[facility]   11/21/2018 10:00 AM [facility]ine Reveal[facility]   11/26/2018 10:00 AM [facility] Reveal[facility]   11/28/2018 10:00 AM Maudine Reveal, [facility]   11/30/2018 10:00 AM Maudine Reveal[facility]   12/3/2018 10:00 AM Maudine Reveal[facility]   12/5/2018 10:00 AM [facility] Reveal[facility]   12/7/2018 10:00 AM [facility]   12/10/2018 10:00 AM Maudine Reveal, RN [facility]   12/12/2018 10:00 AM Maudine Reveal, RN [facility]   12/14/2018 10:00 AM Maudine Reveal, RN [facility]   12/18/2018 8:20 AM PST LAB [facility] PST PST   12/28/2018 10:00 AM Maudine Reveal, RN [facility]   1/8/2019 3:00 PM Geetha Choi MD [facility] PST PST   1/23/2019 11:45 AM MD NIKOLAS Perry       PT Patient Time In/Time Out  Time In: 1105  Time Out: 1155  Treatment time: 50 minutes  Rickey Rosales, PT

## 2018-10-17 ENCOUNTER — HOSPITAL ENCOUNTER (OUTPATIENT)
Dept: CARDIAC REHAB | Age: 68
Discharge: HOME | End: 2018-10-17
Payer: MEDICARE

## 2018-10-17 ENCOUNTER — HOSPITAL ENCOUNTER (OUTPATIENT)
Dept: PHYSICAL THERAPY | Age: 68
Discharge: HOME | End: 2018-10-17
Attending: FAMILY MEDICINE
Payer: MEDICARE

## 2018-10-17 VITALS — WEIGHT: 169 LBS | BODY MASS INDEX: 26.47 KG/M2

## 2018-10-17 PROCEDURE — 97140 MANUAL THERAPY 1/> REGIONS: CPT | Performed by: PHYSICAL THERAPIST

## 2018-10-17 PROCEDURE — 93798 PHYS/QHP OP CAR RHAB W/ECG: CPT

## 2018-10-17 PROCEDURE — 97110 THERAPEUTIC EXERCISES: CPT | Performed by: PHYSICAL THERAPIST

## 2018-10-17 NOTE — PROGRESS NOTES
Betsy Colon  : 1950  Payor: CARE IMPROVEMENT PLUS / Plan[place] / Product Type: Managed Care Medicare /    [facility]  at [facility]  [address], [address], [place]  [phone]   [phone]         OUTPATIENT PHYSICAL THERAPY:Daily Note 10/17/2018    ICD-10: Treatment Diagnosis: Low back pain (M54.5); Unsteadiness on feet (R26.81)  Precautions/Allergies:   Antibiotic [xqbpl-givhe-obnzuml-pramoxine]; Biaxin [clarithromycin]; Ceclor [cefaclor]; and Cephalexin   Fall Risk Score: 2 (? 5 = High Risk)  MD Orders: PT eval and tx MEDICAL/REFERRING DIAGNOSIS:  Low back pain [M54.5]   DATE OF ONSET: Not sure  REFERRING PHYSICIAN: Fly Hayden MD  RETURN PHYSICIAN APPOINTMENT: None scheduled     INITIAL ASSESSMENT:  Ms. Memo Davis presents to PT eval w/ c/o LB pain and LE weakness. 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 She displayed tightness in her hips, decreased LE strength, and decreased balance. She will benefit from continued skilled PT services to improve her deficits listed below and to progress towards her PLOF. PROBLEM LIST (Impacting functional limitations):  1. Decreased Strength  2. Decreased ADL/Functional Activities  3. Decreased Transfer Abilities  4. Decreased Balance  5. Increased Pain  6. Decreased Flexibility/Joint Mobility  7. Decreased [place] with Home Exercise Program INTERVENTIONS PLANNED:  1. Balance Exercise  2. Cold  3. Heat  4. Home Exercise Program (HEP)  5. Manual Therapy  6. Range of Motion (ROM)  7. Therapeutic Exercise/Strengthening   TREATMENT PLAN:  Effective Dates: 10/2/2018 TO 2018 (60 days). Frequency/Duration: 2 times a week for 60 Days  GOALS: (Goals have been discussed and agreed upon with patient.)  Discharge Goals: Time Frame: 5 weeks  1. Pt will be independent w/ HEP in order to improve outcomes and decrease pain.    2. Pt will report pain of 3/10 or less while performing ADLs in order to improve functional mobility. 3. Pt will tolerate walking for 10 minutes w/ pain of 3/10 or less in order to return to PLOF. 4. Pt will have Oswestry score of 16 or less in order to report decreased pain and decreased functional impairments. 5. Pt will have L LE strength of 4+/5 or greater in order to increase her safety w/ functional mobility. Rehabilitation Potential For Stated Goals: Good  Regarding Paige Olmos's therapy, I certify that the treatment plan above will be carried out by a therapist or under their direction. Thank you for this referral,  Maryanne Lovell PT                 The information in this section was collected on 10/2/18 (except where otherwise noted). HISTORY:   History of Present Injury/Illness (Reason for Referral):  Pt is unsure of how her back pain started. 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 Past MRI shows disc bulges in lumbar spine. Pt also notes leg pain. It is of note that she is doing cardiac rehab. Past Medical History/Comorbidities:   Ms. Nan Carmona  has a past medical history of Affective psychosis ([facility] [address] Arthritis, CAD (coronary artery disease), CAD (coronary artery disease), Chest pain, Cholelithiasis, Chronic depression, Chronic lumbar pain, Claudication ([facility] [address] Coronary atherosclerosis of native coronary vessel, Dyslipidemia, Fibromyalgia, HIPOLITO (generalized anxiety disorder), GERD (gastroesophageal reflux disease), History of complete eye exam, History of dental examination, History of placement of stent in LAD coronary artery, Hypertension, Low magnesium level, Malaise and fatigue, Palpitations, Polyarthralgia, Shingles, and Tobacco use disorder.   Ms. Nan Carmona  has a past surgical history that includes hx cholecystectomy (2005); implant breast silicone/eq (Bilateral); pr cardiac surg procedure unlist (08/29/2006); pr left heart cath,percutaneous (09/12/2018); pr breast surgery procedure unlisted (1980); hx tonsillectomy (1976); hx other surgical; COLONOSCOPY/ BMI=26 (N/A, 8/28/2017); ENDOSCOPIC POLYPECTOMY (N/A, 8/28/2017); and TEMPORAL ARTERY BIOPSY RIGHT (Right, 6/13/2017). Social History/Living Environment:    Pt lives alone in 1 story home w/ walk in shower  Prior Level of Function/Work/Activity:  Pt is retired, Does house work at home, is independent   Dominant Side:         RIGHT  Current Medications:       Current Outpatient Medications:     rosuvastatin (CRESTOR) 10 mg tablet, Take 0.5 Tabs by mouth nightly. (Patient taking differently: Take 5 mg by mouth two (2) times a day.), Disp: 90 Tab, Rfl: 3    ezetimibe (ZETIA) 10 mg tablet, Take 1 Tab by mouth daily. , Disp: 90 Tab, Rfl: 3    nitroglycerin (NITROSTAT) 0.4 mg SL tablet, 1 Tab by SubLINGual route every five (5) minutes as needed for Chest Pain. Up to 3 doses. , Disp: 25 Tab, Rfl: 11    ticagrelor (BRILINTA) 90 mg tablet, Take 1 Tab by mouth every twelve (12) hours every twelve (12) hours. , Disp: 60 Tab, Rfl: 11    predniSONE (DELTASONE) 5 mg tablet, Take 5 mg by mouth daily. , Disp: , Rfl:     omeprazole (PRILOSEC) 20 mg capsule, Take 1 Cap by mouth two (2) times a day. (Patient taking differently: Take 20 mg by mouth daily.), Disp: 180 Cap, Rfl: 3    amLODIPine (NORVASC) 5 mg tablet, Take 1 Tab by mouth daily. , Disp: 90 Tab, Rfl: 3    clonazePAM (KLONOPIN) 0.5 mg tablet, Take 1 Tab by mouth nightly as needed. Max Daily Amount: 0.5 mg., Disp: 90 Tab, Rfl: 1    aspirin delayed-release 81 mg tablet, Take  by mouth daily. Take DOS per anesthesia protocol., Disp: , Rfl:     busPIRone (BUSPAR) 10 mg tablet, Take 10 mg by mouth as needed. , Disp: , Rfl:    Date Last Reviewed:  10/17/2018    EXAMINATION:   Observation/Orthostatic Postural Assessment:           Forward head, decreased lordosis, rounded shoulders   Palpation:          Tender to B piriformis muscles and LB muscles   Special Tests:           - tender to entire LB vertebrae Functional Mobility:         Gait/Ambulation:  Independent, antalgic        Transfers:  Pushes up        Bed Mobility:  Independent  Balance:          Decreased on BLEs   Sensation:         Intact to BLEs w/ light touch    Joint/Muscle ROM Strength Updates   Hip flexion WFL 4-/5 L, 4/5 R    Hip extension Decreased 4-/5 B    Hip abduction WFL 4-/5 L, 4/5 R    Knee extension [facility]    Knee flexion [facility]    DF [facility] WFL         Body Structures Involved:  1. Nerves  2. Bones  3. Joints  4. Muscles Body Functions Affected:  1. Sensory/Pain  2. Neuromusculoskeletal  3. Movement Related Activities and Participation Affected:  1. General Tasks and Demands  2. Mobility  3. Self Care  4. Domestic Life  5. Interpersonal Interactions and Relationships  6. Community, Social and Civic Life   CLINICAL PRESENTATION:   CLINICAL DECISION MAKING:   Outcome Measure: Tool Used: Modified Oswestry Low Back Pain Questionnaire  Score:  Initial: 20/50  Most Recent: X/50 (Date: -- )   Interpretation of Score: Each section is scored on a 0-5 scale, 5 representing the greatest disability. The scores of each section are added together for a total score of 50. Score 0 1-10 11-20 21-30 31-40 41-49 50   Modifier CH CI CJ CK CL CM CN     ? Mobility - Walking and Moving Around:     - CURRENT STATUS: CJ - 20%-39% impaired, limited or restricted    - GOAL STATUS: CJ - 20%-39% impaired, limited or restricted    - D/C STATUS:  ---------------To be determined---------------    Medical Necessity:   · Patient is expected to demonstrate progress in strength, range of motion, balance and coordination to increase independence with functional tasks. Reason for Services/Other Comments:  · Patient continues to demonstrate capacity to improve strength, ROM, balance, mobility which will increase independence.             TREATMENT:   (In addition to Assessment/Re-Assessment sessions the following treatments were rendered)  Pre-treatment Symptoms/Complaints:  Pt reports her back pain and tightness continues. She reports daily compliance with HEP. Pain: Initial:     4-5/10 Post Session:  4/10     THERAPEUTIC EXERCISE: (45 minutes):  Exercises per grid below to improve mobility, strength, balance and coordination. Required minimal verbal and tactile cues to promote proper body alignment, promote proper body posture and promote proper body mechanics. Progressed resistance, range, repetitions and complexity of movement as indicated. MANUAL THERAPY: (15minutes):Muscle Energy Technique (MET) utilized to correct pelvic obliquity (R HS/L quads) x 2 rounds x 8 reps, STM/MFR w/ use of the foam roller to B posterior hips (piriformis) and B TFL/GT/ITB to aide with improving tissue mobility to decrease pain. Date:  10/2/18 Date:  10/15/18 Date:  10/17/18   Activity/Exercise Parameters Parameters Parameters   Bridge 10x2 W/ TAs and add sq, 10 x 5sec 20 x 5sec   Cat camel 10x  10x   Lumbar extension 10x5     Prone lying 2 mins painful    NuStep 3 mins, 1.5 10min, L2 10min, L2                           Supine pec stretch/ chest expansion on 1/2 foam roll   3min   Seated R side trunk stretching w/ orange ball   5 x 20sec   Seated forward flexion and each side w/ large ball   5x each dir   Seated forward flexion  5x 5x   TAs  10 x 5sec    TAs w/ add sq  10 x 5sec 20 x 5sec   Supine hip ABD  RTB, 20x RTB, 20x   LTR  20x B in pain free range 20x B in pain free range   SKTC   10 x 10sec B 5 x 10sec B   DKTC   5 x 10sec         Treatment/Session Assessment: Pt with mild R anterior innominate rotation on entry today that was easily corrected with MET as noted above. Pt less painful with STM/MFR to B hips with foam roller, rather than the stick. Pt requires verbal and visual cueing to slow activities and maintain good form and control, working in a pain free range. Pt with improved form with stabilization exercises.    · Response to Treatment:  Pt with improved standing posture and gait mobility/fluidity and a min decrease in LB pain/stiffness after treatment. · Compliance with Program/Exercises: Pt reports compliance with HEP 2-3x/week. · Recommendations/Intent for next treatment session: \"Next visit will focus on advancements to more challenging activities\". Try open book activity next week. [employer] Portal  Access Code: [email or web address]   URL: [email or web address]   Date: 10/02/2018   Prepared by: Bebe Christine     Exercises   Cat-Camel - 10 reps - 2 sets - 5 hold - 1x daily - 3x weekly   Supine Bridge - 10 reps - 2 sets - 5 hold - 1x daily - 3x weekly   Standing Lumbar Extension with Counter - 10 reps - 1 sets - 5 hold - 5x daily - 7x weekly     Variance from POC: none    Future Appointments   Date Time Provider Sarina Cunningham   10[facility]19[facility]2018 10:[facility]   10/22/2018 10:00 [facility]   10/22/2018 11:00 AM Russella Smoke[facility]   10/24/2018 10:00 AM [facility]   10/24/2018 11:00 AM Russella Smoke, [facility]   10/26/2018 10:00 AM [facility]US [facility]   10/29/2018 10:00 AM [facility]US [facility]   10/29/2018 11:00 AM Russella Smoke, PT [facility]   10/31/2018 10:00 AM [facility]US [facility]   10/31/2018 11:00 AM Russella Smoke, PT [facility]   11/2/2018 10:00 [facility]   11/5/2018 10:00 AM [facility]   11/5/2018 11:00 AM Russella Smoke, PT [facility]   11/7/2018  9:00 AM Soraida Nolasco RD [facility]   11/7/2018 10:00 AM Oliver Villarreal RN [facility]   11/7/2018 11:00 AM Daljit Sheppard PT [facility]   11/9/2018 10:00 AM Cammy Lopez, GUS [facility] 11/12/2018 10:00 AM Oliver Villarreal RN [facility]   11/12/2018 11:00 AM Russella Smoke, PT [facility]   11/14/2018 10:00 AM Oliver Villarreal RN [facility]   11/14/2018 11:00 AM Russella Smoke, PT [facility]   11/16/2018 10:00 AM [facility]   11/19/2018 10:00 AM Dg Barcenas[facility]   11/21/2018 10:00 AM Dg [facility]enas[facility]   11/26/2018 10:00 AM [facility]g [facility]   11/28/2018 10:00 AM [facility]g [facility]enas [facility]   11/30/2018 10:00 AM [facility]g [facility]enas [facility]   12/3/2018 10:00 AM Dg Barcenas, [facility]   12/5/2018 10:00 AM Dg Barcenas, [facility]   12/7/2018 10:00 AM [facility]g [facility]enas [facility]   12/10/2018 10:00 [facility]   12/12/2018 10:00 AM Dg Barcenas, [facility]   12/14/2018 10:00 [facility]   12/18/2018  8:20 AM PST LAB [facility] PST PST   12/28/2018 10:00 AM [facility]   1/8/2019  3:00 PM Samuel Traore MD [facility] PST PST   1/23/2019 11:45 AM Lissa Sethi MD [facility] Andre Pino       PT Patient Time In/Time Out  Time In: 1045  Time Out: 1145  Treatment time: 60 minutes  Brenna Bee PT

## 2018-10-19 ENCOUNTER — APPOINTMENT (OUTPATIENT)
Dept: CARDIAC REHAB | Age: 68
End: 2018-10-19
Payer: MEDICARE

## 2018-10-22 ENCOUNTER — HOSPITAL ENCOUNTER (OUTPATIENT)
Dept: PHYSICAL THERAPY | Age: 68
Discharge: HOME | End: 2018-10-22
Attending: FAMILY MEDICINE
Payer: MEDICARE

## 2018-10-22 ENCOUNTER — HOSPITAL ENCOUNTER (OUTPATIENT)
Dept: CARDIAC REHAB | Age: 68
Discharge: HOME | End: 2018-10-22
Payer: MEDICARE

## 2018-10-22 PROCEDURE — 93798 PHYS/QHP OP CAR RHAB W/ECG: CPT

## 2018-10-22 PROCEDURE — 97110 THERAPEUTIC EXERCISES: CPT

## 2018-10-22 NOTE — PROGRESS NOTES
Matthew Panda  : 1950  Payor: CARE IMPROVEMENT PLUS / [place] / Product Type: Managed Care Medicare /    [facility]  at [facility]  [address], [address], [place] [address]  [phone]   [phone]         OUTPATIENT PHYSICAL THERAPY:Daily Note 10/22/2018    ICD-10: Treatment Diagnosis: Low back pain (M54.5); Unsteadiness on feet (R26.81)  Precautions/Allergies:   Antibiotic [abdet-bjtai-leuxllf-pramoxine]; Biaxin [clarithromycin]; Ceclor [cefaclor]; and Cephalexin   Fall Risk Score: 2 (? 5 = High Risk)  MD Orders: PT eval and tx MEDICAL/REFERRING DIAGNOSIS:  Low back pain [M54.5]   DATE OF ONSET: Not sure  REFERRING PHYSICIAN: Carlos Enrique Gatica MD  RETURN PHYSICIAN APPOINTMENT: None scheduled     INITIAL ASSESSMENT:  Ms. Chava Anton presents to PT eval w/ c/o LB pain and LE weakness. 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 She displayed tightness in her hips, decreased LE strength, and decreased balance. She will benefit from continued skilled PT services to improve her deficits listed below and to progress towards her PLOF. PROBLEM LIST (Impacting functional limitations):  1. Decreased Strength  2. Decreased ADL/Functional Activities  3. Decreased Transfer Abilities  4. Decreased Balance  5. Increased Pain  6. Decreased Flexibility/Joint Mobility  7. Decreased [place] with Home Exercise Program INTERVENTIONS PLANNED:  1. Balance Exercise  2. Cold  3. Heat  4. Home Exercise Program (HEP)  5. Manual Therapy  6. Range of Motion (ROM)  7. Therapeutic Exercise/Strengthening   TREATMENT PLAN:  Effective Dates: 10/2/2018 TO 2018 (60 days). Frequency/Duration: 2 times a week for 60 Days  GOALS: (Goals have been discussed and agreed upon with patient.)  Discharge Goals: Time Frame: 5 weeks  1. Pt will be independent w/ HEP in order to improve outcomes and decrease pain.    2. Pt will report pain of 3/10 or less while performing ADLs in order to improve functional mobility. 3. Pt will tolerate walking for 10 minutes w/ pain of 3/10 or less in order to return to PLOF. 4. Pt will have Oswestry score of 16 or less in order to report decreased pain and decreased functional impairments. 5. Pt will have L LE strength of 4+/5 or greater in order to increase her safety w/ functional mobility. Rehabilitation Potential For Stated Goals: Good  Regarding Paige Olmos's therapy, I certify that the treatment plan above will be carried out by a therapist or under their direction. Thank you for this referral,  Cassie Lovell PT                 The information in this section was collected on 10/2/18 (except where otherwise noted). HISTORY:   History of Present Injury/Illness (Reason for Referral):  Pt is unsure of how her back pain started. 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 Past MRI shows disc bulges in lumbar spine. Pt also notes leg pain. It is of note that she is doing cardiac rehab. Past Medical History/Comorbidities:   Ms. Nan Carmona  has a past medical history of Affective psychosis ([facility] [address] Arthritis, CAD (coronary artery disease), CAD (coronary artery disease), Chest pain, Cholelithiasis, Chronic depression, Chronic lumbar pain, Claudication ([facility] [address] Coronary atherosclerosis of native coronary vessel, Dyslipidemia, Fibromyalgia, HIPOLITO (generalized anxiety disorder), GERD (gastroesophageal reflux disease), History of complete eye exam, History of dental examination, History of placement of stent in LAD coronary artery, Hypertension, Low magnesium level, Malaise and fatigue, Palpitations, Polyarthralgia, Shingles, and Tobacco use disorder.   Ms. Nan Carmona  has a past surgical history that includes hx cholecystectomy (2005); implant breast silicone/eq (Bilateral); pr cardiac surg procedure unlist (08/29/2006); pr left heart cath,percutaneous (09/12/2018); pr breast surgery procedure unlisted (1980); hx tonsillectomy (1976); hx other surgical; COLONOSCOPY/ BMI=26 (N/A, 8/28/2017); ENDOSCOPIC POLYPECTOMY (N/A, 8/28/2017); and TEMPORAL ARTERY BIOPSY RIGHT (Right, 6/13/2017). Social History/Living Environment:    Pt lives alone in 1 story home w/ walk in shower  Prior Level of Function/Work/Activity:  Pt is retired, Does house work at home, is independent   Dominant Side:         RIGHT  Current Medications:       Current Outpatient Medications:     rosuvastatin (CRESTOR) 10 mg tablet, Take 0.5 Tabs by mouth nightly. (Patient taking differently: Take 5 mg by mouth two (2) times a day.), Disp: 90 Tab, Rfl: 3    ezetimibe (ZETIA) 10 mg tablet, Take 1 Tab by mouth daily. , Disp: 90 Tab, Rfl: 3    nitroglycerin (NITROSTAT) 0.4 mg SL tablet, 1 Tab by SubLINGual route every five (5) minutes as needed for Chest Pain. Up to 3 doses. , Disp: 25 Tab, Rfl: 11    ticagrelor (BRILINTA) 90 mg tablet, Take 1 Tab by mouth every twelve (12) hours every twelve (12) hours. , Disp: 60 Tab, Rfl: 11    predniSONE (DELTASONE) 5 mg tablet, Take 5 mg by mouth daily. , Disp: , Rfl:     omeprazole (PRILOSEC) 20 mg capsule, Take 1 Cap by mouth two (2) times a day. (Patient taking differently: Take 20 mg by mouth daily.), Disp: 180 Cap, Rfl: 3    amLODIPine (NORVASC) 5 mg tablet, Take 1 Tab by mouth daily. , Disp: 90 Tab, Rfl: 3    clonazePAM (KLONOPIN) 0.5 mg tablet, Take 1 Tab by mouth nightly as needed. Max Daily Amount: 0.5 mg., Disp: 90 Tab, Rfl: 1    aspirin delayed-release 81 mg tablet, Take  by mouth daily. Take DOS per anesthesia protocol., Disp: , Rfl:     busPIRone (BUSPAR) 10 mg tablet, Take 10 mg by mouth as needed. , Disp: , Rfl:    Date Last Reviewed:  10/22/2018    EXAMINATION:   Observation/Orthostatic Postural Assessment:           Forward head, decreased lordosis, rounded shoulders   Palpation:          Tender to B piriformis muscles and LB muscles   Special Tests:           - tender to entire LB vertebrae Functional Mobility:         Gait/Ambulation:  Independent, antalgic        Transfers:  Pushes up        Bed Mobility:  Independent  Balance:          Decreased on BLEs   Sensation:         Intact to BLEs w/ light touch    Joint/Muscle ROM Strength Updates   Hip flexion WFL 4-/5 L, 4/5 R    Hip extension Decreased 4-/5 B    Hip abduction WFL 4-/5 L, 4/5 R    Knee extension [facility]    Knee flexion [facility]    DF [facility] WFL         Body Structures Involved:  1. Nerves  2. Bones  3. Joints  4. Muscles Body Functions Affected:  1. Sensory/Pain  2. Neuromusculoskeletal  3. Movement Related Activities and Participation Affected:  1. General Tasks and Demands  2. Mobility  3. Self Care  4. Domestic Life  5. Interpersonal Interactions and Relationships  6. Community, Social and Civic Life   CLINICAL PRESENTATION:   CLINICAL DECISION MAKING:   Outcome Measure: Tool Used: Modified Oswestry Low Back Pain Questionnaire  Score:  Initial: 20/50  Most Recent: X/50 (Date: -- )   Interpretation of Score: Each section is scored on a 0-5 scale, 5 representing the greatest disability. The scores of each section are added together for a total score of 50. Score 0 1-10 11-20 21-30 31-40 41-49 50   Modifier CH CI CJ CK CL CM CN     ? Mobility - Walking and Moving Around:     - CURRENT STATUS: CJ - 20%-39% impaired, limited or restricted    - GOAL STATUS: CJ - 20%-39% impaired, limited or restricted    - D/C STATUS:  ---------------To be determined---------------    Medical Necessity:   · Patient is expected to demonstrate progress in strength, range of motion, balance and coordination to increase independence with functional tasks. Reason for Services/Other Comments:  · Patient continues to demonstrate capacity to improve strength, ROM, balance, mobility which will increase independence.             TREATMENT:   (In addition to Assessment/Re-Assessment sessions the following treatments were rendered)  Pre-treatment Symptoms/Complaints:  Pt reports her back pain and tightness continues. She reports daily compliance with HEP. Pain: Initial:     4-5/10 Post Session:  3/10     THERAPEUTIC EXERCISE: (50 minutes ) cues for proper body posture and promote proper body mechanics. Progressed resistance, range, repetitions and complexity of movement as indicated. MANUAL THERAPY: (0 minutes):Muscle Energy Technique (MET) utilized to correct pelvic obliquity (R HS/L quads) x 2 rounds x 8 reps, STM/MFR w/ use of the foam roller to B posterior hips (piriformis) and B TFL/GT/ITB to aide with improving tissue mobility to decrease pain. Date:  10/2/18 Date:  10/15/18 Date:  10/17/18 Date:  10/22/18   Activity/Exercise Parameters Parameters Parameters Parameters   Bridge 10x2 W/ TAs and add sq, 10 x 5sec 20 x 5sec 20x RTB   Cat camel 10x  10x 10x   Lumbar extension 10x5   10x prone, 10x5 at //   Prone lying 2 mins painful     NuStep 3 mins, 1.5 10min, L2 10min, L2 L 2 5mins   Clamshell    20x RTB B   Hip extension    10x BLEs prone, 15x YTB in stand at //   Juliaette Rummage dog    5x just legs each   Primal push up    10x   Supine pec stretch/ chest expansion on 1/2 foam roll   3min    Seated R side trunk stretching w/ orange ball   5 x 20sec    Seated forward flexion and each side w/ large ball   5x each dir    Seated forward flexion  5x 5x    TAs  10 x 5sec  20x 5 sec holds   TAs w/ add sq  10 x 5sec 20 x 5sec    Supine hip ABD  RTB, 20x RTB, 20x 15x standing in //   LTR  20x B in pain free range 20x B in pain free range    SKTC   10 x 10sec B 5 x 10sec B    DKTC   5 x 10sec    Mini squat w/ ball    15x    Step ups    15x R and L 6 inch box   Side step ups    30x 6 inch box   Side steps banded     YTB 40ft x2                Treatment/Session Assessment: Pt had some decrease in pain w/ prone lumbar extension. She struggled w/ proximal hip strengthening. Pt complains about every exercise and had to be educated on proper performance and posture. · Response to Treatment:  Pt with improved standing posture and gait mobility/fluidity and a min decrease in LB pain/stiffness after treatment. · Compliance with Program/Exercises: Pt reports compliance with HEP 2-3x/week. · Recommendations/Intent for next treatment session: \"Next visit will focus on advancements to more challenging activities\". Try open book activity next week. [employer] Portal  Access Code: [email or web address]   URL: [email or web address] Textbook Time/   Date: 10/02/2018   Prepared by: Alyce Elena     Exercises   Cat-Camel - 10 reps - 2 sets - 5 hold - 1x daily - 3x weekly   Supine Bridge - 10 reps - 2 sets - 5 hold - 1x daily - 3x weekly   Standing Lumbar Extension with Counter - 10 reps - 1 sets - 5 hold - 5x daily - 7x weekly     Variance from POC: none    Future Appointments   Date Time Provider Sarina Cunningham   10/24/2018 10:00 AM GUS Hunt [facility]   10/24/2018 11:00 AM Diana Manzo, PT [facility]   10/26/2018 10:00 AM GUS Hunt [facility]   10/29/2018 10:00 AM GUS Hunt [facility]   10/29/2018 11:00 AM Diana Manzo, PT [facility]   10/31/2018 10:00 AM GUS Hunt [facility]   10/31/2018 11:00 AM Diana Manzo, PT [facility]   11/2/2018 10:00 AM GUS Hunt [facility]   11/5/2018 10:00 AM GUS Hunt [facility]   11/5/2018 11:00 AM Diana Manzo, PT [facility]   11/7/2018  9:00 AM SHARMILA Frankel [facility]   11/7/2018 [facility]   11/7/2018 11:00 AM Diana Manzo, PT [facility]   11/9/2018 10:00 AM GUS Hunt [facility]   11/12/2018 10:00 AM [facility]   11/12/2018 11:00 [facility]   11/14/2018 10:00 AM Inna Love, [place], RN [facility]   11/14/2018 11:00 AM Eldon Frey, PT [facility]   11/16/2018 10:00 AM Thea Gonzáles, RN [facility]   11/19/2018 10:00 AM Thea Gonzáles, RN [facility]   11/21/2018 10:00 AM [place] Labrum [place], RN [facility]   11/26/2018 10:00 AM [place] Labrum [place], RN [facility]   11/28/2018 10:00 AM [place] Labrum [place], RN [facility]   11/30/2018 10:00 AM [place] Labrum [place], RN [facility]   12/3/2018 10:00 AM Tylor Labrum [place], RN [facility]   12/5/2018 10:00 AM [place] Labrum [place], RN [facility]   12/7/2018 10:00 AM [place] Labrum [place], RN [facility]   12/10/2018 10:00 AM Thea Gonzáles, RN [facility]   12/12/2018 10:00 AM [place] Labrum [place], RN [facility]   12/14/2018 10:00 AM [facility]   12/18/2018  8:20 AM PST LAB [facility] PST PST   12/28/2018 10:00 AM Thea Gonzáles, [facility]   1/8/2019  3:00 PM Laci Blair MD [facility] PST PST   1/23/2019 11:45 AM Bishnu Mack MD [facility]       PT Patient Time In/Time Out  Time In: 1056  Time Out: 1146  Treatment time: 50 minutes  Nathalie Morris, PT

## 2018-10-24 ENCOUNTER — HOSPITAL ENCOUNTER (OUTPATIENT)
Dept: PHYSICAL THERAPY | Age: 68
Discharge: HOME | End: 2018-10-24
Attending: FAMILY MEDICINE
Payer: MEDICARE

## 2018-10-24 ENCOUNTER — APPOINTMENT (OUTPATIENT)
Dept: CARDIAC REHAB | Age: 68
End: 2018-10-24
Payer: MEDICARE

## 2018-10-24 NOTE — PROGRESS NOTES
Tyler Magallanes  : 1950  [place] [employer]  Secondary: [facility] at [place] [address], [address], [place] [address]  [phone]   [phone]      OUTPATIENT DAILY NOTE    NAME/AGE/GENDER: Tyler Magallanes is a 79 y.o. female. DATE: 10/24/2018    Patient cancelled appointment today due to illness. Will plan to follow up on next scheduled visit.     Nilam De La Rosa, PT

## 2018-10-26 ENCOUNTER — APPOINTMENT (OUTPATIENT)
Dept: CARDIAC REHAB | Age: 68
End: 2018-10-26
Payer: MEDICARE

## 2018-10-29 ENCOUNTER — HOSPITAL ENCOUNTER (OUTPATIENT)
Dept: PHYSICAL THERAPY | Age: 68
Discharge: HOME | End: 2018-10-29
Attending: FAMILY MEDICINE
Payer: MEDICARE

## 2018-10-29 ENCOUNTER — HOSPITAL ENCOUNTER (OUTPATIENT)
Dept: CARDIAC REHAB | Age: 68
Discharge: HOME | End: 2018-10-29
Payer: MEDICARE

## 2018-10-29 PROCEDURE — 97140 MANUAL THERAPY 1/> REGIONS: CPT

## 2018-10-29 PROCEDURE — 97110 THERAPEUTIC EXERCISES: CPT

## 2018-10-29 PROCEDURE — 93798 PHYS/QHP OP CAR RHAB W/ECG: CPT

## 2018-10-29 NOTE — PROGRESS NOTES
Belvie Nageotte  : 1950  Payor: CARE IMPROVEMENT PLUS / [place] [place] / Product Type: Managed Care Medicare /    [facility] [place]  at [facility]  [address], [address], [place]  [phone]   [phone]         OUTPATIENT PHYSICAL THERAPY:Daily Note 10/29/2018    ICD-10: Treatment Diagnosis: Low back pain (M54.5); Unsteadiness on feet (R26.81)  Precautions/Allergies:   Antibiotic [tmvrj-hzkda-hetelsc-pramoxine]; Biaxin [clarithromycin]; Ceclor [cefaclor]; and Cephalexin   Fall Risk Score: 2 (? 5 = High Risk)  MD Orders: PT eval and tx MEDICAL/REFERRING DIAGNOSIS:  Low back pain [M54.5]   DATE OF ONSET: Not sure  REFERRING PHYSICIAN: Froylan Clark MD  RETURN PHYSICIAN APPOINTMENT: None scheduled     INITIAL ASSESSMENT:  Ms. Deveron Mortimer presents to PT eval w/ c/o LB pain and LE weakness. 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 She displayed tightness in her hips, decreased LE strength, and decreased balance. She will benefit from continued skilled PT services to improve her deficits listed below and to progress towards her PLOF. PROBLEM LIST (Impacting functional limitations):  1. Decreased Strength  2. Decreased ADL/Functional Activities  3. Decreased Transfer Abilities  4. Decreased Balance  5. Increased Pain  6. Decreased Flexibility/Joint Mobility  7. Decreased Glenn with Home Exercise Program INTERVENTIONS PLANNED:  1. Balance Exercise  2. Cold  3. Heat  4. Home Exercise Program (HEP)  5. Manual Therapy  6. Range of Motion (ROM)  7. Therapeutic Exercise/Strengthening   TREATMENT PLAN:  Effective Dates: 10/2/2018 TO 2018 (60 days). Frequency/Duration: 2 times a week for 60 Days  GOALS: (Goals have been discussed and agreed upon with patient.)  Discharge Goals: Time Frame: 5 weeks  1. Pt will be independent w/ HEP in order to improve outcomes and decrease pain.    2. Pt will report pain of 3/10 or less while performing ADLs in order to improve functional mobility. 3. Pt will tolerate walking for 10 minutes w/ pain of 3/10 or less in order to return to PLOF. 4. Pt will have Oswestry score of 16 or less in order to report decreased pain and decreased functional impairments. 5. Pt will have L LE strength of 4+/5 or greater in order to increase her safety w/ functional mobility. Rehabilitation Potential For Stated Goals: Good  Regarding Courtney Olmos's therapy, I certify that the treatment plan above will be carried out by a therapist or under their direction. Thank you for this referral,  Surjit Han, PT                 The information in this section was collected on 10/2/18 (except where otherwise noted). HISTORY:   History of Present Injury/Illness (Reason for Referral):  Pt is unsure of how her back pain started. 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 Past MRI shows disc bulges in lumbar spine. Pt also notes leg pain. It is of note that she is doing cardiac rehab. Past Medical History/Comorbidities:   Ms. Susie Hurd  has a past medical history of Affective psychosis ([facility] [address] Arthritis, CAD (coronary artery disease), CAD (coronary artery disease), Chest pain, Cholelithiasis, Chronic depression, Chronic lumbar pain, Claudication ([facility] [address] Coronary atherosclerosis of native coronary vessel, Dyslipidemia, Fibromyalgia, HIPOLITO (generalized anxiety disorder), GERD (gastroesophageal reflux disease), History of complete eye exam, History of dental examination, History of placement of stent in LAD coronary artery, Hypertension, Low magnesium level, Malaise and fatigue, Palpitations, Polyarthralgia, Shingles, and Tobacco use disorder.   Ms. Susie Hurd  has a past surgical history that includes hx cholecystectomy (2005); implant breast silicone/eq (Bilateral); pr cardiac surg procedure unlist (08/29/2006); pr left heart cath,percutaneous (09/12/2018); pr breast surgery procedure unlisted (1980); hx tonsillectomy (1976); hx other surgical; COLONOSCOPY/ BMI=26 (N/A, 8/28/2017); ENDOSCOPIC POLYPECTOMY (N/A, 8/28/2017); and TEMPORAL ARTERY BIOPSY RIGHT (Right, 6/13/2017). Social History/Living Environment:    Pt lives alone in 1 story home w/ walk in shower  Prior Level of Function/Work/Activity:  Pt is retired, Does house work at home, is independent   Dominant Side:         RIGHT  Current Medications:       Current Outpatient Medications:     rosuvastatin (CRESTOR) 10 mg tablet, Take 0.5 Tabs by mouth nightly. (Patient taking differently: Take 5 mg by mouth two (2) times a day.), Disp: 90 Tab, Rfl: 3    ezetimibe (ZETIA) 10 mg tablet, Take 1 Tab by mouth daily. , Disp: 90 Tab, Rfl: 3    nitroglycerin (NITROSTAT) 0.4 mg SL tablet, 1 Tab by SubLINGual route every five (5) minutes as needed for Chest Pain. Up to 3 doses. , Disp: 25 Tab, Rfl: 11    ticagrelor (BRILINTA) 90 mg tablet, Take 1 Tab by mouth every twelve (12) hours every twelve (12) hours. , Disp: 60 Tab, Rfl: 11    predniSONE (DELTASONE) 5 mg tablet, Take 5 mg by mouth daily. , Disp: , Rfl:     omeprazole (PRILOSEC) 20 mg capsule, Take 1 Cap by mouth two (2) times a day. (Patient taking differently: Take 20 mg by mouth daily.), Disp: 180 Cap, Rfl: 3    amLODIPine (NORVASC) 5 mg tablet, Take 1 Tab by mouth daily. , Disp: 90 Tab, Rfl: 3    clonazePAM (KLONOPIN) 0.5 mg tablet, Take 1 Tab by mouth nightly as needed. Max Daily Amount: 0.5 mg., Disp: 90 Tab, Rfl: 1    aspirin delayed-release 81 mg tablet, Take  by mouth daily. Take DOS per anesthesia protocol., Disp: , Rfl:     busPIRone (BUSPAR) 10 mg tablet, Take 10 mg by mouth as needed. , Disp: , Rfl:    Date Last Reviewed:  10/29/2018    EXAMINATION:   Observation/Orthostatic Postural Assessment:           Forward head, decreased lordosis, rounded shoulders   Palpation:          Tender to B piriformis muscles and LB muscles   Special Tests:           - tender to entire LB vertebrae Functional Mobility:         Gait/Ambulation:  Independent, antalgic        Transfers:  Pushes up        Bed Mobility:  Independent  Balance:          Decreased on BLEs   Sensation:         Intact to BLEs w/ light touch    Joint/Muscle ROM Strength Updates   Hip flexion WFL 4-/5 L, 4/5 R    Hip extension Decreased 4-/5 B    Hip abduction WFL 4-/5 L, 4/5 R    Knee extension [facility]    Knee flexion [facility]    [facility] WFL         Body Structures Involved:  1. Nerves  2. Bones  3. Joints  4. Muscles Body Functions Affected:  1. Sensory/Pain  2. Neuromusculoskeletal  3. Movement Related Activities and Participation Affected:  1. General Tasks and Demands  2. Mobility  3. Self Care  4. Domestic Life  5. Interpersonal Interactions and Relationships  6. Community, Social and Civic Life   CLINICAL PRESENTATION:   CLINICAL DECISION MAKING:   Outcome Measure: Tool Used: Modified Oswestry Low Back Pain Questionnaire  Score:  Initial: 20/50  Most Recent: X/50 (Date: -- )   Interpretation of Score: Each section is scored on a 0-5 scale, 5 representing the greatest disability. The scores of each section are added together for a total score of 50. Score 0 1-10 11-20 21-30 31-40 41-49 50   Modifier CH CI CJ CK CL CM CN     ? Mobility - Walking and Moving Around:     - CURRENT STATUS: CJ - 20%-39% impaired, limited or restricted    - GOAL STATUS: CJ - 20%-39% impaired, limited or restricted    - D/C STATUS:  ---------------To be determined---------------    Medical Necessity:   · Patient is expected to demonstrate progress in strength, range of motion, balance and coordination to increase independence with functional tasks. Reason for Services/Other Comments:  · Patient continues to demonstrate capacity to improve strength, ROM, balance, mobility which will increase independence.             TREATMENT:   (In addition to Assessment/Re-Assessment sessions the following treatments were rendered)  Pre-treatment Symptoms/Complaints:  Pt reports her radicular symptoms are gone but now her back/hip hurts more. She thinks she is walking better. Pain: Initial:     6-7/10 Post Session: 0/10     THERAPEUTIC EXERCISE: (47 minutes ) cues for proper body posture and promote proper body mechanics. Progressed resistance, range, repetitions and complexity of movement as indicated.     MANUAL THERAPY: (8 minutes): Muscle Energy Technique (MET) utilized to correct pelvic obliquity x 2 rounds x 3 reps     Date:  10/2/18 Date:  10/15/18 Date:  10/17/18 Date:  10/22/18 Date:  10/29/18   Activity/Exercise Parameters Parameters Parameters Parameters Parameters   Bridge 10x2 W/ TAs and add sq, 10 x 5sec 20 x 5sec 20x RTB 20x RTB   Cat camel 10x  10x 10x    Lumbar extension 10x5   10x prone, 10x5 at // 10x prone, 10x3 standing in //   Prone lying 2 mins painful      NuStep 3 mins, 1.5 10min, L2 10min, L2 L 2 5mins L2 10 mins   Clamshell    20x RTB B 20x RTB B   Hip extension    10x BLEs prone, 15x YTB in stand at // 15x leaning on table   Bird dog    5x just legs each    Primal push up    10x    Supine pec stretch/ chest expansion on 1/2 foam roll   3min     Seated R side trunk stretching w/ orange ball   5 x 20sec     Seated forward flexion and each side w/ large ball   5x each dir     Seated forward flexion  5x 5x     TAs  10 x 5sec  20x 5 sec holds 20x 5 sec holds   TAs w/ add sq  10 x 5sec 20 x 5sec     Supine hip ABD  RTB, 20x RTB, 20x 15x standing in //    LTR  20x B in pain free range 20x B in pain free range     SKTC   10 x 10sec B 5 x 10sec B     DKTC   5 x 10sec     Mini squat w/ ball    15x  15x on wall   Step ups    15x R and L 6 inch box 10x R and L 6 in box   Side step ups    30x 6 inch box 15x 6 inch box   Side steps banded     YTB 40ft x2    Glute squeeze     15x   Hip adduction     15x ball squeeze   Side step anti rotation     10x B RTB   Paloff press     10x B RTB         Treatment/Session Assessment: Pt's pain decreased w/ MET to L hip. Her pain resolved w/ therex and lumbar extension. · Response to Treatment:  Pt with improved standing posture and gait mobility/fluidity and a min decrease in LB pain/stiffness after treatment. · Compliance with Program/Exercises: Pt reports compliance with HEP 2-3x/week. · Recommendations/Intent for next treatment session: \"Next visit will focus on advancements to more challenging activities\". [employer] Portal  Access Code: [email or web address]   URL: [email or web address]   Date: 10/02/2018   Prepared by: Shakeel Hood     Exercises   Cat-Camel - 10 reps - 2 sets - 5 hold - 1x daily - 3x weekly   Supine Bridge - 10 reps - 2 sets - 5 hold - 1x daily - 3x weekly   Standing Lumbar Extension with Counter - 10 reps - 1 sets - 5 hold - 5x daily - 7x weekly     Variance from POC: none    Future Appointments   Date Time Provider Sarina Cunningham   10/31/2018 10:00 AM Marylene Nay, RN [facility]PR[facility]   10/31/2018 11:00 AM MOMO Robbins [facility]   11/2/2018 10:00 AM Marylene Nay, RN SFOCPR[facility]   11/5/2018 10:00 AM Marylene Nay, RN [facility]PR[facility]   11/5/2018 [facility]   11/7/2018  9:00 AM Xiomy Aviles RD [facility]   11/7/2018 10:00 AM Marylene Nay, RN SFOCPRHB [facility]   11/7/2018 11:00 [facility]   11/9/2018 10:00 AM Marylene Nay, RN SFOCPR[facility]   11/12/2018 10:00 AM Marylene Nay, RN [facility]PR[facility]   11/12/2018 11:00 [facility]   11/14/2018 10:00 AM Marylene Nay, RN SFOCPR[facility]   11/14/2018 11:00 AM MOMO Robbins [facility]   11/16/2018 10:00 AM Marylene Nay, RN [facility]   11/19/2018 10:00 AM Marylene Nay, RN [facility]   11/21/2018 10:00 AM Caridad Daniel [facility]   11/26/2018 10:00 AM [facility]ins [facility]   11/28/2018 10:00 AM [facility]   11/30/2018 10:00 AM Cl[facility]ins [facility]   12/3/2018 10:00 AM Neida Carlos [facility]   12/5/2018 10:00 AM [facility]   12/7/2018 10:00 AM Neida Carlos [facility]   12/10/2018 10:00 AM [facility] Goins [facility]   12/12/2018 10:00 AM Neida Carlos [facility]   12/14/2018 10:00 AM Cl[facility] Goins RN [facility]   12/18/2018  8:20 AM PST LAB [facility] PST PST   12/28/2018 10:00 AM [facility]   1/8/2019  3:00 PM Madan Issa MD [facility] PST PST   1/23/2019 11:45 AM Sorin Gibbs MD [facility] [place] Prim       PT Patient Time In/Time Out  Time In: 1100  Time Out: 1155  Treatment time: 55 minutes  Jose M De La Garza, PT

## 2018-10-31 ENCOUNTER — HOSPITAL ENCOUNTER (OUTPATIENT)
Dept: CARDIAC REHAB | Age: 68
Discharge: HOME | End: 2018-10-31
Payer: MEDICARE

## 2018-10-31 ENCOUNTER — HOSPITAL ENCOUNTER (OUTPATIENT)
Dept: PHYSICAL THERAPY | Age: 68
Discharge: HOME | End: 2018-10-31
Attending: FAMILY MEDICINE
Payer: MEDICARE

## 2018-10-31 VITALS — BODY MASS INDEX: 26.56 KG/M2 | WEIGHT: 169.6 LBS

## 2018-10-31 PROCEDURE — 93798 PHYS/QHP OP CAR RHAB W/ECG: CPT

## 2018-10-31 PROCEDURE — 97110 THERAPEUTIC EXERCISES: CPT

## 2018-10-31 NOTE — PROGRESS NOTES
Angela Munroe  : 1950  Payor: [employer]US / Plan[place] [employer] [place] / Product Type: Managed Care Medicare /    [address] [place] [address]  at [address]  [address], [address], [address]  [phone]   [phone]         OUTPATIENT PHYSICAL THERAPY:Daily Note 10/31/2018    ICD-10: Treatment Diagnosis: Low back pain (M54.5); Unsteadiness on feet (R26.81)  Precautions/Allergies:   Antibiotic [mtays-jglda-uipwzlk-pramoxine]; Biaxin [clarithromycin]; Ceclor [cefaclor]; and Cephalexin   Fall Risk Score: 2 (? 5 = High Risk)  MD Orders: PT eval and tx MEDICAL/REFERRING DIAGNOSIS:  Low back pain [M54.5]   DATE OF ONSET: Not sure  REFERRING PHYSICIAN: Rony Monteiro MD  RETURN PHYSICIAN APPOINTMENT: None scheduled     INITIAL ASSESSMENT:  Ms. Bundy Adjutant presents to PT eval w/ c/o LB pain and LE weakness. 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 She displayed tightness in her hips, decreased LE strength, and decreased balance. She will benefit from continued skilled PT services to improve her deficits listed below and to progress towards her PLOF. PROBLEM LIST (Impacting functional limitations):  1. Decreased Strength  2. Decreased ADL/Functional Activities  3. Decreased Transfer Abilities  4. Decreased Balance  5. Increased Pain  6. Decreased Flexibility/Joint Mobility  7. Decreased [place] with Home Exercise Program INTERVENTIONS PLANNED:  1. Balance Exercise  2. Cold  3. Heat  4. Home Exercise Program (HEP)  5. Manual Therapy  6. Range of Motion (ROM)  7. Therapeutic Exercise/Strengthening   TREATMENT PLAN:  Effective Dates: 10/2/2018 TO 2018 (60 days). Frequency/Duration: 2 times a week for 60 Days  GOALS: (Goals have been discussed and agreed upon with patient.)  Discharge Goals: Time Frame: 5 weeks  1. Pt will be independent w/ HEP in order to improve outcomes and decrease pain.    2. Pt will report pain of 3/10 or less while performing ADLs in order to improve functional mobility. 3. Pt will tolerate walking for 10 minutes w/ pain of 3/10 or less in order to return to PLOF. 4. Pt will have Oswestry score of 16 or less in order to report decreased pain and decreased functional impairments. 5. Pt will have L LE strength of 4+/5 or greater in order to increase her safety w/ functional mobility. Rehabilitation Potential For Stated Goals: Good  Regarding Jonathan Olmos's therapy, I certify that the treatment plan above will be carried out by a therapist or under their direction. Thank you for this referral,  Candis Morrell, PT                 The information in this section was collected on 10/2/18 (except where otherwise noted). HISTORY:   History of Present Injury/Illness (Reason for Referral):  Pt is unsure of how her back pain started. 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 Past MRI shows disc bulges in lumbar spine. Pt also notes leg pain. It is of note that she is doing cardiac rehab. Past Medical History/Comorbidities:   Ms. Shirin Gruber  has a past medical history of Affective psychosis ([facility] [address] Arthritis, CAD (coronary artery disease), CAD (coronary artery disease), Chest pain, Cholelithiasis, Chronic depression, Chronic lumbar pain, Claudication ([address] Coronary atherosclerosis of native coronary vessel, Dyslipidemia, Fibromyalgia, HIPOLITO (generalized anxiety disorder), GERD (gastroesophageal reflux disease), History of complete eye exam, History of dental examination, History of placement of stent in LAD coronary artery, Hypertension, Low magnesium level, Malaise and fatigue, Palpitations, Polyarthralgia, Shingles, and Tobacco use disorder.   Ms. Shirin Gruber  has a past surgical history that includes hx cholecystectomy (2005); implant breast silicone/eq (Bilateral); pr cardiac surg procedure unlist (08/29/2006); pr left heart cath,percutaneous (09/12/2018); pr breast surgery procedure unlisted (1980); hx tonsillectomy (1976); hx other surgical; COLONOSCOPY/ BMI=26 (N/A, 8/28/2017); ENDOSCOPIC POLYPECTOMY (N/A, 8/28/2017); and TEMPORAL ARTERY BIOPSY RIGHT (Right, 6/13/2017). Social History/Living Environment:    Pt lives alone in 1 story home w/ walk in shower  Prior Level of Function/Work/Activity:  Pt is retired, Does house work at home, is independent   Dominant Side:         RIGHT  Current Medications:       Current Outpatient Medications:     rosuvastatin (CRESTOR) 10 mg tablet, Take 0.5 Tabs by mouth nightly. (Patient taking differently: Take 5 mg by mouth two (2) times a day.), Disp: 90 Tab, Rfl: 3    ezetimibe (ZETIA) 10 mg tablet, Take 1 Tab by mouth daily. , Disp: 90 Tab, Rfl: 3    nitroglycerin (NITROSTAT) 0.4 mg SL tablet, 1 Tab by SubLINGual route every five (5) minutes as needed for Chest Pain. Up to 3 doses. , Disp: 25 Tab, Rfl: 11    ticagrelor (BRILINTA) 90 mg tablet, Take 1 Tab by mouth every twelve (12) hours every twelve (12) hours. , Disp: 60 Tab, Rfl: 11    predniSONE (DELTASONE) 5 mg tablet, Take 5 mg by mouth daily. , Disp: , Rfl:     omeprazole (PRILOSEC) 20 mg capsule, Take 1 Cap by mouth two (2) times a day. (Patient taking differently: Take 20 mg by mouth daily.), Disp: 180 Cap, Rfl: 3    amLODIPine (NORVASC) 5 mg tablet, Take 1 Tab by mouth daily. , Disp: 90 Tab, Rfl: 3    clonazePAM (KLONOPIN) 0.5 mg tablet, Take 1 Tab by mouth nightly as needed. Max Daily Amount: 0.5 mg., Disp: 90 Tab, Rfl: 1    aspirin delayed-release 81 mg tablet, Take  by mouth daily. Take DOS per anesthesia protocol., Disp: , Rfl:     busPIRone (BUSPAR) 10 mg tablet, Take 10 mg by mouth as needed. , Disp: , Rfl:    Date Last Reviewed:  10/31/2018    EXAMINATION:   Observation/Orthostatic Postural Assessment:           Forward head, decreased lordosis, rounded shoulders   Palpation:          Tender to B piriformis muscles and LB muscles   Special Tests:           - tender to entire LB vertebrae Functional Mobility:         Gait/Ambulation:  Independent, antalgic        Transfers:  Pushes up        Bed Mobility:  Independent  Balance:          Decreased on BLEs   Sensation:         Intact to BLEs w/ light touch    Joint/Muscle ROM Strength Updates   Hip flexion WFL 4-/5 L, 4/5 R    Hip extension Decreased 4-/5 B    Hip abduction WFL 4-/5 L, 4/5 R    Knee extension [facility]    Knee flexion [facility]    DF [facility] WFL         Body Structures Involved:  1. Nerves  2. Bones  3. Joints  4. Muscles Body Functions Affected:  1. Sensory/Pain  2. Neuromusculoskeletal  3. Movement Related Activities and Participation Affected:  1. General Tasks and Demands  2. Mobility  3. Self Care  4. Domestic Life  5. Interpersonal Interactions and Relationships  6. Community, Social and Civic Life   CLINICAL PRESENTATION:   CLINICAL DECISION MAKING:   Outcome Measure: Tool Used: Modified Oswestry Low Back Pain Questionnaire  Score:  Initial: 20/50  Most Recent: X/50 (Date: -- )   Interpretation of Score: Each section is scored on a 0-5 scale, 5 representing the greatest disability. The scores of each section are added together for a total score of 50. Score 0 1-10 11-20 21-30 31-40 41-49 50   Modifier CH CI CJ CK CL CM CN     ? Mobility - Walking and Moving Around:     - CURRENT STATUS: CJ - 20%-39% impaired, limited or restricted    - GOAL STATUS: CJ - 20%-39% impaired, limited or restricted    - D/C STATUS:  ---------------To be determined---------------    Medical Necessity:   · Patient is expected to demonstrate progress in strength, range of motion, balance and coordination to increase independence with functional tasks. Reason for Services/Other Comments:  · Patient continues to demonstrate capacity to improve strength, ROM, balance, mobility which will increase independence.             TREATMENT:   (In addition to Assessment/Re-Assessment sessions the following treatments were rendered)  Pre-treatment Symptoms/Complaints:  Pt reports she is feeling better. Pain: Initial:     4-5/10 Post Session: 0/10     THERAPEUTIC EXERCISE: (42 minutes ) cues for proper body posture and promote proper body mechanics. Progressed resistance, range, repetitions and complexity of movement as indicated. MANUAL THERAPY: (0 minutes): Muscle Energy Technique (MET) utilized to correct pelvic obliquity x 2 rounds x 3 reps     Date:  10/2/18 Date:  10/15/18 Date:  10/17/18 Date:  10/22/18 Date:  10/29/18 Date:  10/31/18   Activity/Exercise Parameters Parameters Parameters Parameters Parameters Parameters   Bridge 10x2 W/ TAs and add sq, 10 x 5sec 20 x 5sec 20x RTB 20x RTB 20x RTB   Cat camel 10x  10x 10x     Lumbar extension 10x5   10x prone, 10x5 at // 10x prone, 10x3 standing in // 10x2 in //   Prone lying 2 mins painful       NuStep 3 mins, 1.5 10min, L2 10min, L2 L 2 5mins L2 10 mins Bike 8 mins   Clamshell    20x RTB B 20x RTB B 20x YTB B   Hip extension    10x BLEs prone, 15x YTB in stand at // 15x leaning on table 10x each prone, 15x standing B in //   Bird dog    5x just legs each     TAs  10 x 5sec  20x 5 sec holds 20x 5 sec holds 20x 5 sec holds   Supine hip ABD  RTB, 20x RTB, 20x 15x standing in //  15x standing in // B   LTR  20x B in pain free range 20x B in pain free range      SKTC   10 x 10sec B 5 x 10sec B      DKTC   5 x 10sec      Mini squat w/ ball    15x  15x on wall Leg press 3 black bands 25x, STS 10x no hands   Step ups    15x R and L 6 inch box 10x R and L 6 in box 15x R and L 6 inch box   Side step ups    30x 6 inch box 15x 6 inch box 15x 6 inch box   Side steps banded     YTB 40ft x2  YTB 40ft x2   Glute squeeze     15x 15x   Hip adduction     15x ball squeeze 20x ball squeeze   Side step anti rotation     10x B RTB    Paloff press     10x B RTB 10x B RTB         Treatment/Session Assessment: Pt's pain resolved w/ therex this session. She is progressing towards her goals.     · Response to Treatment:  Pt with improved standing posture and gait mobility/fluidity and a min decrease in LB pain/stiffness after treatment. · Compliance with Program/Exercises: Pt reports compliance with HEP 2-3x/week. · Recommendations/Intent for next treatment session: \"Next visit will focus on advancements to more challenging activities\". [employer] Portal  Access Code: [email or web address]   URL: [email or web address]   Date: 10/02/2018   Prepared by: [facility]     Exercises   Cat-Camel - 10 reps - 2 sets - 5 hold - 1x daily - 3x weekly   Supine Bridge - 10 reps - 2 sets - 5 hold - 1x daily - 3x weekly   Standing Lumbar Extension with Counter - 10 reps - 1 sets - 5 hold - 5x daily - 7x weekly     Variance from POC: none    Future Appointments   Date Time Provider Sarina Cunningham   11/2/2018 10:00 AM GUS Bedolla [facility]   11/5/2018 10:00 [facility]   11/5/2018 11:00 AM [facility]   11/7[facility]2018  9:00 [facility]   11/7/2018 10:00 AM [facility]   11/7/2018 11:00 AM [facility]   11/9/2018 10:00 [facility]   11/12/2018 10:00 [facility]   11/12/2018 11:00 AM [facility]   11/14/2018 10:00 AM [facility]   11/14/2018 11:00 AM [facility]   11/16/2018 10:00 AM [facility]   11/19/2018 10:00 AM [facility]   11/21/2018 10:00 AM [facility]   11/26/2018 10:00 [facility]   11/28/2018 10:00 AM [facility]   11/30/2018 10:00 AM Saima Styles RN [facility]   12/3/2018 10:00 AM Jayashree Munoz RN [facility]   12/5/2018 10:00 AM Terence Rodriguez RN [facility]   12/7/2018 10:00 AM Jayashree Munoz RN [facility]   12/10/2018 10:00 AM Jayashree Munoz RN [facility]   12/12/2018 10:00 AM Terence Rodriguez RN [facility]   12/14/2018 10:00 AM Jayashree Munoz RN [facility]   12/18/2018  8:20 AM PST LAB [facility] PST PST   12/28/2018 10:00 AM Terence Rodriguez RN [facility]   1/8/2019  3:00 PM Taco Alfonso MD [facility] PST   1/23/2019 11:45 AM Xavier Cunningham MD [facility] Jamila Malik       PT Patient Time In/Time Out  Time In: 1100  Time Out: 4601  Treatment time: 42 minutes  Venancio Rodgers PT

## 2018-11-05 ENCOUNTER — HOSPITAL ENCOUNTER (OUTPATIENT)
Dept: PHYSICAL THERAPY | Age: 68
Discharge: HOME | End: 2018-11-05
Attending: FAMILY MEDICINE
Payer: MEDICARE

## 2018-11-05 ENCOUNTER — HOSPITAL ENCOUNTER (OUTPATIENT)
Dept: CARDIAC REHAB | Age: 68
Discharge: HOME | End: 2018-11-05
Payer: MEDICARE

## 2018-11-05 PROCEDURE — 97110 THERAPEUTIC EXERCISES: CPT

## 2018-11-05 PROCEDURE — 97012 MECHANICAL TRACTION THERAPY: CPT

## 2018-11-05 PROCEDURE — 97140 MANUAL THERAPY 1/> REGIONS: CPT

## 2018-11-05 PROCEDURE — 93798 PHYS/QHP OP CAR RHAB W/ECG: CPT

## 2018-11-05 NOTE — PROGRESS NOTES
Nael Barrios  : 1950  Payor: CARE IMPROVEMENT PLUS / Plan[place] [place] / Product Type: Managed Care Medicare /    [address] [place] [address]  at [address]  [address], [address], [place] [address]  [phone]   [phone]         OUTPATIENT PHYSICAL THERAPY:Daily Note 2018    ICD-10: Treatment Diagnosis: Low back pain (M54.5); Unsteadiness on feet (R26.81)  Precautions/Allergies:   Antibiotic [vgiuh-gddyx-hbvdvry-pramoxine]; Biaxin [clarithromycin]; Ceclor [cefaclor]; and Cephalexin   Fall Risk Score: 2 (? 5 = High Risk)  MD Orders: PT eval and tx MEDICAL/REFERRING DIAGNOSIS:  Low back pain [M54.5]   DATE OF ONSET: Not sure  REFERRING PHYSICIAN: Claribel Awad MD  RETURN PHYSICIAN APPOINTMENT: None scheduled     INITIAL ASSESSMENT:  Ms. Stephanie Brock presents to PT eval w/ c/o LB pain and LE weakness. 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 She displayed tightness in her hips, decreased LE strength, and decreased balance. She will benefit from continued skilled PT services to improve her deficits listed below and to progress towards her PLOF. PROBLEM LIST (Impacting functional limitations):  1. Decreased Strength  2. Decreased ADL/Functional Activities  3. Decreased Transfer Abilities  4. Decreased Balance  5. Increased Pain  6. Decreased Flexibility/Joint Mobility  7. Decreased [place] with Home Exercise Program INTERVENTIONS PLANNED:  1. Balance Exercise  2. Cold  3. Heat  4. Home Exercise Program (HEP)  5. Manual Therapy  6. Range of Motion (ROM)  7. Therapeutic Exercise/Strengthening   TREATMENT PLAN:  Effective Dates: 10/2/2018 TO 2018 (60 days). Frequency/Duration: 2 times a week for 60 Days  GOALS: (Goals have been discussed and agreed upon with patient.)  Discharge Goals: Time Frame: 5 weeks  1. Pt will be independent w/ HEP in order to improve outcomes and decrease pain.    2. Pt will report pain of 3/10 or less while performing ADLs in order to improve functional mobility. 3. Pt will tolerate walking for 10 minutes w/ pain of 3/10 or less in order to return to PLOF. 4. Pt will have Oswestry score of 16 or less in order to report decreased pain and decreased functional impairments. 5. Pt will have L LE strength of 4+/5 or greater in order to increase her safety w/ functional mobility. Rehabilitation Potential For Stated Goals: Good  Regarding Gold Olmos's therapy, I certify that the treatment plan above will be carried out by a therapist or under their direction. Thank you for this referral,  Bebe Christine PT                 The information in this section was collected on 10/2/18 (except where otherwise noted). HISTORY:   History of Present Injury/Illness (Reason for Referral):  Pt is unsure of how her back pain started. 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 Past MRI shows disc bulges in lumbar spine. Pt also notes leg pain. It is of note that she is doing cardiac rehab. Past Medical History/Comorbidities:   Ms. Ria Han  has a past medical history of Affective psychosis ([facility] [address] Arthritis, CAD (coronary artery disease), CAD (coronary artery disease), Chest pain, Cholelithiasis, Chronic depression, Chronic lumbar pain, Claudication ([facility] [address] Coronary atherosclerosis of native coronary vessel, Dyslipidemia, Fibromyalgia, HIPOLITO (generalized anxiety disorder), GERD (gastroesophageal reflux disease), History of complete eye exam, History of dental examination, History of placement of stent in LAD coronary artery, Hypertension, Low magnesium level, Malaise and fatigue, Palpitations, Polyarthralgia, Shingles, and Tobacco use disorder.   Ms. Ria Han  has a past surgical history that includes hx cholecystectomy (2005); implant breast silicone/eq (Bilateral); pr cardiac surg procedure unlist (08/29/2006); pr left heart cath,percutaneous (09/12/2018); pr breast surgery procedure unlisted (1980); hx tonsillectomy (1976); hx other surgical; COLONOSCOPY/ BMI=26 (N/A, 8/28/2017); ENDOSCOPIC POLYPECTOMY (N/A, 8/28/2017); and TEMPORAL ARTERY BIOPSY RIGHT (Right, 6/13/2017). Social History/Living Environment:    Pt lives alone in 1 story home w/ walk in shower  Prior Level of Function/Work/Activity:  Pt is retired, Does house work at home, is independent   Dominant Side:         RIGHT  Current Medications:       Current Outpatient Medications:     rosuvastatin (CRESTOR) 10 mg tablet, Take 0.5 Tabs by mouth nightly. (Patient taking differently: Take 5 mg by mouth two (2) times a day.), Disp: 90 Tab, Rfl: 3    ezetimibe (ZETIA) 10 mg tablet, Take 1 Tab by mouth daily. , Disp: 90 Tab, Rfl: 3    nitroglycerin (NITROSTAT) 0.4 mg SL tablet, 1 Tab by SubLINGual route every five (5) minutes as needed for Chest Pain. Up to 3 doses. , Disp: 25 Tab, Rfl: 11    ticagrelor (BRILINTA) 90 mg tablet, Take 1 Tab by mouth every twelve (12) hours every twelve (12) hours. , Disp: 60 Tab, Rfl: 11    predniSONE (DELTASONE) 5 mg tablet, Take 5 mg by mouth daily. , Disp: , Rfl:     omeprazole (PRILOSEC) 20 mg capsule, Take 1 Cap by mouth two (2) times a day. (Patient taking differently: Take 20 mg by mouth daily.), Disp: 180 Cap, Rfl: 3    amLODIPine (NORVASC) 5 mg tablet, Take 1 Tab by mouth daily. , Disp: 90 Tab, Rfl: 3    clonazePAM (KLONOPIN) 0.5 mg tablet, Take 1 Tab by mouth nightly as needed. Max Daily Amount: 0.5 mg., Disp: 90 Tab, Rfl: 1    aspirin delayed-release 81 mg tablet, Take  by mouth daily. Take DOS per anesthesia protocol., Disp: , Rfl:     busPIRone (BUSPAR) 10 mg tablet, Take 10 mg by mouth as needed. , Disp: , Rfl:    Date Last Reviewed:  11/5/2018    EXAMINATION:   Observation/Orthostatic Postural Assessment:           Forward head, decreased lordosis, rounded shoulders   Palpation:          Tender to B piriformis muscles and LB muscles   Special Tests:           - tender to entire LB vertebrae Functional Mobility:         Gait/Ambulation:  Independent, antalgic        Transfers:  Pushes up        Bed Mobility:  Independent  Balance:          Decreased on BLEs   Sensation:         Intact to BLEs w/ light touch    Joint/Muscle ROM Strength Updates   Hip flexion WFL 4-/5 L, 4/5 R    Hip extension Decreased 4-/5 B    Hip abduction WFL 4-/5 L, 4/5 R    Knee extension [facility]    Knee flexion [facility]    DF [facility] WFL         Body Structures Involved:  1. Nerves  2. Bones  3. Joints  4. Muscles Body Functions Affected:  1. Sensory/Pain  2. Neuromusculoskeletal  3. Movement Related Activities and Participation Affected:  1. General Tasks and Demands  2. Mobility  3. Self Care  4. Domestic Life  5. Interpersonal Interactions and Relationships  6. Community, Social and Civic Life   CLINICAL PRESENTATION:   CLINICAL DECISION MAKING:   Outcome Measure: Tool Used: Modified Oswestry Low Back Pain Questionnaire  Score:  Initial: 20/50  Most Recent: X/50 (Date: -- )   Interpretation of Score: Each section is scored on a 0-5 scale, 5 representing the greatest disability. The scores of each section are added together for a total score of 50. Score 0 1-10 11-20 21-30 31-40 41-49 50   Modifier CH CI CJ CK CL CM CN     ? Mobility - Walking and Moving Around:     - CURRENT STATUS: CJ - 20%-39% impaired, limited or restricted    - GOAL STATUS: CJ - 20%-39% impaired, limited or restricted    - D/C STATUS:  ---------------To be determined---------------    Medical Necessity:   · Patient is expected to demonstrate progress in strength, range of motion, balance and coordination to increase independence with functional tasks. Reason for Services/Other Comments:  · Patient continues to demonstrate capacity to improve strength, ROM, balance, mobility which will increase independence.             TREATMENT:   (In addition to Assessment/Re-Assessment sessions the following treatments were rendered)  Pre-treatment Symptoms/Complaints:  Pt reports she is in a lot of pain. Pain: Initial:     4-5/10 Post Session: 0/10     THERAPEUTIC EXERCISE: (25 minutes ) cues for proper body posture and promote proper body mechanics. Progressed resistance, range, repetitions and complexity of movement as indicated.     MANUAL THERAPY: (13 minutes): Muscle Energy Technique (MET) utilized to correct pelvic obliquity x 2 rounds x 3 reps, long axis to BLEs     Date:  10/2/18 Date:  10/15/18 Date:  10/17/18 Date:  10/22/18 Date:  10/29/18 Date:  10/31/18 Date:  11/5/18   Activity/Exercise Parameters Parameters Parameters Parameters Parameters Parameters Parameters   Bridge 10x2 W/ TAs and add sq, 10 x 5sec 20 x 5sec 20x RTB 20x RTB 20x RTB 20x    Cat camel 10x  10x 10x      Lumbar extension 10x5   10x prone, 10x5 at // 10x prone, 10x3 standing in // 10x2 in // 10x prone, 20x standing   Prone lying 2 mins painful        NuStep 3 mins, 1.5 10min, L2 10min, L2 L 2 5mins L2 10 mins Bike 8 mins L3 5 mins nustep   Clamshell    20x RTB B 20x RTB B 20x YTB B    Hip extension    10x BLEs prone, 15x YTB in stand at // 15x leaning on table 10x each prone, 15x standing B in // 15x prone B   Bird dog    5x just legs each      TAs  10 x 5sec  20x 5 sec holds 20x 5 sec holds 20x 5 sec holds    Supine hip ABD  RTB, 20x RTB, 20x 15x standing in //  15x standing in // B    LTR  20x B in pain free range 20x B in pain free range       SKTC   10 x 10sec B 5 x 10sec B       DKTC   5 x 10sec       Mini squat w/ ball    15x  15x on wall Leg press 3 black bands 25x, STS 10x no hands    Step ups    15x R and L 6 inch box 10x R and L 6 in box 15x R and L 6 inch box    Side step ups    30x 6 inch box 15x 6 inch box 15x 6 inch box    Side steps banded     YTB 40ft x2  YTB 40ft x2    Glute squeeze     15x 15x    Hip adduction     15x ball squeeze 20x ball squeeze    Side step anti rotation     10x B RTB     Paloff press     10x B RTB 10x B RTB    SLR       10x2 BLEs   Side glides       Leaning to R     MECHANICAL Traction: (17 mins) - to decrease pain in LB, 75# max, intermittent     Treatment/Session Assessment: Pt's pain resolved w/ therex and manual this session. She is progressing towards her goals. · Response to Treatment:  Pt with improved standing posture and gait mobility/fluidity and a min decrease in LB pain/stiffness after treatment. · Compliance with Program/Exercises: Pt reports compliance with HEP 2-3x/week. · Recommendations/Intent for next treatment session: \"Next visit will focus on advancements to more challenging activities\". [employer] Portal  Access Code: [email or web address]   URL: [email or web address]   Date: 10/02/2018   Prepared by: Dionicia Apgar     Exercises   Cat-Camel - 10 reps - 2 sets - 5 hold - 1x daily - 3x weekly   Supine Bridge - 10 reps - 2 sets - 5 hold - 1x daily - 3x weekly   Standing Lumbar Extension with Counter - 10 reps - 1 sets - 5 hold - 5x daily - 7x weekly     Variance from POC: none    Future Appointments   Date Time Provider Sarina Cunningham   2018  9:00 AM SHARMILA Trammell [facility]   2018 10:00 AM Sondra Schirmer, RN [facility]   2018 11:00 AM MOMO Shanks [facility]   2018 10:00 AM Sondra Schirmer, RN [facility]   2018 10:00 AM [facility]irmer, RN [facility]   2018 11:00 AM MOMO Shanks [facility]   2018 10:00 AM Sondra Schirmer, RN [facility]   2018 11:00 AM MOMO Shanks [facility]   2018 10:00 AM Sondra Schirmer, RN [facility]   2018 10:00 AM Sondra Schirmer, RN [facility]   2018 10:00 AM Sondra Schirmer, RN [facility]   2018 10:00 AM Sondra Schirmer, RN [facility]   2018 10:00 AM Sondra Schirmer, RN [facility]   2018 10:00 AM [facility]   12/3/2018 10:00 AM GUS [facility]OCYULI [facility]   12/5/2018 10:00 AM GUS Doty [facility]   12/7/2018 10:00 AM GUS Doty [facility]   12/10/2018 10:00 AM GUS Doty [facility]   12/12/2018 10:00 AM GUS Scott [facility]   12/14/2018 10:00 AM GUS Doty [facility]   12/18/2018  8:20 AM PST LAB [facility] PST PST   12/28/2018 10:00 AM GUS ScottOCPRKEILA [facility]   1/8/2019  3:00 PM Saba Zuluaga MD [facility] PST PST   1/23/2019 11:45 AM Ralph Barajas MD [facility] Karmen [place]       PT Patient Time In/Time Out  Time In: 1058  Time Out: 1155  Treatment time: 55 mins  Surjit Han PT

## 2018-11-07 ENCOUNTER — HOSPITAL ENCOUNTER (OUTPATIENT)
Dept: CARDIAC REHAB | Age: 68
Discharge: HOME | End: 2018-11-07
Payer: MEDICARE

## 2018-11-07 ENCOUNTER — HOSPITAL ENCOUNTER (OUTPATIENT)
Dept: PHYSICAL THERAPY | Age: 68
Discharge: HOME | End: 2018-11-07
Attending: FAMILY MEDICINE
Payer: MEDICARE

## 2018-11-07 VITALS — WEIGHT: 170 LBS | BODY MASS INDEX: 26.63 KG/M2

## 2018-11-07 PROCEDURE — 93798 PHYS/QHP OP CAR RHAB W/ECG: CPT

## 2018-11-07 PROCEDURE — 97110 THERAPEUTIC EXERCISES: CPT

## 2018-11-07 PROCEDURE — 97012 MECHANICAL TRACTION THERAPY: CPT

## 2018-11-07 NOTE — PROGRESS NOTES
Client History: 
Current Medical Diagnosis: see ITP Pertinent Medications: see review PTA meds Have you gained or lost any weight in the past 3 months: no but gained 25 lbs over the past 1.5 yrs What do you attribute it to: being on Prednisone which is being tapered down now with goal of weaning off this med. What is your weight goal: see cardiac ITP Have you made any changes in your eating habits since your heart problems began: no 
 
Describe your appetite: good Supplements/Vitamins/Herbs: sometimes vitamin C and B12. Food allergies: no 
 
Problems with digestion, N/V/C/D: always constipated. Encouraged pt to increase water, fiber and activity to help with regular BM. Alcohol use: see cardiac ITP Dietary recall: 
Breakfast is biscuit or Western Jen toast or apple, nuts, cereal and whole milk and cup of Ger D Snack is cheese and nuts Lunch is bought Broccoli soup Dinner is cheese and crackers or cereal and milk or tacos or pimento cheese sandwich on white wheat toast 
Snack is Little Bozena cake or cookie but has lowered the amount of sweets she is eating Pt uses salt in cooking but is trying to lower the amt she uses Water is 32-48 oz/day. Pt may also drink a cup of sweet tea. Pt uses honey. Anthropometric Data: see cardiac ITP Ht: Wt:  
Age: BMI: 
Waist measurement:  
 
Estimated Nutritional Needs: 
Calories:22 kcal/kg UCS=2190 kcals Protein: 1gm/kg IBW=61gm CHO:  
Fluids: 1ml/kcal unless restricted by MD 
 
 
Nutrition Diagnosis:  Food and nutrition knowledge deficit related to therapeutic diet as evidenced by lack of formal prior education and diet low in fiber and high in saturated fats and sodium. Nutrition Intervention: 
Reviewed lab/body comp results/goals. Reviewed rate your plate and heart healthy choices. Reviewed fiber and sodium guidelines. Reviewed label reading.  Affirmed pt was lowering the sweets she eats and trying to lower added salt. Reviewed recipe modification tips, lowering inflammation, need for increased calcium when on Prednisone, plate method for eating. Discussed healthier options that taste sweet such as seedless grapes in the freezer. Handouts: lab/body comp, heart healthy, grocery guide, fiber, herbs and spices, recipes, recipe modification tips, plate method Goals: See cardiac ITP Monitoring/Evaluation: Follow-up appointment: RD to follow up with participant during cardiac rehab sessions for questions and assessment of progression toward goals. Trice Mojica RD, LD, CDE Phone: [phone]

## 2018-11-07 NOTE — PROGRESS NOTES
Patsy Dylan  : 1950  Payor: CARE IMPROVEMENT PLUS / Plan[place]ARE [place] / Product Type: Managed Care Medicare /    [address] [place] [address]  at [facility]  [address], [address], [address]  [phone]   [phone]         OUTPATIENT PHYSICAL THERAPY:Daily Note 2018    ICD-10: Treatment Diagnosis: Low back pain (M54.5); Unsteadiness on feet (R26.81)  Precautions/Allergies:   Antibiotic [dxlpx-vhyrn-hnbtpsd-pramoxine]; Biaxin [clarithromycin]; Ceclor [cefaclor]; and Cephalexin   Fall Risk Score: 2 (? 5 = High Risk)  MD Orders: PT eval and tx MEDICAL/REFERRING DIAGNOSIS:  Low back pain [M54.5]   DATE OF ONSET: Not sure  REFERRING PHYSICIAN: Karen Cedeño MD  RETURN PHYSICIAN APPOINTMENT: None scheduled     INITIAL ASSESSMENT:  Ms. Darryle Mulders presents to PT eval w/ c/o LB pain and LE weakness. 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 She displayed tightness in her hips, decreased LE strength, and decreased balance. She will benefit from continued skilled PT services to improve her deficits listed below and to progress towards her PLOF. PROBLEM LIST (Impacting functional limitations):  1. Decreased Strength  2. Decreased ADL/Functional Activities  3. Decreased Transfer Abilities  4. Decreased Balance  5. Increased Pain  6. Decreased Flexibility/Joint Mobility  7. Decreased Mongo with Home Exercise Program INTERVENTIONS PLANNED:  1. Balance Exercise  2. Cold  3. Heat  4. Home Exercise Program (HEP)  5. Manual Therapy  6. Range of Motion (ROM)  7. Therapeutic Exercise/Strengthening   TREATMENT PLAN:  Effective Dates: 10/2/2018 TO 2018 (60 days). Frequency/Duration: 2 times a week for 60 Days  GOALS: (Goals have been discussed and agreed upon with patient.)  Discharge Goals: Time Frame: 5 weeks  1. Pt will be independent w/ HEP in order to improve outcomes and decrease pain.    2. Pt will report pain of 3/10 or less while performing ADLs in order to improve functional mobility. 3. Pt will tolerate walking for 10 minutes w/ pain of 3/10 or less in order to return to PLOF. 4. Pt will have Oswestry score of 16 or less in order to report decreased pain and decreased functional impairments. 5. Pt will have L LE strength of 4+/5 or greater in order to increase her safety w/ functional mobility. The information in this section was collected on 10/2/18 (except where otherwise noted). HISTORY:   History of Present Injury/Illness (Reason for Referral):  Pt is unsure of how her back pain started. 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 Past MRI shows disc bulges in lumbar spine. Pt also notes leg pain. It is of note that she is doing cardiac rehab. Past Medical History/Comorbidities:   Ms. Hermelindo Carrasco  has a past medical history of Affective psychosis ([facility] [address] Arthritis, CAD (coronary artery disease), CAD (coronary artery disease), Chest pain, Cholelithiasis, Chronic depression, Chronic lumbar pain, Claudication ([facility] [address] Coronary atherosclerosis of native coronary vessel, Dyslipidemia, Fibromyalgia, HIPOLITO (generalized anxiety disorder), GERD (gastroesophageal reflux disease), History of complete eye exam, History of dental examination, History of placement of stent in LAD coronary artery, Hypertension, Low magnesium level, Malaise and fatigue, Palpitations, Polyarthralgia, Shingles, and Tobacco use disorder. Ms. Hermelindo Carrasco  has a past surgical history that includes hx cholecystectomy (2005); implant breast silicone/eq (Bilateral); pr cardiac surg procedure unlist (08/29/2006); pr left heart cath,percutaneous (09/12/2018); pr breast surgery procedure unlisted (1980); hx tonsillectomy (1976); hx other surgical; COLONOSCOPY/ BMI=26 (N/A, 8/28/2017); ENDOSCOPIC POLYPECTOMY (N/A, 8/28/2017); and TEMPORAL ARTERY BIOPSY RIGHT (Right, 6/13/2017).   Social History/Living Environment:    Pt lives alone in 1 story home w/ walk in shower  Prior Level of Function/Work/Activity:  Pt is retired, Does house work at home, is independent   Dominant Side:         RIGHT  Current Medications:       Current Outpatient Medications:     rosuvastatin (CRESTOR) 10 mg tablet, Take 0.5 Tabs by mouth nightly. (Patient taking differently: Take 5 mg by mouth two (2) times a day.), Disp: 90 Tab, Rfl: 3    ezetimibe (ZETIA) 10 mg tablet, Take 1 Tab by mouth daily. , Disp: 90 Tab, Rfl: 3    nitroglycerin (NITROSTAT) 0.4 mg SL tablet, 1 Tab by SubLINGual route every five (5) minutes as needed for Chest Pain. Up to 3 doses. , Disp: 25 Tab, Rfl: 11    ticagrelor (BRILINTA) 90 mg tablet, Take 1 Tab by mouth every twelve (12) hours every twelve (12) hours. , Disp: 60 Tab, Rfl: 11    predniSONE (DELTASONE) 5 mg tablet, Take 5 mg by mouth daily. , Disp: , Rfl:     omeprazole (PRILOSEC) 20 mg capsule, Take 1 Cap by mouth two (2) times a day. (Patient taking differently: Take 20 mg by mouth daily.), Disp: 180 Cap, Rfl: 3    amLODIPine (NORVASC) 5 mg tablet, Take 1 Tab by mouth daily. , Disp: 90 Tab, Rfl: 3    clonazePAM (KLONOPIN) 0.5 mg tablet, Take 1 Tab by mouth nightly as needed. Max Daily Amount: 0.5 mg., Disp: 90 Tab, Rfl: 1    aspirin delayed-release 81 mg tablet, Take  by mouth daily. Take DOS per anesthesia protocol., Disp: , Rfl:     busPIRone (BUSPAR) 10 mg tablet, Take 10 mg by mouth as needed. , Disp: , Rfl:    Date Last Reviewed:  11/7/2018    EXAMINATION:   Observation/Orthostatic Postural Assessment:           Forward head, decreased lordosis, rounded shoulders   Palpation:          Tender to B piriformis muscles and LB muscles   Special Tests:           - tender to entire LB vertebrae   Functional Mobility:         Gait/Ambulation:  Independent, antalgic        Transfers:  Pushes up        Bed Mobility:  Independent  Balance:          Decreased on BLEs   Sensation:         Intact to BLEs w/ light touch    Joint/Muscle ROM Strength Updates   Hip flexion WFL 4-/5 L, 4/5 R    Hip extension Decreased 4-/5 B    Hip abduction WFL 4-/5 L, 4/5 R    Knee extension [facility]    Knee flexion [facility]    [facility] WFL         Body Structures Involved:  1. Nerves  2. Bones  3. Joints  4. Muscles Body Functions Affected:  1. Sensory/Pain  2. Neuromusculoskeletal  3. Movement Related Activities and Participation Affected:  1. General Tasks and Demands  2. Mobility  3. Self Care  4. Domestic Life  5. Interpersonal Interactions and Relationships  6. Community, Social and Civic Life   CLINICAL PRESENTATION:   CLINICAL DECISION MAKING:   Outcome Measure: Tool Used: Modified Oswestry Low Back Pain Questionnaire  Score:  Initial: 20/50  Most Recent: X/50 (Date: -- )   Interpretation of Score: Each section is scored on a 0-5 scale, 5 representing the greatest disability. The scores of each section are added together for a total score of 50. Score 0 1-10 11-20 21-30 31-40 41-49 50   Modifier CH CI CJ CK CL CM CN     ? Mobility - Walking and Moving Around:     - CURRENT STATUS: CJ - 20%-39% impaired, limited or restricted    - GOAL STATUS: CJ - 20%-39% impaired, limited or restricted    - D/C STATUS:  ---------------To be determined---------------    Medical Necessity:   · Patient is expected to demonstrate progress in strength, range of motion, balance and coordination to increase independence with functional tasks. Reason for Services/Other Comments:  · Patient continues to demonstrate capacity to improve strength, ROM, balance, mobility which will increase independence. TREATMENT:   (In addition to Assessment/Re-Assessment sessions the following treatments were rendered)  Pre-treatment Symptoms/Complaints:  Pt reports she has a good bit of pain. She liked traction and was without pain for 2 days.    Pain: Initial:     4/10 Post Session: 0/10     THERAPEUTIC EXERCISE: (28 minutes ) cues for proper body posture and promote proper body mechanics. Progressed resistance, range, repetitions and complexity of movement as indicated.     MANUAL THERAPY: (0 minutes): Muscle Energy Technique (MET) utilized to correct pelvic obliquity x 2 rounds x 3 reps, long axis to BLEs     Date:  10/2/18 Date:  10/15/18 Date:  10/17/18 Date:  10/22/18 Date:  10/29/18 Date:  10/31/18 Date:  11/5/18 Date:  11/7/18   Activity/Exercise Parameters Parameters Parameters Parameters Parameters Parameters Parameters Parameters   Bridge 10x2 W/ TAs and add sq, 10 x 5sec 20 x 5sec 20x RTB 20x RTB 20x RTB 20x  20x   Cat camel 10x  10x 10x       Lumbar extension 10x5   10x prone, 10x5 at // 10x prone, 10x3 standing in // 10x2 in // 10x prone, 20x standing 20x standing   Prone lying 2 mins painful         NuStep 3 mins, 1.5 10min, L2 10min, L2 L 2 5mins L2 10 mins Bike 8 mins L3 5 mins nustep L 3 10 mins NuStep   Clamshell    20x RTB B 20x RTB B 20x YTB B     Hip extension    10x BLEs prone, 15x YTB in stand at // 15x leaning on table 10x each prone, 15x standing B in // 15x prone B    Bird dog    5x just legs each       TAs  10 x 5sec  20x 5 sec holds 20x 5 sec holds 20x 5 sec holds     Supine hip ABD  RTB, 20x RTB, 20x 15x standing in //  15x standing in // B     LTR  20x B in pain free range 20x B in pain free range        SKTC   10 x 10sec B 5 x 10sec B        DKTC   5 x 10sec     Seated flexion 10x   Mini squat w/ ball    15x  15x on wall Leg press 3 black bands 25x, STS 10x no hands     Step ups    15x R and L 6 inch box 10x R and L 6 in box 15x R and L 6 inch box     Side step ups    30x 6 inch box 15x 6 inch box 15x 6 inch box     Side steps banded     YTB 40ft x2  YTB 40ft x2     Glute squeeze     15x 15x     Hip adduction     15x ball squeeze 20x ball squeeze     Side step anti rotation     10x B RTB      Paloff press     10x B RTB 10x B RTB     SLR       10x2 BLEs    Side glides       Leaning to R Leaning to R     MECHANICAL Traction: (17 mins) - to decrease pain in LB, 80# max, intermittent     Treatment/Session Assessment: Pt's pain resolved w/ therex and manual this session. She is progressing towards her goals. · Response to Treatment:  Pt with improved standing posture and gait mobility/fluidity and a min decrease in LB pain/stiffness after treatment. · Compliance with Program/Exercises: Pt reports compliance with HEP 2-3x/week. · Recommendations/Intent for next treatment session: \"Next visit will focus on advancements to more challenging activities\". [employer] Portal  Access Code: [email or web address]   URL: [email or web address]   Date: 10/02/2018   Prepared by: Nilam De La Rosa     Exercises   Cat-Camel - 10 reps - 2 sets - 5 hold - 1x daily - 3x weekly   Supine Bridge - 10 reps - 2 sets - 5 hold - 1x daily - 3x weekly   Standing Lumbar Extension with Counter - 10 reps - 1 sets - 5 hold - 5x daily - 7x weekly     Variance from POC: none    Future Appointments   Date Time Provider Sarina Cunningham   11/9/2018 10:00 AM [facility]   [facility]2018 [facility]   11/12/2018 11:00 AM Zoraida Peace, PT [facility]   11/14/2018 10:00 [facility]   11/14/2018 11:00 AM Zoraida Peace, PT [facility]   11/16/2018 10:00 AM [facility]US Collado [facility]   11/19/2018 10:00 [facility]   11/21/2018 10:00 AM [facility] Los [facility]   11/26/2018 10:00 [facility]   11/28/2018 10:00 AM [facility]   11/30/2018 10:00 AM [facility]   12/3/2018 10:00 AM [facility]   12/5/2018 10:00 AM [facility]   12/7/2018 10:00 AM Jarek Benítez, RN [facility]   12/10/2018 10:00 AM Kate Renteria RN [facility]   12/12/2018 10:00 AM Kate Renteria RN [facility]   12/14/2018 10:00 AM Kate Renteria RN [facility]   12/18/2018  8:20 AM PST [facility]   12/28/2018 10:00 AM Chrissy Ballard RN [facility]   1/8/2019  3:00 PM Abbie Mar MD [facility] PST PST   1/23/2019 11:45 AM Peggy Saleh MD [facility]       PT Patient Time In/Time Out  Time In: 1040  Time Out: 1128  Treatment time: 45 mins  Kali Salmeron PT

## 2018-11-09 ENCOUNTER — APPOINTMENT (OUTPATIENT)
Dept: CARDIAC REHAB | Age: 68
End: 2018-11-09
Payer: MEDICARE

## 2018-11-12 ENCOUNTER — APPOINTMENT (OUTPATIENT)
Dept: CARDIAC REHAB | Age: 68
End: 2018-11-12
Payer: MEDICARE

## 2018-11-14 ENCOUNTER — HOSPITAL ENCOUNTER (OUTPATIENT)
Dept: CARDIAC REHAB | Age: 68
End: 2018-11-14
Payer: MEDICARE

## 2018-11-16 ENCOUNTER — APPOINTMENT (OUTPATIENT)
Dept: CARDIAC REHAB | Age: 68
End: 2018-11-16
Payer: MEDICARE

## 2018-11-19 ENCOUNTER — APPOINTMENT (OUTPATIENT)
Dept: CARDIAC REHAB | Age: 68
End: 2018-11-19
Payer: MEDICARE

## 2018-11-21 ENCOUNTER — APPOINTMENT (OUTPATIENT)
Dept: CARDIAC REHAB | Age: 68
End: 2018-11-21
Payer: MEDICARE

## 2018-11-23 ENCOUNTER — APPOINTMENT (OUTPATIENT)
Dept: CARDIAC REHAB | Age: 68
End: 2018-11-23
Payer: MEDICARE

## 2018-11-26 ENCOUNTER — HOSPITAL ENCOUNTER (OUTPATIENT)
Dept: CARDIAC REHAB | Age: 68
End: 2018-11-26
Payer: MEDICARE

## 2018-11-28 ENCOUNTER — HOSPITAL ENCOUNTER (OUTPATIENT)
Dept: CARDIAC REHAB | Age: 68
Discharge: HOME | End: 2018-11-28
Payer: MEDICARE

## 2018-11-30 ENCOUNTER — APPOINTMENT (OUTPATIENT)
Dept: CARDIAC REHAB | Age: 68
End: 2018-11-30
Payer: MEDICARE

## 2018-12-03 ENCOUNTER — HOSPITAL ENCOUNTER (OUTPATIENT)
Dept: CARDIAC REHAB | Age: 68
End: 2018-12-03

## 2018-12-05 ENCOUNTER — APPOINTMENT (OUTPATIENT)
Dept: CARDIAC REHAB | Age: 68
End: 2018-12-05

## 2018-12-07 ENCOUNTER — APPOINTMENT (OUTPATIENT)
Dept: CARDIAC REHAB | Age: 68
End: 2018-12-07

## 2018-12-10 ENCOUNTER — APPOINTMENT (OUTPATIENT)
Dept: CARDIAC REHAB | Age: 68
End: 2018-12-10

## 2018-12-12 ENCOUNTER — APPOINTMENT (OUTPATIENT)
Dept: CARDIAC REHAB | Age: 68
End: 2018-12-12

## 2018-12-14 ENCOUNTER — APPOINTMENT (OUTPATIENT)
Dept: CARDIAC REHAB | Age: 68
End: 2018-12-14

## 2018-12-28 ENCOUNTER — APPOINTMENT (OUTPATIENT)
Dept: CARDIAC REHAB | Age: 68
End: 2018-12-28

## 2019-03-12 ENCOUNTER — HOSPITAL ENCOUNTER (OUTPATIENT)
Dept: MRI IMAGING | Age: 69
Discharge: HOME | End: 2019-03-12
Attending: PSYCHIATRY & NEUROLOGY
Payer: MEDICARE

## 2019-03-12 DIAGNOSIS — G44.099 TRIGEMINAL AUTONOMIC CEPHALGIAS: ICD-10-CM

## 2019-03-12 PROCEDURE — 70551 MRI BRAIN STEM W/O DYE: CPT

## 2019-04-23 ENCOUNTER — APPOINTMENT (RX ONLY)
Dept: URBAN - METROPOLITAN AREA CLINIC 349 | Facility: CLINIC | Age: 69
Setting detail: DERMATOLOGY
End: 2019-04-23

## 2019-04-23 DIAGNOSIS — L70.0 ACNE VULGARIS: ICD-10-CM | Status: WELL CONTROLLED

## 2019-04-23 DIAGNOSIS — L82.0 INFLAMED SEBORRHEIC KERATOSIS: ICD-10-CM

## 2019-04-23 DIAGNOSIS — L57.0 ACTINIC KERATOSIS: ICD-10-CM

## 2019-04-23 DIAGNOSIS — D22 MELANOCYTIC NEVI: ICD-10-CM | Status: STABLE

## 2019-04-23 DIAGNOSIS — L91.0 HYPERTROPHIC SCAR: ICD-10-CM | Status: RESOLVED

## 2019-04-23 DIAGNOSIS — Z85.828 PERSONAL HISTORY OF OTHER MALIGNANT NEOPLASM OF SKIN: ICD-10-CM

## 2019-04-23 DIAGNOSIS — Z85.820 PERSONAL HISTORY OF MALIGNANT MELANOMA OF SKIN: ICD-10-CM

## 2019-04-23 PROBLEM — D22.5 MELANOCYTIC NEVI OF TRUNK: Status: ACTIVE | Noted: 2019-04-23

## 2019-04-23 PROBLEM — D48.5 NEOPLASM OF UNCERTAIN BEHAVIOR OF SKIN: Status: ACTIVE | Noted: 2019-04-23

## 2019-04-23 PROCEDURE — 17003 DESTRUCT PREMALG LES 2-14: CPT

## 2019-04-23 PROCEDURE — ? SHAVE REMOVAL

## 2019-04-23 PROCEDURE — ? LIQUID NITROGEN

## 2019-04-23 PROCEDURE — A4550 SURGICAL TRAYS: HCPCS

## 2019-04-23 PROCEDURE — ? PATHOLOGY BILLING

## 2019-04-23 PROCEDURE — 88305 TISSUE EXAM BY PATHOLOGIST: CPT

## 2019-04-23 PROCEDURE — 11301 SHAVE SKIN LESION 0.6-1.0 CM: CPT

## 2019-04-23 PROCEDURE — 99214 OFFICE O/P EST MOD 30 MIN: CPT | Mod: 25

## 2019-04-23 PROCEDURE — 17000 DESTRUCT PREMALG LESION: CPT | Mod: 59

## 2019-04-23 PROCEDURE — ? COUNSELING

## 2019-04-23 PROCEDURE — ? OTHER

## 2019-04-23 PROCEDURE — ? MEDICAL PHOTOGRAPHY REVIEW

## 2019-04-23 PROCEDURE — ? OBSERVATION

## 2019-04-23 PROCEDURE — ? PRESCRIPTION

## 2019-04-23 RX ORDER — CLINDAMYCIN PHOSPHATE 1 %
GEL (GRAM) TOPICAL
Qty: 1 | Refills: 2 | Status: ERX

## 2019-04-23 RX ORDER — TRETINOIN 0.5 MG/G
CREAM TOPICAL
Qty: 1 | Refills: 2 | Status: ERX | COMMUNITY
Start: 2019-04-23

## 2019-04-23 RX ADMIN — TRETINOIN: 0.5 CREAM TOPICAL at 18:51

## 2019-04-23 ASSESSMENT — LOCATION DETAILED DESCRIPTION DERM
LOCATION DETAILED: NASAL DORSUM
LOCATION DETAILED: RIGHT MEDIAL UPPER BACK
LOCATION DETAILED: LEFT DORSAL WRIST
LOCATION DETAILED: LEFT SUPERIOR MEDIAL MALAR CHEEK
LOCATION DETAILED: SUPERIOR LUMBAR SPINE
LOCATION DETAILED: RIGHT INFERIOR MEDIAL MIDBACK
LOCATION DETAILED: RIGHT INFERIOR CENTRAL MALAR CHEEK
LOCATION DETAILED: LEFT INFERIOR CENTRAL MALAR CHEEK
LOCATION DETAILED: LEFT ANTERIOR DISTAL UPPER ARM
LOCATION DETAILED: RIGHT CLAVICULAR SKIN
LOCATION DETAILED: RIGHT DISTAL CALF
LOCATION DETAILED: RIGHT LATERAL MALAR CHEEK
LOCATION DETAILED: RIGHT DISTAL CALF

## 2019-04-23 ASSESSMENT — LOCATION SIMPLE DESCRIPTION DERM
LOCATION SIMPLE: RIGHT UPPER BACK
LOCATION SIMPLE: RIGHT CLAVICULAR SKIN
LOCATION SIMPLE: RIGHT CALF
LOCATION SIMPLE: LEFT WRIST
LOCATION SIMPLE: RIGHT CHEEK
LOCATION SIMPLE: RIGHT CALF
LOCATION SIMPLE: LOWER BACK
LOCATION SIMPLE: RIGHT LOWER BACK
LOCATION SIMPLE: LEFT CHEEK
LOCATION SIMPLE: NOSE
LOCATION SIMPLE: LEFT UPPER ARM

## 2019-04-23 ASSESSMENT — LOCATION ZONE DERM
LOCATION ZONE: ARM
LOCATION ZONE: TRUNK
LOCATION ZONE: NOSE
LOCATION ZONE: LEG
LOCATION ZONE: LEG
LOCATION ZONE: FACE

## 2019-04-23 ASSESSMENT — SEVERITY ASSESSMENT OVERALL AMONG ALL PATIENTS
IN YOUR EXPERIENCE, AMONG ALL PATIENTS YOU HAVE SEEN WITH THIS CONDITION, HOW SEVERE IS THIS PATIENT'S CONDITION?: ALMOST CLEAR

## 2019-04-23 NOTE — PROCEDURE: COUNSELING
Tetracycline Pregnancy And Lactation Text: This medication is Pregnancy Category D and not consider safe during pregnancy. It is also excreted in breast milk.
Erythromycin Pregnancy And Lactation Text: This medication is Pregnancy Category B and is considered safe during pregnancy. It is also excreted in breast milk.
Bactrim Counseling:  I discussed with the patient the risks of sulfa antibiotics including but not limited to GI upset, allergic reaction, drug rash, diarrhea, dizziness, photosensitivity, and yeast infections.  Rarely, more serious reactions can occur including but not limited to aplastic anemia, agranulocytosis, methemoglobinemia, blood dyscrasias, liver or kidney failure, lung infiltrates or desquamative/blistering drug rashes.
Benzoyl Peroxide Counseling: Patient counseled that medicine may cause skin irritation and bleach clothing.  In the event of skin irritation, the patient was advised to reduce the amount of the drug applied or use it less frequently.   The patient verbalized understanding of the proper use and possible adverse effects of benzoyl peroxide.  All of the patient's questions and concerns were addressed.
Topical Sulfur Applications Pregnancy And Lactation Text: This medication is Pregnancy Category C and has an unknown safety profile during pregnancy. It is unknown if this topical medication is excreted in breast milk.
High Dose Vitamin A Counseling: Side effects reviewed, pt to contact office should one occur.
Topical Retinoid counseling:  Patient advised to apply a pea-sized amount only at bedtime and wait 30 minutes after washing their face before applying.  If too drying, patient may add a non-comedogenic moisturizer. The patient verbalized understanding of the proper use and possible adverse effects of retinoids.  All of the patient's questions and concerns were addressed.
Bactrim Pregnancy And Lactation Text: This medication is Pregnancy Category D and is known to cause fetal risk.  It is also excreted in breast milk.
Isotretinoin Counseling: Patient should get monthly blood tests, not donate blood, not drive at night if vision affected, not share medication, and not undergo elective surgery for 6 months after tx completed. Side effects reviewed, pt to contact office should one occur.
Topical Clindamycin Counseling: Patient counseled that this medication may cause skin irritation or allergic reactions.  In the event of skin irritation, the patient was advised to reduce the amount of the drug applied or use it less frequently.   The patient verbalized understanding of the proper use and possible adverse effects of clindamycin.  All of the patient's questions and concerns were addressed.
Detail Level: Detailed
Doxycycline Pregnancy And Lactation Text: This medication is Pregnancy Category D and not consider safe during pregnancy. It is also excreted in breast milk but is considered safe for shorter treatment courses.
Topical Clindamycin Pregnancy And Lactation Text: This medication is Pregnancy Category B and is considered safe during pregnancy. It is unknown if it is excreted in breast milk.
Detail Level: Generalized
Azithromycin Pregnancy And Lactation Text: This medication is considered safe during pregnancy and is also secreted in breast milk.
Erythromycin Counseling:  I discussed with the patient the risks of erythromycin including but not limited to GI upset, allergic reaction, drug rash, diarrhea, increase in liver enzymes, and yeast infections.
Spironolactone Pregnancy And Lactation Text: This medication can cause feminization of the male fetus and should be avoided during pregnancy. The active metabolite is also found in breast milk.
Doxycycline Counseling:  Patient counseled regarding possible photosensitivity and increased risk for sunburn.  Patient instructed to avoid sunlight, if possible.  When exposed to sunlight, patients should wear protective clothing, sunglasses, and sunscreen.  The patient was instructed to call the office immediately if the following severe adverse effects occur:  hearing changes, easy bruising/bleeding, severe headache, or vision changes.  The patient verbalized understanding of the proper use and possible adverse effects of doxycycline.  All of the patient's questions and concerns were addressed.
Topical Sulfur Applications Counseling: Topical Sulfur Counseling: Patient counseled that this medication may cause skin irritation or allergic reactions.  In the event of skin irritation, the patient was advised to reduce the amount of the drug applied or use it less frequently.   The patient verbalized understanding of the proper use and possible adverse effects of topical sulfur application.  All of the patient's questions and concerns were addressed.
Birth Control Pills Counseling: Birth Control Pill Counseling: I discussed with the patient the potential side effects of OCPs including but not limited to increased risk of stroke, heart attack, thrombophlebitis, deep venous thrombosis, hepatic adenomas, breast changes, GI upset, headaches, and depression.  The patient verbalized understanding of the proper use and possible adverse effects of OCPs. All of the patient's questions and concerns were addressed.
Detail Level: Simple
Benzoyl Peroxide Pregnancy And Lactation Text: This medication is Pregnancy Category C. It is unknown if benzoyl peroxide is excreted in breast milk.
Azithromycin Counseling:  I discussed with the patient the risks of azithromycin including but not limited to GI upset, allergic reaction, drug rash, diarrhea, and yeast infections.
Tazorac Pregnancy And Lactation Text: This medication is not safe during pregnancy. It is unknown if this medication is excreted in breast milk.
High Dose Vitamin A Pregnancy And Lactation Text: High dose vitamin A therapy is contraindicated during pregnancy and breast feeding.
Tetracycline Counseling: Patient counseled regarding possible photosensitivity and increased risk for sunburn.  Patient instructed to avoid sunlight, if possible.  When exposed to sunlight, patients should wear protective clothing, sunglasses, and sunscreen.  The patient was instructed to call the office immediately if the following severe adverse effects occur:  hearing changes, easy bruising/bleeding, severe headache, or vision changes.  The patient verbalized understanding of the proper use and possible adverse effects of tetracycline.  All of the patient's questions and concerns were addressed. Patient understands to avoid pregnancy while on therapy due to potential birth defects.
Topical Retinoid Pregnancy And Lactation Text: This medication is Pregnancy Category C. It is unknown if this medication is excreted in breast milk.
Dapsone Pregnancy And Lactation Text: This medication is Pregnancy Category C and is not considered safe during pregnancy or breast feeding.
Isotretinoin Pregnancy And Lactation Text: This medication is Pregnancy Category X and is considered extremely dangerous during pregnancy. It is unknown if it is excreted in breast milk.
Birth Control Pills Pregnancy And Lactation Text: This medication should be avoided if pregnant and for the first 30 days post-partum.
Spironolactone Counseling: Patient advised regarding risks of diarrhea, abdominal pain, hyperkalemia, birth defects (for female patients), liver toxicity and renal toxicity. The patient may need blood work to monitor liver and kidney function and potassium levels while on therapy. The patient verbalized understanding of the proper use and possible adverse effects of spironolactone.  All of the patient's questions and concerns were addressed.
Tazorac Counseling:  Patient advised that medication is irritating and drying.  Patient may need to apply sparingly and wash off after an hour before eventually leaving it on overnight.  The patient verbalized understanding of the proper use and possible adverse effects of tazorac.  All of the patient's questions and concerns were addressed.
Minocycline Counseling: Patient advised regarding possible photosensitivity and discoloration of the teeth, skin, lips, tongue and gums.  Patient instructed to avoid sunlight, if possible.  When exposed to sunlight, patients should wear protective clothing, sunglasses, and sunscreen.  The patient was instructed to call the office immediately if the following severe adverse effects occur:  hearing changes, easy bruising/bleeding, severe headache, or vision changes.  The patient verbalized understanding of the proper use and possible adverse effects of minocycline.  All of the patient's questions and concerns were addressed.
Dapsone Counseling: I discussed with the patient the risks of dapsone including but not limited to hemolytic anemia, agranulocytosis, rashes, methemoglobinemia, kidney failure, peripheral neuropathy, headaches, GI upset, and liver toxicity.  Patients who start dapsone require monitoring including baseline LFTs and weekly CBCs for the first month, then every month thereafter.  The patient verbalized understanding of the proper use and possible adverse effects of dapsone.  All of the patient's questions and concerns were addressed.

## 2019-04-23 NOTE — PROCEDURE: SHAVE REMOVAL
Wound Care: Vaseline
Billing Type: Third-Party Bill
Post-Care Instructions: I reviewed with the patient in detail post-care instructions. Patient is to keep the biopsy site dry overnight, and then apply Vaseline daily until healed. Patient may apply hydrogen peroxide soaks to remove any crusting. After the procedure, the patient was oriented to person, place, and time. Patient denied feeling dizzy, queasy, and and declined further observation after initial 5 minute observation time.
Consent was obtained from the patient. The risks and benefits to therapy were discussed in detail. Specifically, the risks of infection, scarring, bleeding, prolonged wound healing, incomplete removal, allergy to anesthesia, nerve injury and recurrence were addressed. Prior to the procedure, the treatment site was clearly identified and confirmed by the patient. All components of Universal Protocol/PAUSE Rule completed.
Biopsy Method: scissors
Was A Bandage Applied: Yes
Add Variable For Additional Medical Justification: No
Detail Level: Detailed
X Size Of Lesion In Cm (Optional): 0
Accession #: [id number]
Anesthesia Volume In Cc: 1.5
Medical Necessity Information: It is in your best interest to select a reason for this procedure from the list below. All of these items fulfill various CMS LCD requirements except the new and changing color options.
Hemostasis: Aluminum Chloride
Notification Instructions: Patient will be notified of biopsy results. However, patient instructed to call the office if not contacted within 2 weeks.
Medical Necessity Clause: This procedure was medically necessary because the lesion has a history of change
Size Of Lesion In Cm (Required): 0.6

## 2019-04-23 NOTE — PROCEDURE: REASSURANCE
Detail Level: Detailed
Quality 137: Melanoma: Continuity Of Care - Recall System: Recall system not utilized, reason not otherwise specified
Quality 224: Stage 0-Iic Melanoma: Overutilization Of Imaging Studies For Only Stage 0-Iic Melanoma: None of the following diagnostic imaging studies ordered: chest X-ray, CT, Ultrasound, MRI, PET, or nuclear medicine scans (ML)

## 2019-04-23 NOTE — PROCEDURE: LIQUID NITROGEN
Render Note In Bullet Format When Appropriate: No
Consent: The patient's consent was obtained including but not limited to risks of crusting, scabbing, blistering, scarring, darker or lighter pigmentary change, recurrence, incomplete removal and infection.
Detail Level: Detailed
Number Of Freeze-Thaw Cycles: 2 freeze-thaw cycles
Duration Of Freeze Thaw-Cycle (Seconds): 3
Post-Care Instructions: I reviewed with the patient in detail post-care instructions. Patient is to wear sunprotection, and avoid picking at any of the treated lesions. Pt may apply Vaseline to crusted or scabbing areas.

## 2019-04-23 NOTE — PROCEDURE: OTHER
Other (Free Text): History provided by the patient. Will request records today from [facility].
Note Text (......Xxx Chief Complaint.): This diagnosis correlates with the
Detail Level: Detailed

## 2019-04-23 NOTE — PROCEDURE: MIPS QUALITY
Detail Level: Detailed
Quality 111:Pneumonia Vaccination Status For Older Adults: Pneumococcal Vaccination Previously Received
Quality 110: Preventive Care And Screening: Influenza Immunization: Influenza Immunization Administered during Influenza season
Quality 137: Melanoma: Continuity Of Care - Recall System: Recall system not utilized, reason not otherwise specified

## 2019-06-12 RX ORDER — TRETINOIN 0.5 MG/G
CREAM TOPICAL
Qty: 1 | Refills: 0

## 2019-07-17 ENCOUNTER — HOSPITAL ENCOUNTER (OUTPATIENT)
Dept: GENERAL RADIOLOGY | Age: 69
Discharge: HOME | End: 2019-07-17
Payer: MEDICARE

## 2019-07-17 DIAGNOSIS — M25.551 BILATERAL HIP PAIN: ICD-10-CM

## 2019-07-17 DIAGNOSIS — M25.552 BILATERAL HIP PAIN: ICD-10-CM

## 2019-07-17 PROCEDURE — 73523 X-RAY EXAM HIPS BI 5/> VIEWS: CPT

## 2019-07-31 ENCOUNTER — APPOINTMENT (RX ONLY)
Dept: URBAN - METROPOLITAN AREA CLINIC 349 | Facility: CLINIC | Age: 69
Setting detail: DERMATOLOGY
End: 2019-07-31

## 2019-07-31 ENCOUNTER — HOSPITAL ENCOUNTER (OUTPATIENT)
Dept: LAB | Age: 69
Discharge: HOME | End: 2019-07-31
Attending: INTERNAL MEDICINE
Payer: MEDICARE

## 2019-07-31 DIAGNOSIS — L29.8 OTHER PRURITUS: ICD-10-CM

## 2019-07-31 DIAGNOSIS — L29.89 OTHER PRURITUS: ICD-10-CM

## 2019-07-31 DIAGNOSIS — F42.4 EXCORIATION (SKIN-PICKING) DISORDER: ICD-10-CM

## 2019-07-31 DIAGNOSIS — L82.1 OTHER SEBORRHEIC KERATOSIS: ICD-10-CM

## 2019-07-31 DIAGNOSIS — E78.5 DYSLIPIDEMIA: ICD-10-CM

## 2019-07-31 PROBLEM — S50.912A UNSPECIFIED SUPERFICIAL INJURY OF LEFT FOREARM, INITIAL ENCOUNTER: Status: ACTIVE | Noted: 2019-07-31

## 2019-07-31 LAB
CHOLEST SERPL-MCNC: 214 MG/DL
HDLC SERPL-MCNC: 55 MG/DL (ref 40–60)
HDLC SERPL: 3.9 {RATIO}
LDLC SERPL CALC-MCNC: 124 MG/DL
LIPID PROFILE,FLP: ABNORMAL
TRIGL SERPL-MCNC: 175 MG/DL (ref 35–150)
VLDLC SERPL CALC-MCNC: 35 MG/DL (ref 6–23)

## 2019-07-31 PROCEDURE — ? COUNSELING

## 2019-07-31 PROCEDURE — 36415 COLL VENOUS BLD VENIPUNCTURE: CPT

## 2019-07-31 PROCEDURE — 80061 LIPID PANEL: CPT

## 2019-07-31 PROCEDURE — 99213 OFFICE O/P EST LOW 20 MIN: CPT

## 2019-07-31 PROCEDURE — ? OTHER

## 2019-07-31 PROCEDURE — ? TREATMENT REGIMEN

## 2019-07-31 PROCEDURE — ? PHOTO-DOCUMENTATION

## 2019-07-31 ASSESSMENT — LOCATION DETAILED DESCRIPTION DERM
LOCATION DETAILED: LEFT PROXIMAL DORSAL FOREARM
LOCATION DETAILED: LEFT DISTAL DORSAL FOREARM

## 2019-07-31 ASSESSMENT — LOCATION ZONE DERM: LOCATION ZONE: ARM

## 2019-07-31 ASSESSMENT — LOCATION SIMPLE DESCRIPTION DERM: LOCATION SIMPLE: LEFT FOREARM

## 2020-05-28 ENCOUNTER — APPOINTMENT (RX ONLY)
Dept: URBAN - METROPOLITAN AREA CLINIC 349 | Facility: CLINIC | Age: 70
Setting detail: DERMATOLOGY
End: 2020-05-28

## 2020-05-28 DIAGNOSIS — L57.0 ACTINIC KERATOSIS: ICD-10-CM | Status: RESOLVED

## 2020-05-28 DIAGNOSIS — D22 MELANOCYTIC NEVI: ICD-10-CM | Status: STABLE

## 2020-05-28 DIAGNOSIS — L70.0 ACNE VULGARIS: ICD-10-CM | Status: WELL CONTROLLED

## 2020-05-28 DIAGNOSIS — Z85.820 PERSONAL HISTORY OF MALIGNANT MELANOMA OF SKIN: ICD-10-CM

## 2020-05-28 DIAGNOSIS — Z85.828 PERSONAL HISTORY OF OTHER MALIGNANT NEOPLASM OF SKIN: ICD-10-CM

## 2020-05-28 DIAGNOSIS — L82.1 OTHER SEBORRHEIC KERATOSIS: ICD-10-CM

## 2020-05-28 PROBLEM — D22.5 MELANOCYTIC NEVI OF TRUNK: Status: ACTIVE | Noted: 2020-05-28

## 2020-05-28 PROCEDURE — ? MEDICAL PHOTOGRAPHY REVIEW

## 2020-05-28 PROCEDURE — 17003 DESTRUCT PREMALG LES 2-14: CPT

## 2020-05-28 PROCEDURE — ? LIQUID NITROGEN

## 2020-05-28 PROCEDURE — 17000 DESTRUCT PREMALG LESION: CPT

## 2020-05-28 PROCEDURE — ? COUNSELING

## 2020-05-28 PROCEDURE — ? OTHER

## 2020-05-28 PROCEDURE — 99214 OFFICE O/P EST MOD 30 MIN: CPT | Mod: 25

## 2020-05-28 PROCEDURE — ? PRESCRIPTION

## 2020-05-28 RX ORDER — TRETINOIN 0.5 MG/G
CREAM TOPICAL
Qty: 1 | Refills: 0 | Status: ERX

## 2020-05-28 RX ORDER — CLINDAMYCIN PHOSPHATE 1 %
GEL (GRAM) TOPICAL
Qty: 1 | Refills: 2 | Status: ERX

## 2020-05-28 ASSESSMENT — LOCATION DETAILED DESCRIPTION DERM
LOCATION DETAILED: LEFT INFERIOR CENTRAL MALAR CHEEK
LOCATION DETAILED: RIGHT DORSAL INDEX FINGER METACARPOPHALANGEAL JOINT
LOCATION DETAILED: RIGHT INFERIOR MEDIAL MIDBACK
LOCATION DETAILED: LEFT DORSAL WRIST
LOCATION DETAILED: LEFT ANTERIOR DISTAL UPPER ARM
LOCATION DETAILED: RIGHT CLAVICULAR SKIN
LOCATION DETAILED: RIGHT MEDIAL UPPER BACK
LOCATION DETAILED: RIGHT INFERIOR CENTRAL MALAR CHEEK
LOCATION DETAILED: RIGHT RADIAL DORSAL HAND
LOCATION DETAILED: LEFT INFERIOR FOREHEAD
LOCATION DETAILED: NASAL DORSUM
LOCATION DETAILED: SUPERIOR LUMBAR SPINE

## 2020-05-28 ASSESSMENT — LOCATION ZONE DERM
LOCATION ZONE: NOSE
LOCATION ZONE: ARM
LOCATION ZONE: FACE
LOCATION ZONE: TRUNK
LOCATION ZONE: HAND

## 2020-05-28 ASSESSMENT — LOCATION SIMPLE DESCRIPTION DERM
LOCATION SIMPLE: NOSE
LOCATION SIMPLE: RIGHT UPPER BACK
LOCATION SIMPLE: RIGHT LOWER BACK
LOCATION SIMPLE: LOWER BACK
LOCATION SIMPLE: RIGHT CLAVICULAR SKIN
LOCATION SIMPLE: LEFT UPPER ARM
LOCATION SIMPLE: RIGHT CHEEK
LOCATION SIMPLE: LEFT WRIST
LOCATION SIMPLE: LEFT FOREHEAD
LOCATION SIMPLE: RIGHT HAND
LOCATION SIMPLE: LEFT CHEEK

## 2020-05-28 ASSESSMENT — PAIN INTENSITY VAS: HOW INTENSE IS YOUR PAIN 0 BEING NO PAIN, 10 BEING THE MOST SEVERE PAIN POSSIBLE?: 1/10 PAIN

## 2020-05-28 NOTE — PROCEDURE: OTHER
Note Text (......Xxx Chief Complaint.): This diagnosis correlates with the
Detail Level: Detailed
Other (Free Text): History provided by the patient.
Other (Free Text): LN2 offered, patient declines
Other (Free Text): Compared to photo, this has resolved

## 2020-05-28 NOTE — PROCEDURE: COUNSELING
Birth Control Pills Pregnancy And Lactation Text: This medication should be avoided if pregnant and for the first 30 days post-partum.
Dapsone Counseling: I discussed with the patient the risks of dapsone including but not limited to hemolytic anemia, agranulocytosis, rashes, methemoglobinemia, kidney failure, peripheral neuropathy, headaches, GI upset, and liver toxicity.  Patients who start dapsone require monitoring including baseline LFTs and weekly CBCs for the first month, then every month thereafter.  The patient verbalized understanding of the proper use and possible adverse effects of dapsone.  All of the patient's questions and concerns were addressed.
Isotretinoin Pregnancy And Lactation Text: This medication is Pregnancy Category X and is considered extremely dangerous during pregnancy. It is unknown if it is excreted in breast milk.
Bactrim Counseling:  I discussed with the patient the risks of sulfa antibiotics including but not limited to GI upset, allergic reaction, drug rash, diarrhea, dizziness, photosensitivity, and yeast infections.  Rarely, more serious reactions can occur including but not limited to aplastic anemia, agranulocytosis, methemoglobinemia, blood dyscrasias, liver or kidney failure, lung infiltrates or desquamative/blistering drug rashes.
Detail Level: Generalized
Detail Level: Simple
Minocycline Pregnancy And Lactation Text: This medication is Pregnancy Category D and not consider safe during pregnancy. It is also excreted in breast milk.
Topical Sulfur Applications Counseling: Topical Sulfur Counseling: Patient counseled that this medication may cause skin irritation or allergic reactions.  In the event of skin irritation, the patient was advised to reduce the amount of the drug applied or use it less frequently.   The patient verbalized understanding of the proper use and possible adverse effects of topical sulfur application.  All of the patient's questions and concerns were addressed.
Azithromycin Counseling:  I discussed with the patient the risks of azithromycin including but not limited to GI upset, allergic reaction, drug rash, diarrhea, and yeast infections.
Doxycycline Pregnancy And Lactation Text: This medication is Pregnancy Category D and not consider safe during pregnancy. It is also excreted in breast milk but is considered safe for shorter treatment courses.
Minocycline Counseling: Patient advised regarding possible photosensitivity and discoloration of the teeth, skin, lips, tongue and gums.  Patient instructed to avoid sunlight, if possible.  When exposed to sunlight, patients should wear protective clothing, sunglasses, and sunscreen.  The patient was instructed to call the office immediately if the following severe adverse effects occur:  hearing changes, easy bruising/bleeding, severe headache, or vision changes.  The patient verbalized understanding of the proper use and possible adverse effects of minocycline.  All of the patient's questions and concerns were addressed.
High Dose Vitamin A Counseling: Side effects reviewed, pt to contact office should one occur.
Azithromycin Pregnancy And Lactation Text: This medication is considered safe during pregnancy and is also secreted in breast milk.
Erythromycin Pregnancy And Lactation Text: This medication is Pregnancy Category B and is considered safe during pregnancy. It is also excreted in breast milk.
Detail Level: Detailed
Spironolactone Counseling: Patient advised regarding risks of diarrhea, abdominal pain, hyperkalemia, birth defects (for female patients), liver toxicity and renal toxicity. The patient may need blood work to monitor liver and kidney function and potassium levels while on therapy. The patient verbalized understanding of the proper use and possible adverse effects of spironolactone.  All of the patient's questions and concerns were addressed.
Benzoyl Peroxide Counseling: Patient counseled that medicine may cause skin irritation and bleach clothing.  In the event of skin irritation, the patient was advised to reduce the amount of the drug applied or use it less frequently.   The patient verbalized understanding of the proper use and possible adverse effects of benzoyl peroxide.  All of the patient's questions and concerns were addressed.
Topical Retinoid Pregnancy And Lactation Text: This medication is Pregnancy Category C. It is unknown if this medication is excreted in breast milk.
Topical Sulfur Applications Pregnancy And Lactation Text: This medication is Pregnancy Category C and has an unknown safety profile during pregnancy. It is unknown if this topical medication is excreted in breast milk.
Tetracycline Counseling: Patient counseled regarding possible photosensitivity and increased risk for sunburn.  Patient instructed to avoid sunlight, if possible.  When exposed to sunlight, patients should wear protective clothing, sunglasses, and sunscreen.  The patient was instructed to call the office immediately if the following severe adverse effects occur:  hearing changes, easy bruising/bleeding, severe headache, or vision changes.  The patient verbalized understanding of the proper use and possible adverse effects of tetracycline.  All of the patient's questions and concerns were addressed. Patient understands to avoid pregnancy while on therapy due to potential birth defects.
Benzoyl Peroxide Pregnancy And Lactation Text: This medication is Pregnancy Category C. It is unknown if benzoyl peroxide is excreted in breast milk.
High Dose Vitamin A Pregnancy And Lactation Text: High dose vitamin A therapy is contraindicated during pregnancy and breast feeding.
Birth Control Pills Counseling: Birth Control Pill Counseling: I discussed with the patient the potential side effects of OCPs including but not limited to increased risk of stroke, heart attack, thrombophlebitis, deep venous thrombosis, hepatic adenomas, breast changes, GI upset, headaches, and depression.  The patient verbalized understanding of the proper use and possible adverse effects of OCPs. All of the patient's questions and concerns were addressed.
Topical Clindamycin Pregnancy And Lactation Text: This medication is Pregnancy Category B and is considered safe during pregnancy. It is unknown if it is excreted in breast milk.
Doxycycline Counseling:  Patient counseled regarding possible photosensitivity and increased risk for sunburn.  Patient instructed to avoid sunlight, if possible.  When exposed to sunlight, patients should wear protective clothing, sunglasses, and sunscreen.  The patient was instructed to call the office immediately if the following severe adverse effects occur:  hearing changes, easy bruising/bleeding, severe headache, or vision changes.  The patient verbalized understanding of the proper use and possible adverse effects of doxycycline.  All of the patient's questions and concerns were addressed.
Isotretinoin Counseling: Patient should get monthly blood tests, not donate blood, not drive at night if vision affected, not share medication, and not undergo elective surgery for 6 months after tx completed. Side effects reviewed, pt to contact office should one occur.
Spironolactone Pregnancy And Lactation Text: This medication can cause feminization of the male fetus and should be avoided during pregnancy. The active metabolite is also found in breast milk.
Topical Clindamycin Counseling: Patient counseled that this medication may cause skin irritation or allergic reactions.  In the event of skin irritation, the patient was advised to reduce the amount of the drug applied or use it less frequently.   The patient verbalized understanding of the proper use and possible adverse effects of clindamycin.  All of the patient's questions and concerns were addressed.
Dapsone Pregnancy And Lactation Text: This medication is Pregnancy Category C and is not considered safe during pregnancy or breast feeding.
Tazorac Pregnancy And Lactation Text: This medication is not safe during pregnancy. It is unknown if this medication is excreted in breast milk.
Topical Retinoid counseling:  Patient advised to apply a pea-sized amount only at bedtime and wait 30 minutes after washing their face before applying.  If too drying, patient may add a non-comedogenic moisturizer. The patient verbalized understanding of the proper use and possible adverse effects of retinoids.  All of the patient's questions and concerns were addressed.
Tazorac Counseling:  Patient advised that medication is irritating and drying.  Patient may need to apply sparingly and wash off after an hour before eventually leaving it on overnight.  The patient verbalized understanding of the proper use and possible adverse effects of tazorac.  All of the patient's questions and concerns were addressed.
Erythromycin Counseling:  I discussed with the patient the risks of erythromycin including but not limited to GI upset, allergic reaction, drug rash, diarrhea, increase in liver enzymes, and yeast infections.
Bactrim Pregnancy And Lactation Text: This medication is Pregnancy Category D and is known to cause fetal risk.  It is also excreted in breast milk.

## 2020-05-28 NOTE — PROCEDURE: MIPS QUALITY
Quality 111:Pneumonia Vaccination Status For Older Adults: Pneumococcal Vaccination Previously Received
Quality 137: Melanoma: Continuity Of Care - Recall System: Recall system not utilized, reason not otherwise specified
Detail Level: Detailed
Quality 110: Preventive Care And Screening: Influenza Immunization: Influenza Immunization Administered during Influenza season

## 2020-05-28 NOTE — PROCEDURE: LIQUID NITROGEN
Detail Level: Detailed
Render Note In Bullet Format When Appropriate: No
Duration Of Freeze Thaw-Cycle (Seconds): 3
Number Of Freeze-Thaw Cycles: 2 freeze-thaw cycles
Consent: The patient's consent was obtained including but not limited to risks of crusting, scabbing, blistering, scarring, darker or lighter pigmentary change, recurrence, incomplete removal and infection.
Post-Care Instructions: I reviewed with the patient in detail post-care instructions. Patient is to wear sunprotection, and avoid picking at any of the treated lesions. Pt may apply Vaseline to crusted or scabbing areas.

## 2020-06-19 PROBLEM — Z95.5 H/O HEART ARTERY STENT: Status: ACTIVE | Noted: 2020-06-19

## 2020-06-19 PROBLEM — M31.6 TEMPORAL ARTERITIS (HCC): Status: RESOLVED | Noted: 2018-01-31 | Resolved: 2020-06-19

## 2020-06-23 ENCOUNTER — HOSPITAL ENCOUNTER (OUTPATIENT)
Dept: LAB | Age: 70
Discharge: HOME | End: 2020-06-23
Payer: MEDICARE

## 2020-06-23 DIAGNOSIS — Z87.39 HISTORY OF TEMPORAL ARTERITIS: ICD-10-CM

## 2020-06-23 DIAGNOSIS — R73.09 ABNORMAL GLUCOSE: ICD-10-CM

## 2020-06-23 DIAGNOSIS — E78.00 PURE HYPERCHOLESTEROLEMIA: ICD-10-CM

## 2020-06-23 LAB
ALBUMIN SERPL-MCNC: 3.6 G/DL (ref 3.2–4.6)
ALBUMIN/GLOB SERPL: 1 {RATIO} (ref 1.2–3.5)
ALP SERPL-CCNC: 114 U/L (ref 50–136)
ALT SERPL-CCNC: 26 U/L (ref 12–65)
ANION GAP SERPL CALC-SCNC: 8 MMOL/L (ref 7–16)
AST SERPL-CCNC: 15 U/L (ref 15–37)
BASOPHILS # BLD: 0.1 K/UL (ref 0–0.2)
BASOPHILS NFR BLD: 1 % (ref 0–2)
BILIRUB SERPL-MCNC: 0.4 MG/DL (ref 0.2–1.1)
BUN SERPL-MCNC: 11 MG/DL (ref 8–23)
CALCIUM SERPL-MCNC: 8.3 MG/DL (ref 8.3–10.4)
CHLORIDE SERPL-SCNC: 109 MMOL/L (ref 98–107)
CHOLEST SERPL-MCNC: 193 MG/DL
CO2 SERPL-SCNC: 25 MMOL/L (ref 21–32)
CREAT SERPL-MCNC: 0.73 MG/DL (ref 0.6–1)
CRP SERPL-MCNC: 0.5 MG/DL (ref 0–0.9)
DIFFERENTIAL METHOD BLD: NORMAL
EOSINOPHIL # BLD: 0.2 K/UL (ref 0–0.8)
EOSINOPHIL NFR BLD: 2 % (ref 0.5–7.8)
ERYTHROCYTE [DISTWIDTH] IN BLOOD BY AUTOMATED COUNT: 12.1 % (ref 11.9–14.6)
ERYTHROCYTE [SEDIMENTATION RATE] IN BLOOD: 14 MM/HR (ref 0–30)
EST. AVERAGE GLUCOSE BLD GHB EST-MCNC: 146 MG/DL
GLOBULIN SER CALC-MCNC: 3.6 G/DL (ref 2.3–3.5)
GLUCOSE SERPL-MCNC: 95 MG/DL (ref 65–100)
HBA1C MFR BLD: 6.7 % (ref 4.8–6)
HCT VFR BLD AUTO: 44.5 % (ref 35.8–46.3)
HDLC SERPL-MCNC: 59 MG/DL (ref 40–60)
HDLC SERPL: 3.3 {RATIO}
HGB BLD-MCNC: 14.3 G/DL (ref 11.7–15.4)
IMM GRANULOCYTES # BLD AUTO: 0 K/UL (ref 0–0.5)
IMM GRANULOCYTES NFR BLD AUTO: 0 % (ref 0–5)
LDLC SERPL CALC-MCNC: 103 MG/DL
LIPID PROFILE,FLP: ABNORMAL
LYMPHOCYTES # BLD: 2 K/UL (ref 0.5–4.6)
LYMPHOCYTES NFR BLD: 29 % (ref 13–44)
MCH RBC QN AUTO: 31.4 PG (ref 26.1–32.9)
MCHC RBC AUTO-ENTMCNC: 32.1 G/DL (ref 31.4–35)
MCV RBC AUTO: 97.6 FL (ref 79.6–97.8)
MONOCYTES # BLD: 0.5 K/UL (ref 0.1–1.3)
MONOCYTES NFR BLD: 8 % (ref 4–12)
NEUTS SEG # BLD: 4 K/UL (ref 1.7–8.2)
NEUTS SEG NFR BLD: 59 % (ref 43–78)
NRBC # BLD: 0 K/UL (ref 0–0.2)
PLATELET # BLD AUTO: 360 K/UL (ref 150–450)
PMV BLD AUTO: 9.7 FL (ref 9.4–12.3)
POTASSIUM SERPL-SCNC: 4.2 MMOL/L (ref 3.5–5.1)
PROT SERPL-MCNC: 7.2 G/DL (ref 6.3–8.2)
RBC # BLD AUTO: 4.56 M/UL (ref 4.05–5.2)
SODIUM SERPL-SCNC: 142 MMOL/L (ref 136–145)
TRIGL SERPL-MCNC: 155 MG/DL (ref 35–150)
TSH SERPL DL<=0.005 MIU/L-ACNC: 1.76 UIU/ML (ref 0.36–3.74)
VLDLC SERPL CALC-MCNC: 31 MG/DL (ref 6–23)
WBC # BLD AUTO: 6.7 K/UL (ref 4.3–11.1)

## 2020-06-23 PROCEDURE — 85025 COMPLETE CBC W/AUTO DIFF WBC: CPT

## 2020-06-23 PROCEDURE — 84443 ASSAY THYROID STIM HORMONE: CPT

## 2020-06-23 PROCEDURE — 83036 HEMOGLOBIN GLYCOSYLATED A1C: CPT

## 2020-06-23 PROCEDURE — 80053 COMPREHEN METABOLIC PANEL: CPT

## 2020-06-23 PROCEDURE — 36415 COLL VENOUS BLD VENIPUNCTURE: CPT

## 2020-06-23 PROCEDURE — 86140 C-REACTIVE PROTEIN: CPT

## 2020-06-23 PROCEDURE — 80061 LIPID PANEL: CPT

## 2020-06-23 PROCEDURE — 85652 RBC SED RATE AUTOMATED: CPT

## 2020-06-23 NOTE — PROGRESS NOTES
Can let her know that labs really look good including normal ESR and creactive protein (no inflammation). A1C is good.  Will see her in September

## 2020-09-29 PROBLEM — Z78.9 STATIN INTOLERANCE: Status: ACTIVE | Noted: 2020-09-29

## 2020-10-05 ENCOUNTER — HOSPITAL ENCOUNTER (OUTPATIENT)
Dept: MAMMOGRAPHY | Age: 70
Discharge: HOME | End: 2020-10-05
Attending: NURSE PRACTITIONER
Payer: MEDICARE

## 2020-10-05 DIAGNOSIS — N64.4 BREAST PAIN, LEFT: ICD-10-CM

## 2020-10-05 PROCEDURE — 77066 DX MAMMO INCL CAD BI: CPT

## 2020-10-28 ENCOUNTER — APPOINTMENT (RX ONLY)
Dept: URBAN - METROPOLITAN AREA CLINIC 349 | Facility: CLINIC | Age: 70
Setting detail: DERMATOLOGY
End: 2020-10-28

## 2020-10-28 DIAGNOSIS — D22 MELANOCYTIC NEVI: ICD-10-CM | Status: STABLE

## 2020-10-28 DIAGNOSIS — L29.8 OTHER PRURITUS: ICD-10-CM

## 2020-10-28 DIAGNOSIS — L70.0 ACNE VULGARIS: ICD-10-CM | Status: WELL CONTROLLED

## 2020-10-28 DIAGNOSIS — L29.89 OTHER PRURITUS: ICD-10-CM

## 2020-10-28 DIAGNOSIS — L57.0 ACTINIC KERATOSIS: ICD-10-CM

## 2020-10-28 DIAGNOSIS — Z85.820 PERSONAL HISTORY OF MALIGNANT MELANOMA OF SKIN: ICD-10-CM

## 2020-10-28 DIAGNOSIS — Z85.828 PERSONAL HISTORY OF OTHER MALIGNANT NEOPLASM OF SKIN: ICD-10-CM

## 2020-10-28 PROBLEM — D22.5 MELANOCYTIC NEVI OF TRUNK: Status: ACTIVE | Noted: 2020-10-28

## 2020-10-28 PROCEDURE — 17003 DESTRUCT PREMALG LES 2-14: CPT

## 2020-10-28 PROCEDURE — 99214 OFFICE O/P EST MOD 30 MIN: CPT | Mod: 25

## 2020-10-28 PROCEDURE — ? OBSERVATION

## 2020-10-28 PROCEDURE — 17000 DESTRUCT PREMALG LESION: CPT

## 2020-10-28 PROCEDURE — ? MEDICAL PHOTOGRAPHY REVIEW

## 2020-10-28 PROCEDURE — ? PRESCRIPTION

## 2020-10-28 PROCEDURE — ? OTHER

## 2020-10-28 PROCEDURE — ? LIQUID NITROGEN

## 2020-10-28 PROCEDURE — ? TREATMENT REGIMEN

## 2020-10-28 PROCEDURE — ? COUNSELING

## 2020-10-28 RX ORDER — CLINDAMYCIN PHOSPHATE 1 %
GEL (GRAM) TOPICAL
Qty: 1 | Refills: 2 | Status: ERX

## 2020-10-28 RX ORDER — TRETINOIN 0.5 MG/G
CREAM TOPICAL
Qty: 1 | Refills: 0 | Status: ERX

## 2020-10-28 ASSESSMENT — LOCATION DETAILED DESCRIPTION DERM
LOCATION DETAILED: LEFT CENTRAL BUCCAL CHEEK
LOCATION DETAILED: RIGHT LATERAL BUCCAL CHEEK
LOCATION DETAILED: RIGHT INFERIOR CENTRAL MALAR CHEEK
LOCATION DETAILED: RIGHT PROXIMAL DORSAL FOREARM
LOCATION DETAILED: LEFT INFERIOR MEDIAL MALAR CHEEK
LOCATION DETAILED: LEFT PROXIMAL DORSAL FOREARM
LOCATION DETAILED: RIGHT CLAVICULAR SKIN
LOCATION DETAILED: RIGHT CENTRAL ZYGOMA
LOCATION DETAILED: LEFT ANTERIOR DISTAL UPPER ARM
LOCATION DETAILED: RIGHT INFERIOR MEDIAL MIDBACK
LOCATION DETAILED: NASAL DORSUM
LOCATION DETAILED: RIGHT DISTAL PRETIBIAL REGION
LOCATION DETAILED: SUPERIOR LUMBAR SPINE
LOCATION DETAILED: RIGHT MEDIAL UPPER BACK
LOCATION DETAILED: LEFT INFERIOR CENTRAL MALAR CHEEK

## 2020-10-28 ASSESSMENT — LOCATION ZONE DERM
LOCATION ZONE: TRUNK
LOCATION ZONE: FACE
LOCATION ZONE: NOSE
LOCATION ZONE: LEG
LOCATION ZONE: ARM

## 2020-10-28 ASSESSMENT — PAIN INTENSITY VAS: HOW INTENSE IS YOUR PAIN 0 BEING NO PAIN, 10 BEING THE MOST SEVERE PAIN POSSIBLE?: 1/10 PAIN

## 2020-10-28 ASSESSMENT — LOCATION SIMPLE DESCRIPTION DERM
LOCATION SIMPLE: RIGHT CHEEK
LOCATION SIMPLE: LEFT UPPER ARM
LOCATION SIMPLE: LOWER BACK
LOCATION SIMPLE: RIGHT FOREARM
LOCATION SIMPLE: RIGHT UPPER BACK
LOCATION SIMPLE: LEFT CHEEK
LOCATION SIMPLE: RIGHT LOWER BACK
LOCATION SIMPLE: RIGHT ZYGOMA
LOCATION SIMPLE: NOSE
LOCATION SIMPLE: RIGHT CLAVICULAR SKIN
LOCATION SIMPLE: LEFT FOREARM
LOCATION SIMPLE: RIGHT PRETIBIAL REGION

## 2020-10-28 ASSESSMENT — SEVERITY ASSESSMENT OVERALL AMONG ALL PATIENTS
IN YOUR EXPERIENCE, AMONG ALL PATIENTS YOU HAVE SEEN WITH THIS CONDITION, HOW SEVERE IS THIS PATIENT'S CONDITION?: NORMAL

## 2020-10-28 NOTE — PROCEDURE: COUNSELING
Minocycline Pregnancy And Lactation Text: This medication is Pregnancy Category D and not consider safe during pregnancy. It is also excreted in breast milk.
Detail Level: Simple
Azithromycin Counseling:  I discussed with the patient the risks of azithromycin including but not limited to GI upset, allergic reaction, drug rash, diarrhea, and yeast infections.
Topical Sulfur Applications Counseling: Topical Sulfur Counseling: Patient counseled that this medication may cause skin irritation or allergic reactions.  In the event of skin irritation, the patient was advised to reduce the amount of the drug applied or use it less frequently.   The patient verbalized understanding of the proper use and possible adverse effects of topical sulfur application.  All of the patient's questions and concerns were addressed.
Minocycline Counseling: Patient advised regarding possible photosensitivity and discoloration of the teeth, skin, lips, tongue and gums.  Patient instructed to avoid sunlight, if possible.  When exposed to sunlight, patients should wear protective clothing, sunglasses, and sunscreen.  The patient was instructed to call the office immediately if the following severe adverse effects occur:  hearing changes, easy bruising/bleeding, severe headache, or vision changes.  The patient verbalized understanding of the proper use and possible adverse effects of minocycline.  All of the patient's questions and concerns were addressed.
Doxycycline Pregnancy And Lactation Text: This medication is Pregnancy Category D and not consider safe during pregnancy. It is also excreted in breast milk but is considered safe for shorter treatment courses.
Detail Level: Detailed
Azithromycin Pregnancy And Lactation Text: This medication is considered safe during pregnancy and is also secreted in breast milk.
Erythromycin Pregnancy And Lactation Text: This medication is Pregnancy Category B and is considered safe during pregnancy. It is also excreted in breast milk.
Detail Level: Generalized
High Dose Vitamin A Counseling: Side effects reviewed, pt to contact office should one occur.
Topical Retinoid Pregnancy And Lactation Text: This medication is Pregnancy Category C. It is unknown if this medication is excreted in breast milk.
Spironolactone Counseling: Patient advised regarding risks of diarrhea, abdominal pain, hyperkalemia, birth defects (for female patients), liver toxicity and renal toxicity. The patient may need blood work to monitor liver and kidney function and potassium levels while on therapy. The patient verbalized understanding of the proper use and possible adverse effects of spironolactone.  All of the patient's questions and concerns were addressed.
Benzoyl Peroxide Counseling: Patient counseled that medicine may cause skin irritation and bleach clothing.  In the event of skin irritation, the patient was advised to reduce the amount of the drug applied or use it less frequently.   The patient verbalized understanding of the proper use and possible adverse effects of benzoyl peroxide.  All of the patient's questions and concerns were addressed.
Tetracycline Counseling: Patient counseled regarding possible photosensitivity and increased risk for sunburn.  Patient instructed to avoid sunlight, if possible.  When exposed to sunlight, patients should wear protective clothing, sunglasses, and sunscreen.  The patient was instructed to call the office immediately if the following severe adverse effects occur:  hearing changes, easy bruising/bleeding, severe headache, or vision changes.  The patient verbalized understanding of the proper use and possible adverse effects of tetracycline.  All of the patient's questions and concerns were addressed. Patient understands to avoid pregnancy while on therapy due to potential birth defects.
Benzoyl Peroxide Pregnancy And Lactation Text: This medication is Pregnancy Category C. It is unknown if benzoyl peroxide is excreted in breast milk.
Topical Sulfur Applications Pregnancy And Lactation Text: This medication is Pregnancy Category C and has an unknown safety profile during pregnancy. It is unknown if this topical medication is excreted in breast milk.
High Dose Vitamin A Pregnancy And Lactation Text: High dose vitamin A therapy is contraindicated during pregnancy and breast feeding.
Birth Control Pills Counseling: Birth Control Pill Counseling: I discussed with the patient the potential side effects of OCPs including but not limited to increased risk of stroke, heart attack, thrombophlebitis, deep venous thrombosis, hepatic adenomas, breast changes, GI upset, headaches, and depression.  The patient verbalized understanding of the proper use and possible adverse effects of OCPs. All of the patient's questions and concerns were addressed.
Topical Clindamycin Pregnancy And Lactation Text: This medication is Pregnancy Category B and is considered safe during pregnancy. It is unknown if it is excreted in breast milk.
Isotretinoin Counseling: Patient should get monthly blood tests, not donate blood, not drive at night if vision affected, not share medication, and not undergo elective surgery for 6 months after tx completed. Side effects reviewed, pt to contact office should one occur.
Doxycycline Counseling:  Patient counseled regarding possible photosensitivity and increased risk for sunburn.  Patient instructed to avoid sunlight, if possible.  When exposed to sunlight, patients should wear protective clothing, sunglasses, and sunscreen.  The patient was instructed to call the office immediately if the following severe adverse effects occur:  hearing changes, easy bruising/bleeding, severe headache, or vision changes.  The patient verbalized understanding of the proper use and possible adverse effects of doxycycline.  All of the patient's questions and concerns were addressed.
Topical Clindamycin Counseling: Patient counseled that this medication may cause skin irritation or allergic reactions.  In the event of skin irritation, the patient was advised to reduce the amount of the drug applied or use it less frequently.   The patient verbalized understanding of the proper use and possible adverse effects of clindamycin.  All of the patient's questions and concerns were addressed.
Spironolactone Pregnancy And Lactation Text: This medication can cause feminization of the male fetus and should be avoided during pregnancy. The active metabolite is also found in breast milk.
Tazorac Pregnancy And Lactation Text: This medication is not safe during pregnancy. It is unknown if this medication is excreted in breast milk.
Dapsone Pregnancy And Lactation Text: This medication is Pregnancy Category C and is not considered safe during pregnancy or breast feeding.
Bactrim Pregnancy And Lactation Text: This medication is Pregnancy Category D and is known to cause fetal risk.  It is also excreted in breast milk.
Topical Retinoid counseling:  Patient advised to apply a pea-sized amount only at bedtime and wait 30 minutes after washing their face before applying.  If too drying, patient may add a non-comedogenic moisturizer. The patient verbalized understanding of the proper use and possible adverse effects of retinoids.  All of the patient's questions and concerns were addressed.
Isotretinoin Pregnancy And Lactation Text: This medication is Pregnancy Category X and is considered extremely dangerous during pregnancy. It is unknown if it is excreted in breast milk.
Bactrim Counseling:  I discussed with the patient the risks of sulfa antibiotics including but not limited to GI upset, allergic reaction, drug rash, diarrhea, dizziness, photosensitivity, and yeast infections.  Rarely, more serious reactions can occur including but not limited to aplastic anemia, agranulocytosis, methemoglobinemia, blood dyscrasias, liver or kidney failure, lung infiltrates or desquamative/blistering drug rashes.
Tazorac Counseling:  Patient advised that medication is irritating and drying.  Patient may need to apply sparingly and wash off after an hour before eventually leaving it on overnight.  The patient verbalized understanding of the proper use and possible adverse effects of tazorac.  All of the patient's questions and concerns were addressed.
Erythromycin Counseling:  I discussed with the patient the risks of erythromycin including but not limited to GI upset, allergic reaction, drug rash, diarrhea, increase in liver enzymes, and yeast infections.
Birth Control Pills Pregnancy And Lactation Text: This medication should be avoided if pregnant and for the first 30 days post-partum.
Dapsone Counseling: I discussed with the patient the risks of dapsone including but not limited to hemolytic anemia, agranulocytosis, rashes, methemoglobinemia, kidney failure, peripheral neuropathy, headaches, GI upset, and liver toxicity.  Patients who start dapsone require monitoring including baseline LFTs and weekly CBCs for the first month, then every month thereafter.  The patient verbalized understanding of the proper use and possible adverse effects of dapsone.  All of the patient's questions and concerns were addressed.

## 2020-10-28 NOTE — PROCEDURE: LIQUID NITROGEN
Consent: The patient's consent was obtained including but not limited to risks of crusting, scabbing, blistering, scarring, darker or lighter pigmentary change, recurrence, incomplete removal and infection.
Detail Level: Detailed
Render Note In Bullet Format When Appropriate: No
Post-Care Instructions: I reviewed with the patient in detail post-care instructions. Patient is to wear sunprotection, and avoid picking at any of the treated lesions. Pt may apply Vaseline to crusted or scabbing areas.
Duration Of Freeze Thaw-Cycle (Seconds): 3
Number Of Freeze-Thaw Cycles: 2 freeze-thaw cycles

## 2020-10-28 NOTE — PROCEDURE: MEDICAL PHOTOGRAPHY REVIEW
Number Of Photographs Reviewed: 2
Detail Level: Generalized
Review Findings: no new or changing lesions

## 2020-10-28 NOTE — PROCEDURE: MIPS QUALITY
Quality 137: Melanoma: Continuity Of Care - Recall System: Recall system not utilized, reason not otherwise specified
Detail Level: Detailed
Quality 110: Preventive Care And Screening: Influenza Immunization: Influenza Immunization Administered during Influenza season
Quality 111:Pneumonia Vaccination Status For Older Adults: Pneumococcal Vaccination Previously Received

## 2020-10-28 NOTE — PROCEDURE: TREATMENT REGIMEN
Samples Given: Cerave anti itch lotion, use twice daily as needed for itching
Detail Level: Detailed

## 2020-10-28 NOTE — PROCEDURE: OTHER
Other (Free Text): History provided by the patient.
Note Text (......Xxx Chief Complaint.): This diagnosis correlates with the
Other (Free Text): Patient has tried to treat with triamcinolone, patient states when she puts this on it itches.\\nRecommended patient moisturize frequently
Detail Level: Detailed

## 2020-10-30 PROBLEM — Z90.49 S/P CHOLECYSTECTOMY: Status: ACTIVE | Noted: 2020-10-30

## 2020-11-05 ENCOUNTER — HOSPITAL ENCOUNTER (OUTPATIENT)
Dept: ULTRASOUND IMAGING | Age: 70
Discharge: HOME | End: 2020-11-05
Attending: NURSE PRACTITIONER

## 2020-11-05 DIAGNOSIS — R10.816 EPIGASTRIC ABDOMINAL TENDERNESS WITHOUT REBOUND TENDERNESS: ICD-10-CM

## 2020-11-05 DIAGNOSIS — R10.11 RIGHT UPPER QUADRANT ABDOMINAL PAIN: ICD-10-CM

## 2020-11-06 NOTE — PROGRESS NOTES
Can let her know that abdominal ultrasound is ok. Common bile duct size might be on high-normal side but not sure if this is significant. She does have referral to GI and they will assess this.  Please fax report to their office

## 2020-11-09 NOTE — PROGRESS NOTES
Pt was made aware of her U/S and I faxed a copy to GI today/. Pt has shelia set with them on 12/17 @ 2:25 Dr Lei Staff

## 2020-11-19 ENCOUNTER — HOSPITAL ENCOUNTER (OUTPATIENT)
Dept: GENERAL RADIOLOGY | Age: 70
Discharge: HOME | End: 2020-11-19

## 2020-11-19 DIAGNOSIS — M54.9 UPPER BACK PAIN: ICD-10-CM

## 2020-11-20 NOTE — PROGRESS NOTES
I tired to call pt BUT her mail box was full. I mailed pt a letter asking her to call me back re: her x-ray.     Thank you  Ignacio Jacobsen

## 2020-11-20 NOTE — PROGRESS NOTES
Pt was made aware of her x-ray and she has received a mike for PT was was told each PT visit would cost her $40 and she will not be able to do PT Pt said she will call me back after calling her insurance to find out why it would cost so much

## 2021-03-17 ENCOUNTER — HOSPITAL ENCOUNTER (OUTPATIENT)
Dept: LAB | Age: 71
Discharge: HOME | End: 2021-03-17

## 2021-03-17 PROCEDURE — 88305 TISSUE EXAM BY PATHOLOGIST: CPT

## 2021-03-17 PROCEDURE — 88312 SPECIAL STAINS GROUP 1: CPT

## 2021-04-28 ENCOUNTER — APPOINTMENT (RX ONLY)
Dept: URBAN - METROPOLITAN AREA CLINIC 349 | Facility: CLINIC | Age: 71
Setting detail: DERMATOLOGY
End: 2021-04-28

## 2021-04-28 DIAGNOSIS — Z85.820 PERSONAL HISTORY OF MALIGNANT MELANOMA OF SKIN: ICD-10-CM

## 2021-04-28 DIAGNOSIS — Z85.828 PERSONAL HISTORY OF OTHER MALIGNANT NEOPLASM OF SKIN: ICD-10-CM

## 2021-04-28 DIAGNOSIS — R20.2 PARESTHESIA OF SKIN: ICD-10-CM

## 2021-04-28 DIAGNOSIS — L57.8 OTHER SKIN CHANGES DUE TO CHRONIC EXPOSURE TO NONIONIZING RADIATION: ICD-10-CM

## 2021-04-28 DIAGNOSIS — L70.0 ACNE VULGARIS: ICD-10-CM | Status: WELL CONTROLLED

## 2021-04-28 DIAGNOSIS — D22 MELANOCYTIC NEVI: ICD-10-CM | Status: STABLE

## 2021-04-28 DIAGNOSIS — L85.3 XEROSIS CUTIS: ICD-10-CM

## 2021-04-28 DIAGNOSIS — L57.0 ACTINIC KERATOSIS: ICD-10-CM

## 2021-04-28 PROBLEM — D22.5 MELANOCYTIC NEVI OF TRUNK: Status: ACTIVE | Noted: 2021-04-28

## 2021-04-28 PROCEDURE — ? SUNSCREEN RECOMMENDATIONS

## 2021-04-28 PROCEDURE — ? MEDICAL PHOTOGRAPHY REVIEW

## 2021-04-28 PROCEDURE — ? OTHER

## 2021-04-28 PROCEDURE — ? COUNSELING

## 2021-04-28 PROCEDURE — 17000 DESTRUCT PREMALG LESION: CPT

## 2021-04-28 PROCEDURE — ? PRESCRIPTION MEDICATION MANAGEMENT

## 2021-04-28 PROCEDURE — 17003 DESTRUCT PREMALG LES 2-14: CPT

## 2021-04-28 PROCEDURE — ? LIQUID NITROGEN

## 2021-04-28 PROCEDURE — 99214 OFFICE O/P EST MOD 30 MIN: CPT | Mod: 25

## 2021-04-28 ASSESSMENT — LOCATION ZONE DERM
LOCATION ZONE: ARM
LOCATION ZONE: LEG
LOCATION ZONE: NOSE
LOCATION ZONE: HAND
LOCATION ZONE: TRUNK
LOCATION ZONE: FACE

## 2021-04-28 ASSESSMENT — LOCATION SIMPLE DESCRIPTION DERM
LOCATION SIMPLE: LEFT HAND
LOCATION SIMPLE: RIGHT LOWER BACK
LOCATION SIMPLE: LEFT CHEEK
LOCATION SIMPLE: LOWER BACK
LOCATION SIMPLE: RIGHT CLAVICULAR SKIN
LOCATION SIMPLE: RIGHT ZYGOMA
LOCATION SIMPLE: RIGHT UPPER BACK
LOCATION SIMPLE: RIGHT CHEEK
LOCATION SIMPLE: NOSE
LOCATION SIMPLE: RIGHT PRETIBIAL REGION
LOCATION SIMPLE: LEFT UPPER ARM
LOCATION SIMPLE: RIGHT HAND

## 2021-04-28 ASSESSMENT — LOCATION DETAILED DESCRIPTION DERM
LOCATION DETAILED: NASAL DORSUM
LOCATION DETAILED: RIGHT INFERIOR MEDIAL MIDBACK
LOCATION DETAILED: RIGHT MEDIAL UPPER BACK
LOCATION DETAILED: RIGHT DORSAL MIDDLE FINGER METACARPOPHALANGEAL JOINT
LOCATION DETAILED: LEFT RADIAL DORSAL HAND
LOCATION DETAILED: SUPERIOR LUMBAR SPINE
LOCATION DETAILED: RIGHT INFERIOR CENTRAL MALAR CHEEK
LOCATION DETAILED: RIGHT CLAVICULAR SKIN
LOCATION DETAILED: LEFT INFERIOR CENTRAL MALAR CHEEK
LOCATION DETAILED: 2ND WEB SPACE RIGHT HAND
LOCATION DETAILED: RIGHT CENTRAL ZYGOMA
LOCATION DETAILED: LEFT ANTERIOR DISTAL UPPER ARM
LOCATION DETAILED: RIGHT DISTAL PRETIBIAL REGION
LOCATION DETAILED: LEFT CENTRAL MALAR CHEEK
LOCATION DETAILED: LEFT DORSAL RING FINGER METACARPOPHALANGEAL JOINT
LOCATION DETAILED: NASAL ROOT

## 2021-04-28 ASSESSMENT — PAIN INTENSITY VAS: HOW INTENSE IS YOUR PAIN 0 BEING NO PAIN, 10 BEING THE MOST SEVERE PAIN POSSIBLE?: 1/10 PAIN

## 2021-04-28 NOTE — PROCEDURE: MEDICAL PHOTOGRAPHY REVIEW
Detail Level: Generalized
Review Findings: no new or changing lesions
Number Of Photographs Reviewed: 2

## 2021-04-28 NOTE — PROCEDURE: PRESCRIPTION MEDICATION MANAGEMENT
Detail Level: Zone
Continue Regimen: clindamycin 1 % topical gel \\nApply to affected area for acne on face twice daily\\n\\ntretinoin 0.05 % topical cream \\nApply to face nightly for acne\\n\\n\\nPatient declines needing refills at this time
Samples Given: Vanicream facial moisturizing cream
Render In Strict Bullet Format?: No
Plan: Patient states products other than cataphil break her out. Discussed Vanicream being free of chemicals, preservatives, and fragrance.

## 2021-04-28 NOTE — PROCEDURE: COUNSELING
Detail Level: Detailed
Topical Sulfur Applications Pregnancy And Lactation Text: This medication is Pregnancy Category C and has an unknown safety profile during pregnancy. It is unknown if this topical medication is excreted in breast milk.
Detail Level: Simple
High Dose Vitamin A Pregnancy And Lactation Text: High dose vitamin A therapy is contraindicated during pregnancy and breast feeding.
Birth Control Pills Counseling: Birth Control Pill Counseling: I discussed with the patient the potential side effects of OCPs including but not limited to increased risk of stroke, heart attack, thrombophlebitis, deep venous thrombosis, hepatic adenomas, breast changes, GI upset, headaches, and depression.  The patient verbalized understanding of the proper use and possible adverse effects of OCPs. All of the patient's questions and concerns were addressed.
Detail Level: Generalized
Topical Clindamycin Pregnancy And Lactation Text: This medication is Pregnancy Category B and is considered safe during pregnancy. It is unknown if it is excreted in breast milk.
Isotretinoin Counseling: Patient should get monthly blood tests, not donate blood, not drive at night if vision affected, not share medication, and not undergo elective surgery for 6 months after tx completed. Side effects reviewed, pt to contact office should one occur.
Topical Clindamycin Counseling: Patient counseled that this medication may cause skin irritation or allergic reactions.  In the event of skin irritation, the patient was advised to reduce the amount of the drug applied or use it less frequently.   The patient verbalized understanding of the proper use and possible adverse effects of clindamycin.  All of the patient's questions and concerns were addressed.
Doxycycline Counseling:  Patient counseled regarding possible photosensitivity and increased risk for sunburn.  Patient instructed to avoid sunlight, if possible.  When exposed to sunlight, patients should wear protective clothing, sunglasses, and sunscreen.  The patient was instructed to call the office immediately if the following severe adverse effects occur:  hearing changes, easy bruising/bleeding, severe headache, or vision changes.  The patient verbalized understanding of the proper use and possible adverse effects of doxycycline.  All of the patient's questions and concerns were addressed.
Spironolactone Pregnancy And Lactation Text: This medication can cause feminization of the male fetus and should be avoided during pregnancy. The active metabolite is also found in breast milk.
Tazorac Pregnancy And Lactation Text: This medication is not safe during pregnancy. It is unknown if this medication is excreted in breast milk.
Dapsone Pregnancy And Lactation Text: This medication is Pregnancy Category C and is not considered safe during pregnancy or breast feeding.
Bactrim Pregnancy And Lactation Text: This medication is Pregnancy Category D and is known to cause fetal risk.  It is also excreted in breast milk.
Topical Retinoid counseling:  Patient advised to apply a pea-sized amount only at bedtime and wait 30 minutes after washing their face before applying.  If too drying, patient may add a non-comedogenic moisturizer. The patient verbalized understanding of the proper use and possible adverse effects of retinoids.  All of the patient's questions and concerns were addressed.
Tazorac Counseling:  Patient advised that medication is irritating and drying.  Patient may need to apply sparingly and wash off after an hour before eventually leaving it on overnight.  The patient verbalized understanding of the proper use and possible adverse effects of tazorac.  All of the patient's questions and concerns were addressed.
Erythromycin Counseling:  I discussed with the patient the risks of erythromycin including but not limited to GI upset, allergic reaction, drug rash, diarrhea, increase in liver enzymes, and yeast infections.
Bactrim Counseling:  I discussed with the patient the risks of sulfa antibiotics including but not limited to GI upset, allergic reaction, drug rash, diarrhea, dizziness, photosensitivity, and yeast infections.  Rarely, more serious reactions can occur including but not limited to aplastic anemia, agranulocytosis, methemoglobinemia, blood dyscrasias, liver or kidney failure, lung infiltrates or desquamative/blistering drug rashes.
Minocycline Pregnancy And Lactation Text: This medication is Pregnancy Category D and not consider safe during pregnancy. It is also excreted in breast milk.
Azithromycin Counseling:  I discussed with the patient the risks of azithromycin including but not limited to GI upset, allergic reaction, drug rash, diarrhea, and yeast infections.
Birth Control Pills Pregnancy And Lactation Text: This medication should be avoided if pregnant and for the first 30 days post-partum.
Dapsone Counseling: I discussed with the patient the risks of dapsone including but not limited to hemolytic anemia, agranulocytosis, rashes, methemoglobinemia, kidney failure, peripheral neuropathy, headaches, GI upset, and liver toxicity.  Patients who start dapsone require monitoring including baseline LFTs and weekly CBCs for the first month, then every month thereafter.  The patient verbalized understanding of the proper use and possible adverse effects of dapsone.  All of the patient's questions and concerns were addressed.
Isotretinoin Pregnancy And Lactation Text: This medication is Pregnancy Category X and is considered extremely dangerous during pregnancy. It is unknown if it is excreted in breast milk.
Topical Sulfur Applications Counseling: Topical Sulfur Counseling: Patient counseled that this medication may cause skin irritation or allergic reactions.  In the event of skin irritation, the patient was advised to reduce the amount of the drug applied or use it less frequently.   The patient verbalized understanding of the proper use and possible adverse effects of topical sulfur application.  All of the patient's questions and concerns were addressed.
Doxycycline Pregnancy And Lactation Text: This medication is Pregnancy Category D and not consider safe during pregnancy. It is also excreted in breast milk but is considered safe for shorter treatment courses.
Azithromycin Pregnancy And Lactation Text: This medication is considered safe during pregnancy and is also secreted in breast milk.
High Dose Vitamin A Counseling: Side effects reviewed, pt to contact office should one occur.
Erythromycin Pregnancy And Lactation Text: This medication is Pregnancy Category B and is considered safe during pregnancy. It is also excreted in breast milk.
Minocycline Counseling: Patient advised regarding possible photosensitivity and discoloration of the teeth, skin, lips, tongue and gums.  Patient instructed to avoid sunlight, if possible.  When exposed to sunlight, patients should wear protective clothing, sunglasses, and sunscreen.  The patient was instructed to call the office immediately if the following severe adverse effects occur:  hearing changes, easy bruising/bleeding, severe headache, or vision changes.  The patient verbalized understanding of the proper use and possible adverse effects of minocycline.  All of the patient's questions and concerns were addressed.
Spironolactone Counseling: Patient advised regarding risks of diarrhea, abdominal pain, hyperkalemia, birth defects (for female patients), liver toxicity and renal toxicity. The patient may need blood work to monitor liver and kidney function and potassium levels while on therapy. The patient verbalized understanding of the proper use and possible adverse effects of spironolactone.  All of the patient's questions and concerns were addressed.
Topical Retinoid Pregnancy And Lactation Text: This medication is Pregnancy Category C. It is unknown if this medication is excreted in breast milk.
Benzoyl Peroxide Counseling: Patient counseled that medicine may cause skin irritation and bleach clothing.  In the event of skin irritation, the patient was advised to reduce the amount of the drug applied or use it less frequently.   The patient verbalized understanding of the proper use and possible adverse effects of benzoyl peroxide.  All of the patient's questions and concerns were addressed.
Tetracycline Counseling: Patient counseled regarding possible photosensitivity and increased risk for sunburn.  Patient instructed to avoid sunlight, if possible.  When exposed to sunlight, patients should wear protective clothing, sunglasses, and sunscreen.  The patient was instructed to call the office immediately if the following severe adverse effects occur:  hearing changes, easy bruising/bleeding, severe headache, or vision changes.  The patient verbalized understanding of the proper use and possible adverse effects of tetracycline.  All of the patient's questions and concerns were addressed. Patient understands to avoid pregnancy while on therapy due to potential birth defects.
Benzoyl Peroxide Pregnancy And Lactation Text: This medication is Pregnancy Category C. It is unknown if benzoyl peroxide is excreted in breast milk.

## 2021-04-28 NOTE — PROCEDURE: OTHER
Other (Free Text): History provided by the patient.
Note Text (......Xxx Chief Complaint.): This diagnosis correlates with the
Detail Level: Detailed

## 2021-04-28 NOTE — PROCEDURE: REASSURANCE
Detail Level: Detailed
Quality 137: Melanoma: Continuity Of Care - Recall System: Recall system not utilized, reason not otherwise specified
Hide Additional Notes?: No
Quality 224: Stage 0-Iic Melanoma: Overutilization Of Imaging Studies For Only Stage 0-Iic Melanoma: None of the following diagnostic imaging studies ordered: chest X-ray, CT, Ultrasound, MRI, PET, or nuclear medicine scans (ML)
Additional Notes (Optional): Compared to photo this lesion has resolved

## 2021-04-28 NOTE — PROCEDURE: LIQUID NITROGEN
Post-Care Instructions: I reviewed with the patient in detail post-care instructions. Patient is to wear sunprotection, and avoid picking at any of the treated lesions. Pt may apply Vaseline to crusted or scabbing areas.
Duration Of Freeze Thaw-Cycle (Seconds): 3
Consent: The patient's consent was obtained including but not limited to risks of crusting, scabbing, blistering, scarring, darker or lighter pigmentary change, recurrence, incomplete removal and infection.
Number Of Freeze-Thaw Cycles: 2 freeze-thaw cycles
Render Note In Bullet Format When Appropriate: No
Detail Level: Detailed

## 2021-05-13 ENCOUNTER — HOSPITAL ENCOUNTER (OUTPATIENT)
Dept: GENERAL RADIOLOGY | Age: 71
Discharge: HOME | End: 2021-05-13
Attending: NURSE PRACTITIONER

## 2021-05-13 DIAGNOSIS — M79.2 RADICULAR PAIN IN LEFT ARM: ICD-10-CM

## 2021-05-13 DIAGNOSIS — M54.2 NECK PAIN: ICD-10-CM

## 2021-07-12 ENCOUNTER — HOSPITAL ENCOUNTER (OUTPATIENT)
Dept: GENERAL RADIOLOGY | Age: 71
Discharge: HOME | End: 2021-07-12

## 2021-07-12 DIAGNOSIS — M54.2 NECK PAIN: ICD-10-CM

## 2021-07-12 DIAGNOSIS — M25.512 CHRONIC LEFT SHOULDER PAIN: ICD-10-CM

## 2021-07-12 DIAGNOSIS — R20.2 ARM PARESTHESIA, LEFT: ICD-10-CM

## 2021-07-12 DIAGNOSIS — G89.29 CHRONIC LEFT SHOULDER PAIN: ICD-10-CM

## 2021-07-12 PROBLEM — M31.6 TEMPORAL ARTERITIS (HCC): Status: ACTIVE | Noted: 2021-07-12

## 2021-07-12 PROBLEM — Z79.52 LONG TERM SYSTEMIC STEROID USER: Status: ACTIVE | Noted: 2021-07-12

## 2021-07-12 PROBLEM — Z79.1 LONG TERM (CURRENT) USE OF NON-STEROIDAL ANTI-INFLAMMATORIES (NSAID): Status: ACTIVE | Noted: 2021-07-12

## 2021-07-12 PROBLEM — M85.80 OSTEOPENIA: Status: ACTIVE | Noted: 2021-07-12

## 2021-07-12 PROBLEM — M31.6 TEMPORAL ARTERITIS (HCC): Status: RESOLVED | Noted: 2021-07-12 | Resolved: 2021-07-12

## 2021-07-12 PROBLEM — M79.10 MYALGIA: Status: ACTIVE | Noted: 2021-07-12

## 2021-07-12 PROBLEM — Z79.891 LONG TERM (CURRENT) USE OF OPIATE ANALGESIC: Status: ACTIVE | Noted: 2021-07-12

## 2021-07-12 PROBLEM — Z87.39 HISTORY OF TEMPORAL ARTERITIS: Status: ACTIVE | Noted: 2021-07-12

## 2021-07-12 PROBLEM — M15.9 GENERALIZED OSTEOARTHRITIS: Status: ACTIVE | Noted: 2021-07-12

## 2021-07-12 PROBLEM — E66.3 OVERWEIGHT: Status: ACTIVE | Noted: 2021-07-12

## 2021-07-12 PROBLEM — M54.6 PAIN IN THORACIC SPINE: Status: ACTIVE | Noted: 2021-07-12

## 2021-08-02 ENCOUNTER — TELEPHONE (OUTPATIENT)
Dept: PHARMACY | Age: 71
End: 2021-08-02

## 2021-08-02 NOTE — TELEPHONE ENCOUNTER
Dr. Maribell Rios:    Patient identified with statin use in persons with cardiovascular disease [facility]) care gap. Noted intolerant to statins in the past (and is currently on Repatha). CMS allows exclusions for the [facility] metric - if certain diagnosis codes are entered during visits in 2021, it will exclude the patient from the metric this year. Patient has appointment with you 8/4/21 - please consider adding dx code G72.0 (drug-induced myopathy) if appropriate and it will exclude the patient from the metric. Thank you,  Juan Biggs, PharmD, [facility]  Department, toll free: [phone], option 7  ==============================================================    [facility] CLINICAL PHARMACY: STATIN THERAPY REVIEW  Identified statin use in persons with cardiovascular disease care gap per Chaya. Last Office Visit: 7/12/21 PCP, 1/28/21 cardiology    ASSESSMENT:  Patient has been identified as having a diagnosis for clinical ASCVD or event (e.g., inpatient hospitalization for MI, CABG, PCI or other revascularization procedures) in the measurement year and not currently filling a moderate or high intensity statin. Patients included in this care gap are men age 18-72 and women age 43-69. Per chart review, patient is prescribed Repatha  Per chart review, patient has documented intolerance to statin therapy, which qualifies the patient for an exclusion from this care gap.     Statin intolerance noted per problem list. Appears history of myalgia to statins - see 2/26/2019 neurology office visit documentation    Per 9/1/2015 telephone note: States she has been intolerant of all statins and has been off statins for 1 year    Lab Results   Component Value Date/Time    Cholesterol, total 193 06/23/2020 08:17 AM    Cholesterol (POC) 261 (A) 08/31/2015 08:48 AM    HDL Cholesterol 59 06/23/2020 08:17 AM    HDL Cholesterol (POC) 54 08/31/2015 08:48 AM    LDL Cholesterol (POC) 179 (A) 08/31/2015 08:48 AM    LDL, calculated 103 (H) 06/23/2020 08:17 AM    VLDL, calculated 31 (H) 06/23/2020 08:17 AM    Triglyceride 155 (H) 06/23/2020 08:17 AM    Triglycerides (POC) 141 08/31/2015 08:48 AM    CHOL/HDL Ratio 3.3 06/23/2020 08:17 AM     ALT (SGPT)   Date Value Ref Range Status   05/06/2021 10 0 - 32 IU/L Final     AST (SGOT)   Date Value Ref Range Status   05/06/2021 16 0 - 40 IU/L Final     The 10-year ASCVD risk score (Guillermina Burger, et al., 2013) is: 8.6%    Values used to calculate the score:      Age: 79 years      Sex: Female      Is Non- : No      Diabetic: No      Tobacco smoker: No      Systolic Blood Pressure: 440 mmHg      Is BP treated: Yes      HDL Cholesterol: 59 MG/DL      Total Cholesterol: 193 MG/DL     Hyperlipidemia Goal: Patient has a history of ASCVD and is therefore a candidate for high-intensity statin therapy based on updated guidelines. Per chart review has tried several statins in the past and intolerant. Noted she is currently on Repatha.       PLAN:  - suggest dx code to exclude patient from [facility] since unable to tolerate stains    Future Appointments   Date Time Provider Sarina Cunningham   8/4/2021  1:30 PM [facility]   9/30/2021  8:00 AM Becki Perez NP [facility]   10/7/2021  8:30 AM Becki Perez NP [facility]       Frankey Adu, PharmD, [facility]  Department, toll free: [phone], option 7

## 2021-08-06 NOTE — TELEPHONE ENCOUNTER
Noted exclusion dx code added to 8/4/21 cardiology visit - thank you!   Myalgia    Codes: ICD-10-CM: M79.10   ICD-9-CM: 729.1         For Pharmacy Admin Tracking Only     CPA in place: No   Recommendation Provided To: Provider: 1 via Note to Provider    Gap Closed?: Yes   Intervention Accepted By: Provider: 1   Time Spent (min): 20

## 2021-08-11 ENCOUNTER — HOSPITAL ENCOUNTER (OUTPATIENT)
Dept: LAB | Age: 71
Discharge: HOME | End: 2021-08-11
Payer: MEDICARE

## 2021-08-11 DIAGNOSIS — M79.10 MYALGIA: ICD-10-CM

## 2021-08-11 DIAGNOSIS — E78.00 PURE HYPERCHOLESTEROLEMIA: ICD-10-CM

## 2021-08-11 LAB
CHOLEST SERPL-MCNC: 143 MG/DL
HDLC SERPL-MCNC: 57 MG/DL (ref 40–60)
HDLC SERPL: 2.5 {RATIO}
LDLC SERPL CALC-MCNC: 41.4 MG/DL
TRIGL SERPL-MCNC: 223 MG/DL (ref 35–150)
VLDLC SERPL CALC-MCNC: 44.6 MG/DL (ref 6–23)

## 2021-08-11 PROCEDURE — 36415 COLL VENOUS BLD VENIPUNCTURE: CPT

## 2021-08-11 PROCEDURE — 80061 LIPID PANEL: CPT

## 2021-08-12 NOTE — PROGRESS NOTES
Per Dr. Jeison Mcghee her lipids look good but the triglycerides were little bit higher than last time. Watch the fatty foods and increase exercise. Informed patient of lab results. She voiced understanding.

## 2021-09-30 ENCOUNTER — RX ONLY (OUTPATIENT)
Age: 71
Setting detail: RX ONLY
End: 2021-09-30

## 2021-09-30 RX ORDER — TRETIONIN 0.5 MG/G
CREAM TOPICAL
Qty: 20 | Refills: 0 | Status: ERX

## 2021-10-06 ENCOUNTER — TRANSCRIBE ORDER (OUTPATIENT)
Dept: SCHEDULING | Age: 71
End: 2021-10-06

## 2021-10-06 DIAGNOSIS — Z12.31 SCREENING MAMMOGRAM FOR HIGH-RISK PATIENT: Primary | ICD-10-CM

## 2021-10-07 ENCOUNTER — HOSPITAL ENCOUNTER (OUTPATIENT)
Dept: MAMMOGRAPHY | Age: 71
Discharge: HOME | End: 2021-10-07
Attending: NURSE PRACTITIONER

## 2021-10-07 DIAGNOSIS — Z12.31 SCREENING MAMMOGRAM FOR HIGH-RISK PATIENT: ICD-10-CM

## 2021-10-14 ENCOUNTER — HOSPITAL ENCOUNTER (OUTPATIENT)
Dept: GENERAL RADIOLOGY | Age: 71
Discharge: HOME | End: 2021-10-14

## 2021-10-14 DIAGNOSIS — R05.3 PERSISTENT COUGH: ICD-10-CM

## 2021-10-14 PROBLEM — T46.6X5A STATIN MYOPATHY: Status: ACTIVE | Noted: 2021-10-14

## 2021-10-14 PROBLEM — G72.0 STATIN MYOPATHY: Status: ACTIVE | Noted: 2021-10-14

## 2021-10-25 NOTE — H&P (VIEW-ONLY)
[facility]   [address] [address]  Phone [phone]   Fax [phone]      Date of visit: 10/25/2021     Primary/Requesting provider: Anca Gregory NP    Chief Complaint   Patient presents with    New Patient     umbilical hernia             Name: Corinne Arabia      MRN: [id number]       : 1950       Age: 79 y.o. Sex: female        PCP: Anca Gregory NP       CC:    Chief Complaint   Patient presents with    New Patient     umbilical hernia       HPI:     Corinne Arabia is a 79 y.o. female who presents for evaluation of umbilical hernia. This is a 51-year-old female previously known to me for temporal artery biopsy and screening colonoscopy. Patient is referred for evaluation of umbilical hernia. The patient states that she is unsure of what how long she has had the hernia. It was noticed by her primary care doctor and she was referred for evaluation. She states that it is tender when palpated. Otherwise she is not complaining of any periumbilical pain related to the hernia. She does complain of diffuse abdominal pain in the upper abdomen. It is intermittent. She otherwise denies any associated fevers nausea or vomiting. Her pain is 10 out of 10 when the hernia is palpated. She denies any previous umbilical surgeries however she has had a laparoscopic cholecystectomy. She is here today for evaluation of umbilical hernia possible surgery. [facility]:    Past Medical History:   Diagnosis Date    Affective psychosis ([address] 2006    Anxiety     Arthritis     OA    CAD (coronary artery disease)     no angina cp or govea. no orthopnea or pnd    CAD (coronary artery disease) 2018    PCI    Chest pain 2006     no cp but feels\" anxious\" for 10 seconds at a time- no further cp. No GOVEA. Followed by [facility]-Dr. Tanner Ivey.      Cholelithiasis 2006    Chronic depression 10/15/2014    Chronic lumbar pain 10/15/2014    Claudication ([address] 06/2013    Coronary atherosclerosis of native coronary vessel 09/2006    Per Cardiology note (2/8/17) \"Stable. Continue current medical therapy. \"     Dyslipidemia 10/15/2014    managed with medication     Fibromyalgia 10/15/2014    Fibromyalgia     Fibromyalgia     HIPOLITO (generalized anxiety disorder) 10/15/2014    managed with medication     GERD (gastroesophageal reflux disease) 10/15/2014    managed with medication     History of complete eye exam 04/01/2016    History of dental examination 06/01/2016    History of placement of stent in LAD coronary artery 08/29/2006    Two heart stents. Patient takes daily 81 mg ASA.  Hypercholesterolemia     Hypertension     managed with medication     Low magnesium level     Patient's daily Magnesium supplement decreased to once a week by PCP due to Magnesium of 2.4 on 5/18/17    Malaise and fatigue 05/2008    Overweight 7/12/2021    Palpitations 02/05/2016    Stable     Polyarthralgia 10/15/2014    Shingles 2006    10-12x    Tobacco use disorder 112/2006       PSH:    Past Surgical History:   Procedure Laterality Date    COLONOSCOPY N/A 8/28/2017    COLONOSCOPY/ BMI=26 performed by Kennedy Coffey DO at [facility] HX CHOLECYSTECTOMY  2005    HX OTHER SURGICAL      several skin cancer removals    HX TONSILLECTOMY  1976    at age 32   [facility] Bilateral     35 yrs silicone    DC BREAST SURGERY PROCEDURE UNLISTED  1980    implants    DC CARDIAC SURG PROCEDURE UNLIST  08/29/2006    2 cardiac stents    DC LEFT HEART CATH,PERCUTANEOUS  09/12/2018    PCI       MEDS:    Current Outpatient Medications   Medication Sig    tretinoin (RETIN-A) 0.05 % topical cream Apply  to affected area nightly.  fluticasone-umeclidinium-vilanterol (Trelegy Ellipta) 100-62.5-25 mcg inhaler Take 1 Puff by inhalation daily.     pantoprazole (PROTONIX) 40 mg tablet TAKE 1 TABLET BY MOUTH  DAILY    amLODIPine (NORVASC) 5 mg tablet Take 1 Tablet by mouth daily.  fluticasone propionate (Flonase Allergy Relief) 50 mcg/actuation nasal spray 2 Sprays by Both Nostrils route daily as needed (prn). 2 sprays in each nostril once a day    clonazePAM (KlonoPIN) 0.5 mg tablet Take 1 Tablet by mouth nightly as needed (sleep). Max Daily Amount: 0.5 mg.    buPROPion XL (WELLBUTRIN XL) 150 mg tablet Take 1 Tablet by mouth every morning.  clindamycin (CLINDAGEL) 1 % topical gel APPLY TO THE AFFECTED AREA FOR ACNE ON FACE TWICE A DAY    busPIRone (BUSPAR) 10 mg tablet Take 1 Tab by mouth three (3) times daily.  evolocumab (Repatha SureClick) pen injection 1 mL by SubCUTAneous route every fourteen (14) days.  acetaminophen (TYLENOL ARTHRITIS PO) Take 2 Tabs by mouth every six to eight (6-8) hours as needed.  DISABLED PLACARD (DISABLED PLACARD) DMV Requires disability placard    cholecalciferol, vitamin D3, (VITAMIN D3 PO) Take 2,000 Units by mouth daily.  ascorbic acid, vitamin C, (Vitamin C) 500 mg tablet Take 500 mg by mouth daily.  triamcinolone acetonide (KENALOG) 0.1 % topical cream Apply  to affected area three (3) times daily. use thin layer (Patient taking differently: Apply  to affected area three (3) times daily as needed. use thin layer)    aspirin delayed-release 81 mg tablet Take 81 mg by mouth daily. Take DOS per anesthesia protocol. No current facility-administered medications for this visit.        ALLERGIES:      Allergies   Allergen Reactions    Antibiotic [Odwiq-Rxrae-Jobfrmv-Pramoxine] Unknown (comments)     Patient unsure     Biaxin [Clarithromycin] Nausea and Vomiting     Patient unsure     Ceclor [Cefaclor] Nausea Only    Cephalexin Nausea and Vomiting       SH:    Social History     Tobacco Use    Smoking status: Former Smoker     Packs/day: 0.50     Years: 35.00     Pack years: 17.50     Quit date: 2004     Years since quittin.1    Smokeless tobacco: Never Used    Tobacco comment: started at age 23   Substance Use Topics    Alcohol use: No    Drug use: No       FH:    Family History   Problem Relation Age of Onset    Hypertension Other     High Cholesterol Other     Diabetes Other     Glaucoma Other     Heart Attack Other     Stroke Other     Hypertension Mother     High Cholesterol Mother     Heart Attack Mother     Hypertension Sister     Depression Sister     High Cholesterol Sister     Hypertension Brother     High Cholesterol Brother     Heart Attack Brother     No Known Problems Maternal Aunt     Other Father         gun accident    Breast Cancer Neg Hx          Review of Systems   Constitutional: Negative. HENT: Negative. Eyes:        Glasses   Respiratory: Negative. Cardiovascular: Negative. Gastrointestinal: Positive for abdominal pain. Genitourinary: Negative. Musculoskeletal: Positive for back pain and joint pain. Skin:        Umbilical hernia   Neurological: Negative. Endo/Heme/Allergies: Negative. Psychiatric/Behavioral: Negative. Physical Exam:     Visit Vitals  BP (!) 106/54   Pulse 78   Wt 162 lb (73.5 kg)   BMI 25.37 kg/m²         Physical Exam  HENT:      Head: Normocephalic and atraumatic. Eyes:      Pupils: Pupils are equal, round, and reactive to light. Neck:      Thyroid: No thyromegaly. Trachea: No tracheal deviation. Cardiovascular:      Rate and Rhythm: Normal rate and regular rhythm. Heart sounds: Normal heart sounds. No murmur heard. Pulmonary:      Effort: Pulmonary effort is normal. No respiratory distress. Breath sounds: Normal breath sounds. No wheezing or rales. Abdominal:      General: Bowel sounds are normal. There is no distension. Palpations: Abdomen is soft. There is no mass. Tenderness: There is no abdominal tenderness. There is no guarding or rebound. Musculoskeletal:         General: Normal range of motion.       Cervical back: Normal range of motion and neck supple. Skin:     General: Skin is warm and dry. Findings: No erythema. Neurological:      Mental Status: She is alert and oriented to person, place, and time. Psychiatric:         Mood and Affect: Mood normal.         Judgment: Judgment normal.         Assessment/Plan:  Marion Elizabeth is a 79 y.o. female who has signs and symptoms consistent with incarcerated umbilical hernia. Today I recommended open umbilical hernia repair with possible use of mesh. We discussed the details of the procedure should be scheduled the next available date and time. ICD-10-CM ICD-9-CM    1. Umbilical hernia without obstruction and without gangrene  K42.9 553.1          Counseling time:counseling time more than 50% of visit: 50 minutes were utilized in office visit today 50% of times in a face-to-face discussion explaining umbilical hernia. We discussed the anatomy of the abdominal wall and how hernias occur. We discussed the dangers of hernias including incarceration and strangulation. We discussed the details of open umbilical hernia repair and possible use of mesh. We discussed the risks of mesh and rationale for using mesh. We discussed postoperative expectations recovery time and she will be scheduled at the next available date and time.     Signed: Patti Nielson DO   10/25/2021  9:07 AM

## 2021-11-17 ENCOUNTER — ANESTHESIA EVENT (OUTPATIENT)
Dept: SURGERY | Age: 71
End: 2021-11-17
Payer: MEDICARE

## 2021-11-18 ENCOUNTER — HOSPITAL ENCOUNTER (OUTPATIENT)
Age: 71
Setting detail: OUTPATIENT SURGERY
Discharge: HOME | End: 2021-11-18
Attending: SURGERY | Admitting: SURGERY
Payer: MEDICARE

## 2021-11-18 ENCOUNTER — ANESTHESIA (OUTPATIENT)
Dept: SURGERY | Age: 71
End: 2021-11-18
Payer: MEDICARE

## 2021-11-18 VITALS
HEIGHT: 67 IN | SYSTOLIC BLOOD PRESSURE: 117 MMHG | RESPIRATION RATE: 16 BRPM | DIASTOLIC BLOOD PRESSURE: 67 MMHG | BODY MASS INDEX: 24.96 KG/M2 | HEART RATE: 69 BPM | OXYGEN SATURATION: 96 % | TEMPERATURE: 97.7 F | WEIGHT: 159 LBS

## 2021-11-18 DIAGNOSIS — Z09 S/P UMBILICAL HERNIA REPAIR, FOLLOW-UP EXAM: Primary | ICD-10-CM

## 2021-11-18 DIAGNOSIS — K42.0 INCARCERATED UMBILICAL HERNIA: ICD-10-CM

## 2021-11-18 PROCEDURE — 76010000138 HC OR TIME 0.5 TO 1 HR: Performed by: SURGERY

## 2021-11-18 PROCEDURE — 74011250636 HC RX REV CODE- 250/636: Performed by: NURSE ANESTHETIST, CERTIFIED REGISTERED

## 2021-11-18 PROCEDURE — 77030039425 HC BLD LARYNG TRULITE DISP TELE -A: Performed by: NURSE ANESTHETIST, CERTIFIED REGISTERED

## 2021-11-18 PROCEDURE — 74011000250 HC RX REV CODE- 250: Performed by: SURGERY

## 2021-11-18 PROCEDURE — 74011000250 HC RX REV CODE- 250: Performed by: NURSE ANESTHETIST, CERTIFIED REGISTERED

## 2021-11-18 PROCEDURE — 77030037088 HC TUBE ENDOTRACH ORAL NSL COVD-A: Performed by: NURSE ANESTHETIST, CERTIFIED REGISTERED

## 2021-11-18 PROCEDURE — 2709999900 HC NON-CHARGEABLE SUPPLY: Performed by: SURGERY

## 2021-11-18 PROCEDURE — 76210000020 HC REC RM PH II FIRST 0.5 HR: Performed by: SURGERY

## 2021-11-18 PROCEDURE — 74011250636 HC RX REV CODE- 250/636: Performed by: SURGERY

## 2021-11-18 PROCEDURE — 74011250636 HC RX REV CODE- 250/636: Performed by: ANESTHESIOLOGY

## 2021-11-18 PROCEDURE — 77030040922 HC BLNKT HYPOTHRM STRY -A: Performed by: NURSE ANESTHETIST, CERTIFIED REGISTERED

## 2021-11-18 PROCEDURE — 49587 PR REPAIR UMBILICAL HERN,5+Y/O,STRANG: CPT | Performed by: SURGERY

## 2021-11-18 PROCEDURE — 77030031139 HC SUT VCRL2 J&J -A: Performed by: SURGERY

## 2021-11-18 PROCEDURE — 77030019908 HC STETH ESOPH SIMS -A: Performed by: NURSE ANESTHETIST, CERTIFIED REGISTERED

## 2021-11-18 PROCEDURE — 76210000006 HC OR PH I REC 0.5 TO 1 HR: Performed by: SURGERY

## 2021-11-18 PROCEDURE — 76060000032 HC ANESTHESIA 0.5 TO 1 HR: Performed by: SURGERY

## 2021-11-18 PROCEDURE — 77030002912 HC SUT ETHBND J&J -A: Performed by: SURGERY

## 2021-11-18 PROCEDURE — 74011250637 HC RX REV CODE- 250/637: Performed by: ANESTHESIOLOGY

## 2021-11-18 RX ORDER — FLUMAZENIL 0.1 MG/ML
0.2 INJECTION INTRAVENOUS
Status: DISCONTINUED | OUTPATIENT
Start: 2021-11-18 | End: 2021-11-18 | Stop reason: HOSPADM

## 2021-11-18 RX ORDER — OXYCODONE AND ACETAMINOPHEN 5; 325 MG/1; MG/1
1 TABLET ORAL
Qty: 20 TABLET | Refills: 0 | Status: SHIPPED | OUTPATIENT
Start: 2021-11-18 | End: 2021-11-23

## 2021-11-18 RX ORDER — FENTANYL CITRATE 50 UG/ML
100 INJECTION, SOLUTION INTRAMUSCULAR; INTRAVENOUS AS NEEDED
Status: DISCONTINUED | OUTPATIENT
Start: 2021-11-18 | End: 2021-11-18 | Stop reason: HOSPADM

## 2021-11-18 RX ORDER — HYDROMORPHONE HYDROCHLORIDE 2 MG/ML
0.5 INJECTION, SOLUTION INTRAMUSCULAR; INTRAVENOUS; SUBCUTANEOUS
Status: DISCONTINUED | OUTPATIENT
Start: 2021-11-18 | End: 2021-11-18 | Stop reason: HOSPADM

## 2021-11-18 RX ORDER — ROCURONIUM BROMIDE 10 MG/ML
INJECTION, SOLUTION INTRAVENOUS AS NEEDED
Status: DISCONTINUED | OUTPATIENT
Start: 2021-11-18 | End: 2021-11-18 | Stop reason: HOSPADM

## 2021-11-18 RX ORDER — DIPHENHYDRAMINE HYDROCHLORIDE 50 MG/ML
12.5 INJECTION, SOLUTION INTRAMUSCULAR; INTRAVENOUS
Status: DISCONTINUED | OUTPATIENT
Start: 2021-11-18 | End: 2021-11-18 | Stop reason: HOSPADM

## 2021-11-18 RX ORDER — ACETAMINOPHEN 500 MG
1000 TABLET ORAL ONCE
Status: COMPLETED | OUTPATIENT
Start: 2021-11-18 | End: 2021-11-18

## 2021-11-18 RX ORDER — CEFAZOLIN SODIUM/WATER 2 G/20 ML
2 SYRINGE (ML) INTRAVENOUS ONCE
Status: COMPLETED | OUTPATIENT
Start: 2021-11-18 | End: 2021-11-18

## 2021-11-18 RX ORDER — FENTANYL CITRATE 50 UG/ML
INJECTION, SOLUTION INTRAMUSCULAR; INTRAVENOUS AS NEEDED
Status: DISCONTINUED | OUTPATIENT
Start: 2021-11-18 | End: 2021-11-18 | Stop reason: HOSPADM

## 2021-11-18 RX ORDER — ONDANSETRON 2 MG/ML
INJECTION INTRAMUSCULAR; INTRAVENOUS AS NEEDED
Status: DISCONTINUED | OUTPATIENT
Start: 2021-11-18 | End: 2021-11-18 | Stop reason: HOSPADM

## 2021-11-18 RX ORDER — LIDOCAINE HYDROCHLORIDE 20 MG/ML
INJECTION, SOLUTION EPIDURAL; INFILTRATION; INTRACAUDAL; PERINEURAL AS NEEDED
Status: DISCONTINUED | OUTPATIENT
Start: 2021-11-18 | End: 2021-11-18 | Stop reason: HOSPADM

## 2021-11-18 RX ORDER — NALOXONE HYDROCHLORIDE 0.4 MG/ML
0.1 INJECTION, SOLUTION INTRAMUSCULAR; INTRAVENOUS; SUBCUTANEOUS AS NEEDED
Status: DISCONTINUED | OUTPATIENT
Start: 2021-11-18 | End: 2021-11-18 | Stop reason: HOSPADM

## 2021-11-18 RX ORDER — LIDOCAINE HYDROCHLORIDE 10 MG/ML
0.1 INJECTION INFILTRATION; PERINEURAL AS NEEDED
Status: DISCONTINUED | OUTPATIENT
Start: 2021-11-18 | End: 2021-11-18 | Stop reason: HOSPADM

## 2021-11-18 RX ORDER — OXYCODONE HYDROCHLORIDE 5 MG/1
5 TABLET ORAL
Status: DISCONTINUED | OUTPATIENT
Start: 2021-11-18 | End: 2021-11-18 | Stop reason: HOSPADM

## 2021-11-18 RX ORDER — DEXAMETHASONE SODIUM PHOSPHATE 4 MG/ML
INJECTION, SOLUTION INTRA-ARTICULAR; INTRALESIONAL; INTRAMUSCULAR; INTRAVENOUS; SOFT TISSUE AS NEEDED
Status: DISCONTINUED | OUTPATIENT
Start: 2021-11-18 | End: 2021-11-18 | Stop reason: HOSPADM

## 2021-11-18 RX ORDER — BUPIVACAINE HYDROCHLORIDE 2.5 MG/ML
INJECTION, SOLUTION EPIDURAL; INFILTRATION; INTRACAUDAL AS NEEDED
Status: DISCONTINUED | OUTPATIENT
Start: 2021-11-18 | End: 2021-11-18 | Stop reason: HOSPADM

## 2021-11-18 RX ORDER — PROPOFOL 10 MG/ML
INJECTION, EMULSION INTRAVENOUS AS NEEDED
Status: DISCONTINUED | OUTPATIENT
Start: 2021-11-18 | End: 2021-11-18 | Stop reason: HOSPADM

## 2021-11-18 RX ORDER — SODIUM CHLORIDE, SODIUM LACTATE, POTASSIUM CHLORIDE, CALCIUM CHLORIDE 600; 310; 30; 20 MG/100ML; MG/100ML; MG/100ML; MG/100ML
100 INJECTION, SOLUTION INTRAVENOUS CONTINUOUS
Status: DISCONTINUED | OUTPATIENT
Start: 2021-11-18 | End: 2021-11-18 | Stop reason: HOSPADM

## 2021-11-18 RX ORDER — SODIUM CHLORIDE, SODIUM LACTATE, POTASSIUM CHLORIDE, CALCIUM CHLORIDE 600; 310; 30; 20 MG/100ML; MG/100ML; MG/100ML; MG/100ML
75 INJECTION, SOLUTION INTRAVENOUS CONTINUOUS
Status: DISCONTINUED | OUTPATIENT
Start: 2021-11-18 | End: 2021-11-18 | Stop reason: HOSPADM

## 2021-11-18 RX ORDER — MIDAZOLAM HYDROCHLORIDE 1 MG/ML
2 INJECTION, SOLUTION INTRAMUSCULAR; INTRAVENOUS ONCE
Status: DISCONTINUED | OUTPATIENT
Start: 2021-11-18 | End: 2021-11-18 | Stop reason: HOSPADM

## 2021-11-18 RX ADMIN — ROCURONIUM BROMIDE 40 MG: 10 INJECTION, SOLUTION INTRAVENOUS at 13:26

## 2021-11-18 RX ADMIN — ONDANSETRON 4 MG: 2 INJECTION INTRAMUSCULAR; INTRAVENOUS at 13:43

## 2021-11-18 RX ADMIN — PROPOFOL 150 MG: 10 INJECTION, EMULSION INTRAVENOUS at 13:24

## 2021-11-18 RX ADMIN — FENTANYL CITRATE 50 MCG: 50 INJECTION INTRAMUSCULAR; INTRAVENOUS at 13:24

## 2021-11-18 RX ADMIN — CEFAZOLIN 2 G: 1 INJECTION, POWDER, FOR SOLUTION INTRAVENOUS at 13:29

## 2021-11-18 RX ADMIN — HYDROMORPHONE HYDROCHLORIDE 0.5 MG: 2 INJECTION, SOLUTION INTRAMUSCULAR; INTRAVENOUS; SUBCUTANEOUS at 14:12

## 2021-11-18 RX ADMIN — SUGAMMADEX 200 MG: 100 INJECTION, SOLUTION INTRAVENOUS at 13:53

## 2021-11-18 RX ADMIN — PHENYLEPHRINE HYDROCHLORIDE 25 MCG: 10 INJECTION INTRAVENOUS at 13:32

## 2021-11-18 RX ADMIN — SODIUM CHLORIDE, SODIUM LACTATE, POTASSIUM CHLORIDE, AND CALCIUM CHLORIDE: 600; 310; 30; 20 INJECTION, SOLUTION INTRAVENOUS at 13:15

## 2021-11-18 RX ADMIN — ACETAMINOPHEN 1000 MG: 500 TABLET ORAL at 12:15

## 2021-11-18 RX ADMIN — SODIUM CHLORIDE, SODIUM LACTATE, POTASSIUM CHLORIDE, AND CALCIUM CHLORIDE 100 ML/HR: 600; 310; 30; 20 INJECTION, SOLUTION INTRAVENOUS at 12:16

## 2021-11-18 RX ADMIN — PHENYLEPHRINE HYDROCHLORIDE 50 MCG: 10 INJECTION INTRAVENOUS at 13:26

## 2021-11-18 RX ADMIN — LIDOCAINE HYDROCHLORIDE 100 MG: 20 INJECTION, SOLUTION EPIDURAL; INFILTRATION; INTRACAUDAL; PERINEURAL at 13:24

## 2021-11-18 RX ADMIN — PHENYLEPHRINE HYDROCHLORIDE 25 MCG: 10 INJECTION INTRAVENOUS at 13:35

## 2021-11-18 RX ADMIN — FENTANYL CITRATE 50 MCG: 50 INJECTION INTRAMUSCULAR; INTRAVENOUS at 14:02

## 2021-11-18 RX ADMIN — DEXAMETHASONE SODIUM PHOSPHATE 4 MG: 4 INJECTION, SOLUTION INTRAMUSCULAR; INTRAVENOUS at 13:43

## 2021-11-18 NOTE — PERIOP NOTES
2:53 PM  Ignacio Humphreys, friend, at bedside for discharge instructions. Verbalized understanding. No questions or concerns verbalized at this time.

## 2021-11-18 NOTE — INTERVAL H&P NOTE
Update History & Physical    The Patient's History and Physical of October 25, 2021 was reviewed with the patient and I examined the patient. There was no change. The surgical site was confirmed by the patient and me. Plan:  The risk, benefits, expected outcome, and alternative to the recommended procedure have been discussed with the patient. Patient understands and wants to proceed with the procedure.     Electronically signed by Alondra De Guzman DO on 11/18/2021 at 12:39 PM

## 2021-11-18 NOTE — ANESTHESIA POSTPROCEDURE EVALUATION
Procedure(s): HERNIA UMBILICAL REPAIR. general    Anesthesia Post Evaluation      Multimodal analgesia: multimodal analgesia used between 6 hours prior to anesthesia start to PACU discharge  Patient location during evaluation: PACU  Patient participation: complete - patient participated  Level of consciousness: awake  Pain management: adequate  Airway patency: patent  Anesthetic complications: no  Cardiovascular status: acceptable  Respiratory status: acceptable  Hydration status: acceptable  Post anesthesia nausea and vomiting:  none  Final Post Anesthesia Temperature Assessment:  Normothermia (36.0-37.5 degrees C)      INITIAL Post-op Vital signs:   Vitals Value Taken Time   /68 11/18/21 1430   Temp 36.6 °C (97.9 °F) 11/18/21 1404   Pulse 73 11/18/21 1434   Resp 16 11/18/21 1415   SpO2 89 % 11/18/21 1434   Vitals shown include unvalidated device data.

## 2021-11-18 NOTE — BRIEF OP NOTE
Brief Postoperative Note    Patient: Sol Arias  YOB: 1950  MRN: [id number]    Date of Procedure: 11/18/2021     Pre-Op Diagnosis: Umbilical hernia without obstruction and without gangrene [K42.9]    Post-Op Diagnosis: Incarcerated umbilical hernia      Procedure(s): HERNIA UMBILICAL REPAIR INCARCERATED CPT 23112    Surgeon(s):  Telma Hidalgo DO    Surgical Assistant: None    Anesthesia: General     Estimated Blood Loss (mL): Minimal    Complications: None    Specimens: * No specimens in log *     Implants: * No implants in log *    Drains: * No LDAs found *    Findings: 0.5 cm incarcerated umbilical hernia with omental fat. No necrosis. Primary closure no mesh.     Electronically Signed by Jeremy Yanez DO on 11/18/2021 at 2:00 PM  [id number]

## 2021-11-18 NOTE — ANESTHESIA PREPROCEDURE EVALUATION
Anesthetic History   No history of anesthetic complications            Review of Systems / Medical History  Patient summary reviewed and pertinent labs reviewed    Pulmonary                Comments: D/Matt smoking over 20 years   Neuro/Psych         Psychiatric history    Comments: fibromyalgia Cardiovascular    Hypertension          CAD, cardiac stents and hyperlipidemia    Exercise tolerance: >4 METS  Comments: 7 stents last 2018--preserved LV fx   GI/Hepatic/Renal     GERD           Endo/Other        Morbid obesity     Other Findings            Physical Exam    Airway  Mallampati: I  TM Distance: > 6 cm  Neck ROM: normal range of motion   Mouth opening: Normal     Cardiovascular    Rhythm: regular           Dental  No notable dental hx       Pulmonary                 Abdominal  GI exam deferred       Other Findings            Anesthetic Plan    ASA: 3  Anesthesia type: general          Induction: Intravenous  Anesthetic plan and risks discussed with: Patient

## 2021-11-19 NOTE — OP NOTES
[address] [place] [address]  OPERATIVE REPORT    Name:  Norberto Nathan  MR#:  [id number]  :  1950  ACCOUNT #:  [de-identified]  DATE OF SERVICE:  2021    PREOPERATIVE DIAGNOSIS:  Umbilical hernia. POSTOPERATIVE DIAGNOSIS:  Incarcerated umbilical hernia. PROCEDURE PERFORMED:  Open umbilical hernia repair, incarcerated, CPT code 21703. SURGEON:  Brittany Jeffries DO    ASSISTANT:  None. ANESTHESIA:  General endotracheal.    COMPLICATIONS:  None. SPECIMENS REMOVED:  None. IMPLANTS:  None. ESTIMATED BLOOD LOSS:  Minimal.    CONDITION:  Stable. PROCEDURE:  This is a 77-year-old female with umbilical hernia. She was prepared for open umbilical hernia repair with possible use of mesh. Consent was obtained by describing the procedure to the patient including potential complications to include infection, bleeding, possible use of mesh. Consent was obtained and placed on the final chart. She was administered Ancef 2 g IV preoperatively and taken to the operative suite and placed in the supine position. General anesthesia was initiated without complications. She was then prepped and draped in sterile fashion. Time-out was taken to confirm the patient and proper procedure. Following this, an infraumbilical incision was planned. 0.25% Marcaine with epinephrine was used to anesthetize the skin and subcutaneous tissue. A #15 scalpel blade was used to make a skin incision. Bovie cauterization was used to dissect down the rectus fascia. The fascia was then skeletonized around the umbilical stalk and the umbilical stalk was then divided at the level of the fascia revealing incarcerated omental fat. The omental fat was then reduced. There was no evidence of ischemic change or necrosis. This was all reduced back into the peritoneum. We then freshened the hernia edges revealing a 0.5 cm umbilical hernia.   We elected to use primary closure with a #1 Ethibond in a figure-of-eight fashion x1. No mesh was elected. We irrigated at this point with saline until clear. We reattached the umbilical stalk using the Ethibond. We irrigated once again closing subcutaneous tissue with 3-0 Vicryl in interrupted fashion, 3-0 Vicryl in simple running fashion. Mastisol and Steri-Strips placed up the incision and sterile dressing applied. The patient will be extubated and transferred to Recovery stable. FINDINGS:  A 72-year-old female with an open umbilical hernia repair for an incarcerated hernia. There was no evidence of necrosis of omental fat. Primary closure was performed without mesh. She tolerated the procedure well.       [address], DO      [id number]  D:  11/18/2021 14:05  T:  11/18/2021 22:57  JOB #:  [id number]

## 2022-01-01 ENCOUNTER — APPOINTMENT (OUTPATIENT)
Dept: GENERAL RADIOLOGY | Age: 72
End: 2022-01-01
Attending: EMERGENCY MEDICINE
Payer: MEDICARE

## 2022-01-01 ENCOUNTER — HOSPITAL ENCOUNTER (OUTPATIENT)
Age: 72
Setting detail: OBSERVATION
Discharge: HOME | End: 2022-01-02
Attending: EMERGENCY MEDICINE | Admitting: INTERNAL MEDICINE
Payer: MEDICARE

## 2022-01-01 DIAGNOSIS — R09.02 HYPOXIA: ICD-10-CM

## 2022-01-01 DIAGNOSIS — U07.1 COVID-19: Primary | ICD-10-CM

## 2022-01-01 PROBLEM — J96.01 ACUTE HYPOXEMIC RESPIRATORY FAILURE DUE TO COVID-19 (HCC): Status: ACTIVE | Noted: 2022-01-01

## 2022-01-01 PROBLEM — I25.10 CORONARY ARTERY DISEASE INVOLVING NATIVE CORONARY ARTERY OF NATIVE HEART WITHOUT ANGINA PECTORIS: Chronic | Status: ACTIVE | Noted: 2017-03-03

## 2022-01-01 LAB
ALBUMIN SERPL-MCNC: 3.4 G/DL (ref 3.2–4.6)
ALBUMIN/GLOB SERPL: 0.8 {RATIO} (ref 1.2–3.5)
ALP SERPL-CCNC: 141 U/L (ref 50–136)
ALT SERPL-CCNC: 22 U/L (ref 12–65)
ANION GAP SERPL CALC-SCNC: 7 MMOL/L (ref 7–16)
AST SERPL-CCNC: 28 U/L (ref 15–37)
BASOPHILS # BLD: 0.1 K/UL (ref 0–0.2)
BASOPHILS NFR BLD: 1 % (ref 0–2)
BILIRUB SERPL-MCNC: 0.4 MG/DL (ref 0.2–1.1)
BUN SERPL-MCNC: 9 MG/DL (ref 8–23)
CALCIUM SERPL-MCNC: 8.9 MG/DL (ref 8.3–10.4)
CHLORIDE SERPL-SCNC: 103 MMOL/L (ref 98–107)
CO2 SERPL-SCNC: 26 MMOL/L (ref 21–32)
CREAT SERPL-MCNC: 0.77 MG/DL (ref 0.6–1)
CRP SERPL-MCNC: 10.4 MG/DL (ref 0–0.9)
DIFFERENTIAL METHOD BLD: ABNORMAL
EOSINOPHIL # BLD: 0.1 K/UL (ref 0–0.8)
EOSINOPHIL NFR BLD: 1 % (ref 0.5–7.8)
ERYTHROCYTE [DISTWIDTH] IN BLOOD BY AUTOMATED COUNT: 11.8 % (ref 11.9–14.6)
GLOBULIN SER CALC-MCNC: 4.2 G/DL (ref 2.3–3.5)
GLUCOSE SERPL-MCNC: 109 MG/DL (ref 65–100)
HCT VFR BLD AUTO: 40.2 % (ref 35.8–46.3)
HGB BLD-MCNC: 13.4 G/DL (ref 11.7–15.4)
IMM GRANULOCYTES # BLD AUTO: 0 K/UL (ref 0–0.5)
IMM GRANULOCYTES NFR BLD AUTO: 0 % (ref 0–5)
LYMPHOCYTES # BLD: 1.5 K/UL (ref 0.5–4.6)
LYMPHOCYTES NFR BLD: 16 % (ref 13–44)
MAGNESIUM SERPL-MCNC: 2.4 MG/DL (ref 1.8–2.4)
MCH RBC QN AUTO: 30.9 PG (ref 26.1–32.9)
MCHC RBC AUTO-ENTMCNC: 33.3 G/DL (ref 31.4–35)
MCV RBC AUTO: 92.6 FL (ref 79.6–97.8)
MONOCYTES # BLD: 1.3 K/UL (ref 0.1–1.3)
MONOCYTES NFR BLD: 14 % (ref 4–12)
NEUTS SEG # BLD: 6.3 K/UL (ref 1.7–8.2)
NEUTS SEG NFR BLD: 68 % (ref 43–78)
NRBC # BLD: 0 K/UL (ref 0–0.2)
PLATELET # BLD AUTO: 325 K/UL (ref 150–450)
PMV BLD AUTO: 9 FL (ref 9.4–12.3)
POTASSIUM SERPL-SCNC: 3.8 MMOL/L (ref 3.5–5.1)
PROCALCITONIN SERPL-MCNC: 0.05 NG/ML (ref 0–0.49)
PROT SERPL-MCNC: 7.6 G/DL (ref 6.3–8.2)
RBC # BLD AUTO: 4.34 M/UL (ref 4.05–5.2)
SODIUM SERPL-SCNC: 136 MMOL/L (ref 136–145)
WBC # BLD AUTO: 9.3 K/UL (ref 4.3–11.1)

## 2022-01-01 PROCEDURE — 86140 C-REACTIVE PROTEIN: CPT

## 2022-01-01 PROCEDURE — 93005 ELECTROCARDIOGRAM TRACING: CPT | Performed by: EMERGENCY MEDICINE

## 2022-01-01 PROCEDURE — 74011250636 HC RX REV CODE- 250/636: Performed by: INTERNAL MEDICINE

## 2022-01-01 PROCEDURE — 96374 THER/PROPH/DIAG INJ IV PUSH: CPT

## 2022-01-01 PROCEDURE — 84145 PROCALCITONIN (PCT): CPT

## 2022-01-01 PROCEDURE — 74011250637 HC RX REV CODE- 250/637: Performed by: INTERNAL MEDICINE

## 2022-01-01 PROCEDURE — 85025 COMPLETE CBC W/AUTO DIFF WBC: CPT

## 2022-01-01 PROCEDURE — G0378 HOSPITAL OBSERVATION PER HR: HCPCS

## 2022-01-01 PROCEDURE — 94762 N-INVAS EAR/PLS OXIMTRY CONT: CPT

## 2022-01-01 PROCEDURE — 99284 EMERGENCY DEPT VISIT MOD MDM: CPT

## 2022-01-01 PROCEDURE — 83735 ASSAY OF MAGNESIUM: CPT

## 2022-01-01 PROCEDURE — 80053 COMPREHEN METABOLIC PANEL: CPT

## 2022-01-01 PROCEDURE — 71045 X-RAY EXAM CHEST 1 VIEW: CPT

## 2022-01-01 RX ORDER — ONDANSETRON 2 MG/ML
4 INJECTION INTRAMUSCULAR; INTRAVENOUS
Status: DISCONTINUED | OUTPATIENT
Start: 2022-01-01 | End: 2022-01-02 | Stop reason: HOSPADM

## 2022-01-01 RX ORDER — HYDROCODONE BITARTRATE AND HOMATROPINE METHYLBROMIDE 1.5; 5 MG/5ML; MG/5ML
5 SYRUP ORAL
Status: DISCONTINUED | OUTPATIENT
Start: 2022-01-01 | End: 2022-01-02 | Stop reason: HOSPADM

## 2022-01-01 RX ORDER — PANTOPRAZOLE SODIUM 40 MG/1
40 TABLET, DELAYED RELEASE ORAL DAILY
Status: DISCONTINUED | OUTPATIENT
Start: 2022-01-02 | End: 2022-01-02 | Stop reason: HOSPADM

## 2022-01-01 RX ORDER — LOPERAMIDE HYDROCHLORIDE 2 MG/1
2 CAPSULE ORAL
Status: DISCONTINUED | OUTPATIENT
Start: 2022-01-01 | End: 2022-01-02 | Stop reason: HOSPADM

## 2022-01-01 RX ORDER — ASPIRIN 81 MG/1
81 TABLET ORAL DAILY
Status: DISCONTINUED | OUTPATIENT
Start: 2022-01-02 | End: 2022-01-02 | Stop reason: HOSPADM

## 2022-01-01 RX ORDER — DM/P-EPHED/ACETAMINOPH/DOXYLAM 30-7.5/3
2 LIQUID (ML) ORAL
Status: DISCONTINUED | OUTPATIENT
Start: 2022-01-01 | End: 2022-01-02 | Stop reason: HOSPADM

## 2022-01-01 RX ORDER — AMLODIPINE BESYLATE 10 MG/1
5 TABLET ORAL DAILY
Status: DISCONTINUED | OUTPATIENT
Start: 2022-01-02 | End: 2022-01-02 | Stop reason: HOSPADM

## 2022-01-01 RX ORDER — SODIUM CHLORIDE 0.9 % (FLUSH) 0.9 %
5-10 SYRINGE (ML) INJECTION EVERY 8 HOURS
Status: DISCONTINUED | OUTPATIENT
Start: 2022-01-01 | End: 2022-01-02 | Stop reason: HOSPADM

## 2022-01-01 RX ORDER — ACETAMINOPHEN 325 MG/1
650 TABLET ORAL
Status: DISCONTINUED | OUTPATIENT
Start: 2022-01-01 | End: 2022-01-02 | Stop reason: HOSPADM

## 2022-01-01 RX ORDER — GUAIFENESIN 600 MG/1
1200 TABLET, EXTENDED RELEASE ORAL 2 TIMES DAILY
Status: DISCONTINUED | OUTPATIENT
Start: 2022-01-01 | End: 2022-01-02 | Stop reason: HOSPADM

## 2022-01-01 RX ORDER — ACETAMINOPHEN 650 MG/1
650 SUPPOSITORY RECTAL
Status: DISCONTINUED | OUTPATIENT
Start: 2022-01-01 | End: 2022-01-02 | Stop reason: HOSPADM

## 2022-01-01 RX ORDER — MAG HYDROX/ALUMINUM HYD/SIMETH 200-200-20
15 SUSPENSION, ORAL (FINAL DOSE FORM) ORAL
Status: DISCONTINUED | OUTPATIENT
Start: 2022-01-01 | End: 2022-01-02 | Stop reason: HOSPADM

## 2022-01-01 RX ORDER — DEXAMETHASONE SODIUM PHOSPHATE 100 MG/10ML
10 INJECTION INTRAMUSCULAR; INTRAVENOUS ONCE
Status: COMPLETED | OUTPATIENT
Start: 2022-01-01 | End: 2022-01-01

## 2022-01-01 RX ORDER — SODIUM CHLORIDE 0.9 % (FLUSH) 0.9 %
5-10 SYRINGE (ML) INJECTION AS NEEDED
Status: DISCONTINUED | OUTPATIENT
Start: 2022-01-01 | End: 2022-01-02 | Stop reason: HOSPADM

## 2022-01-01 RX ORDER — BUSPIRONE HYDROCHLORIDE 10 MG/1
10 TABLET ORAL 3 TIMES DAILY
Status: DISCONTINUED | OUTPATIENT
Start: 2022-01-01 | End: 2022-01-02 | Stop reason: HOSPADM

## 2022-01-01 RX ORDER — POLYETHYLENE GLYCOL 3350 17 G/17G
17 POWDER, FOR SOLUTION ORAL DAILY PRN
Status: DISCONTINUED | OUTPATIENT
Start: 2022-01-01 | End: 2022-01-02 | Stop reason: HOSPADM

## 2022-01-01 RX ORDER — ONDANSETRON 4 MG/1
4 TABLET, ORALLY DISINTEGRATING ORAL
Status: DISCONTINUED | OUTPATIENT
Start: 2022-01-01 | End: 2022-01-02 | Stop reason: HOSPADM

## 2022-01-01 RX ORDER — SODIUM CHLORIDE 0.9 % (FLUSH) 0.9 %
5-40 SYRINGE (ML) INJECTION AS NEEDED
Status: DISCONTINUED | OUTPATIENT
Start: 2022-01-01 | End: 2022-01-02 | Stop reason: HOSPADM

## 2022-01-01 RX ORDER — ENOXAPARIN SODIUM 100 MG/ML
40 INJECTION SUBCUTANEOUS EVERY 24 HOURS
Status: DISCONTINUED | OUTPATIENT
Start: 2022-01-02 | End: 2022-01-02 | Stop reason: HOSPADM

## 2022-01-01 RX ORDER — FAMOTIDINE 20 MG/1
20 TABLET, FILM COATED ORAL
Status: DISCONTINUED | OUTPATIENT
Start: 2022-01-01 | End: 2022-01-02 | Stop reason: HOSPADM

## 2022-01-01 RX ORDER — CLONAZEPAM 0.5 MG/1
0.5 TABLET ORAL
Status: DISCONTINUED | OUTPATIENT
Start: 2022-01-01 | End: 2022-01-02 | Stop reason: HOSPADM

## 2022-01-01 RX ORDER — SODIUM CHLORIDE 0.9 % (FLUSH) 0.9 %
5-40 SYRINGE (ML) INJECTION EVERY 8 HOURS
Status: DISCONTINUED | OUTPATIENT
Start: 2022-01-01 | End: 2022-01-02 | Stop reason: HOSPADM

## 2022-01-01 RX ORDER — MELATONIN
2000 DAILY
Status: DISCONTINUED | OUTPATIENT
Start: 2022-01-02 | End: 2022-01-02 | Stop reason: HOSPADM

## 2022-01-01 RX ORDER — DEXAMETHASONE SODIUM PHOSPHATE 100 MG/10ML
6 INJECTION INTRAMUSCULAR; INTRAVENOUS
Status: DISCONTINUED | OUTPATIENT
Start: 2022-01-01 | End: 2022-01-01

## 2022-01-01 RX ORDER — LIDOCAINE HYDROCHLORIDE 40 MG/ML
SOLUTION TOPICAL
Status: DISCONTINUED | OUTPATIENT
Start: 2022-01-01 | End: 2022-01-02 | Stop reason: HOSPADM

## 2022-01-01 RX ORDER — HYDRALAZINE HYDROCHLORIDE 25 MG/1
50 TABLET, FILM COATED ORAL
Status: DISCONTINUED | OUTPATIENT
Start: 2022-01-01 | End: 2022-01-02 | Stop reason: HOSPADM

## 2022-01-01 RX ORDER — DEXAMETHASONE 4 MG/1
6 TABLET ORAL DAILY
Status: DISCONTINUED | OUTPATIENT
Start: 2022-01-02 | End: 2022-01-02 | Stop reason: HOSPADM

## 2022-01-01 RX ORDER — METOPROLOL TARTRATE 50 MG/1
50 TABLET ORAL
Status: DISCONTINUED | OUTPATIENT
Start: 2022-01-01 | End: 2022-01-02 | Stop reason: HOSPADM

## 2022-01-01 RX ORDER — BUPROPION HYDROCHLORIDE 150 MG/1
150 TABLET, EXTENDED RELEASE ORAL DAILY
Status: DISCONTINUED | OUTPATIENT
Start: 2022-01-02 | End: 2022-01-02 | Stop reason: HOSPADM

## 2022-01-01 RX ORDER — GUAIFENESIN/DEXTROMETHORPHAN 100-10MG/5
10 SYRUP ORAL
Status: DISCONTINUED | OUTPATIENT
Start: 2022-01-01 | End: 2022-01-02 | Stop reason: HOSPADM

## 2022-01-01 RX ADMIN — DEXAMETHASONE SODIUM PHOSPHATE 10 MG: 10 INJECTION INTRAMUSCULAR; INTRAVENOUS at 17:02

## 2022-01-01 RX ADMIN — Medication 5 ML: at 17:02

## 2022-01-01 RX ADMIN — BUSPIRONE HYDROCHLORIDE 10 MG: 10 TABLET ORAL at 22:20

## 2022-01-01 RX ADMIN — BARICITINIB 4 MG: 2 TABLET, FILM COATED ORAL at 22:20

## 2022-01-01 RX ADMIN — GUAIFENESIN 1200 MG: 600 TABLET, EXTENDED RELEASE ORAL at 22:20

## 2022-01-01 NOTE — Clinical Note
Status[de-identified] INPATIENT [101]   Type of Bed: Medical [8]   Cardiac Monitoring Required?: No   Inpatient Hospitalization Certified Necessary for the Following Reasons: 3.  Patient receiving treatment that can only be provided in an inpatient setting (further clarification in H&P documentation)   Admitting Diagnosis: Acute hypoxemic respiratory failure due to COVID-19 [facility]) [[id number]]   Admitting Physician: Saba Clemente   Attending Physician: Alessandro Gray [[id number]]   Estimated Length of Stay: 2 Midnights   Discharge Plan[de-identified] Other (Specify) Comment: to be determined

## 2022-01-01 NOTE — ED TRIAGE NOTES
Pt ambulatory to triage with report of positive COVID test today cough x 5 days. Reports urgent care sent her to receive antibodies. Upon ambulation to triage pt O2 88%, she recoverd on her own, reports 82% at urgent care.

## 2022-01-01 NOTE — H&P
Hospitalist History and Physical   Admit Date:  2022  4:02 PM   Name:  Karishma Henderson   Age:  70 y.o. Sex:  female  :  1950   MRN:  [id number]   Room:  ER/    Presenting Complaint: Positive For Covid-19    Reason(s) for Admission: Acute hypoxemic respiratory failure due to COVID-19 ([facility] [U07.1, J96.01]     History of Present Illness:   Karishma Henderson is a 70 y.o. female with medical history of HTN, fibromyalgia, anxiety who presented with cough. Dry cough. Onset around Dec 28. Intermittent since then. A/w mild SOB. No fevers, chills, CP. Suresh Blas She went to urgent care today and was COVID+. Reportedly her sats were 82% on RA so she was sent here. She was 88% on RA here initially but currently 95% on RA while talking to me at length. Review of Systems:  10 systems reviewed and negative except as noted in HPI. Assessment & Plan:       COVID-19   -observation. She is not on oxygen at rest, satting 95% even with extensive talking. Her CRP is elevated and she did mild desat with ambulation but will check this again tomorrow. If satting OK, can go home. -Decadron daily x10d. -CRP >7.5 so give baricitinib/Olumiant x14d (while in house)  -daily CRP  -Mucinex  -Incentive spirometry  -PRNs for associated symptoms: robitussin DM, hycodan, imodium      Chronic depression   -Controlled. Continue home meds      Fibromyalgia   -Controlled. Continue home meds      HIPOLITO  -Controlled. Continue home meds      GERD  -Controlled. Continue home meds      Essential hypertension   -Controlled. Continue home meds  -prn hydralazine      Coronary artery disease   -Controlled.  Continue home meds      Dispo/Discharge Planning:   -home tomorrow likely    Diet: regular  VTE ppx: lovenox  Code status: full code    Hospital Problems as of 2022 Date Reviewed: 2021          Codes Class Noted - Resolved POA    * (Principal) [id number] ICD-10-CM: U07.1  ICD-9-CM: 079.89  2022 - Present Yes Coronary artery disease involving native coronary artery of native heart without angina pectoris (Chronic) ICD-10-CM: I25.10  ICD-9-CM: 414.01  3/3/2017 - Present Yes        Essential hypertension (Chronic) ICD-10-CM: I10  ICD-9-CM: 401.9  11/8/2016 - Present Yes        Chronic depression (Chronic) ICD-10-CM: F32. A  ICD-9-CM: 987  10/15/2014 - Present Yes        Fibromyalgia (Chronic) ICD-10-CM: M79.7  ICD-9-CM: 729.1  10/15/2014 - Present Yes        HIPOLITO (generalized anxiety disorder) (Chronic) ICD-10-CM: F41.1  ICD-9-CM: 300.02  10/15/2014 - Present Yes        GERD (gastroesophageal reflux disease) (Chronic) ICD-10-CM: K21.9  ICD-9-CM: 530.81  10/15/2014 - Present Yes              Past History:  Past Medical History:   Diagnosis Date    Affective psychosis ([facility] 07/2006    Anxiety     Arthritis     OA    CAD (coronary artery disease)     no angina cp or mosquera. no orthopnea or pnd    CAD (coronary artery disease) 09/12/2018    PCI    Chest pain 07/2006     no cp but feels\" anxious\" for 10 seconds at a time- no further cp. No MOSQUERA. [place] [facility]Dr. Eric Yanez.  Cholelithiasis 09/2006    Chronic depression 10/15/2014    Chronic lumbar pain 10/15/2014    Claudication ([facility] 06/2013    Coronary atherosclerosis of native coronary vessel 09/2006    Per Cardiology note (2/8/17) \"Stable. Continue current medical therapy. \"     Dyslipidemia 10/15/2014    managed with medication     Fibromyalgia 10/15/2014    Fibromyalgia     Fibromyalgia     HIPOLITO (generalized anxiety disorder) 10/15/2014    managed with medication     GERD (gastroesophageal reflux disease) 10/15/2014    managed with medication     History of complete eye exam 04/01/2016    History of dental examination 06/01/2016    History of placement of stent in LAD coronary artery 08/29/2006    Two heart stents. Patient takes daily 81 mg ASA.       Hypercholesterolemia     Hypertension     managed with medication     Low magnesium level Patient's daily Magnesium supplement decreased to once a week by PCP due to Magnesium of 2.4 on 17    Malaise and fatigue 2008    Overweight 2021    Palpitations 2016    Stable     Polyarthralgia 10/15/2014    Shingles 2006    10-12x    Tobacco use disorder /     Past Surgical History:   Procedure Laterality Date    COLONOSCOPY N/A 2017    COLONOSCOPY/ BMI=26 performed by Fiordaliza Zambrano DO at [facility] HX CHOLECYSTECTOMY      HX OTHER SURGICAL      several skin cancer removals    HX TONSILLECTOMY  1976    at age 32   [facility] [address] Bilateral     35 yrs silicone    ND BREAST SURGERY PROCEDURE UNLISTED      implants    ND CARDIAC SURG PROCEDURE UNLIST  2006    7 cardiac stents    ND LEFT HEART CATH,PERCUTANEOUS  2018    PCI      Allergies   Allergen Reactions    Antibiotic [Rmehm-Itmhx-Evkmafu-Pramoxine] Unknown (comments)     Patient unsure     Biaxin [Clarithromycin] Nausea and Vomiting     Patient unsure     Ceclor [Cefaclor] Nausea Only    Cephalexin Nausea and Vomiting      Social History     Tobacco Use    Smoking status: Former Smoker     Packs/day: 0.50     Years: 35.00     Pack years: 17.50     Quit date: 2004     Years since quittin.2    Smokeless tobacco: Never Used    Tobacco comment: started at age 23   Substance Use Topics    Alcohol use: No      Family History   Problem Relation Age of Onset    Hypertension Other     High Cholesterol Other     Diabetes Other     Glaucoma Other     Heart Attack Other     Stroke Other     Hypertension Mother     High Cholesterol Mother     Heart Attack Mother     Hypertension Sister     Depression Sister     High Cholesterol Sister     Hypertension Brother     High Cholesterol Brother     Heart Attack Brother     No Known Problems Maternal Aunt     Other Father         gun accident    Breast Cancer Neg Hx       Family history reviewed and negative except as noted above. Immunization History   Administered Date(s) Administered    COVID-19, Moderna, Primary or Immunocompromised Series, MRNA, PF, 100mcg/0.5mL 01/22/2021, 02/19/2021    Influenza High Dose Vaccine PF 09/29/2021    Influenza Vaccine 10/05/2011, 12/17/2013, 10/03/2014, 10/01/2015, 10/06/2016    Influenza Vaccine (>6 mo Afluria QUAD Vial 07346 (0.25 mL) / 73187 (0.5 mL)) 09/25/2018    Influenza Vaccine (Quad) Mdck Pf (>2 Yrs Flucelvax QUAD 98364) 10/05/2017    Influenza Vaccine (Quad) PF (>6 Mo Flulaval, Fluarix, and >3 Yrs Afluria, Fluzone 54722) 10/23/2019    Influenza, Quadrivalent, Adjuvanted (>65 Yrs FLUAD QUAD 98080) 09/18/2020    Pneumococcal Conjugate (PCV-13) 03/07/2018    Pneumococcal Polysaccharide (PPSV-23) 02/10/2017    Td, Adsorbed PF 09/18/2020     Prior to Admit Medications:  Current Outpatient Medications   Medication Instructions    acetaminophen (TYLENOL ARTHRITIS PO) 2 Tablets, Oral, EVERY 6-8 HOURS PRN    amLODIPine (NORVASC) 5 mg, Oral, DAILY    aspirin delayed-release 81 mg, Oral, DAILY, Take DOS per anesthesia protocol.     buPROPion XL (WELLBUTRIN XL) 150 mg, Oral, 7AM    busPIRone (BUSPAR) 10 mg, Oral, 3 TIMES DAILY    cholecalciferol, vitamin D3, (VITAMIN D3 PO) 2,000 Units, Oral, DAILY    clindamycin (CLINDAGEL) 1 % topical gel APPLY TO THE AFFECTED AREA FOR ACNE ON FACE TWICE A DAY    clonazePAM (KLONOPIN) 0.5 mg, Oral, BEDTIME PRN    DISABLED PLACARD (DISABLED PLACARD) DMV Requires disability placard    fluticasone propionate (Flonase Allergy Relief) 50 mcg/actuation nasal spray 2 Sprays, Both Nostrils, DAILY AS NEEDED, 2 sprays in each nostril once a day    pantoprazole (PROTONIX) 40 mg tablet TAKE 1 TABLET BY MOUTH  DAILY    Repatha SureClick 118 mg, SubCUTAneous, EVERY 14 DAYS    tretinoin (RETIN-A) 0.05 % topical cream Topical, EVERY BEDTIME    triamcinolone acetonide (KENALOG) 0.1 % topical cream Topical, 3 TIMES DAILY, use thin layer       Objective:     Patient Vitals for the past 24 hrs:   Temp Pulse Resp BP SpO2   01/01/22 1441     96 %   01/01/22 1440 98.8 °F (37.1 °C) 96 18 133/83 (!) 88 %     Oxygen Therapy  O2 Sat (%): 96 % (01/01/22 1441)  O2 Device: None (Room air) (01/01/22 1441)    Estimated body mass index is 25.37 kg/m² as calculated from the following:    Height as of this encounter: 5' 7\" (1.702 m). Weight as of this encounter: 73.5 kg (162 lb). No intake or output data in the 24 hours ending 01/01/22 1633      Physical Exam:    Blood pressure 133/83, pulse 96, temperature 98.8 °F (37.1 °C), resp. rate 18, height 5' 7\" (1.702 m), weight 73.5 kg (162 lb), SpO2 96 %. General:    Well nourished. No overt distress  Head:  Normocephalic, atraumatic  Eyes:  Sclerae appear normal.  Pupils equally round. ENT:  Nares appear normal, no drainage. Moist oral mucosa  Neck:  No restricted ROM. Trachea midline   CV:   RRR. No m/r/g. No jugular venous distension. Lungs:   CTAB. Respirations even, unlabored  Abdomen:   Soft, nontender, nondistended. Extremities: No cyanosis or clubbing. No edema  Skin:     No rashes and normal coloration. Warm and dry. Neuro:  CN II-XII grossly intact. Sensation intact. A&Ox3  Psych:  Normal mood and affect.       I have reviewed ordered lab tests and independently visualized imaging below:    Last 24hr Labs:  Recent Results (from the past 24 hour(s))   EKG, 12 LEAD, INITIAL    Collection Time: 01/01/22  2:44 PM   Result Value Ref Range    Ventricular Rate 95 BPM    Atrial Rate 95 BPM    P-R Interval 124 ms    QRS Duration 80 ms    Q-T Interval 370 ms    QTC Calculation (Bezet) 464 ms    Calculated P Axis 50 degrees    Calculated R Axis 52 degrees    Calculated T Axis 44 degrees    Diagnosis       Normal sinus rhythm  Nonspecific T wave abnormality  Abnormal ECG  When compared with ECG of 12-SEP-2018 15:01,  No significant change was found     CBC WITH AUTOMATED DIFF    Collection Time: 01/01/22  2:46 PM   Result Value Ref Range    WBC 9.3 4.3 - 11.1 K/uL    RBC 4.34 4.05 - 5.2 M/uL    HGB 13.4 11.7 - 15.4 g/dL    HCT 40.2 35.8 - 46.3 %    MCV 92.6 79.6 - 97.8 FL    MCH 30.9 26.1 - 32.9 PG    MCHC 33.3 31.4 - 35.0 g/dL    RDW 11.8 (L) 11.9 - 14.6 %    PLATELET 235 265 - 239 K/uL    MPV 9.0 (L) 9.4 - 12.3 FL    ABSOLUTE NRBC 0.00 0.0 - 0.2 K/uL    DF AUTOMATED      NEUTROPHILS 68 43 - 78 %    LYMPHOCYTES 16 13 - 44 %    MONOCYTES 14 (H) 4.0 - 12.0 %    EOSINOPHILS 1 0.5 - 7.8 %    BASOPHILS 1 0.0 - 2.0 %    IMMATURE GRANULOCYTES 0 0.0 - 5.0 %    ABS. NEUTROPHILS 6.3 1.7 - 8.2 K/UL    ABS. LYMPHOCYTES 1.5 0.5 - 4.6 K/UL    ABS. MONOCYTES 1.3 0.1 - 1.3 K/UL    ABS. EOSINOPHILS 0.1 0.0 - 0.8 K/UL    ABS. BASOPHILS 0.1 0.0 - 0.2 K/UL    ABS. IMM. GRANS. 0.0 0.0 - 0.5 K/UL   METABOLIC PANEL, COMPREHENSIVE    Collection Time: 01/01/22  2:46 PM   Result Value Ref Range    Sodium 136 136 - 145 mmol/L    Potassium 3.8 3.5 - 5.1 mmol/L    Chloride 103 98 - 107 mmol/L    CO2 26 21 - 32 mmol/L    Anion gap 7 7 - 16 mmol/L    Glucose 109 (H) 65 - 100 mg/dL    BUN 9 8 - 23 MG/DL    Creatinine 0.77 0.6 - 1.0 MG/DL    GFR est AA >60 >60 ml/min/1.73m2    GFR est non-AA >60 >60 ml/min/1.73m2    Calcium 8.9 8.3 - 10.4 MG/DL    Bilirubin, total 0.4 0.2 - 1.1 MG/DL    ALT (SGPT) 22 12 - 65 U/L    AST (SGOT) 28 15 - 37 U/L    Alk.  phosphatase 141 (H) 50 - 136 U/L    Protein, total 7.6 6.3 - 8.2 g/dL    Albumin 3.4 3.2 - 4.6 g/dL    Globulin 4.2 (H) 2.3 - 3.5 g/dL    A-G Ratio 0.8 (L) 1.2 - 3.5     MAGNESIUM    Collection Time: 01/01/22  2:46 PM   Result Value Ref Range    Magnesium 2.4 1.8 - 2.4 mg/dL   C REACTIVE PROTEIN, QT    Collection Time: 01/01/22  2:46 PM   Result Value Ref Range    C-Reactive protein 10.4 (H) 0.0 - 0.9 mg/dL   PROCALCITONIN    Collection Time: 01/01/22  2:46 PM   Result Value Ref Range    Procalcitonin 0.05 0.00 - 0.49 ng/mL       All Micro Results     None          Other Studies:  XR CHEST PORT    Result Date: 1/1/2022  Chest X-ray INDICATION: Shortness of breath and viral pneumonitis. COMPARISON: Chest x-ray 10/14/2021. A portable AP view of the chest was obtained. FINDINGS: The lungs are clear. There are no infiltrates or effusions. The heart size is normal.  The bony thorax is intact. No acute findings in the chest           Signed:  Lieutenant Maribell MD    Part of this note may have been written by using a voice dictation software. The note has been proof read but may still contain some grammatical/other typographical errors.

## 2022-01-01 NOTE — ED PROVIDER NOTES
70-year-old female developed cough and congestion December 28. She reports chest and abdominal discomfort from cough. She has slight shortness of breath. She was urgent care today and was diagnosed with Covid. She was sent to the emergency department for sats of 82%. She was 88% after ambulating from the waiting room to triage. She has a history of heart disease. Denies underlying lung disease. Positive For Covid-19  Associated symptoms: chest pain and cough    Associated symptoms: no confusion, no diarrhea, no headaches, no nausea, no rash and no vomiting         Past Medical History:   Diagnosis Date    Affective psychosis ([address] 07/2006    Anxiety     Arthritis     OA    CAD (coronary artery disease)     no angina cp or mosquera. no orthopnea or pnd    CAD (coronary artery disease) 09/12/2018    PCI    Chest pain 07/2006     no cp but feels\" anxious\" for 10 seconds at a time- no further cp. No MOSQUERA. Followed by [facility]-Dr. Alejandro Jasmine.  Cholelithiasis 09/2006    Chronic depression 10/15/2014    Chronic lumbar pain 10/15/2014    Claudication ([facility] [address] 06/2013    Coronary atherosclerosis of native coronary vessel 09/2006    Per Cardiology note (2/8/17) \"Stable. Continue current medical therapy. \"     Dyslipidemia 10/15/2014    managed with medication     Fibromyalgia 10/15/2014    Fibromyalgia     Fibromyalgia     HIPOLITO (generalized anxiety disorder) 10/15/2014    managed with medication     GERD (gastroesophageal reflux disease) 10/15/2014    managed with medication     History of complete eye exam 04/01/2016    History of dental examination 06/01/2016    History of placement of stent in LAD coronary artery 08/29/2006    Two heart stents. Patient takes daily 81 mg ASA.       Hypercholesterolemia     Hypertension     managed with medication     Low magnesium level     Patient's daily Magnesium supplement decreased to once a week by PCP due to Magnesium of 2.4 on 5/18/17    Malaise and fatigue 2008    Overweight 2021    Palpitations 2016    Stable     Polyarthralgia 10/15/2014    Shingles 2006    10-12x    Tobacco use disorder 112/2006       Past Surgical History:   Procedure Laterality Date    COLONOSCOPY N/A 2017    COLONOSCOPY/ BMI=26 performed by Nam Montes DO at [address] HX CHOLECYSTECTOMY  2005    HX OTHER SURGICAL      several skin cancer removals    HX TONSILLECTOMY  1976    at age 32   [address] Bilateral     28 yrs silicone    [place] BREAST SURGERY PROCEDURE UNLISTED  1980    implants    [place] CARDIAC SURG PROCEDURE UNLIST  2006    7 cardiac stents    [place] LEFT HEART CATH,PERCUTANEOUS  2018    PCI         Family History:   Problem Relation Age of Onset    Hypertension Other     High Cholesterol Other     Diabetes Other     Glaucoma Other     Heart Attack Other     Stroke Other     Hypertension Mother     High Cholesterol Mother     Heart Attack Mother     Hypertension Sister     Depression Sister     High Cholesterol Sister     Hypertension Brother     High Cholesterol Brother     Heart Attack Brother     No Known Problems Maternal Aunt     Other Father         gun accident    Breast Cancer Neg Hx        Social History     Socioeconomic History    Marital status: SINGLE     Spouse name: Not on file    Number of children: Not on file    Years of education: Not on file    Highest education level: Not on file   Occupational History    Not on file   Tobacco Use    Smoking status: Former Smoker     Packs/day: 0.50     Years: 35.00     Pack years: 17.50     Quit date: 2004     Years since quittin.2    Smokeless tobacco: Never Used    Tobacco comment: started at age 23   Substance and Sexual Activity    Alcohol use: No    Drug use: No    Sexual activity: Not on file   Other Topics Concern    Not on file   Social History Narrative    Not on file     Social Determinants of Health Financial Resource Strain:     Difficulty of Paying Living Expenses: Not on file   Food Insecurity:     Worried About Running Out of Food in the Last Year: Not on file    [place] of Food in the Last Year: Not on file   Transportation Needs:     Lack of Transportation (Medical): Not on file    Lack of Transportation (Non-Medical): Not on file   Physical Activity:     Days of Exercise per Week: Not on file    Minutes of Exercise per Session: Not on file   Stress:     Feeling of Stress : Not on file   Social Connections:     Frequency of Communication with Friends and Family: Not on file    Frequency of Social Gatherings with Friends and Family: Not on file    Attends [employer] Services: Not on file    Active Member of [address] [place] [address] [employer] Group or Organizations: Not on file    Attends Club or Organization Meetings: Not on file    Marital Status: Not on file   Intimate Partner Violence:     Fear of Current or Ex-Partner: Not on file    Emotionally Abused: Not on file    Physically Abused: Not on file    Sexually Abused: Not on file   Housing Stability:     Unable to Pay for Housing in the Last Year: Not on file    Number of [place] in the Last Year: Not on file    Unstable Housing in the Last Year: Not on file         ALLERGIES: Antibiotic [eagpn-bcpug-oixilqg-pramoxine], Biaxin [clarithromycin], Ceclor [cefaclor], and Cephalexin    Review of Systems   Constitutional: Positive for fatigue. Negative for fever. HENT: Negative for hearing loss. Eyes: Negative for visual disturbance. Respiratory: Positive for cough and shortness of breath. Cardiovascular: Positive for chest pain. Gastrointestinal: Positive for abdominal pain and constipation. Negative for diarrhea, nausea and vomiting. Musculoskeletal: Negative for back pain. Skin: Negative for rash. Neurological: Negative for headaches. Psychiatric/Behavioral: Negative for confusion. All other systems reviewed and are negative.       Vitals: 01/01/22 1440 01/01/22 1441   BP: 133/83    Pulse: 96    Resp: 18    Temp: 98.8 °F (37.1 °C)    SpO2: (!) 88% 96%   Weight: 73.5 kg (162 lb)    Height: 5' 7\" (1.702 m)             Physical Exam  Vitals and nursing note reviewed. Constitutional:       Appearance: Normal appearance. HENT:      Head: Normocephalic and atraumatic. Nose: Nose normal.      Mouth/Throat:      Mouth: Mucous membranes are moist.   Eyes:      Pupils: Pupils are equal, round, and reactive to light. Cardiovascular:      Rate and Rhythm: Normal rate and regular rhythm. Pulmonary:      Effort: Pulmonary effort is normal.      Breath sounds: No stridor. Abdominal:      General: Abdomen is flat. Musculoskeletal:         General: No deformity. Normal range of motion. Cervical back: Normal range of motion and neck supple. Skin:     General: Skin is warm and dry. Neurological:      General: No focal deficit present. Mental Status: She is alert. Mental status is at baseline. Psychiatric:         Mood and Affect: Mood normal.         Behavior: Behavior normal.          MDM  Number of Diagnoses or Management Options  COVID-19  Hypoxia  Diagnosis management comments: Parts of this document were created using dragon voice recognition software. The chart has been reviewed but errors may still be present. I wore appropriate PPE throughout this patient's ED visit. Carol Busch MD, 4:21 PM    Placed on oxygen. Given steroids. dw hospitalist for admission.         Amount and/or Complexity of Data Reviewed  Clinical lab tests: ordered and reviewed (Results for orders placed or performed during the hospital encounter of 01/01/22  -CBC WITH AUTOMATED DIFF:        Result                      Value             Ref Range           WBC                         9.3               4.3 - 11.1 K*       RBC                         4.34              4.05 - 5.2 M*       HGB                         13.4              11.7 - 15.4 * HCT                         40.2              35.8 - 46.3 %       MCV                         92.6              79.6 - 97.8 *       MCH                         30.9              26.1 - 32.9 *       MCHC                        33.3              31.4 - 35.0 *       RDW                         11.8 (L)          11.9 - 14.6 %       PLATELET                    325               150 - 450 K/*       MPV                         9.0 (L)           9.4 - 12.3 FL       ABSOLUTE NRBC               0.00              0.0 - 0.2 K/*       DF                          AUTOMATED                             NEUTROPHILS                 68                43 - 78 %           LYMPHOCYTES                 16                13 - 44 %           MONOCYTES                   14 (H)            4.0 - 12.0 %        EOSINOPHILS                 1                 0.5 - 7.8 %         BASOPHILS                   1                 0.0 - 2.0 %         IMMATURE GRANULOCYTES       0                 0.0 - 5.0 %         ABS. NEUTROPHILS            6.3               1.7 - 8.2 K/*       ABS. LYMPHOCYTES            1.5               0.5 - 4.6 K/*       ABS. MONOCYTES              1.3               0.1 - 1.3 K/*       ABS. EOSINOPHILS            0.1               0.0 - 0.8 K/*       ABS. BASOPHILS              0.1               0.0 - 0.2 K/*       ABS. IMM.  GRANS.            0.0               0.0 - 0.5 K/*  -METABOLIC PANEL, COMPREHENSIVE:        Result                      Value             Ref Range           Sodium                      136               136 - 145 mm*       Potassium                   3.8               3.5 - 5.1 mm*       Chloride                    103               98 - 107 mmo*       CO2                         26                21 - 32 mmol*       Anion gap                   7                 7 - 16 mmol/L       Glucose                     109 (H)           65 - 100 mg/*       BUN                         9                 8 - 23 MG/DL Creatinine                  0.77              0.6 - 1.0 MG*       GFR est AA                  >60               >60 ml/min/1*       GFR est non-AA              >60               >60 ml/min/1*       Calcium                     8.9               8.3 - 10.4 M*       Bilirubin, total            0.4               0.2 - 1.1 MG*       ALT (SGPT)                  22                12 - 65 U/L         AST (SGOT)                  28                15 - 37 U/L         Alk.  phosphatase            141 (H)           50 - 136 U/L        Protein, total              7.6               6.3 - 8.2 g/*       Albumin                     3.4               3.2 - 4.6 g/*       Globulin                    4.2 (H)           2.3 - 3.5 g/*       A-G Ratio                   0.8 (L)           1.2 - 3.5      -MAGNESIUM:        Result                      Value             Ref Range           Magnesium                   2.4               1.8 - 2.4 mg*  -C REACTIVE PROTEIN, QT:        Result                      Value             Ref Range           C-Reactive protein          10.4 (H)          0.0 - 0.9 mg*  -PROCALCITONIN:        Result                      Value             Ref Range           Procalcitonin               0.05              0.00 - 0.49 *  -EKG, 12 LEAD, INITIAL:        Result                      Value             Ref Range           Ventricular Rate            95                BPM                 Atrial Rate                 95                BPM                 P-R Interval                124               ms                  QRS Duration                80                ms                  Q-T Interval                370               ms                  QTC Calculation (Bezet)     464               ms                  Calculated P Axis           50                degrees             Calculated R Axis           52                degrees             Calculated T Axis           44                degrees             Diagnosis Normal sinus rhythm   Nonspecific T wave abnormality   Abnormal ECG   When compared with ECG of 12-SEP-2018 15:01,   No significant change was found     )  Tests in the radiology section of CPT®: ordered and reviewed (XR CHEST PORT    Result Date: 1/1/2022  Chest X-ray INDICATION: Shortness of breath and viral pneumonitis. COMPARISON: Chest x-ray 10/14/2021. A portable AP view of the chest was obtained. FINDINGS: The lungs are clear. There are no infiltrates or effusions. The heart size is normal.  The bony thorax is intact.       No acute findings in the chest     )  Tests in the medicine section of CPT®: ordered and reviewed           Procedures

## 2022-01-02 VITALS
OXYGEN SATURATION: 97 % | SYSTOLIC BLOOD PRESSURE: 128 MMHG | RESPIRATION RATE: 18 BRPM | WEIGHT: 162 LBS | HEIGHT: 67 IN | BODY MASS INDEX: 25.43 KG/M2 | TEMPERATURE: 97.7 F | HEART RATE: 82 BPM | DIASTOLIC BLOOD PRESSURE: 82 MMHG

## 2022-01-02 LAB
ALBUMIN SERPL-MCNC: 3.2 G/DL (ref 3.2–4.6)
ALBUMIN/GLOB SERPL: 0.8 {RATIO} (ref 1.2–3.5)
ALP SERPL-CCNC: 128 U/L (ref 50–136)
ALT SERPL-CCNC: 21 U/L (ref 12–65)
ANION GAP SERPL CALC-SCNC: 7 MMOL/L (ref 7–16)
AST SERPL-CCNC: 17 U/L (ref 15–37)
ATRIAL RATE: 95 BPM
BASOPHILS # BLD: 0 K/UL (ref 0–0.2)
BASOPHILS NFR BLD: 0 % (ref 0–2)
BILIRUB SERPL-MCNC: 0.3 MG/DL (ref 0.2–1.1)
BNP SERPL-MCNC: 130 PG/ML (ref 5–125)
BUN SERPL-MCNC: 12 MG/DL (ref 8–23)
CALCIUM SERPL-MCNC: 9.2 MG/DL (ref 8.3–10.4)
CALCULATED P AXIS, ECG09: 50 DEGREES
CALCULATED R AXIS, ECG10: 52 DEGREES
CALCULATED T AXIS, ECG11: 44 DEGREES
CHLORIDE SERPL-SCNC: 106 MMOL/L (ref 98–107)
CO2 SERPL-SCNC: 25 MMOL/L (ref 21–32)
COVID-19 RAPID TEST, COVR: DETECTED
CREAT SERPL-MCNC: 0.65 MG/DL (ref 0.6–1)
CRP SERPL-MCNC: 9.4 MG/DL (ref 0–0.9)
DIAGNOSIS, 93000: NORMAL
DIFFERENTIAL METHOD BLD: ABNORMAL
EOSINOPHIL # BLD: 0 K/UL (ref 0–0.8)
EOSINOPHIL NFR BLD: 0 % (ref 0.5–7.8)
ERYTHROCYTE [DISTWIDTH] IN BLOOD BY AUTOMATED COUNT: 11.6 % (ref 11.9–14.6)
GLOBULIN SER CALC-MCNC: 4 G/DL (ref 2.3–3.5)
GLUCOSE SERPL-MCNC: 161 MG/DL (ref 65–100)
HCT VFR BLD AUTO: 39 % (ref 35.8–46.3)
HGB BLD-MCNC: 13.3 G/DL (ref 11.7–15.4)
IMM GRANULOCYTES # BLD AUTO: 0 K/UL (ref 0–0.5)
IMM GRANULOCYTES NFR BLD AUTO: 0 % (ref 0–5)
LYMPHOCYTES # BLD: 1 K/UL (ref 0.5–4.6)
LYMPHOCYTES NFR BLD: 25 % (ref 13–44)
MAGNESIUM SERPL-MCNC: 2.4 MG/DL (ref 1.8–2.4)
MCH RBC QN AUTO: 31.7 PG (ref 26.1–32.9)
MCHC RBC AUTO-ENTMCNC: 34.1 G/DL (ref 31.4–35)
MCV RBC AUTO: 93.1 FL (ref 79.6–97.8)
MONOCYTES # BLD: 0.1 K/UL (ref 0.1–1.3)
MONOCYTES NFR BLD: 3 % (ref 4–12)
NEUTS SEG # BLD: 2.7 K/UL (ref 1.7–8.2)
NEUTS SEG NFR BLD: 72 % (ref 43–78)
NRBC # BLD: 0 K/UL (ref 0–0.2)
P-R INTERVAL, ECG05: 124 MS
PLATELET # BLD AUTO: 323 K/UL (ref 150–450)
PMV BLD AUTO: 8.7 FL (ref 9.4–12.3)
POTASSIUM SERPL-SCNC: 4.1 MMOL/L (ref 3.5–5.1)
PROCALCITONIN SERPL-MCNC: <0.05 NG/ML (ref 0–0.49)
PROT SERPL-MCNC: 7.2 G/DL (ref 6.3–8.2)
Q-T INTERVAL, ECG07: 370 MS
QRS DURATION, ECG06: 80 MS
QTC CALCULATION (BEZET), ECG08: 464 MS
RBC # BLD AUTO: 4.19 M/UL (ref 4.05–5.2)
SARS-COV-2, COV2: NORMAL
SODIUM SERPL-SCNC: 138 MMOL/L (ref 136–145)
SOURCE, COVRS: ABNORMAL
VENTRICULAR RATE, ECG03: 95 BPM
WBC # BLD AUTO: 3.8 K/UL (ref 4.3–11.1)

## 2022-01-02 PROCEDURE — 74011250636 HC RX REV CODE- 250/636: Performed by: INTERNAL MEDICINE

## 2022-01-02 PROCEDURE — 74011250637 HC RX REV CODE- 250/637: Performed by: INTERNAL MEDICINE

## 2022-01-02 PROCEDURE — G0378 HOSPITAL OBSERVATION PER HR: HCPCS

## 2022-01-02 PROCEDURE — 84145 PROCALCITONIN (PCT): CPT

## 2022-01-02 PROCEDURE — 87635 SARS-COV-2 COVID-19 AMP PRB: CPT

## 2022-01-02 PROCEDURE — 83880 ASSAY OF NATRIURETIC PEPTIDE: CPT

## 2022-01-02 PROCEDURE — 83735 ASSAY OF MAGNESIUM: CPT

## 2022-01-02 PROCEDURE — 80053 COMPREHEN METABOLIC PANEL: CPT

## 2022-01-02 PROCEDURE — 85025 COMPLETE CBC W/AUTO DIFF WBC: CPT

## 2022-01-02 PROCEDURE — 86140 C-REACTIVE PROTEIN: CPT

## 2022-01-02 RX ADMIN — DEXAMETHASONE 6 MG: 4 TABLET ORAL at 11:28

## 2022-01-02 RX ADMIN — ASPIRIN 81 MG: 81 TABLET ORAL at 11:29

## 2022-01-02 RX ADMIN — GUAIFENESIN 1200 MG: 600 TABLET, EXTENDED RELEASE ORAL at 11:28

## 2022-01-02 RX ADMIN — AMLODIPINE BESYLATE 5 MG: 10 TABLET ORAL at 11:30

## 2022-01-02 RX ADMIN — VITAMIN D, TAB 1000IU (100/BT) 2000 UNITS: 25 TAB at 11:29

## 2022-01-02 NOTE — ED NOTES
I have reviewed discharge instructions with the patient. The patient verbalized understanding. Patient left ED via Discharge Method: ambulatory to Home with self. Opportunity for questions and clarification provided. Patient given 0 scripts. To continue your aftercare when you leave the hospital, you may receive an automated call from our care team to check in on how you are doing. This is a free service and part of our promise to provide the best care and service to meet your aftercare needs.  If you have questions, or wish to unsubscribe from this service please call [phone]. Thank you for Choosing our [facility] Emergency Department.

## 2022-01-02 NOTE — DISCHARGE SUMMARY
Hospitalist Discharge Summary   Admit Date:  2022  4:02 PM   DC Note date: 2022  Name:  Wil Gallo   Age:  70 y.o. Sex:  female  :  1950   MRN:  [id number]   Room:  [place] [address]  PCP:  Nakita Henderson NP    Presenting Complaint: Positive For Covid-19    Initial Admission Diagnosis: Acute hypoxemic respiratory failure due to COVID-19 ([address] [U07.1, J96.01]  COVID-19 [U07.1]     Problem List for this Hospitalization:  Hospital Problems as of 2022 Date Reviewed: 2021          Codes Class Noted - Resolved POA    * (Principal) COVID-19 ICD-10-CM: U07.1  ICD-9-CM: 079.89  2022 - Present Yes        Coronary artery disease involving native coronary artery of native heart without angina pectoris (Chronic) ICD-10-CM: I25.10  ICD-9-CM: 414.01  3/3/2017 - Present Yes        Essential hypertension (Chronic) ICD-10-CM: I10  ICD-9-CM: 401.9  2016 - Present Yes        Chronic depression (Chronic) ICD-10-CM: F32. A  ICD-9-CM: 300  10/15/2014 - Present Yes        Fibromyalgia (Chronic) ICD-10-CM: M79.7  ICD-9-CM: 729.1  10/15/2014 - Present Yes        HIPOLITO (generalized anxiety disorder) (Chronic) ICD-10-CM: F41.1  ICD-9-CM: 300.02  10/15/2014 - Present Yes        GERD (gastroesophageal reflux disease) (Chronic) ICD-10-CM: K21.9  ICD-9-CM: 530.81  10/15/2014 - Present Yes            Did Patient have Sepsis (YES OR NO): No    Hospital Course:    Per the HPI from the admission H&P the day prior: \"Echo Martinez is a 70 y.o. female with medical history of HTN, fibromyalgia, anxiety who presented with cough. Dry cough. Onset around Dec 28. Intermittent since then. A/w mild SOB. No fevers, chills, CP. Erleen Mendoza She went to urgent care today and was COVID+. Reportedly her sats were 82% on RA so she was sent here. She was 88% on RA here initially but currently 95% on RA while talking to me at length. \"    The patient ambulated several times from the emergency room was satting over 88% not short of breath feeling fine. She was fine to be discharged understands that she needs to quarantine for 10 days from her first positive test which was yesterday from our perspective 1/1. Disposition:   Diet: ADULT DIET Regular  Code Status: Full Code    Follow Up Orders:  No orders of the defined types were placed in this encounter. Follow-up Information    None         Follow up labs/diagnostics (ultimately defer to outpatient provider):  Primary care physician    Time spent in patient discharge and coordination 15 minutes. Plan was discussed with patient. All questions answered. Patient was stable at time of discharge. Instructions given to call a physician or return if any concerns. Discharge Info:   Current Discharge Medication List          Procedures done this admission:  * No surgery found *    Consults this admission:  None    Echocardiogram/EKG results:  No results found for this or any previous visit. EKG Results     Procedure 720 Value Units Date/Time    EKG [[id number]] Collected: 01/01/22 1444    Order Status: Completed Updated: 01/02/22 1020     Ventricular Rate 95 BPM      Atrial Rate 95 BPM      P-R Interval 124 ms      QRS Duration 80 ms      Q-T Interval 370 ms      QTC Calculation (Bezet) 464 ms      Calculated P Axis 50 degrees      Calculated R Axis 52 degrees      Calculated T Axis 44 degrees      Diagnosis --     Normal sinus rhythm  Nonspecific T wave abnormality  Abnormal ECG  When compared with ECG of 12-SEP-2018 15:01,  No significant change was found  Confirmed by Tiffani Berg MD (), LOUISA SOTO ([id number]) on 1/2/2022 10:20:25 AM            Diagnostic Imaging/Tests:   XR CHEST PORT    Result Date: 1/1/2022  Chest X-ray INDICATION: Shortness of breath and viral pneumonitis. COMPARISON: Chest x-ray 10/14/2021. A portable AP view of the chest was obtained. FINDINGS: The lungs are clear. There are no infiltrates or effusions. The heart size is normal.  The bony thorax is intact.       No acute findings in the chest       All Micro Results     Procedure Component Value Units Date/Time    COVID-19 RAPID TEST [[id number]]  (Abnormal) Collected: 01/02/22 1022    Order Status: Completed Specimen: Nasopharyngeal Updated: 01/02/22 1051     Specimen source Nasopharyngeal        COVID-19 rapid test Detected        Comment:      The specimen is POSITIVE for SARS-CoV-2, the novel coronavirus associated with COVID-19. This test has been authorized by the FDA under an Emergency Use Authorization (EUA) for use by authorized laboratories.         Fact sheet for Healthcare Providers: ConventionUpdate.co.nz  Fact sheet for Patients: ConventionUpdate.co.nz       Methodology: Isothermal Nucleic Acid Amplification               Labs: Results:       BMP, Mg, Phos Recent Labs     01/02/22  0428 01/01/22  1446    136   K 4.1 3.8    103   CO2 25 26   AGAP 7 7   BUN 12 9   CREA 0.65 0.77   CA 9.2 8.9   * 109*   MG 2.4 2.4      CBC Recent Labs     01/02/22 0428 01/01/22  1446   WBC 3.8* 9.3   RBC 4.19 4.34   HGB 13.3 13.4   HCT 39.0 40.2    325   GRANS 72 68   LYMPH 25 16   EOS 0* 1   MONOS 3* 14*   BASOS 0 1   IG 0 0   ANEU 2.7 6.3   ABL 1.0 1.5   POWER 0.0 0.1   ABM 0.1 1.3   ABB 0.0 0.1   AIG 0.0 0.0      LFT Recent Labs     01/02/22 0428 01/01/22  1446   ALT 21 22    141*   TP 7.2 7.6   ALB 3.2 3.4   GLOB 4.0* 4.2*   AGRAT 0.8* 0.8*      Cardiac Testing No results found for: BNPP, BNP, CPK, RCK1, RCK2, RCK3, RCK4, CKMB, CKNDX, CKND1, TROPT, TROIQ   Coagulation Tests Lab Results   Component Value Date/Time    Prothrombin time 12.8 09/12/2018 07:26 AM    Prothrombin time 12.9 08/27/2018 11:13 AM    INR 1.0 09/12/2018 07:26 AM    INR 1.0 08/27/2018 11:13 AM      A1c Lab Results   Component Value Date/Time    Hemoglobin A1c 6.0 (H) 10/07/2021 10:20 AM    Hemoglobin A1c 6.0 (H) 05/06/2021 09:01 AM    Hemoglobin A1c 6.7 (H) 06/23/2020 08:17 AM      Lipid Panel Lab Results   Component Value Date/Time    Cholesterol, total 143 08/11/2021 08:10 AM    Cholesterol (POC) 261 (A) 08/31/2015 08:48 AM    HDL Cholesterol 57 08/11/2021 08:10 AM    LDL, calculated 41.4 08/11/2021 08:10 AM    VLDL, calculated 44.6 (H) 08/11/2021 08:10 AM    Triglyceride 223 (H) 08/11/2021 08:10 AM    Triglycerides (POC) 141 08/31/2015 08:48 AM    CHOL/HDL Ratio 2.5 08/11/2021 08:10 AM      Thyroid Panel Lab Results   Component Value Date/Time    TSH 1.840 10/07/2021 10:20 AM    TSH 1.760 06/23/2020 08:17 AM        Most Recent UA Lab Results   Component Value Date/Time    Color Yellow 10/07/2021 10:20 AM    Appearance Clear 10/07/2021 10:20 AM    pH (UA) 7.0 10/07/2021 10:20 AM    Ketone Negative 10/07/2021 10:20 AM    Bilirubin Negative 10/07/2021 10:20 AM    Blood Negative 10/07/2021 10:20 AM    Nitrites Negative 10/07/2021 10:20 AM    Leukocyte Esterase Negative 10/07/2021 10:20 AM    WBC 0-3 05/11/2015 11:21 AM    RBC 0-3 05/11/2015 11:21 AM    Casts 2 05/11/2015 11:21 AM          All Labs from Last 24 Hrs:  Recent Results (from the past 24 hour(s))   EKG, 12 LEAD, INITIAL    Collection Time: 01/01/22  2:44 PM   Result Value Ref Range    Ventricular Rate 95 BPM    Atrial Rate 95 BPM    P-R Interval 124 ms    QRS Duration 80 ms    Q-T Interval 370 ms    QTC Calculation (Bezet) 464 ms    Calculated P Axis 50 degrees    Calculated R Axis 52 degrees    Calculated T Axis 44 degrees    Diagnosis       Normal sinus rhythm  Nonspecific T wave abnormality  Abnormal ECG  When compared with ECG of 12-SEP-2018 15:01,  No significant change was found  Confirmed by KAYLA SALINAS (), LOUISA SOTO ([id number]) on 1/2/2022 10:20:25 AM     CBC WITH AUTOMATED DIFF    Collection Time: 01/01/22  2:46 PM   Result Value Ref Range    WBC 9.3 4.3 - 11.1 K/uL    RBC 4.34 4.05 - 5.2 M/uL    HGB 13.4 11.7 - 15.4 g/dL    HCT 40.2 35.8 - 46.3 %    MCV 92.6 79.6 - 97.8 FL    MCH 30.9 26.1 - 32.9 PG    MCHC 33.3 31.4 - 35.0 g/dL RDW 11.8 (L) 11.9 - 14.6 %    PLATELET 261 219 - 507 K/uL    MPV 9.0 (L) 9.4 - 12.3 FL    ABSOLUTE NRBC 0.00 0.0 - 0.2 K/uL    DF AUTOMATED      NEUTROPHILS 68 43 - 78 %    LYMPHOCYTES 16 13 - 44 %    MONOCYTES 14 (H) 4.0 - 12.0 %    EOSINOPHILS 1 0.5 - 7.8 %    BASOPHILS 1 0.0 - 2.0 %    IMMATURE GRANULOCYTES 0 0.0 - 5.0 %    ABS. NEUTROPHILS 6.3 1.7 - 8.2 K/UL    ABS. LYMPHOCYTES 1.5 0.5 - 4.6 K/UL    ABS. MONOCYTES 1.3 0.1 - 1.3 K/UL    ABS. EOSINOPHILS 0.1 0.0 - 0.8 K/UL    ABS. BASOPHILS 0.1 0.0 - 0.2 K/UL    ABS. IMM. GRANS. 0.0 0.0 - 0.5 K/UL   METABOLIC PANEL, COMPREHENSIVE    Collection Time: 01/01/22  2:46 PM   Result Value Ref Range    Sodium 136 136 - 145 mmol/L    Potassium 3.8 3.5 - 5.1 mmol/L    Chloride 103 98 - 107 mmol/L    CO2 26 21 - 32 mmol/L    Anion gap 7 7 - 16 mmol/L    Glucose 109 (H) 65 - 100 mg/dL    BUN 9 8 - 23 MG/DL    Creatinine 0.77 0.6 - 1.0 MG/DL    GFR est AA >60 >60 ml/min/1.73m2    GFR est non-AA >60 >60 ml/min/1.73m2    Calcium 8.9 8.3 - 10.4 MG/DL    Bilirubin, total 0.4 0.2 - 1.1 MG/DL    ALT (SGPT) 22 12 - 65 U/L    AST (SGOT) 28 15 - 37 U/L    Alk.  phosphatase 141 (H) 50 - 136 U/L    Protein, total 7.6 6.3 - 8.2 g/dL    Albumin 3.4 3.2 - 4.6 g/dL    Globulin 4.2 (H) 2.3 - 3.5 g/dL    A-G Ratio 0.8 (L) 1.2 - 3.5     MAGNESIUM    Collection Time: 01/01/22  2:46 PM   Result Value Ref Range    Magnesium 2.4 1.8 - 2.4 mg/dL   C REACTIVE PROTEIN, QT    Collection Time: 01/01/22  2:46 PM   Result Value Ref Range    C-Reactive protein 10.4 (H) 0.0 - 0.9 mg/dL   PROCALCITONIN    Collection Time: 01/01/22  2:46 PM   Result Value Ref Range    Procalcitonin 0.05 0.00 - 0.49 ng/mL   CBC WITH AUTOMATED DIFF    Collection Time: 01/02/22  4:28 AM   Result Value Ref Range    WBC 3.8 (L) 4.3 - 11.1 K/uL    RBC 4.19 4.05 - 5.2 M/uL    HGB 13.3 11.7 - 15.4 g/dL    HCT 39.0 35.8 - 46.3 %    MCV 93.1 79.6 - 97.8 FL    MCH 31.7 26.1 - 32.9 PG    MCHC 34.1 31.4 - 35.0 g/dL    RDW 11.6 (L) 11.9 - 14.6 %    PLATELET 620 048 - 114 K/uL    MPV 8.7 (L) 9.4 - 12.3 FL    ABSOLUTE NRBC 0.00 0.0 - 0.2 K/uL    DF AUTOMATED      NEUTROPHILS 72 43 - 78 %    LYMPHOCYTES 25 13 - 44 %    MONOCYTES 3 (L) 4.0 - 12.0 %    EOSINOPHILS 0 (L) 0.5 - 7.8 %    BASOPHILS 0 0.0 - 2.0 %    IMMATURE GRANULOCYTES 0 0.0 - 5.0 %    ABS. NEUTROPHILS 2.7 1.7 - 8.2 K/UL    ABS. LYMPHOCYTES 1.0 0.5 - 4.6 K/UL    ABS. MONOCYTES 0.1 0.1 - 1.3 K/UL    ABS. EOSINOPHILS 0.0 0.0 - 0.8 K/UL    ABS. BASOPHILS 0.0 0.0 - 0.2 K/UL    ABS. IMM. GRANS. 0.0 0.0 - 0.5 K/UL   METABOLIC PANEL, COMPREHENSIVE    Collection Time: 01/02/22  4:28 AM   Result Value Ref Range    Sodium 138 136 - 145 mmol/L    Potassium 4.1 3.5 - 5.1 mmol/L    Chloride 106 98 - 107 mmol/L    CO2 25 21 - 32 mmol/L    Anion gap 7 7 - 16 mmol/L    Glucose 161 (H) 65 - 100 mg/dL    BUN 12 8 - 23 MG/DL    Creatinine 0.65 0.6 - 1.0 MG/DL    GFR est AA >60 >60 ml/min/1.73m2    GFR est non-AA >60 >60 ml/min/1.73m2    Calcium 9.2 8.3 - 10.4 MG/DL    Bilirubin, total 0.3 0.2 - 1.1 MG/DL    ALT (SGPT) 21 12 - 65 U/L    AST (SGOT) 17 15 - 37 U/L    Alk.  phosphatase 128 50 - 136 U/L    Protein, total 7.2 6.3 - 8.2 g/dL    Albumin 3.2 3.2 - 4.6 g/dL    Globulin 4.0 (H) 2.3 - 3.5 g/dL    A-G Ratio 0.8 (L) 1.2 - 3.5     MAGNESIUM    Collection Time: 01/02/22  4:28 AM   Result Value Ref Range    Magnesium 2.4 1.8 - 2.4 mg/dL   PROCALCITONIN    Collection Time: 01/02/22  4:28 AM   Result Value Ref Range    Procalcitonin <0.05 0.00 - 0.49 ng/mL   C REACTIVE PROTEIN, QT    Collection Time: 01/02/22  4:28 AM   Result Value Ref Range    C-Reactive protein 9.4 (H) 0.0 - 0.9 mg/dL   NT-PRO BNP    Collection Time: 01/02/22  4:28 AM   Result Value Ref Range    NT pro- (H) 5 - 125 PG/ML   SARS-COV-2    Collection Time: 01/02/22 10:22 AM   Result Value Ref Range    SARS-CoV-2 Nasopharyngeal     COVID-19 RAPID TEST    Collection Time: 01/02/22 10:22 AM   Result Value Ref Range    Specimen source Nasopharyngeal      COVID-19 rapid test Detected (AA) NOTD         Current Med List in Hospital:   Current Facility-Administered Medications   Medication Dose Route Frequency    sodium chloride (NS) flush 5-10 mL  5-10 mL IntraVENous Q8H    sodium chloride (NS) flush 5-10 mL  5-10 mL IntraVENous PRN    amLODIPine (NORVASC) tablet 5 mg  5 mg Oral DAILY    aspirin delayed-release tablet 81 mg  81 mg Oral DAILY    buPROPion SR (WELLBUTRIN SR) tablet 150 mg  150 mg Oral DAILY    busPIRone (BUSPAR) tablet 10 mg  10 mg Oral TID    cholecalciferol (VITAMIN D3) (1000 Units /25 mcg) tablet 2,000 Units  2,000 Units Oral DAILY    clonazePAM (KlonoPIN) tablet 0.5 mg  0.5 mg Oral QHS PRN    pantoprazole (PROTONIX) tablet 40 mg  40 mg Oral DAILY    sodium chloride (NS) flush 5-40 mL  5-40 mL IntraVENous Q8H    sodium chloride (NS) flush 5-40 mL  5-40 mL IntraVENous PRN    acetaminophen (TYLENOL) tablet 650 mg  650 mg Oral Q6H PRN    Or    acetaminophen (TYLENOL) suppository 650 mg  650 mg Rectal Q6H PRN    polyethylene glycol (MIRALAX) packet 17 g  17 g Oral DAILY PRN    ondansetron (ZOFRAN ODT) tablet 4 mg  4 mg Oral Q8H PRN    Or    ondansetron (ZOFRAN) injection 4 mg  4 mg IntraVENous Q6H PRN    famotidine (PEPCID) tablet 20 mg  20 mg Oral BID PRN    alum-mag hydroxide-simeth (MYLANTA) oral suspension 15 mL  15 mL Oral Q6H PRN    nicotine buccal (POLACRILEX) lozenge 2 mg  2 mg Oral Q1H PRN    enoxaparin (LOVENOX) injection 40 mg  40 mg SubCUTAneous Q24H    guaiFENesin-dextromethorphan (ROBITUSSIN DM) 100-10 mg/5 mL syrup 10 mL  10 mL Oral Q4H PRN    dexAMETHasone (DECADRON) tablet 6 mg  6 mg Oral DAILY    baricitinib (OLUMIANT) tablet 4 mg  4 mg Oral Q24H    guaiFENesin ER (MUCINEX) tablet 1,200 mg  1,200 mg Oral BID    HYDROcodone-homatropine (HYCODAN) 5-1.5 mg/5 mL (5 mL) oral solution 5 mL  5 mL Oral Q4H PRN    loperamide (IMODIUM) capsule 2 mg  2 mg Oral Q4H PRN    lidocaine (XYLOCAINE) 4 % (40 mg/mL) topical solution   Aerosolization Q4H PRN    metoprolol tartrate (LOPRESSOR) tablet 50 mg  50 mg Oral Q12H PRN    hydrALAZINE (APRESOLINE) tablet 50 mg  50 mg Oral QID PRN     Current Outpatient Medications   Medication Sig    evolocumab (Repatha SureClick) pen injection 1 mL by SubCUTAneous route every fourteen (14) days.  tretinoin (RETIN-A) 0.05 % topical cream Apply  to affected area nightly.  pantoprazole (PROTONIX) 40 mg tablet TAKE 1 TABLET BY MOUTH  DAILY    amLODIPine (NORVASC) 5 mg tablet Take 1 Tablet by mouth daily.  fluticasone propionate (Flonase Allergy Relief) 50 mcg/actuation nasal spray 2 Sprays by Both Nostrils route daily as needed (prn). 2 sprays in each nostril once a day    clonazePAM (KlonoPIN) 0.5 mg tablet Take 1 Tablet by mouth nightly as needed (sleep). Max Daily Amount: 0.5 mg.    buPROPion XL (WELLBUTRIN XL) 150 mg tablet Take 1 Tablet by mouth every morning.  clindamycin (CLINDAGEL) 1 % topical gel APPLY TO THE AFFECTED AREA FOR ACNE ON FACE TWICE A DAY    busPIRone (BUSPAR) 10 mg tablet Take 1 Tab by mouth three (3) times daily.  acetaminophen (TYLENOL ARTHRITIS PO) Take 2 Tabs by mouth every six to eight (6-8) hours as needed.  DISABLED PLACARD (DISABLED PLACARD) DMV Requires disability placard    cholecalciferol, vitamin D3, (VITAMIN D3 PO) Take 2,000 Units by mouth daily.  triamcinolone acetonide (KENALOG) 0.1 % topical cream Apply  to affected area three (3) times daily. use thin layer    aspirin delayed-release 81 mg tablet Take 81 mg by mouth daily. Take DOS per anesthesia protocol.        Allergies   Allergen Reactions    Antibiotic [Adrsa-Eiuxq-Kggtgym-Pramoxine] Unknown (comments)     Patient unsure     Biaxin [Clarithromycin] Nausea and Vomiting     Patient unsure     Ceclor [Cefaclor] Nausea Only    Cephalexin Nausea and Vomiting     Immunization History   Administered Date(s) Administered    Nicholas MACARIO, Primary or Immunocompromised Series, MRNA, PF, 100mcg/0.5mL 01/22/2021, 02/19/2021    Influenza High Dose Vaccine PF 09/29/2021    Influenza Vaccine 10/05/2011, 12/17/2013, 10/03/2014, 10/01/2015, 10/06/2016    Influenza Vaccine (>6 mo Afluria QUAD Vial 70688 (0.25 mL) / 25861 (0.5 mL)) 09/25/2018    Influenza Vaccine (Quad) Mdck Pf (>2 Yrs Flucelvax QUAD 25021) 10/05/2017    Influenza Vaccine (Quad) PF (>6 Mo Flulaval, Fluarix, and >3 Yrs Afluria, Fluzone 39447) 10/23/2019    Influenza, Quadrivalent, Adjuvanted (>65 Yrs FLUAD QUAD [id number]) 09/18/2020    Pneumococcal Conjugate (PCV-13) 03/07/2018    Pneumococcal Polysaccharide (PPSV-23) 02/10/2017    Td, Adsorbed PF 09/18/2020       Recent Vital Data:  Patient Vitals for the past 24 hrs:   Temp Pulse Resp BP SpO2   01/02/22 1237    122/84 97 %   01/02/22 1130  80  116/74 95 %   01/02/22 1030     97 %   01/02/22 1016    118/68 96 %   01/02/22 0944     95 %   01/02/22 0852     95 %   01/02/22 0838    134/84 92 %   01/02/22 0515     97 %   01/02/22 0333     98 %   01/01/22 2333 97.7 °F (36.5 °C) 72  116/74 94 %   01/01/22 2221     96 %   01/01/22 2121     92 %   01/01/22 1837     95 %   01/01/22 1607    (!) 148/122 95 %   01/01/22 1441     96 %   01/01/22 1440 98.8 °F (37.1 °C) 96 18 133/83 (!) 88 %     Oxygen Therapy  O2 Sat (%): 97 % (01/02/22 1237)  Pulse via Oximetry: 84 beats per minute (01/02/22 1237)  O2 Device: None (Room air) (Patient was ambulating) (01/02/22 1030)  O2 Flow Rate (L/min): 2 l/min (01/01/22 7363)    Estimated body mass index is 25.37 kg/m² as calculated from the following:    Height as of this encounter: 5' 7\" (1.702 m). Weight as of this encounter: 73.5 kg (162 lb). No intake or output data in the 24 hours ending 01/02/22 1321      Physical Exam:  General:    Well nourished. No overt distress  Head:  Normocephalic, atraumatic  Eyes:  Sclerae appear normal.  Pupils equally round. HENT:  Nares appear normal, no drainage. Moist mucous membranes  Neck:  No restricted ROM. Trachea midline  CV:   RRR. No m/r/g. No JVD  Lungs:   CTAB. No wheezing, rhonchi, or rales. Even, unlabored  Abdomen:   Soft, nontender, nondistended. Extremities: Warm and dry. No cyanosis or clubbing. No edema. Skin:     No rashes. Normal coloration  Neuro:  CN II-XII grossly intact. Psych:  Normal mood and affect. Signed:  Sony Lees MD    Part of this note may have been written by using a voice dictation software. The note has been proof read but may still contain some grammatical/other typographical errors.

## 2022-01-02 NOTE — PROGRESS NOTES
Pt was admitted waiting for a bed after assessment in the ED yesterday. She has improved and is being discharged from the ED after staying overnight. No O2 needed per chart review at this time. Pt is for discharge home today with family and no needs/supportive care orders recieved for CM at this time.

## 2022-01-18 ENCOUNTER — HOSPITAL ENCOUNTER (OUTPATIENT)
Dept: GENERAL RADIOLOGY | Age: 72
Discharge: HOME | End: 2022-01-18
Payer: MEDICARE

## 2022-01-18 ENCOUNTER — HOSPITAL ENCOUNTER (OUTPATIENT)
Dept: LAB | Age: 72
Discharge: HOME | End: 2022-01-18
Payer: MEDICARE

## 2022-01-18 DIAGNOSIS — I10 ESSENTIAL HYPERTENSION: Chronic | ICD-10-CM

## 2022-01-18 DIAGNOSIS — R05.3 PERSISTENT COUGH: ICD-10-CM

## 2022-01-18 DIAGNOSIS — R61 NIGHT SWEATS: ICD-10-CM

## 2022-01-18 DIAGNOSIS — Z86.16 HISTORY OF COVID-19: ICD-10-CM

## 2022-01-18 LAB
ALBUMIN SERPL-MCNC: 3.6 G/DL (ref 3.2–4.6)
ALBUMIN/GLOB SERPL: 0.9 {RATIO} (ref 1.2–3.5)
ALP SERPL-CCNC: 123 U/L (ref 50–130)
ALT SERPL-CCNC: 18 U/L (ref 12–65)
ANION GAP SERPL CALC-SCNC: 8 MMOL/L (ref 7–16)
AST SERPL-CCNC: 19 U/L (ref 15–37)
BASOPHILS # BLD: 0.1 K/UL (ref 0–0.2)
BASOPHILS NFR BLD: 1 % (ref 0–2)
BILIRUB SERPL-MCNC: 0.3 MG/DL (ref 0.2–1.1)
BUN SERPL-MCNC: 17 MG/DL (ref 8–23)
CALCIUM SERPL-MCNC: 9.3 MG/DL (ref 8.3–10.4)
CHLORIDE SERPL-SCNC: 102 MMOL/L (ref 98–107)
CO2 SERPL-SCNC: 30 MMOL/L (ref 21–32)
CREAT SERPL-MCNC: 0.73 MG/DL (ref 0.6–1)
DIFFERENTIAL METHOD BLD: ABNORMAL
EOSINOPHIL # BLD: 0.1 K/UL (ref 0–0.8)
EOSINOPHIL NFR BLD: 1 % (ref 0.5–7.8)
ERYTHROCYTE [DISTWIDTH] IN BLOOD BY AUTOMATED COUNT: 12.4 % (ref 11.9–14.6)
GLOBULIN SER CALC-MCNC: 4 G/DL (ref 2.3–3.5)
GLUCOSE SERPL-MCNC: 109 MG/DL (ref 65–100)
HCT VFR BLD AUTO: 44.5 % (ref 35.8–46.3)
HGB BLD-MCNC: 14.2 G/DL (ref 11.7–15.4)
IMM GRANULOCYTES # BLD AUTO: 0.1 K/UL (ref 0–0.5)
IMM GRANULOCYTES NFR BLD AUTO: 1 % (ref 0–5)
LYMPHOCYTES # BLD: 1.8 K/UL (ref 0.5–4.6)
LYMPHOCYTES NFR BLD: 16 % (ref 13–44)
MCH RBC QN AUTO: 30.7 PG (ref 26.1–32.9)
MCHC RBC AUTO-ENTMCNC: 31.9 G/DL (ref 31.4–35)
MCV RBC AUTO: 96.3 FL (ref 79.6–97.8)
MONOCYTES # BLD: 0.8 K/UL (ref 0.1–1.3)
MONOCYTES NFR BLD: 7 % (ref 4–12)
NEUTS SEG # BLD: 8.8 K/UL (ref 1.7–8.2)
NEUTS SEG NFR BLD: 75 % (ref 43–78)
NRBC # BLD: 0 K/UL (ref 0–0.2)
PLATELET # BLD AUTO: 510 K/UL (ref 150–450)
PMV BLD AUTO: 9.1 FL (ref 9.4–12.3)
POTASSIUM SERPL-SCNC: 4.6 MMOL/L (ref 3.5–5.1)
PROT SERPL-MCNC: 7.6 G/DL (ref 6.3–8.2)
RBC # BLD AUTO: 4.62 M/UL (ref 4.05–5.2)
SODIUM SERPL-SCNC: 140 MMOL/L (ref 136–145)
WBC # BLD AUTO: 11.7 K/UL (ref 4.3–11.1)

## 2022-01-18 PROCEDURE — 85025 COMPLETE CBC W/AUTO DIFF WBC: CPT

## 2022-01-18 PROCEDURE — 71046 X-RAY EXAM CHEST 2 VIEWS: CPT

## 2022-01-18 PROCEDURE — 80053 COMPREHEN METABOLIC PANEL: CPT

## 2022-01-18 PROCEDURE — 36415 COLL VENOUS BLD VENIPUNCTURE: CPT

## 2022-01-18 NOTE — PROGRESS NOTES
Labs ok (wbcs a little elevated - may be related to prednisone).   Will see her in March unless she needs me sooner

## 2022-02-18 ENCOUNTER — APPOINTMENT (RX ONLY)
Dept: URBAN - METROPOLITAN AREA CLINIC 329 | Facility: CLINIC | Age: 72
Setting detail: DERMATOLOGY
End: 2022-02-18

## 2022-02-18 DIAGNOSIS — R20.2 PARESTHESIA OF SKIN: ICD-10-CM

## 2022-02-18 DIAGNOSIS — D22 MELANOCYTIC NEVI: ICD-10-CM | Status: STABLE

## 2022-02-18 DIAGNOSIS — L57.0 ACTINIC KERATOSIS: ICD-10-CM

## 2022-02-18 DIAGNOSIS — L71.8 OTHER ROSACEA: ICD-10-CM

## 2022-02-18 DIAGNOSIS — Z85.828 PERSONAL HISTORY OF OTHER MALIGNANT NEOPLASM OF SKIN: ICD-10-CM

## 2022-02-18 DIAGNOSIS — Z85.820 PERSONAL HISTORY OF MALIGNANT MELANOMA OF SKIN: ICD-10-CM

## 2022-02-18 DIAGNOSIS — F42.4 EXCORIATION (SKIN-PICKING) DISORDER: ICD-10-CM

## 2022-02-18 PROBLEM — S20.402A UNSPECIFIED SUPERFICIAL INJURIES OF LEFT BACK WALL OF THORAX, INITIAL ENCOUNTER: Status: ACTIVE | Noted: 2022-02-18

## 2022-02-18 PROBLEM — D48.5 NEOPLASM OF UNCERTAIN BEHAVIOR OF SKIN: Status: ACTIVE | Noted: 2022-02-18

## 2022-02-18 PROBLEM — D22.5 MELANOCYTIC NEVI OF TRUNK: Status: ACTIVE | Noted: 2022-02-18

## 2022-02-18 PROCEDURE — ? COUNSELING

## 2022-02-18 PROCEDURE — ? FULL BODY SKIN EXAM

## 2022-02-18 PROCEDURE — 17003 DESTRUCT PREMALG LES 2-14: CPT

## 2022-02-18 PROCEDURE — ? PRESCRIPTION MEDICATION MANAGEMENT

## 2022-02-18 PROCEDURE — ? LIQUID NITROGEN

## 2022-02-18 PROCEDURE — 17000 DESTRUCT PREMALG LESION: CPT | Mod: 59

## 2022-02-18 PROCEDURE — ? MEDICAL PHOTOGRAPHY REVIEW

## 2022-02-18 PROCEDURE — 11301 SHAVE SKIN LESION 0.6-1.0 CM: CPT

## 2022-02-18 PROCEDURE — ? PRESCRIPTION

## 2022-02-18 PROCEDURE — ? PRESCRIPTION SAMPLES PROVIDED

## 2022-02-18 PROCEDURE — ? BIOPSY BY SHAVE METHOD

## 2022-02-18 PROCEDURE — ? SHAVE REMOVAL

## 2022-02-18 PROCEDURE — 99214 OFFICE O/P EST MOD 30 MIN: CPT | Mod: 25

## 2022-02-18 PROCEDURE — 11102 TANGNTL BX SKIN SINGLE LES: CPT | Mod: 59

## 2022-02-18 RX ORDER — METRONIDAZOLE 7.5 MG/G
CREAM TOPICAL
Qty: 45 | Refills: 2 | Status: ERX | COMMUNITY
Start: 2022-02-18

## 2022-02-18 RX ADMIN — METRONIDAZOLE: 7.5 CREAM TOPICAL at 00:00

## 2022-02-18 ASSESSMENT — LOCATION SIMPLE DESCRIPTION DERM
LOCATION SIMPLE: LEFT UPPER BACK
LOCATION SIMPLE: NOSE
LOCATION SIMPLE: UPPER BACK
LOCATION SIMPLE: CHEST
LOCATION SIMPLE: LEFT BREAST
LOCATION SIMPLE: RIGHT CHEEK
LOCATION SIMPLE: LEFT UPPER ARM
LOCATION SIMPLE: LEFT CHEEK
LOCATION SIMPLE: RIGHT UPPER BACK

## 2022-02-18 ASSESSMENT — LOCATION DETAILED DESCRIPTION DERM
LOCATION DETAILED: RIGHT CENTRAL MALAR CHEEK
LOCATION DETAILED: LEFT INFERIOR CENTRAL MALAR CHEEK
LOCATION DETAILED: INFERIOR THORACIC SPINE
LOCATION DETAILED: LEFT SUPERIOR LATERAL BUCCAL CHEEK
LOCATION DETAILED: LEFT MID-UPPER BACK
LOCATION DETAILED: RIGHT SUPERIOR CENTRAL BUCCAL CHEEK
LOCATION DETAILED: LEFT LATERAL BUCCAL CHEEK
LOCATION DETAILED: LEFT LATERAL BREAST 4-5:00 REGION
LOCATION DETAILED: LEFT CENTRAL MALAR CHEEK
LOCATION DETAILED: RIGHT MEDIAL UPPER BACK
LOCATION DETAILED: RIGHT MEDIAL SUPERIOR CHEST
LOCATION DETAILED: NASAL DORSUM
LOCATION DETAILED: LEFT ANTERIOR DISTAL UPPER ARM

## 2022-02-18 ASSESSMENT — SEVERITY ASSESSMENT OVERALL AMONG ALL PATIENTS
IN YOUR EXPERIENCE, AMONG ALL PATIENTS YOU HAVE SEEN WITH THIS CONDITION, HOW SEVERE IS THIS PATIENT'S CONDITION?: MILD TO MODERATE

## 2022-02-18 ASSESSMENT — LOCATION ZONE DERM
LOCATION ZONE: FACE
LOCATION ZONE: TRUNK
LOCATION ZONE: NOSE
LOCATION ZONE: ARM

## 2022-02-18 ASSESSMENT — PAIN INTENSITY VAS: HOW INTENSE IS YOUR PAIN 0 BEING NO PAIN, 10 BEING THE MOST SEVERE PAIN POSSIBLE?: 1/10 PAIN

## 2022-02-18 NOTE — PROCEDURE: LIQUID NITROGEN
Render Post-Care Instructions In Note?: no
Post-Care Instructions: I reviewed with the patient in detail post-care instructions. Patient is to wear sunprotection, and avoid picking at any of the treated lesions. Pt may apply Vaseline to crusted or scabbing areas.
Show Aperture Variable?: Yes
Consent: The patient's consent was obtained including but not limited to risks of crusting, scabbing, blistering, scarring, darker or lighter pigmentary change, recurrence, incomplete removal and infection.
Duration Of Freeze Thaw-Cycle (Seconds): 0
Detail Level: Detailed

## 2022-02-18 NOTE — PROCEDURE: SHAVE REMOVAL
Billing Type: Third-Party Bill
Bill 32926 For Specimen Handling/Conveyance To Laboratory?: no
Notification Instructions: Patient will be notified of pathology results. However, patient instructed to call the office if not contacted within 2 weeks.
Lab: 6
Medical Necessity Clause: This procedure was medically necessary because the lesion that was treated was:
Detail Level: Detailed
Wound Care: Petrolatum
Lab Facility: 0
Size Of Lesion In Cm (Required): 0.6
Biopsy Method: Dermablade
Anesthesia Type: 1% lidocaine with 1:100,000 epinephrine and a 1:10 solution of 8.4% sodium bicarbonate
Post-Care Instructions: I reviewed with the patient in detail post-care instructions. Patient is to keep the biopsy site dry overnight, and then apply bacitracin twice daily until healed. Patient may apply hydrogen peroxide soaks to remove any crusting.
Hemostasis: Aluminum Chloride
Was A Bandage Applied: Yes
Medical Necessity Information: It is in your best interest to select a reason for this procedure from the list below. All of these items fulfill various CMS LCD requirements except the new and changing color options.
Accession #: Skin Path
Consent was obtained from the patient. The risks and benefits to therapy were discussed in detail. Specifically, the risks of infection, scarring, bleeding, prolonged wound healing, incomplete removal, allergy to anesthesia, nerve injury and recurrence were addressed. Prior to the procedure, the treatment site was clearly identified and confirmed by the patient. All components of Universal Protocol/PAUSE Rule completed.
X Size Of Lesion In Cm (Optional): 0.3

## 2022-02-18 NOTE — PROCEDURE: BIOPSY BY SHAVE METHOD
Hide Anticipated Plan (Based On Presumed Biopsy Results)?: No
Was A Bandage Applied: Yes
Wound Care: Petrolatum
Lab: 6
Curettage Text: The wound bed was treated with curettage after the biopsy was performed.
Lab Facility: 0
Information: Selecting Yes will display possible errors in your note based on the variables you have selected. This validation is only offered as a suggestion for you. PLEASE NOTE THAT THE VALIDATION TEXT WILL BE REMOVED WHEN YOU FINALIZE YOUR NOTE. IF YOU WANT TO FAX A PRELIMINARY NOTE YOU WILL NEED TO TOGGLE THIS TO 'NO' IF YOU DO NOT WANT IT IN YOUR FAXED NOTE.
Consent: Written consent was obtained and risks were reviewed including but not limited to scarring, infection, bleeding, scabbing, incomplete removal, nerve damage and allergy to anesthesia.
Dressing: bandage
Cryotherapy Text: The wound bed was treated with cryotherapy after the biopsy was performed.
Billing Type: Third-Party Bill
Electrodesiccation Text: The wound bed was treated with electrodesiccation after the biopsy was performed.
Detail Level: Detailed
Biopsy Type: H and E
Anesthesia Type: 1% lidocaine with 1:100,000 epinephrine and a 1:10 solution of 8.4% sodium bicarbonate
Post-Care Instructions: I reviewed with the patient in detail post-care instructions. Patient is to keep the biopsy site dry overnight, and then apply bacitracin twice daily until healed. Patient may apply hydrogen peroxide soaks to remove any crusting.
Accession #: Skin path
Electrodesiccation And Curettage Text: The wound bed was treated with electrodesiccation and curettage after the biopsy was performed.
Hemostasis: Aluminum Chloride
Depth Of Biopsy: dermis
Biopsy Method: Dermablade
Notification Instructions: Patient will be notified of biopsy results. However, patient instructed to call the office if not contacted within 2 weeks.
Silver Nitrate Text: The wound bed was treated with silver nitrate after the biopsy was performed.
Type Of Destruction Used: Curettage
Anesthesia Volume In Cc (Will Not Render If 0): 0.5

## 2022-02-18 NOTE — PROCEDURE: REASSURANCE
Detail Level: Detailed
When Should The Patient Follow-Up For Their Next Full-Body Skin Exam?: 1 Year
Quality 137: Melanoma: Continuity Of Care - Recall System: Recall system not utilized, reason not otherwise specified

## 2022-03-10 PROBLEM — R73.09 ABNORMAL GLUCOSE: Status: ACTIVE | Noted: 2022-03-10

## 2022-03-15 ENCOUNTER — APPOINTMENT (RX ONLY)
Dept: URBAN - METROPOLITAN AREA CLINIC 329 | Facility: CLINIC | Age: 72
Setting detail: DERMATOLOGY
End: 2022-03-15

## 2022-03-15 PROBLEM — C44.511 BASAL CELL CARCINOMA OF SKIN OF BREAST: Status: ACTIVE | Noted: 2022-03-15

## 2022-03-15 PROCEDURE — ? EXCISION

## 2022-03-15 PROCEDURE — 12032 INTMD RPR S/A/T/EXT 2.6-7.5: CPT

## 2022-03-15 PROCEDURE — 11604 EXC TR-EXT MAL+MARG 3.1-4 CM: CPT

## 2022-03-15 PROCEDURE — ? COUNSELING

## 2022-03-15 NOTE — PROCEDURE: EXCISION
Yes Cheek-To-Nose Interpolation Flap Text: A decision was made to reconstruct the defect utilizing an interpolation axial flap and a staged reconstruction.  A telfa template was made of the defect.  This telfa template was then used to outline the Cheek-To-Nose Interpolation flap.  The donor area for the pedicle flap was then injected with anesthesia.  The flap was excised through the skin and subcutaneous tissue down to the layer of the underlying musculature.  The interpolation flap was carefully excised within this deep plane to maintain its blood supply.  The edges of the donor site were undermined.   The donor site was closed in a primary fashion.  The pedicle was then rotated into position and sutured.  Once the tube was sutured into place, adequate blood supply was confirmed with blanching and refill.  The pedicle was then wrapped with xeroform gauze and dressed appropriately with a telfa and gauze bandage to ensure continued blood supply and protect the attached pedicle.

## 2022-03-17 ENCOUNTER — APPOINTMENT (RX ONLY)
Dept: URBAN - METROPOLITAN AREA CLINIC 329 | Facility: CLINIC | Age: 72
Setting detail: DERMATOLOGY
End: 2022-03-17

## 2022-03-17 DIAGNOSIS — Z48.817 ENCOUNTER FOR SURGICAL AFTERCARE FOLLOWING SURGERY ON THE SKIN AND SUBCUTANEOUS TISSUE: ICD-10-CM

## 2022-03-17 DIAGNOSIS — K6811 OTHER POSTOPERATIVE INFECTION: ICD-10-CM

## 2022-03-17 DIAGNOSIS — T814XXA OTHER POSTOPERATIVE INFECTION: ICD-10-CM

## 2022-03-17 PROBLEM — T81.40XA INFECTION FOLLOWING A PROCEDURE, UNSPECIFIED, INITIAL ENCOUNTER: Status: ACTIVE | Noted: 2022-03-17

## 2022-03-17 PROCEDURE — ? PRESCRIPTION MEDICATION MANAGEMENT

## 2022-03-17 PROCEDURE — ? ORDER TESTS

## 2022-03-17 PROCEDURE — ? PRESCRIPTION

## 2022-03-17 PROCEDURE — ? COUNSELING

## 2022-03-17 RX ORDER — CEFDINIR 300 MG/1
CAPSULE ORAL
Qty: 20 | Refills: 0 | Status: ERX | COMMUNITY
Start: 2022-03-17

## 2022-03-17 RX ADMIN — CEFDINIR: 300 CAPSULE ORAL at 00:00

## 2022-03-17 ASSESSMENT — LOCATION SIMPLE DESCRIPTION DERM: LOCATION SIMPLE: RIGHT BREAST

## 2022-03-17 ASSESSMENT — LOCATION ZONE DERM: LOCATION ZONE: TRUNK

## 2022-03-17 ASSESSMENT — LOCATION DETAILED DESCRIPTION DERM: LOCATION DETAILED: RIGHT MEDIAL BREAST 3-4:00 REGION

## 2022-03-17 NOTE — PROCEDURE: ORDER TESTS
Expected Date Of Service: 03/17/2022
Performing Laboratory: 0
Bill For Surgical Tray: no
Billing Type: Third-Party Bill

## 2022-03-17 NOTE — PROCEDURE: PRESCRIPTION MEDICATION MANAGEMENT
Render In Strict Bullet Format?: No
Detail Level: Zone
Initiate Treatment: Cefdinir 300mg bid x 10 days. Discussed side effects with patient.

## 2022-03-18 PROBLEM — Z86.16 HISTORY OF COVID-19: Status: ACTIVE | Noted: 2022-01-18

## 2022-03-18 PROBLEM — I25.10 CORONARY ARTERY DISEASE INVOLVING NATIVE CORONARY ARTERY OF NATIVE HEART WITHOUT ANGINA PECTORIS: Status: ACTIVE | Noted: 2017-03-03

## 2022-03-19 PROBLEM — G72.0 STATIN MYOPATHY: Status: ACTIVE | Noted: 2021-10-14

## 2022-03-19 PROBLEM — M15.9 GENERALIZED OSTEOARTHRITIS: Status: ACTIVE | Noted: 2021-07-12

## 2022-03-19 PROBLEM — U07.1 COVID-19: Status: ACTIVE | Noted: 2022-01-01

## 2022-03-19 PROBLEM — Z79.891 LONG TERM (CURRENT) USE OF OPIATE ANALGESIC: Status: ACTIVE | Noted: 2021-07-12

## 2022-03-19 PROBLEM — Z87.39 HISTORY OF TEMPORAL ARTERITIS: Status: ACTIVE | Noted: 2021-07-12

## 2022-03-19 PROBLEM — Z79.1 LONG TERM (CURRENT) USE OF NON-STEROIDAL ANTI-INFLAMMATORIES (NSAID): Status: ACTIVE | Noted: 2021-07-12

## 2022-03-19 PROBLEM — Z78.9 STATIN INTOLERANCE: Status: ACTIVE | Noted: 2020-09-29

## 2022-03-19 PROBLEM — Z95.5 H/O HEART ARTERY STENT: Status: ACTIVE | Noted: 2020-06-19

## 2022-03-19 PROBLEM — M79.10 MYALGIA: Status: ACTIVE | Noted: 2021-07-12

## 2022-03-19 PROBLEM — E78.00 PURE HYPERCHOLESTEROLEMIA: Status: ACTIVE | Noted: 2018-09-25

## 2022-03-19 PROBLEM — Z90.49 S/P CHOLECYSTECTOMY: Status: ACTIVE | Noted: 2020-10-30

## 2022-03-19 PROBLEM — T46.6X5A STATIN MYOPATHY: Status: ACTIVE | Noted: 2021-10-14

## 2022-03-20 PROBLEM — Z79.52 LONG TERM SYSTEMIC STEROID USER: Status: ACTIVE | Noted: 2021-07-12

## 2022-03-20 PROBLEM — E66.3 OVERWEIGHT: Status: ACTIVE | Noted: 2021-07-12

## 2022-03-20 PROBLEM — M85.80 OSTEOPENIA: Status: ACTIVE | Noted: 2021-07-12

## 2022-03-20 PROBLEM — R73.09 ABNORMAL GLUCOSE: Status: ACTIVE | Noted: 2022-03-10

## 2022-03-20 PROBLEM — M54.6 PAIN IN THORACIC SPINE: Status: ACTIVE | Noted: 2021-07-12

## 2022-03-29 ENCOUNTER — APPOINTMENT (RX ONLY)
Dept: URBAN - METROPOLITAN AREA CLINIC 329 | Facility: CLINIC | Age: 72
Setting detail: DERMATOLOGY
End: 2022-03-29

## 2022-03-29 DIAGNOSIS — L82.0 INFLAMED SEBORRHEIC KERATOSIS: ICD-10-CM

## 2022-03-29 DIAGNOSIS — Z48.02 ENCOUNTER FOR REMOVAL OF SUTURES: ICD-10-CM

## 2022-03-29 PROCEDURE — ? LIQUID NITROGEN

## 2022-03-29 PROCEDURE — 99024 POSTOP FOLLOW-UP VISIT: CPT

## 2022-03-29 PROCEDURE — 17110 DESTRUCTION B9 LES UP TO 14: CPT

## 2022-03-29 PROCEDURE — ? COUNSELING

## 2022-03-29 PROCEDURE — ? SUTURE REMOVAL (GLOBAL PERIOD)

## 2022-03-29 ASSESSMENT — LOCATION SIMPLE DESCRIPTION DERM
LOCATION SIMPLE: RIGHT CHEEK
LOCATION SIMPLE: RIGHT BREAST

## 2022-03-29 ASSESSMENT — LOCATION DETAILED DESCRIPTION DERM
LOCATION DETAILED: RIGHT SUPERIOR MEDIAL MALAR CHEEK
LOCATION DETAILED: RIGHT MEDIAL BREAST 3-4:00 REGION

## 2022-03-29 ASSESSMENT — LOCATION ZONE DERM
LOCATION ZONE: TRUNK
LOCATION ZONE: FACE

## 2022-03-29 NOTE — PROCEDURE: LIQUID NITROGEN
Include Z78.9 (Other Specified Conditions Influencing Health Status) As An Associated Diagnosis?: No
Post-Care Instructions: I reviewed with the patient in detail post-care instructions. Patient is to wear sunprotection, and avoid picking at any of the treated lesions. Pt may apply Vaseline to crusted or scabbing areas.
Show Spray Paint Technique Variable?: Yes
Medical Necessity Clause: This procedure was medically necessary because the lesions that were treated were:
Consent: The patient's consent was obtained including but not limited to risks of crusting, scabbing, blistering, scarring, darker or lighter pigmentary change, recurrence, incomplete removal and infection.
Medical Necessity Information: It is in your best interest to select a reason for this procedure from the list below. All of these items fulfill various CMS LCD requirements except the new and changing color options.
Detail Level: Detailed
Spray Paint Text: The liquid nitrogen was applied to the skin utilizing a spray paint frosting technique.

## 2022-03-29 NOTE — PROCEDURE: SUTURE REMOVAL (GLOBAL PERIOD)
Detail Level: Detailed
Add 21174 Cpt? (Important Note: In 2017 The Use Of 88395 Is Being Tracked By Cms To Determine Future Global Period Reimbursement For Global Periods): yes

## 2022-03-30 NOTE — PROCEDURE: EXCISION
Per medical records request   2020 pap results faxed to [facility]. Accession #: Skin Pathology

## 2022-04-20 ENCOUNTER — HOSPITAL ENCOUNTER (OUTPATIENT)
Dept: LAB | Age: 72
Discharge: HOME | End: 2022-04-20
Payer: MEDICARE

## 2022-04-20 DIAGNOSIS — E78.00 PURE HYPERCHOLESTEROLEMIA: ICD-10-CM

## 2022-04-20 DIAGNOSIS — R73.09 ABNORMAL GLUCOSE: ICD-10-CM

## 2022-04-20 DIAGNOSIS — I10 ESSENTIAL HYPERTENSION: Chronic | ICD-10-CM

## 2022-04-20 LAB
ALBUMIN SERPL-MCNC: 3.8 G/DL (ref 3.2–4.6)
ALBUMIN SERPL-MCNC: 3.8 G/DL (ref 3.2–4.6)
ALBUMIN/GLOB SERPL: 1.3 {RATIO} (ref 1.2–3.5)
ALBUMIN/GLOB SERPL: 1.3 {RATIO} (ref 1.2–3.5)
ALP SERPL-CCNC: 121 U/L (ref 50–136)
ALP SERPL-CCNC: 123 U/L (ref 50–136)
ALT SERPL-CCNC: 18 U/L (ref 12–65)
ALT SERPL-CCNC: 18 U/L (ref 12–65)
ANION GAP SERPL CALC-SCNC: 5 MMOL/L (ref 7–16)
AST SERPL-CCNC: 15 U/L (ref 15–37)
AST SERPL-CCNC: 16 U/L (ref 15–37)
BILIRUB DIRECT SERPL-MCNC: 0.1 MG/DL
BILIRUB SERPL-MCNC: 0.3 MG/DL (ref 0.2–1.1)
BILIRUB SERPL-MCNC: 0.4 MG/DL (ref 0.2–1.1)
BUN SERPL-MCNC: 10 MG/DL (ref 8–23)
CALCIUM SERPL-MCNC: 9.2 MG/DL (ref 8.3–10.4)
CHLORIDE SERPL-SCNC: 105 MMOL/L (ref 98–107)
CHOLEST SERPL-MCNC: 141 MG/DL
CO2 SERPL-SCNC: 29 MMOL/L (ref 21–32)
CREAT SERPL-MCNC: 0.7 MG/DL (ref 0.6–1)
EST. AVERAGE GLUCOSE BLD GHB EST-MCNC: 123 MG/DL
GLOBULIN SER CALC-MCNC: 3 G/DL (ref 2.3–3.5)
GLOBULIN SER CALC-MCNC: 3 G/DL (ref 2.3–3.5)
GLUCOSE SERPL-MCNC: 106 MG/DL (ref 65–100)
HBA1C MFR BLD: 5.9 % (ref 4.2–6.3)
HDLC SERPL-MCNC: 66 MG/DL (ref 40–60)
HDLC SERPL: 2.1 {RATIO}
LDLC SERPL CALC-MCNC: 48.4 MG/DL
POTASSIUM SERPL-SCNC: 4.1 MMOL/L (ref 3.5–5.1)
PROT SERPL-MCNC: 6.8 G/DL (ref 6.3–8.2)
PROT SERPL-MCNC: 6.8 G/DL (ref 6.3–8.2)
SODIUM SERPL-SCNC: 139 MMOL/L (ref 136–145)
TRIGL SERPL-MCNC: 133 MG/DL (ref 35–150)
VLDLC SERPL CALC-MCNC: 26.6 MG/DL (ref 6–23)

## 2022-04-20 PROCEDURE — 36415 COLL VENOUS BLD VENIPUNCTURE: CPT

## 2022-04-20 PROCEDURE — 80053 COMPREHEN METABOLIC PANEL: CPT

## 2022-04-20 PROCEDURE — 80061 LIPID PANEL: CPT

## 2022-04-20 PROCEDURE — 83036 HEMOGLOBIN GLYCOSYLATED A1C: CPT

## 2022-04-20 PROCEDURE — 80076 HEPATIC FUNCTION PANEL: CPT

## 2022-05-09 NOTE — PROCEDURE: PATHOLOGY BILLING
Immunohistochemistry ([id number] and [id number]) billing is not performed here. Please use the Immunohistochemistry Stain Billing plan to accomplish this. Immunohistochemistry ([id number] and [id number]) billing is not performed here. Please use the Immunohistochemistry Stain Billing plan to accomplish this. Yes

## 2022-05-12 ENCOUNTER — HOSPITAL ENCOUNTER (OUTPATIENT)
Dept: LAB | Age: 72
Discharge: HOME | End: 2022-05-12
Payer: MEDICARE

## 2022-05-12 DIAGNOSIS — R79.82 ELEVATED C-REACTIVE PROTEIN (CRP): ICD-10-CM

## 2022-05-12 LAB
CRP SERPL-MCNC: 0.4 MG/DL (ref 0–0.9)
ERYTHROCYTE [SEDIMENTATION RATE] IN BLOOD: 2 MM/HR (ref 0–30)

## 2022-05-12 PROCEDURE — 86140 C-REACTIVE PROTEIN: CPT

## 2022-05-12 PROCEDURE — 85652 RBC SED RATE AUTOMATED: CPT

## 2022-05-12 PROCEDURE — 36415 COLL VENOUS BLD VENIPUNCTURE: CPT

## 2022-05-13 NOTE — PROGRESS NOTES
Inocente De Dios Schedule for stereotactic craniotomy for resection of left middle cranial fossa meningioma tentatively on 05/24/2022. Pre op instructions, famotidine given and explained. Pt verbalized understanding.  Pt confirmed schedule appt for Covid test pre op

## 2022-08-18 ENCOUNTER — HOSPITAL ENCOUNTER (OUTPATIENT)
Dept: GENERAL RADIOLOGY | Age: 72
Discharge: HOME | End: 2022-08-21

## 2022-08-18 ENCOUNTER — OFFICE VISIT (OUTPATIENT)
Dept: INTERNAL MEDICINE CLINIC | Facility: CLINIC | Age: 72
End: 2022-08-18
Payer: MEDICARE

## 2022-08-18 VITALS
HEIGHT: 67 IN | SYSTOLIC BLOOD PRESSURE: 132 MMHG | WEIGHT: 158 LBS | DIASTOLIC BLOOD PRESSURE: 82 MMHG | BODY MASS INDEX: 24.8 KG/M2

## 2022-08-18 DIAGNOSIS — R51.9 TEMPORAL HEADACHE: ICD-10-CM

## 2022-08-18 DIAGNOSIS — Z87.39 HISTORY OF TEMPORAL ARTERITIS: ICD-10-CM

## 2022-08-18 DIAGNOSIS — I10 ESSENTIAL HYPERTENSION: ICD-10-CM

## 2022-08-18 DIAGNOSIS — R07.9 CHEST PAIN, UNSPECIFIED TYPE: Primary | ICD-10-CM

## 2022-08-18 DIAGNOSIS — R07.9 CHEST PAIN, UNSPECIFIED TYPE: ICD-10-CM

## 2022-08-18 LAB — ERYTHROCYTE [SEDIMENTATION RATE] IN BLOOD: 6 MM/HR (ref 0–30)

## 2022-08-18 PROCEDURE — 1123F ACP DISCUSS/DSCN MKR DOCD: CPT | Performed by: NURSE PRACTITIONER

## 2022-08-18 PROCEDURE — 93000 ELECTROCARDIOGRAM COMPLETE: CPT | Performed by: NURSE PRACTITIONER

## 2022-08-18 PROCEDURE — 99214 OFFICE O/P EST MOD 30 MIN: CPT | Performed by: NURSE PRACTITIONER

## 2022-08-18 RX ORDER — PANTOPRAZOLE SODIUM 40 MG/1
40 TABLET, DELAYED RELEASE ORAL DAILY
Qty: 90 TABLET | Refills: 3 | Status: SHIPPED | OUTPATIENT
Start: 2022-08-18

## 2022-08-18 RX ORDER — ACETAMINOPHEN 500 MG
2 TABLET ORAL EVERY 6 HOURS PRN
COMMUNITY

## 2022-08-18 ASSESSMENT — PATIENT HEALTH QUESTIONNAIRE - PHQ9
1. LITTLE INTEREST OR PLEASURE IN DOING THINGS: 1
SUM OF ALL RESPONSES TO PHQ QUESTIONS 1-9: 3
SUM OF ALL RESPONSES TO PHQ QUESTIONS 1-9: 3
3. TROUBLE FALLING OR STAYING ASLEEP: 1
SUM OF ALL RESPONSES TO PHQ QUESTIONS 1-9: 3
SUM OF ALL RESPONSES TO PHQ9 QUESTIONS 1 & 2: 1
10. IF YOU CHECKED OFF ANY PROBLEMS, HOW DIFFICULT HAVE THESE PROBLEMS MADE IT FOR YOU TO DO YOUR WORK, TAKE CARE OF THINGS AT HOME, OR GET ALONG WITH OTHER PEOPLE: 1
SUM OF ALL RESPONSES TO PHQ QUESTIONS 1-9: 3
5. POOR APPETITE OR OVEREATING: 0
7. TROUBLE CONCENTRATING ON THINGS, SUCH AS READING THE NEWSPAPER OR WATCHING TELEVISION: 0
9. THOUGHTS THAT YOU WOULD BE BETTER OFF DEAD, OR OF HURTING YOURSELF: 0
8. MOVING OR SPEAKING SO SLOWLY THAT OTHER PEOPLE COULD HAVE NOTICED. OR THE OPPOSITE, BEING SO FIGETY OR RESTLESS THAT YOU HAVE BEEN MOVING AROUND A LOT MORE THAN USUAL: 0
4. FEELING TIRED OR HAVING LITTLE ENERGY: 1
2. FEELING DOWN, DEPRESSED OR HOPELESS: 0
6. FEELING BAD ABOUT YOURSELF - OR THAT YOU ARE A FAILURE OR HAVE LET YOURSELF OR YOUR FAMILY DOWN: 0

## 2022-08-18 ASSESSMENT — ENCOUNTER SYMPTOMS: SHORTNESS OF BREATH: 0

## 2022-08-18 NOTE — PROGRESS NOTES
PROGRESS NOTE      Chief Complaint   Patient presents with    Joint Pain     Pt c/o joint pain past few months , stated she is not feeling well    Pain     Pt c/o pain to pain to above right eye past 2 weeks ,     Fatigue     Pt noted increased fatigue    Back Pain     Pt c/o mid back pain     Chest Pain     Pt c/o \"bricks squashes her upper chest\" No c/o acid reflux       HPI    Chest pain: Substernal with radiation to back - present intermittently for 2 weeks. Tightness,  heaviness. Lasting up to 1 hour. Relieved when recumbent. No associated dyspnea. No nausea or sweats. No heartburn or indigestion. No history of similar. ASCVD: Heart cath with stent LAD 2018. Stress test 2020: EF 70%. Normal perfusion. Right knee pain: present for months. No significant swelling. No injury or specific precipitating event. Popping and clicking. Pain with stair climbing. Right shoulder pain: Chronic and without change. Referred to ortho but rescheduled with cataract surgery. Low back pain: Also chronic. Right side. Inflammation: Concerned about \"inflammation test\" drawn when she had Covid. Polymyalgia rheumatica in past but recovered. ASCVD and hyperlipidemia: Previously on Repatha (statin intolerance) but unable to afford so now trial of Crestor 10 mg per cardiologist. No significant myalgias currently     Daytime fatigue: Present since Covid. Unsure of snoring but not interested in sleep evalluation     Depression and anxiety: doing well on Lexapro 5 mg    Headache: right temporal region. Started yesterday. Also some fatigue. No visual change. Concerned with history of polymyalgia rheumatica. ASSESSMENT and PLAN    Magaly Lopes was seen today for joint pain, pain, fatigue, back pain and chest pain. Diagnoses and all orders for this visit:    Chest pain, unspecified type  -     EKG 12 Lead sinus bradycardia. Heart rate 59 bpm nonspecific twave changes  -     XR CHEST PA LAT (2 VIEWS);  Future    Temporal headache  -     Sedimentation Rate; Future    History of temporal arteritis    Essential hypertension      Medical problems and test results were reviewed with the patient today. Past Medical History, Past Surgical History, Family history, Social History, and Medications were all reviewed and updated as necessary. Current Outpatient Medications   Medication Sig Dispense Refill    acetaminophen (TYLENOL) 500 MG tablet Take 2 tablets by mouth every 6 hours as needed      Cholecalciferol 50 MCG (2000 UT) CAPS Take 2,000 Units by mouth daily      amLODIPine (NORVASC) 5 MG tablet Take 5 mg by mouth daily      ascorbic acid (VITAMIN C) 500 MG tablet Take 500 mg by mouth daily      aspirin 81 MG EC tablet Take 81 mg by mouth daily      clindamycin (CLINDAGEL) 1 % gel APPLY TO THE AFFECTED AREA FOR ACNE ON FACE TWICE A DAY      cyanocobalamin 1000 MCG tablet Take 1,000 mcg by mouth daily      escitalopram (LEXAPRO) 5 MG tablet Take 5 mg by mouth daily      rosuvastatin (CRESTOR) 10 MG tablet Take 10 mg by mouth at bedtime      tretinoin (RETIN-A) 0.05 % cream Apply topically nightly      triamcinolone (KENALOG) 0.1 % cream Apply topically 3 times daily      pantoprazole (PROTONIX) 40 MG tablet Take 1 tablet by mouth daily TAKE 1 TABLET BY MOUTH DAILY 90 tablet 3    clonazePAM (KLONOPIN) 0.5 MG tablet Take 0.5 mg by mouth. (Patient not taking: Reported on 8/18/2022)      naloxone 4 MG/0.1ML LIQD nasal spray 1 spray by Nasal route once as needed (Patient not taking: Reported on 8/18/2022)       No current facility-administered medications for this visit. Allergies   Allergen Reactions    Cefaclor Nausea Only    Qemrl-Wobwi-Ikildho-Pramoxine Other (See Comments)     Patient unsure     Cephalexin Nausea And Vomiting    Clarithromycin Nausea And Vomiting     Patient unsure        REVIEW OF SYSTEMS    Review of Systems   Constitutional:  Negative for chills, fever and unexpected weight change.    Eyes:  Negative for visual disturbance. Respiratory:  Negative for shortness of breath. Cardiovascular:  Positive for chest pain. Negative for palpitations and leg swelling. Neurological:  Positive for headaches. PHYSICAL EXAM    /82 (Site: Left Upper Arm)   Ht 5' 7\" (1.702 m)   Wt 158 lb (71.7 kg)   BMI 24.75 kg/m²      Physical Exam  Vitals and nursing note reviewed. Constitutional:       Appearance: Normal appearance. She is not ill-appearing. HENT:      Head:      Comments: Tenderness right temporal area. No overlying skin changes  Cardiovascular:      Rate and Rhythm: Normal rate and regular rhythm. Pulmonary:      Effort: Pulmonary effort is normal.      Breath sounds: Normal breath sounds. Musculoskeletal:      Right lower leg: No edema. Left lower leg: No edema. Neurological:      Mental Status: She is alert. Psychiatric:         Mood and Affect: Mood normal.         Behavior: Behavior normal.        FOLLOW UP    Return for Pending test results.

## 2022-08-24 RX ORDER — AMLODIPINE BESYLATE 5 MG/1
TABLET ORAL
Qty: 90 TABLET | Refills: 3 | Status: SHIPPED | OUTPATIENT
Start: 2022-08-24

## 2022-08-24 NOTE — TELEPHONE ENCOUNTER
Requested Prescriptions     Pending Prescriptions Disp Refills    amLODIPine (NORVASC) 5 MG tablet [Pharmacy Med Name: amLODIPine Besylate 5 MG Oral Tablet] 90 tablet 3     Sig: TAKE 1 TABLET BY MOUTH  DAILY

## 2022-09-22 ENCOUNTER — OFFICE VISIT (OUTPATIENT)
Dept: INTERNAL MEDICINE CLINIC | Facility: CLINIC | Age: 72
End: 2022-09-22
Payer: MEDICARE

## 2022-09-22 VITALS
DIASTOLIC BLOOD PRESSURE: 72 MMHG | BODY MASS INDEX: 24.4 KG/M2 | WEIGHT: 155.5 LBS | HEIGHT: 67 IN | SYSTOLIC BLOOD PRESSURE: 112 MMHG

## 2022-09-22 DIAGNOSIS — H69.91 EUSTACHIAN TUBE DISORDER, RIGHT: Primary | ICD-10-CM

## 2022-09-22 DIAGNOSIS — I10 ESSENTIAL HYPERTENSION: ICD-10-CM

## 2022-09-22 PROCEDURE — 1123F ACP DISCUSS/DSCN MKR DOCD: CPT | Performed by: INTERNAL MEDICINE

## 2022-09-22 PROCEDURE — 99213 OFFICE O/P EST LOW 20 MIN: CPT | Performed by: INTERNAL MEDICINE

## 2022-09-22 RX ORDER — CETIRIZINE HYDROCHLORIDE, PSEUDOEPHEDRINE HYDROCHLORIDE 5; 120 MG/1; MG/1
1 TABLET, FILM COATED, EXTENDED RELEASE ORAL DAILY
Qty: 30 TABLET | Refills: 1 | Status: SHIPPED | OUTPATIENT
Start: 2022-09-22 | End: 2022-10-22

## 2022-09-22 NOTE — PROGRESS NOTES
She had R ear pain for the last few days that goes down into her throat. She stopped Claritin and Zyrtec. Past Medical History, Past Surgical History, Family history, Social History, and Medications were all reviewed with the patient today and updated as necessary. Current Outpatient Medications   Medication Sig Dispense Refill    amLODIPine (NORVASC) 5 MG tablet TAKE 1 TABLET BY MOUTH  DAILY 90 tablet 3    pantoprazole (PROTONIX) 40 MG tablet Take 1 tablet by mouth daily TAKE 1 TABLET BY MOUTH DAILY 90 tablet 3    acetaminophen (TYLENOL) 500 MG tablet Take 2 tablets by mouth every 6 hours as needed      Cholecalciferol 50 MCG (2000 UT) CAPS Take 2,000 Units by mouth daily      ascorbic acid (VITAMIN C) 500 MG tablet Take 500 mg by mouth daily      aspirin 81 MG EC tablet Take 81 mg by mouth daily      clindamycin (CLINDAGEL) 1 % gel APPLY TO THE AFFECTED AREA FOR ACNE ON FACE TWICE A DAY      cyanocobalamin 1000 MCG tablet Take 1,000 mcg by mouth daily      escitalopram (LEXAPRO) 5 MG tablet Take 5 mg by mouth daily      rosuvastatin (CRESTOR) 10 MG tablet Take 10 mg by mouth at bedtime      tretinoin (RETIN-A) 0.05 % cream Apply topically nightly      triamcinolone (KENALOG) 0.1 % cream Apply topically 3 times daily       No current facility-administered medications for this visit.      Allergies   Allergen Reactions    Cefaclor Nausea Only    Ndsbu-Gikcj-Bnceuma-Pramoxine Other (See Comments)     Patient unsure     Cephalexin Nausea And Vomiting    Clarithromycin Nausea And Vomiting     Patient unsure      Patient Active Problem List   Diagnosis    Polyarthralgia    Essential hypertension    History of COVID-19    Coronary artery disease involving native coronary artery of native heart without angina pectoris    Long term (current) use of non-steroidal anti-inflammatories (nsaid)    Myalgia    GERD (gastroesophageal reflux disease)    Fibromyalgia    Chronic lumbar pain    Generalized osteoarthritis COVID-19    History of temporal arteritis    H/O heart artery stent    Pure hypercholesterolemia    S/P cholecystectomy    Long term (current) use of opiate analgesic    Statin myopathy    Statin intolerance    Chronic depression    Osteopenia    KATJA (generalized anxiety disorder)    Long term systemic steroid user    Abnormal glucose    Overweight    Pain in thoracic spine    Degeneration of intervertebral disc    Eustachian tube disorder, right         Review of Systems   HENT:  Positive for ear pain. OBJECTIVE:  /72   Ht 5' 7\" (1.702 m)   Wt 155 lb 8 oz (70.5 kg)   BMI 24.35 kg/m²      Physical Exam  HENT:      Right Ear: Tympanic membrane, ear canal and external ear normal. No swelling or tenderness. Tympanic membrane is not injected. Lymphadenopathy:      Cervical: No cervical adenopathy. Right cervical: No superficial, deep or posterior cervical adenopathy. Medical problems and test results were reviewed with the patient today. No results found for this or any previous visit (from the past 672 hour(s)). ASSESSMENT and PLAN    1. Eustachian tube disorder, right  The following orders have not been finalized:  -     cetirizine-psuedoephedrine (ZYRTEC-D) 5-120 MG per extended release tablet  2.  Essential hypertension       She will get Zyrtec D from pharmacist and keep an eye on her blood pressure

## 2022-10-17 ENCOUNTER — OFFICE VISIT (OUTPATIENT)
Dept: INTERNAL MEDICINE CLINIC | Facility: CLINIC | Age: 72
End: 2022-10-17
Payer: MEDICARE

## 2022-10-17 VITALS
HEIGHT: 67 IN | DIASTOLIC BLOOD PRESSURE: 64 MMHG | BODY MASS INDEX: 24.64 KG/M2 | WEIGHT: 157 LBS | SYSTOLIC BLOOD PRESSURE: 104 MMHG

## 2022-10-17 DIAGNOSIS — M79.10 MYALGIA: ICD-10-CM

## 2022-10-17 DIAGNOSIS — Z12.31 ENCOUNTER FOR SCREENING MAMMOGRAM FOR MALIGNANT NEOPLASM OF BREAST: ICD-10-CM

## 2022-10-17 DIAGNOSIS — Z78.0 ASYMPTOMATIC MENOPAUSAL STATE: ICD-10-CM

## 2022-10-17 DIAGNOSIS — R73.09 ABNORMAL GLUCOSE: ICD-10-CM

## 2022-10-17 DIAGNOSIS — Z23 NEED FOR PROPHYLACTIC VACCINATION AND INOCULATION AGAINST VARICELLA: ICD-10-CM

## 2022-10-17 DIAGNOSIS — E78.00 PURE HYPERCHOLESTEROLEMIA: ICD-10-CM

## 2022-10-17 DIAGNOSIS — H69.91 EUSTACHIAN TUBE DISORDER, RIGHT: ICD-10-CM

## 2022-10-17 DIAGNOSIS — K21.9 GASTROESOPHAGEAL REFLUX DISEASE WITHOUT ESOPHAGITIS: ICD-10-CM

## 2022-10-17 DIAGNOSIS — I10 ESSENTIAL HYPERTENSION: ICD-10-CM

## 2022-10-17 DIAGNOSIS — Z12.11 SCREENING FOR MALIGNANT NEOPLASM OF COLON: ICD-10-CM

## 2022-10-17 DIAGNOSIS — F41.1 GAD (GENERALIZED ANXIETY DISORDER): ICD-10-CM

## 2022-10-17 DIAGNOSIS — Z00.00 MEDICARE ANNUAL WELLNESS VISIT, SUBSEQUENT: Primary | ICD-10-CM

## 2022-10-17 LAB
ALBUMIN SERPL-MCNC: 3.8 G/DL (ref 3.2–4.6)
ALBUMIN/GLOB SERPL: 1.3 {RATIO} (ref 0.4–1.6)
ALP SERPL-CCNC: 112 U/L (ref 50–136)
ALT SERPL-CCNC: 15 U/L (ref 12–65)
ANION GAP SERPL CALC-SCNC: 7 MMOL/L (ref 2–11)
AST SERPL-CCNC: 12 U/L (ref 15–37)
BACTERIA URNS QL MICRO: NEGATIVE /HPF
BASOPHILS # BLD: 0 K/UL (ref 0–0.2)
BASOPHILS NFR BLD: 1 % (ref 0–2)
BILIRUB SERPL-MCNC: 0.5 MG/DL (ref 0.2–1.1)
BUN SERPL-MCNC: 10 MG/DL (ref 8–23)
CALCIUM SERPL-MCNC: 9.5 MG/DL (ref 8.3–10.4)
CASTS URNS QL MICRO: ABNORMAL /LPF
CHLORIDE SERPL-SCNC: 107 MMOL/L (ref 101–110)
CHOLEST SERPL-MCNC: 196 MG/DL
CO2 SERPL-SCNC: 26 MMOL/L (ref 21–32)
CREAT SERPL-MCNC: 0.7 MG/DL (ref 0.6–1)
DIFFERENTIAL METHOD BLD: NORMAL
EOSINOPHIL # BLD: 0.2 K/UL (ref 0–0.8)
EOSINOPHIL NFR BLD: 2 % (ref 0.5–7.8)
EPI CELLS #/AREA URNS HPF: ABNORMAL /HPF
ERYTHROCYTE [DISTWIDTH] IN BLOOD BY AUTOMATED COUNT: 12 % (ref 11.9–14.6)
EST. AVERAGE GLUCOSE BLD GHB EST-MCNC: 148 MG/DL
GLOBULIN SER CALC-MCNC: 2.9 G/DL (ref 2.8–4.5)
GLUCOSE SERPL-MCNC: 98 MG/DL (ref 65–100)
HBA1C MFR BLD: 6.8 % (ref 4.8–5.6)
HCT VFR BLD AUTO: 43.1 % (ref 35.8–46.3)
HDLC SERPL-MCNC: 63 MG/DL (ref 40–60)
HDLC SERPL: 3.1 {RATIO}
HGB BLD-MCNC: 13.9 G/DL (ref 11.7–15.4)
IMM GRANULOCYTES # BLD AUTO: 0 K/UL (ref 0–0.5)
IMM GRANULOCYTES NFR BLD AUTO: 0 % (ref 0–5)
LDLC SERPL CALC-MCNC: 107 MG/DL
LYMPHOCYTES # BLD: 2.2 K/UL (ref 0.5–4.6)
LYMPHOCYTES NFR BLD: 33 % (ref 13–44)
MCH RBC QN AUTO: 31.6 PG (ref 26.1–32.9)
MCHC RBC AUTO-ENTMCNC: 32.3 G/DL (ref 31.4–35)
MCV RBC AUTO: 98 FL (ref 82–102)
MONOCYTES # BLD: 0.6 K/UL (ref 0.1–1.3)
MONOCYTES NFR BLD: 10 % (ref 4–12)
NEUTS SEG # BLD: 3.6 K/UL (ref 1.7–8.2)
NEUTS SEG NFR BLD: 54 % (ref 43–78)
NRBC # BLD: 0 K/UL (ref 0–0.2)
PLATELET # BLD AUTO: 352 K/UL (ref 150–450)
PMV BLD AUTO: 9.9 FL (ref 9.4–12.3)
POTASSIUM SERPL-SCNC: 4.3 MMOL/L (ref 3.5–5.1)
PROT SERPL-MCNC: 6.7 G/DL (ref 6.3–8.2)
RBC # BLD AUTO: 4.4 M/UL (ref 4.05–5.2)
RBC #/AREA URNS HPF: ABNORMAL /HPF
SODIUM SERPL-SCNC: 140 MMOL/L (ref 133–143)
TRIGL SERPL-MCNC: 130 MG/DL (ref 35–150)
TSH W FREE THYROID IF ABNORMAL: 1.82 UIU/ML (ref 0.36–3.74)
VLDLC SERPL CALC-MCNC: 26 MG/DL (ref 6–23)
WBC # BLD AUTO: 6.7 K/UL (ref 4.3–11.1)
WBC URNS QL MICRO: ABNORMAL /HPF

## 2022-10-17 PROCEDURE — G0439 PPPS, SUBSEQ VISIT: HCPCS | Performed by: NURSE PRACTITIONER

## 2022-10-17 PROCEDURE — 1123F ACP DISCUSS/DSCN MKR DOCD: CPT | Performed by: NURSE PRACTITIONER

## 2022-10-17 ASSESSMENT — PATIENT HEALTH QUESTIONNAIRE - PHQ9
SUM OF ALL RESPONSES TO PHQ QUESTIONS 1-9: 1
SUM OF ALL RESPONSES TO PHQ QUESTIONS 1-9: 1
9. THOUGHTS THAT YOU WOULD BE BETTER OFF DEAD, OR OF HURTING YOURSELF: 0
SUM OF ALL RESPONSES TO PHQ9 QUESTIONS 1 & 2: 1
7. TROUBLE CONCENTRATING ON THINGS, SUCH AS READING THE NEWSPAPER OR WATCHING TELEVISION: 0
5. POOR APPETITE OR OVEREATING: 0
2. FEELING DOWN, DEPRESSED OR HOPELESS: 0
1. LITTLE INTEREST OR PLEASURE IN DOING THINGS: 1
3. TROUBLE FALLING OR STAYING ASLEEP: 0
4. FEELING TIRED OR HAVING LITTLE ENERGY: 0
SUM OF ALL RESPONSES TO PHQ QUESTIONS 1-9: 1
8. MOVING OR SPEAKING SO SLOWLY THAT OTHER PEOPLE COULD HAVE NOTICED. OR THE OPPOSITE, BEING SO FIGETY OR RESTLESS THAT YOU HAVE BEEN MOVING AROUND A LOT MORE THAN USUAL: 0
SUM OF ALL RESPONSES TO PHQ QUESTIONS 1-9: 1
10. IF YOU CHECKED OFF ANY PROBLEMS, HOW DIFFICULT HAVE THESE PROBLEMS MADE IT FOR YOU TO DO YOUR WORK, TAKE CARE OF THINGS AT HOME, OR GET ALONG WITH OTHER PEOPLE: 0
6. FEELING BAD ABOUT YOURSELF - OR THAT YOU ARE A FAILURE OR HAVE LET YOURSELF OR YOUR FAMILY DOWN: 0

## 2022-10-17 ASSESSMENT — LIFESTYLE VARIABLES
HOW MANY STANDARD DRINKS CONTAINING ALCOHOL DO YOU HAVE ON A TYPICAL DAY: PATIENT DOES NOT DRINK
HOW OFTEN DO YOU HAVE A DRINK CONTAINING ALCOHOL: NEVER

## 2022-10-17 NOTE — PROGRESS NOTES
Medicare Annual Wellness Visit    Keesha Grullon is here for Medicare AWV (Pt here for AWE part one and she is fasting )    Assessment & Plan   Medicare annual wellness visit, subsequent  Discussed BMI and healthy weight and diet, weight bearing exercise, smoking avoidance, sun protection and medication compliance. Reviewed appropriate health maintenance screening. The patient was counseled regarding regular monthly SBE, screening procedures and recommended schedules for immunizations, mammography, Pap smears, GI hemoccult testing, colonoscopy and BMD.  Due for colonoscopy and referral placed. Mammo and DEXA due. Screening for malignant neoplasm of colon  -     [facility]  Need for prophylactic vaccination and inoculation against varicella  -     zoster recombinant adjuvanted vaccine [facility]) 50 MCG/0.5ML SUSR injection; Inject 0.5 mLs into the muscle once for 1 dose, Disp-0.5 mL, R-0Print  Essential hypertension  -     TSH with Reflex; Future  -     Urinalysis, Micro; Future  Gastroesophageal reflux disease without esophagitis  KATJA (generalized anxiety disorder)  Abnormal glucose  -     Hemoglobin A1C; Future  Pure hypercholesterolemia  -     Comprehensive Metabolic Panel; Future  -     Lipid Panel; Future    Myalgia  -     CBC with Auto Differential; Future  -     CK; Future    Recommendations for Preventive Services Due: see orders and patient instructions/AVS.  Recommended screening schedule for the next 5-10 years is provided to the patient in written form: see Patient Instructions/AVS.     Return for FU this week. Subjective   The following acute and/or chronic problems were also addressed today:    Patient's complete Health Risk Assessment and screening values have been reviewed and are found in Flowsheets. The following problems were reviewed today and where indicated follow up appointments were made and/or referrals ordered.     Positive Risk Factor Screenings with Interventions:             General Health and ACP:  General  In general, how would you say your health is?: Good  In the past 7 days, have you experienced any of the following: New or Increased Pain, New or Increased Fatigue, Loneliness, Social Isolation, Stress or Anger?: (!) Yes  Select all that apply: (!) New or Increased Pain  Do you get the social and emotional support that you need? Yes  Do you have a Living Will?: Yes    Advance Directives       Power of  Living Will ACP-Advance Directive ACP-Power of     Not on File Filed on 06/04/18 Filed Not on File          General Health Risk Interventions:  Has living will     Health Habits/Nutrition:  Physical Activity: Sufficiently Active    Days of Exercise per Week: 3 days    Minutes of Exercise per Session: 50 min     Have you lost any weight without trying in the past 3 months?: No  Body mass index: 24.59  Have you seen the dentist within the past year?: (!) No  Health Habits/Nutrition Interventions:  Dental exam overdue:  patient encouraged to make appointment with his/her dentist     Safety:  Do you have working smoke detectors?: Yes  Do you have any tripping hazards - loose or unsecured carpets or rugs?: No  Do you have any tripping hazards - clutter in doorways, halls, or stairs?: No  Do you have either shower bars, grab bars, non-slip mats or non-slip surfaces in your shower or bathtub?: (!) No  Do all of your stairways have a railing or banister?: (!) No  Do you always fasten your seatbelt when you are in a car?: Yes  Safety Interventions:  Home safety tips provided           Objective   Vitals:    10/17/22 0946   BP: 104/64   Weight: 157 lb (71.2 kg)   Height: 5' 7\" (1.702 m)      Body mass index is 24.59 kg/m².              Allergies   Allergen Reactions    Cefaclor Nausea Only    Uztrn-Ngfzv-Efzniub-Pramoxine Other (See Comments)     Patient unsure     Cephalexin Nausea And Vomiting    Clarithromycin Nausea And Vomiting     Patient unsure      Prior to Visit Medications    Medication Sig Taking?  Authorizing Provider   zoster recombinant adjuvanted vaccine (SHINGRIX) 50 MCG/0.5ML SUSR injection Inject 0.5 mLs into the muscle once for 1 dose Yes CISCO Owusu CNP   cetirizine-psuedoephedrine (ZYRTEC-D) 5-120 MG per extended release tablet Take 1 tablet by mouth daily Yes Claudell Norma, MD   amLODIPine (NORVASC) 5 MG tablet TAKE 1 TABLET BY MOUTH  DAILY  Patient taking differently: Take 5 mg by mouth daily Yes Hussain Gamez MD   pantoprazole (PROTONIX) 40 MG tablet Take 1 tablet by mouth daily TAKE 1 TABLET BY MOUTH DAILY Yes CISCO Owusu CNP   acetaminophen (TYLENOL) 500 MG tablet Take 2 tablets by mouth every 6 hours as needed Yes Historical Provider, MD   Cholecalciferol 50 MCG (2000 UT) CAPS Take 2,000 Units by mouth daily Yes Historical Provider, MD   ascorbic acid (VITAMIN C) 500 MG tablet Take 500 mg by mouth daily Yes Ar Automatic Reconciliation   aspirin 81 MG EC tablet Take 81 mg by mouth daily Yes Ar Automatic Reconciliation   clindamycin (CLINDAGEL) 1 % gel APPLY TO THE AFFECTED AREA FOR ACNE ON FACE TWICE A DAY Yes Ar Automatic Reconciliation   cyanocobalamin 1000 MCG tablet Take 1,000 mcg by mouth daily Yes Ar Automatic Reconciliation   escitalopram (LEXAPRO) 5 MG tablet Take 5 mg by mouth daily Yes Ar Automatic Reconciliation   rosuvastatin (CRESTOR) 10 MG tablet Take 10 mg by mouth at bedtime Yes Ar Automatic Reconciliation   tretinoin (RETIN-A) 0.05 % cream Apply topically nightly Yes Ar Automatic Reconciliation   triamcinolone (KENALOG) 0.1 % cream Apply topically 3 times daily Yes Ar Automatic Reconciliation       CareTeam (Including outside providers/suppliers regularly involved in providing care):   Patient Care Team:  CISCO Cano CNP as PCP - General  CISCO Cano CNP as PCP - [facility] Empaneled Provider  Michela Melgar (Cardiology)     Reviewed and updated this visit:  Tobacco  Allergies  Meds  Problems  Med Hx  Surg Hx  Soc Hx  Fam Hx

## 2022-10-17 NOTE — PATIENT INSTRUCTIONS
Personalized Preventive Plan for Jose Manuel Lentz - 10/17/2022  Medicare offers a range of preventive health benefits. Some of the tests and screenings are paid in full while other may be subject to a deductible, co-insurance, and/or copay. Some of these benefits include a comprehensive review of your medical history including lifestyle, illnesses that may run in your family, and various assessments and screenings as appropriate. After reviewing your medical record and screening and assessments performed today your provider may have ordered immunizations, labs, imaging, and/or referrals for you. A list of these orders (if applicable) as well as your Preventive Care list are included within your After Visit Summary for your review. Other Preventive Recommendations:    A preventive eye exam performed by an eye specialist is recommended every 1-2 years to screen for glaucoma; cataracts, macular degeneration, and other eye disorders. A preventive dental visit is recommended every 6 months. Try to get at least 150 minutes of exercise per week or 10,000 steps per day on a pedometer . Order or download the FREE \"Exercise & Physical Activity: Your Everyday Guide\" from The [employer]. Call 5-990.581.2311 or search The [employer] online. You need 3035-5589 mg of calcium and 2936-0780 IU of vitamin D per day. It is possible to meet your calcium requirement with diet alone, but a vitamin D supplement is usually necessary to meet this goal.  When exposed to the sun, use a sunscreen that protects against both UVA and UVB radiation with an SPF of 30 or greater. Reapply every 2 to 3 hours or after sweating, drying off with a towel, or swimming. Always wear a seat belt when traveling in a car. Always wear a helmet when riding a bicycle or motorcycle.

## 2022-10-20 ENCOUNTER — TELEPHONE (OUTPATIENT)
Dept: INTERNAL MEDICINE CLINIC | Facility: CLINIC | Age: 72
End: 2022-10-20

## 2022-10-20 ENCOUNTER — CLINICAL DOCUMENTATION (OUTPATIENT)
Dept: SURGERY | Age: 72
End: 2022-10-20

## 2022-10-20 ENCOUNTER — OFFICE VISIT (OUTPATIENT)
Dept: INTERNAL MEDICINE CLINIC | Facility: CLINIC | Age: 72
End: 2022-10-20
Payer: MEDICARE

## 2022-10-20 VITALS
SYSTOLIC BLOOD PRESSURE: 122 MMHG | WEIGHT: 158 LBS | HEART RATE: 77 BPM | BODY MASS INDEX: 24.75 KG/M2 | OXYGEN SATURATION: 98 % | DIASTOLIC BLOOD PRESSURE: 84 MMHG

## 2022-10-20 DIAGNOSIS — M15.9 GENERALIZED OSTEOARTHRITIS: ICD-10-CM

## 2022-10-20 DIAGNOSIS — I10 ESSENTIAL HYPERTENSION: Primary | ICD-10-CM

## 2022-10-20 DIAGNOSIS — H69.91 EUSTACHIAN TUBE DISORDER, RIGHT: ICD-10-CM

## 2022-10-20 DIAGNOSIS — I25.10 CORONARY ARTERY DISEASE INVOLVING NATIVE CORONARY ARTERY OF NATIVE HEART WITHOUT ANGINA PECTORIS: ICD-10-CM

## 2022-10-20 DIAGNOSIS — F32.A CHRONIC DEPRESSION: ICD-10-CM

## 2022-10-20 DIAGNOSIS — M79.10 MYALGIA: ICD-10-CM

## 2022-10-20 DIAGNOSIS — E78.00 PURE HYPERCHOLESTEROLEMIA: ICD-10-CM

## 2022-10-20 DIAGNOSIS — K21.9 GASTROESOPHAGEAL REFLUX DISEASE WITHOUT ESOPHAGITIS: ICD-10-CM

## 2022-10-20 DIAGNOSIS — M85.80 OSTEOPENIA, UNSPECIFIED LOCATION: ICD-10-CM

## 2022-10-20 DIAGNOSIS — Z95.5 H/O HEART ARTERY STENT: ICD-10-CM

## 2022-10-20 DIAGNOSIS — F41.1 GAD (GENERALIZED ANXIETY DISORDER): ICD-10-CM

## 2022-10-20 DIAGNOSIS — Z78.9 STATIN INTOLERANCE: ICD-10-CM

## 2022-10-20 DIAGNOSIS — R73.09 ABNORMAL GLUCOSE: ICD-10-CM

## 2022-10-20 PROCEDURE — 1123F ACP DISCUSS/DSCN MKR DOCD: CPT | Performed by: NURSE PRACTITIONER

## 2022-10-20 PROCEDURE — 99215 OFFICE O/P EST HI 40 MIN: CPT | Performed by: NURSE PRACTITIONER

## 2022-10-20 ASSESSMENT — ENCOUNTER SYMPTOMS
SHORTNESS OF BREATH: 0
EYE REDNESS: 0
DIARRHEA: 0
NAUSEA: 0
COLOR CHANGE: 0
ABDOMINAL PAIN: 0
COUGH: 0
BACK PAIN: 0
EYE ITCHING: 0
CONSTIPATION: 0
BLOOD IN STOOL: 0

## 2022-10-20 NOTE — TELEPHONE ENCOUNTER
I called lab services and I spoke to Kalin who informed me pt' s CK was not released by Nisreen Phillips on 10/ 17 along with her other labs that was released and this could not be added since sample is only good for 2 days in refreg

## 2022-10-20 NOTE — PROGRESS NOTES
PROGRESS NOTE      Chief Complaint   Patient presents with    Follow-up    Discuss Labs       HPI    Hyperlipidemia with documented statin intolerance and ascvd: Previously on Repatha with tolerance and good control and receiving for free through patient assistance. Pharmacy informed her that she would now be responsible for $50 copay and cannot afford. Crestor 10 mg prescribed by cardiology and she does complain of generalized muscle pain.  - compliant with medication. Will see cardiologist next week. Hypertension: good control with amlodipine 5 mg    Anxiety and depression: Lexapro 5 mg with less anhedonia and improved well being. Tolerating. Joined [employer] but pain with exercise     Basal cell carcinoma: Right breast. Due for follow up with Dr Surjit Tovar and pressure: Some benefit with zyrtec d     Chronic back pain with intermittent sciatica. PT in past      Past Medical History, Past Surgical History, Family history, Social History, and Medications were all reviewed and updated as necessary.      Current Outpatient Medications   Medication Sig Dispense Refill    cetirizine-psuedoephedrine (ZYRTEC-D) 5-120 MG per extended release tablet Take 1 tablet by mouth daily 30 tablet 1    amLODIPine (NORVASC) 5 MG tablet TAKE 1 TABLET BY MOUTH  DAILY (Patient taking differently: Take 5 mg by mouth daily) 90 tablet 3    pantoprazole (PROTONIX) 40 MG tablet Take 1 tablet by mouth daily TAKE 1 TABLET BY MOUTH DAILY 90 tablet 3    acetaminophen (TYLENOL) 500 MG tablet Take 2 tablets by mouth every 6 hours as needed      Cholecalciferol 50 MCG (2000 UT) CAPS Take 2,000 Units by mouth daily      ascorbic acid (VITAMIN C) 500 MG tablet Take 500 mg by mouth daily      aspirin 81 MG EC tablet Take 81 mg by mouth daily      clindamycin (CLINDAGEL) 1 % gel APPLY TO THE AFFECTED AREA FOR ACNE ON FACE TWICE A DAY      cyanocobalamin 1000 MCG tablet Take 1,000 mcg by mouth daily      escitalopram (LEXAPRO) 5 MG tablet Take 5 mg by mouth daily      rosuvastatin (CRESTOR) 10 MG tablet Take 10 mg by mouth at bedtime      tretinoin (RETIN-A) 0.05 % cream Apply topically nightly      triamcinolone (KENALOG) 0.1 % cream Apply topically 3 times daily       No current facility-administered medications for this visit. Allergies   Allergen Reactions    Cefaclor Nausea Only    Zkqil-Ykiry-Gtbvvnm-Pramoxine Other (See Comments)     Patient unsure     Cephalexin Nausea And Vomiting    Clarithromycin Nausea And Vomiting     Patient unsure        ASSESSMENT and Christianne Pena was seen today for follow-up and discuss labs. Diagnoses and all orders for this visit:    Essential hypertension    Pure hypercholesterolemia  -     Lipid Panel; Future  -     Comprehensive Metabolic Panel; Future    Coronary artery disease involving native coronary artery of native heart without angina pectoris    H/O heart artery stent    Statin intolerance    Abnormal glucose  -     Hemoglobin A1C; Future    Gastroesophageal reflux disease without esophagitis    Myalgia  -     CK; Future    Eustachian tube disorder, right    Generalized osteoarthritis    Osteopenia, unspecified location    KATJA (generalized anxiety disorder)    Chronic depression    Reviewed labs and discussed significance. CK today. Reviewed labs and LDL not to goal in patient with history of ascvd and stent placement. Wants to discuss Karlynn Cea again with cardiology next week. Trial of Flonase  Must work on diet - limit carbs and sweet  Will follow weight and blood pressure    Medical problems and test results were reviewed with the patient today. FOLLOW UP    Return for 4 months fasting labs and ov. REVIEW OF SYSTEMS    Review of Systems   Constitutional:  Negative for activity change, appetite change, fatigue and unexpected weight change. HENT:  Negative for congestion and hearing loss. Eyes:  Negative for redness, itching and visual disturbance.    Respiratory: Negative for cough and shortness of breath. Cardiovascular:  Negative for chest pain, palpitations and leg swelling. Gastrointestinal:  Negative for abdominal pain, blood in stool, constipation, diarrhea and nausea. Genitourinary:  Negative for dysuria, frequency, hematuria and urgency. Musculoskeletal:  Negative for arthralgias, back pain, myalgias and neck pain. Skin:  Negative for color change and rash. Neurological:  Negative for dizziness, weakness, numbness and headaches. Psychiatric/Behavioral:  Negative for dysphoric mood and sleep disturbance. The patient is not nervous/anxious. PHYSICAL EXAM    /84   Pulse 77   Wt 158 lb (71.7 kg)   SpO2 98%   BMI 24.75 kg/m²      Physical Exam  Vitals and nursing note reviewed. Constitutional:       General: She is not in acute distress. Appearance: Normal appearance. HENT:      Head: Atraumatic. Right Ear: Tympanic membrane and ear canal normal.      Left Ear: Tympanic membrane and ear canal normal.      Nose: Nose normal.   Eyes:      Conjunctiva/sclera: Conjunctivae normal.   Neck:      Thyroid: No thyroid mass, thyromegaly or thyroid tenderness. Cardiovascular:      Rate and Rhythm: Normal rate and regular rhythm. Pulses:           Radial pulses are 2+ on the right side and 2+ on the left side. Dorsalis pedis pulses are 2+ on the right side and 2+ on the left side. Posterior tibial pulses are 2+ on the right side and 2+ on the left side. Pulmonary:      Effort: Pulmonary effort is normal.      Breath sounds: Normal breath sounds. Abdominal:      Palpations: Abdomen is soft. There is no hepatomegaly, splenomegaly or mass. Tenderness: There is no abdominal tenderness. Musculoskeletal:      Cervical back: Normal range of motion. Right lower leg: No edema. Left lower leg: No edema.    Feet:      Right foot:      Skin integrity: Skin integrity normal.      Toenail Condition: Right toenails are normal.      Left foot:      Skin integrity: Skin integrity normal.      Toenail Condition: Left toenails are normal.   Lymphadenopathy:      Cervical: No cervical adenopathy. Skin:     Findings: No rash. Neurological:      General: No focal deficit present. Mental Status: She is alert and oriented to person, place, and time. Motor: Motor function is intact.       Gait: Gait normal.   Psychiatric:         Attention and Perception: Attention normal.         Mood and Affect: Mood normal.         Speech: Speech normal.         Behavior: Behavior normal.

## 2022-10-20 NOTE — TELEPHONE ENCOUNTER
----- Message from CISCO Tompkins CNP sent at 10/17/2022  7:12 PM EDT -----  Can you please check on ck? This was ordered with other labs today but doesn't look like it was processed?  Thank you

## 2022-10-21 LAB — CK SERPL-CCNC: 57 U/L (ref 21–215)

## 2022-10-27 ENCOUNTER — TELEPHONE (OUTPATIENT)
Dept: CARDIOLOGY CLINIC | Age: 72
End: 2022-10-27

## 2022-10-27 ENCOUNTER — TELEPHONE (OUTPATIENT)
Dept: INTERNAL MEDICINE CLINIC | Facility: CLINIC | Age: 72
End: 2022-10-27

## 2022-10-27 ENCOUNTER — OFFICE VISIT (OUTPATIENT)
Dept: CARDIOLOGY CLINIC | Age: 72
End: 2022-10-27
Payer: MEDICARE

## 2022-10-27 VITALS
BODY MASS INDEX: 24.8 KG/M2 | HEART RATE: 68 BPM | SYSTOLIC BLOOD PRESSURE: 118 MMHG | HEIGHT: 67 IN | DIASTOLIC BLOOD PRESSURE: 70 MMHG | WEIGHT: 158 LBS

## 2022-10-27 DIAGNOSIS — I10 ESSENTIAL HYPERTENSION: ICD-10-CM

## 2022-10-27 DIAGNOSIS — E78.00 PURE HYPERCHOLESTEROLEMIA: ICD-10-CM

## 2022-10-27 DIAGNOSIS — I25.10 CORONARY ARTERY DISEASE INVOLVING NATIVE CORONARY ARTERY OF NATIVE HEART WITHOUT ANGINA PECTORIS: Primary | ICD-10-CM

## 2022-10-27 PROCEDURE — 3078F DIAST BP <80 MM HG: CPT | Performed by: INTERNAL MEDICINE

## 2022-10-27 PROCEDURE — 1123F ACP DISCUSS/DSCN MKR DOCD: CPT | Performed by: INTERNAL MEDICINE

## 2022-10-27 PROCEDURE — 3074F SYST BP LT 130 MM HG: CPT | Performed by: INTERNAL MEDICINE

## 2022-10-27 PROCEDURE — 99214 OFFICE O/P EST MOD 30 MIN: CPT | Performed by: INTERNAL MEDICINE

## 2022-10-27 NOTE — TELEPHONE ENCOUNTER
Please call the patient back to speak with you about getting the Repatha shot. She prefers to wait for you until you are back from your time off. Her phone number is [phone].

## 2022-10-27 NOTE — PROGRESS NOTES
[facility] CARDIOLOGY  [address] [facility], [address]  PHONE: [phone]      10/27/22    NAME:  Erica Perea  : 1950  MRN: [id number]       SUBJECTIVE:   Erica Perea is a 70 y.o. female seen for a follow up visit regarding the following:     No chief complaint on file. HPI:    No cp or dutton. No orthopnea or pnd. No palpitations or syncope. Past Medical History, Past Surgical History, Family history, Social History, and Medications were all reviewed with the patient today and updated as necessary. Current Outpatient Medications   Medication Sig Dispense Refill    amLODIPine (NORVASC) 5 MG tablet TAKE 1 TABLET BY MOUTH  DAILY (Patient taking differently: Take 5 mg by mouth daily) 90 tablet 3    pantoprazole (PROTONIX) 40 MG tablet Take 1 tablet by mouth daily TAKE 1 TABLET BY MOUTH DAILY 90 tablet 3    acetaminophen (TYLENOL) 500 MG tablet Take 2 tablets by mouth every 6 hours as needed      Cholecalciferol 50 MCG (2000 UT) CAPS Take 2,000 Units by mouth daily      ascorbic acid (VITAMIN C) 500 MG tablet Take 500 mg by mouth daily      aspirin 81 MG EC tablet Take 81 mg by mouth daily      clindamycin (CLINDAGEL) 1 % gel APPLY TO THE AFFECTED AREA FOR ACNE ON FACE TWICE A DAY      cyanocobalamin 1000 MCG tablet Take 1,000 mcg by mouth daily      escitalopram (LEXAPRO) 5 MG tablet Take 5 mg by mouth daily      rosuvastatin (CRESTOR) 10 MG tablet Take 10 mg by mouth at bedtime      tretinoin (RETIN-A) 0.05 % cream Apply topically nightly      triamcinolone (KENALOG) 0.1 % cream Apply topically 3 times daily       No current facility-administered medications for this visit.                Social History     Tobacco Use    Smoking status: Former     Packs/day: 0.50     Years: 25.00     Pack years: 12.50     Types: Cigarettes     Quit date: 2004     Years since quittin.1    Smokeless tobacco: Never    Tobacco comments:     Quit smoking: started at age 23 Substance Use Topics    Alcohol use: No              PHYSICAL EXAM:   There were no vitals taken for this visit. Constitutional: Oriented to person, place, and time. Appears well-developed and well-nourished. Head: Normocephalic and atraumatic. Neck: Neck supple. Cardiovascular: Normal rate and regular rhythm with no murmur -No JVP  Pulmonary/Chest: Breath sounds normal.   Abdominal: Soft. Musculoskeletal: No edema. Neurological: Alert and oriented to person, place, and time. Skin: Skin is warm and dry. Psychiatric: Normal mood and affect. Vitals reviewed. Wt Readings from Last 3 Encounters:   10/20/22 158 lb (71.7 kg)   10/17/22 157 lb (71.2 kg)   09/22/22 155 lb 8 oz (70.5 kg)       Medical problems and test results were reviewed with the patient today. ASSESSMENT and PLAN    1. Coronary artery disease involving native coronary artery of native heart without angina pectoris  Stable. Continue asa      2. Essential hypertension  Stable. Continue norvasc      3. Pure hypercholesterolemia  Stable. ldl 107- myalgias with crestor and not at goal- will stop and start repatha 140 mg every two weeks  Flp before next visit       Return in about 6 months (around 4/27/2023).          Eneida Mcguire MD  10/27/2022  10:36 AM

## 2022-10-31 NOTE — TELEPHONE ENCOUNTER
Pt was made aware to call cardio back and discuss with them their recommendation to stop the Creator and to re-start Repatha she had taken yrs ago

## 2022-11-01 NOTE — TELEPHONE ENCOUNTER
Pt is calling she needs to speak to a nurse regarding her Repatha medicine  She can not afford this medicine and would like to talk with someone about getting help with the cost .Please call pt  asap

## 2022-11-08 ENCOUNTER — PREP FOR PROCEDURE (OUTPATIENT)
Dept: SURGERY | Age: 72
End: 2022-11-08

## 2022-11-08 ENCOUNTER — APPOINTMENT (OUTPATIENT)
Dept: MAMMOGRAPHY | Age: 72
End: 2022-11-08
Payer: MEDICARE

## 2022-11-08 ENCOUNTER — TELEPHONE (OUTPATIENT)
Dept: INTERNAL MEDICINE CLINIC | Facility: CLINIC | Age: 72
End: 2022-11-08

## 2022-11-08 ENCOUNTER — HOSPITAL ENCOUNTER (OUTPATIENT)
Dept: MAMMOGRAPHY | Age: 72
Discharge: HOME | End: 2022-11-11
Payer: MEDICARE

## 2022-11-08 DIAGNOSIS — Z12.31 ENCOUNTER FOR SCREENING MAMMOGRAM FOR MALIGNANT NEOPLASM OF BREAST: ICD-10-CM

## 2022-11-08 DIAGNOSIS — Z78.0 ASYMPTOMATIC MENOPAUSAL STATE: ICD-10-CM

## 2022-11-08 PROBLEM — Z86.0100 PERSONAL HISTORY OF COLONIC POLYPS: Status: ACTIVE | Noted: 2022-11-08

## 2022-11-08 PROBLEM — Z86.010 PERSONAL HISTORY OF COLONIC POLYPS: Status: ACTIVE | Noted: 2022-11-08

## 2022-11-08 PROCEDURE — 77067 SCR MAMMO BI INCL CAD: CPT

## 2022-11-08 PROCEDURE — 77080 DXA BONE DENSITY AXIAL: CPT

## 2022-11-08 NOTE — TELEPHONE ENCOUNTER
Pt was given [address] ([place] [address]) [phone] so she can check on referral for her colonoscopy

## 2022-11-08 NOTE — TELEPHONE ENCOUNTER
The patient stopped by to check the status of her colonoscopy referral status. Please call her back at [phone].

## 2022-11-10 ENCOUNTER — TELEPHONE (OUTPATIENT)
Dept: INTERNAL MEDICINE CLINIC | Facility: CLINIC | Age: 72
End: 2022-11-10

## 2022-11-10 NOTE — TELEPHONE ENCOUNTER
----- Message from [facility] sent at 11/10/2022 10:50 AM EST -----  Subject: Message to Provider    QUESTIONS  Information for Provider? Pt says she has referred a friend to Cristi Fraser and would like to help her friend schedule an appt. Please   contact.  ---------------------------------------------------------------------------  --------------  Neymar Sandra [facility]  [phone]; OK to leave message on voicemail  ---------------------------------------------------------------------------  --------------  SCRIPT ANSWERS  Relationship to Patient?  Self

## 2022-12-06 ENCOUNTER — RX ONLY (OUTPATIENT)
Age: 72
Setting detail: RX ONLY
End: 2022-12-06

## 2022-12-06 RX ORDER — CLINDAMYCIN PHOSPHATE 10 MG/G
GEL TOPICAL
Qty: 60 | Refills: 2 | Status: ERX

## 2022-12-08 RX ORDER — ROSUVASTATIN CALCIUM 10 MG/1
TABLET, COATED ORAL
Qty: 90 TABLET | Refills: 3 | OUTPATIENT
Start: 2022-12-08

## 2023-01-03 ENCOUNTER — TELEPHONE (OUTPATIENT)
Dept: SURGERY | Age: 73
End: 2023-01-03

## 2023-01-17 ENCOUNTER — OFFICE VISIT (OUTPATIENT)
Dept: INTERNAL MEDICINE CLINIC | Facility: CLINIC | Age: 73
End: 2023-01-17
Payer: MEDICARE

## 2023-01-17 ENCOUNTER — HOSPITAL ENCOUNTER (OUTPATIENT)
Dept: GENERAL RADIOLOGY | Age: 73
Discharge: HOME | End: 2023-01-20

## 2023-01-17 VITALS
SYSTOLIC BLOOD PRESSURE: 126 MMHG | DIASTOLIC BLOOD PRESSURE: 68 MMHG | BODY MASS INDEX: 24.96 KG/M2 | WEIGHT: 159 LBS | HEIGHT: 67 IN

## 2023-01-17 DIAGNOSIS — R07.81 RIB PAIN ON LEFT SIDE: Primary | ICD-10-CM

## 2023-01-17 DIAGNOSIS — R07.81 RIB PAIN ON LEFT SIDE: ICD-10-CM

## 2023-01-17 PROCEDURE — 3074F SYST BP LT 130 MM HG: CPT | Performed by: INTERNAL MEDICINE

## 2023-01-17 PROCEDURE — 1123F ACP DISCUSS/DSCN MKR DOCD: CPT | Performed by: INTERNAL MEDICINE

## 2023-01-17 PROCEDURE — 99213 OFFICE O/P EST LOW 20 MIN: CPT | Performed by: INTERNAL MEDICINE

## 2023-01-17 PROCEDURE — 3078F DIAST BP <80 MM HG: CPT | Performed by: INTERNAL MEDICINE

## 2023-01-17 RX ORDER — TRAMADOL HYDROCHLORIDE 50 MG/1
50 TABLET ORAL EVERY 6 HOURS PRN
Qty: 28 TABLET | Refills: 0 | Status: SHIPPED | OUTPATIENT
Start: 2023-01-17 | End: 2023-01-24

## 2023-01-17 RX ORDER — ROSUVASTATIN CALCIUM 10 MG/1
10 TABLET, COATED ORAL DAILY
COMMUNITY
Start: 2022-10-27

## 2023-01-17 NOTE — PROGRESS NOTES
SUBJECTIVE:   Shira Hernandez is a 67 y.o. female seen for a visit regarding   Chief Complaint   Patient presents with    Rib Pain (injury)     On left side under breast. Sensitive to the touch. Was leaning over a trash can and rib got caught on it. Other  This is a new problem. The current episode started in the past 7 days (leaned over trashcan 6 d ago and got stuck at ribs-then acute rib pain -has persisted and worse to twist, deep breathe , cough). The problem occurs constantly. The problem has been unchanged. Dexa done in Nov was ok  Past Medical History, Past Surgical History, Family history, Social History, and Medications were all reviewed with the patient today and updated as necessary. Current Outpatient Medications   Medication Sig Dispense Refill    Cetirizine-Pseudoephedrine (ZYRTEC-D PO) Take 1 tablet by mouth daily      rosuvastatin (CRESTOR) 10 MG tablet Take 10 mg by mouth daily      traMADol (ULTRAM) 50 MG tablet Take 1 tablet by mouth every 6 hours as needed for Pain for up to 7 days. Intended supply: 7 days.  Take lowest dose possible to manage pain Max Daily Amount: 200 mg 28 tablet 0    amLODIPine (NORVASC) 5 MG tablet TAKE 1 TABLET BY MOUTH  DAILY (Patient taking differently: Take 5 mg by mouth daily) 90 tablet 3    pantoprazole (PROTONIX) 40 MG tablet Take 1 tablet by mouth daily TAKE 1 TABLET BY MOUTH DAILY 90 tablet 3    acetaminophen (TYLENOL) 500 MG tablet Take 2 tablets by mouth every 6 hours as needed      Cholecalciferol 50 MCG (2000 UT) CAPS Take 2,000 Units by mouth daily      ascorbic acid (VITAMIN C) 500 MG tablet Take 500 mg by mouth daily      aspirin 81 MG EC tablet Take 81 mg by mouth daily      clindamycin (CLINDAGEL) 1 % gel APPLY TO THE AFFECTED AREA FOR ACNE ON FACE TWICE A DAY      escitalopram (LEXAPRO) 5 MG tablet Take 5 mg by mouth daily      tretinoin (RETIN-A) 0.05 % cream Apply topically nightly      triamcinolone (KENALOG) 0.1 % cream Apply topically 3 times daily       No current facility-administered medications for this visit. Allergies   Allergen Reactions    Cefaclor Nausea Only    Nwojz-Msqjk-Kwijonx-Pramoxine Other (See Comments)     Patient unsure     Cephalexin Nausea And Vomiting    Clarithromycin Nausea And Vomiting     Patient unsure      Patient Active Problem List   Diagnosis    Polyarthralgia    Essential hypertension    History of COVID-19    Coronary artery disease involving native coronary artery of native heart without angina pectoris    Myalgia    GERD (gastroesophageal reflux disease)    Fibromyalgia    Chronic lumbar pain    Generalized osteoarthritis    History of temporal arteritis    H/O heart artery stent    Pure hypercholesterolemia    S/P cholecystectomy    Statin myopathy    Statin intolerance    Chronic depression    Osteopenia    KATJA (generalized anxiety disorder)    Abnormal glucose    Overweight    Pain in thoracic spine    Degeneration of intervertebral disc    Eustachian tube disorder, right    Personal history of colonic polyps     Social History     Tobacco Use    Smoking status: Former     Packs/day: 0.50     Years: 25.00     Pack years: 12.50     Types: Cigarettes     Quit date: 2004     Years since quittin.3    Smokeless tobacco: Never    Tobacco comments:     Quit smoking: started at age 23   Substance Use Topics    Alcohol use: No          Review of Systems      OBJECTIVE:  /68   Ht 5' 7\" (1.702 m)   Wt 159 lb (72.1 kg)   BMI 24.90 kg/m²      Physical Exam  Pulmonary:      Effort: Pulmonary effort is normal.      Breath sounds: No rhonchi. Musculoskeletal:         General: Tenderness (very tender at left anterior ribs-any pressure causes pain -posterior to it no pain) present. Medical problems and test results were reviewed with the patient today. ASSESSMENT and PLAN     1. Rib pain on left side  -     traMADol (ULTRAM) 50 MG tablet;  Take 1 tablet by mouth every 6 hours as needed for Pain for up to 7 days. Intended supply: 7 days. Take lowest dose possible to manage pain Max Daily Amount: 200 mg, Disp-28 tablet, R-0Normal  -     XR RIBS LEFT (2 VIEWS); Future   Acts like rib fracture -discussed tramadol and can use-had in past--get xray-see Jayla about 10 d but can cancel if better  reviewed medications and side effects in detail     Return in about 10 days (around 1/27/2023) for For medication assessment-for Jayla please.

## 2023-02-09 ENCOUNTER — TELEPHONE (OUTPATIENT)
Dept: INTERNAL MEDICINE CLINIC | Facility: CLINIC | Age: 73
End: 2023-02-09

## 2023-02-09 NOTE — TELEPHONE ENCOUNTER
Patient called and request Don Stevenson please call in a prescription for cough patient  been on this cough for 6 days have take mucinex and not go away. please call patient back as soon is possible thanks.

## 2023-02-09 NOTE — TELEPHONE ENCOUNTER
Pt said she has a productive cough  8 days and has been trying OTC cough med with little relief, Pt has fever 3 days ago Pt refuses to go to an urgent care and wanted an antibiotic called in Pt was advised she would need to be seen and I made her an bandar with Dr Cammie Alvarez for tomorrow at 11:30

## 2023-02-10 ENCOUNTER — OFFICE VISIT (OUTPATIENT)
Dept: INTERNAL MEDICINE CLINIC | Facility: CLINIC | Age: 73
End: 2023-02-10
Payer: MEDICARE

## 2023-02-10 VITALS
BODY MASS INDEX: 24.48 KG/M2 | DIASTOLIC BLOOD PRESSURE: 68 MMHG | SYSTOLIC BLOOD PRESSURE: 118 MMHG | WEIGHT: 156 LBS | HEIGHT: 67 IN

## 2023-02-10 DIAGNOSIS — J68.3 REACTIVE AIRWAYS DYSFUNCTION SYNDROME (HCC): ICD-10-CM

## 2023-02-10 DIAGNOSIS — R05.2 SUBACUTE COUGH: Primary | ICD-10-CM

## 2023-02-10 PROCEDURE — 1123F ACP DISCUSS/DSCN MKR DOCD: CPT | Performed by: INTERNAL MEDICINE

## 2023-02-10 PROCEDURE — 3074F SYST BP LT 130 MM HG: CPT | Performed by: INTERNAL MEDICINE

## 2023-02-10 PROCEDURE — 3078F DIAST BP <80 MM HG: CPT | Performed by: INTERNAL MEDICINE

## 2023-02-10 PROCEDURE — 99213 OFFICE O/P EST LOW 20 MIN: CPT | Performed by: INTERNAL MEDICINE

## 2023-02-10 RX ORDER — HYDROCODONE BITARTRATE AND HOMATROPINE METHYLBROMIDE ORAL SOLUTION 5; 1.5 MG/5ML; MG/5ML
5 LIQUID ORAL EVERY 6 HOURS PRN
Qty: 140 ML | Refills: 0 | Status: SHIPPED | OUTPATIENT
Start: 2023-02-10 | End: 2023-02-17

## 2023-02-10 RX ORDER — PREDNISONE 10 MG/1
TABLET ORAL
Qty: 21 EACH | Refills: 0 | Status: SHIPPED | OUTPATIENT
Start: 2023-02-10

## 2023-02-10 RX ORDER — AZITHROMYCIN 250 MG/1
250 TABLET, FILM COATED ORAL SEE ADMIN INSTRUCTIONS
Qty: 6 TABLET | Refills: 0 | Status: SHIPPED | OUTPATIENT
Start: 2023-02-10 | End: 2023-02-15

## 2023-02-10 SDOH — ECONOMIC STABILITY: FOOD INSECURITY: WITHIN THE PAST 12 MONTHS, THE FOOD YOU BOUGHT JUST DIDN'T LAST AND YOU DIDN'T HAVE MONEY TO GET MORE.: SOMETIMES TRUE

## 2023-02-10 SDOH — ECONOMIC STABILITY: FOOD INSECURITY: WITHIN THE PAST 12 MONTHS, YOU WORRIED THAT YOUR FOOD WOULD RUN OUT BEFORE YOU GOT MONEY TO BUY MORE.: SOMETIMES TRUE

## 2023-02-10 SDOH — ECONOMIC STABILITY: INCOME INSECURITY: HOW HARD IS IT FOR YOU TO PAY FOR THE VERY BASICS LIKE FOOD, HOUSING, MEDICAL CARE, AND HEATING?: HARD

## 2023-02-10 SDOH — ECONOMIC STABILITY: HOUSING INSECURITY
IN THE LAST 12 MONTHS, WAS THERE A TIME WHEN YOU DID NOT HAVE A STEADY PLACE TO SLEEP OR SLEPT IN A SHELTER (INCLUDING NOW)?: NO

## 2023-02-10 NOTE — PROGRESS NOTES
She has been sick for the last 10 days, and had a fever of 101 5 days ago. Headache has been above her eyes, now green mucous. She has vomited a few times with the coughing      Past Medical History, Past Surgical History, Family history, Social History, and Medications were all reviewed with the patient today and updated as necessary. Current Outpatient Medications   Medication Sig Dispense Refill    azithromycin (ZITHROMAX) 250 MG tablet Take 1 tablet by mouth See Admin Instructions for 5 days 500mg on day 1 followed by 250mg on days 2 - 5 6 tablet 0    predniSONE 10 MG (21) TBPK Take as directed 21 each 0    HYDROcodone homatropine (HYCODAN) 5-1.5 MG/5ML solution Take 5 mLs by mouth every 6 hours as needed (cough) for up to 7 days. Max Daily Amount: 20 mLs 140 mL 0    rosuvastatin (CRESTOR) 10 MG tablet Take 10 mg by mouth daily      amLODIPine (NORVASC) 5 MG tablet TAKE 1 TABLET BY MOUTH  DAILY (Patient taking differently: Take 5 mg by mouth daily) 90 tablet 3    pantoprazole (PROTONIX) 40 MG tablet Take 1 tablet by mouth daily TAKE 1 TABLET BY MOUTH DAILY 90 tablet 3    acetaminophen (TYLENOL) 500 MG tablet Take 2 tablets by mouth every 6 hours as needed      Cholecalciferol 50 MCG (2000 UT) CAPS Take 2,000 Units by mouth daily      ascorbic acid (VITAMIN C) 500 MG tablet Take 500 mg by mouth daily      aspirin 81 MG EC tablet Take 81 mg by mouth daily      clindamycin (CLINDAGEL) 1 % gel APPLY TO THE AFFECTED AREA FOR ACNE ON FACE TWICE A DAY      escitalopram (LEXAPRO) 5 MG tablet Take 5 mg by mouth daily      tretinoin (RETIN-A) 0.05 % cream Apply topically nightly      triamcinolone (KENALOG) 0.1 % cream Apply topically 3 times daily      Cetirizine-Pseudoephedrine (ZYRTEC-D PO) Take 1 tablet by mouth daily (Patient not taking: Reported on 2/10/2023)       No current facility-administered medications for this visit.      Allergies   Allergen Reactions    Cefaclor Nausea Only Gijyf-Zzlud-Ssgeqbi-Pramoxine Other (See Comments)     Patient unsure     Cephalexin Nausea And Vomiting    Clarithromycin Nausea And Vomiting     Patient unsure      Patient Active Problem List   Diagnosis    Polyarthralgia    Essential hypertension    History of COVID-19    Coronary artery disease involving native coronary artery of native heart without angina pectoris    Myalgia    GERD (gastroesophageal reflux disease)    Fibromyalgia    Chronic lumbar pain    Generalized osteoarthritis    History of temporal arteritis    H/O heart artery stent    Pure hypercholesterolemia    S/P cholecystectomy    Statin myopathy    Statin intolerance    Chronic depression    Osteopenia    KATJA (generalized anxiety disorder)    Abnormal glucose    Overweight    Pain in thoracic spine    Degeneration of intervertebral disc    Eustachian tube disorder, right    Personal history of colonic polyps         Review of Systems      OBJECTIVE:  /68   Ht 5' 7\" (1.702 m)   Wt 156 lb (70.8 kg)   BMI 24.43 kg/m²      Physical Exam  Constitutional:       Appearance: Normal appearance. HENT:      Right Ear: Tympanic membrane normal.      Left Ear: A middle ear effusion is present. Nose:      Right Sinus: No maxillary sinus tenderness or frontal sinus tenderness. Left Sinus: No maxillary sinus tenderness or frontal sinus tenderness. Mouth/Throat:      Pharynx: No posterior oropharyngeal erythema. Tonsils: No tonsillar exudate. Pulmonary:      Effort: Pulmonary effort is normal.      Comments: Faint exp wheezes   Lymphadenopathy:      Cervical: No cervical adenopathy. Neurological:      Mental Status: She is alert. Medical problems and test results were reviewed with the patient today. No results found for this or any previous visit (from the past 672 hour(s)). ASSESSMENT and PLAN    1. Subacute cough  -     BORDETELLA PERTUSSIS ANTIBODIES;  Future  -     HYDROcodone homatropine (HYCODAN) 5-1.5 MG/5ML solution; Take 5 mLs by mouth every 6 hours as needed (cough) for up to 7 days. Max Daily Amount: 20 mLs, Disp-140 mL, R-0Normal  2. Reactive airways dysfunction syndrome (HCC)  -     azithromycin (ZITHROMAX) 250 MG tablet; Take 1 tablet by mouth See Admin Instructions for 5 days 500mg on day 1 followed by 250mg on days 2 - 5, Disp-6 tablet, R-0Normal  -     predniSONE 10 MG (21) TBPK; Take as directed, Disp-21 each, R-0Normal  -     BORDETELLA PERTUSSIS ANTIBODIES; Future     She has been unable to talk much due to coughing, has had emesis several times and coughs all day and night. Will check for pertussis, treat for reactive airways     No follow-ups on file.

## 2023-02-15 ENCOUNTER — TELEPHONE (OUTPATIENT)
Dept: INTERNAL MEDICINE CLINIC | Facility: CLINIC | Age: 73
End: 2023-02-15

## 2023-02-15 NOTE — PROGRESS NOTES
Can let her know that she does have degenerative changes in her spine (without significant change from 2013) and shoulder.  Would like her to see ortho for shoulder pain and referral has been made 15-Feb-2023 16:18

## 2023-02-15 NOTE — TELEPHONE ENCOUNTER
On the phone with pt regarding lab results. Pt states she was feeling better, but starting last night she has had some chest heaviness/tightness. Pt also stated last night she starting sweating and had to change clothes because they were soaking wet. States she felt like her chest was being suffocated. Advised pt she needs to be seen and should go to urgent care or ED. Pt said she won't do that and wanted to see Ruma Armendariz. Appt scheduled for tomorrow with Ruma Armendariz. Advised that if sx's worsen overnight she should go to ED. Pt agreed.

## 2023-02-16 ENCOUNTER — HOSPITAL ENCOUNTER (OUTPATIENT)
Dept: GENERAL RADIOLOGY | Age: 73
Discharge: HOME | End: 2023-02-19

## 2023-02-16 ENCOUNTER — TELEPHONE (OUTPATIENT)
Dept: INTERNAL MEDICINE CLINIC | Facility: CLINIC | Age: 73
End: 2023-02-16

## 2023-02-16 ENCOUNTER — OFFICE VISIT (OUTPATIENT)
Dept: INTERNAL MEDICINE CLINIC | Facility: CLINIC | Age: 73
End: 2023-02-16
Payer: MEDICARE

## 2023-02-16 VITALS
WEIGHT: 157 LBS | BODY MASS INDEX: 24.64 KG/M2 | HEIGHT: 67 IN | TEMPERATURE: 97.8 F | OXYGEN SATURATION: 96 % | SYSTOLIC BLOOD PRESSURE: 120 MMHG | DIASTOLIC BLOOD PRESSURE: 78 MMHG | HEART RATE: 69 BPM

## 2023-02-16 DIAGNOSIS — E78.00 PURE HYPERCHOLESTEROLEMIA: ICD-10-CM

## 2023-02-16 DIAGNOSIS — R07.89 ATYPICAL CHEST PAIN: ICD-10-CM

## 2023-02-16 DIAGNOSIS — J30.9 ALLERGIC RHINITIS, UNSPECIFIED SEASONALITY, UNSPECIFIED TRIGGER: ICD-10-CM

## 2023-02-16 DIAGNOSIS — B00.1 HERPES LABIALIS: ICD-10-CM

## 2023-02-16 DIAGNOSIS — M79.10 MYALGIA: ICD-10-CM

## 2023-02-16 DIAGNOSIS — R07.89 ATYPICAL CHEST PAIN: Primary | ICD-10-CM

## 2023-02-16 DIAGNOSIS — R73.09 ABNORMAL GLUCOSE: ICD-10-CM

## 2023-02-16 LAB
ALBUMIN SERPL-MCNC: 3.5 G/DL (ref 3.2–4.6)
ALBUMIN/GLOB SERPL: 1.2 (ref 0.4–1.6)
ALP SERPL-CCNC: 98 U/L (ref 50–136)
ALT SERPL-CCNC: 22 U/L (ref 12–65)
ANION GAP SERPL CALC-SCNC: 5 MMOL/L (ref 2–11)
AST SERPL-CCNC: 12 U/L (ref 15–37)
BILIRUB SERPL-MCNC: 0.3 MG/DL (ref 0.2–1.1)
BUN SERPL-MCNC: 14 MG/DL (ref 8–23)
CALCIUM SERPL-MCNC: 8.7 MG/DL (ref 8.3–10.4)
CHLORIDE SERPL-SCNC: 104 MMOL/L (ref 101–110)
CHOLEST SERPL-MCNC: 157 MG/DL
CK SERPL-CCNC: 29 U/L (ref 21–215)
CO2 SERPL-SCNC: 29 MMOL/L (ref 21–32)
CREAT SERPL-MCNC: 0.7 MG/DL (ref 0.6–1)
EST. AVERAGE GLUCOSE BLD GHB EST-MCNC: 131 MG/DL
GLOBULIN SER CALC-MCNC: 2.9 G/DL (ref 2.8–4.5)
GLUCOSE SERPL-MCNC: 83 MG/DL (ref 65–100)
HBA1C MFR BLD: 6.2 % (ref 4.8–5.6)
HDLC SERPL-MCNC: 69 MG/DL (ref 40–60)
HDLC SERPL: 2.3
LDLC SERPL CALC-MCNC: 65 MG/DL
POTASSIUM SERPL-SCNC: 4.4 MMOL/L (ref 3.5–5.1)
PROT SERPL-MCNC: 6.4 G/DL (ref 6.3–8.2)
SODIUM SERPL-SCNC: 138 MMOL/L (ref 133–143)
TRIGL SERPL-MCNC: 115 MG/DL (ref 35–150)
VLDLC SERPL CALC-MCNC: 23 MG/DL (ref 6–23)

## 2023-02-16 PROCEDURE — 99215 OFFICE O/P EST HI 40 MIN: CPT | Performed by: NURSE PRACTITIONER

## 2023-02-16 PROCEDURE — 1123F ACP DISCUSS/DSCN MKR DOCD: CPT | Performed by: NURSE PRACTITIONER

## 2023-02-16 PROCEDURE — 3078F DIAST BP <80 MM HG: CPT | Performed by: NURSE PRACTITIONER

## 2023-02-16 PROCEDURE — 3074F SYST BP LT 130 MM HG: CPT | Performed by: NURSE PRACTITIONER

## 2023-02-16 PROCEDURE — 93000 ELECTROCARDIOGRAM COMPLETE: CPT | Performed by: NURSE PRACTITIONER

## 2023-02-16 RX ORDER — VALACYCLOVIR HYDROCHLORIDE 1 G/1
TABLET, FILM COATED ORAL
Qty: 4 TABLET | Refills: 3 | Status: SHIPPED | OUTPATIENT
Start: 2023-02-16

## 2023-02-16 RX ORDER — FLUTICASONE PROPIONATE 50 MCG
2 SPRAY, SUSPENSION (ML) NASAL DAILY
Qty: 3 EACH | Refills: 3 | Status: CANCELLED | OUTPATIENT
Start: 2023-02-16

## 2023-02-16 RX ORDER — FLUTICASONE PROPIONATE 50 MCG
2 SPRAY, SUSPENSION (ML) NASAL DAILY
Qty: 3 EACH | Refills: 3 | Status: SHIPPED | OUTPATIENT
Start: 2023-02-16

## 2023-02-16 RX ORDER — FLUTICASONE PROPIONATE 50 MCG
2 SPRAY, SUSPENSION (ML) NASAL DAILY
Qty: 16 G | Refills: 5 | Status: SHIPPED | OUTPATIENT
Start: 2023-02-16 | End: 2023-02-16 | Stop reason: SDUPTHER

## 2023-02-16 ASSESSMENT — PATIENT HEALTH QUESTIONNAIRE - PHQ9
SUM OF ALL RESPONSES TO PHQ QUESTIONS 1-9: 0
7. TROUBLE CONCENTRATING ON THINGS, SUCH AS READING THE NEWSPAPER OR WATCHING TELEVISION: 0
SUM OF ALL RESPONSES TO PHQ QUESTIONS 1-9: 0
9. THOUGHTS THAT YOU WOULD BE BETTER OFF DEAD, OR OF HURTING YOURSELF: 0
8. MOVING OR SPEAKING SO SLOWLY THAT OTHER PEOPLE COULD HAVE NOTICED. OR THE OPPOSITE, BEING SO FIGETY OR RESTLESS THAT YOU HAVE BEEN MOVING AROUND A LOT MORE THAN USUAL: 0
SUM OF ALL RESPONSES TO PHQ QUESTIONS 1-9: 0
SUM OF ALL RESPONSES TO PHQ9 QUESTIONS 1 & 2: 0
10. IF YOU CHECKED OFF ANY PROBLEMS, HOW DIFFICULT HAVE THESE PROBLEMS MADE IT FOR YOU TO DO YOUR WORK, TAKE CARE OF THINGS AT HOME, OR GET ALONG WITH OTHER PEOPLE: 0
3. TROUBLE FALLING OR STAYING ASLEEP: 0
6. FEELING BAD ABOUT YOURSELF - OR THAT YOU ARE A FAILURE OR HAVE LET YOURSELF OR YOUR FAMILY DOWN: 0
1. LITTLE INTEREST OR PLEASURE IN DOING THINGS: 0
2. FEELING DOWN, DEPRESSED OR HOPELESS: 0
4. FEELING TIRED OR HAVING LITTLE ENERGY: 0
5. POOR APPETITE OR OVEREATING: 0
SUM OF ALL RESPONSES TO PHQ QUESTIONS 1-9: 0

## 2023-02-16 ASSESSMENT — ENCOUNTER SYMPTOMS
WHEEZING: 0
COUGH: 1
SHORTNESS OF BREATH: 0
ABDOMINAL PAIN: 0

## 2023-02-16 NOTE — PROGRESS NOTES
PROGRESS NOTE      Chief Complaint   Patient presents with    Cough     Pt c/o non productive cough Pt having heaviness feelings  Pt had temp 98.4 Tuesday night     Ear Problem     Pt c/o on and off sharp pain to right ear        HPI    Chest pain: Cough improving. Chest heaviness yesterday and some upper back pain. No associated dyspnea or palpitations. Treated with Zpak and prednisone and antitussive per Dr Kathryn Arteaga after evaluation and symptoms are much improved. Only dry cough persists. Right ear pain: sharp, intermittent. Ongoing. ENT eval negative in past. Some sneezing and congestion with seasonal allergies. Herpes labialis: Asking about treatment for recurrent, though infrequent breakouts      Past Medical History, Past Surgical History, Family history, Social History, and Medications were all reviewed and updated as necessary. Current Outpatient Medications   Medication Sig Dispense Refill    valACYclovir (VALTREX) 1 g tablet Take 2 at onset and repeat after 12 hours 4 tablet 3    fluticasone (FLONASE) 50 MCG/ACT nasal spray 2 sprays by Each Nostril route daily 3 each 3    predniSONE 10 MG (21) TBPK Take as directed 21 each 0    HYDROcodone homatropine (HYCODAN) 5-1.5 MG/5ML solution Take 5 mLs by mouth every 6 hours as needed (cough) for up to 7 days.  Max Daily Amount: 20 mLs 140 mL 0    rosuvastatin (CRESTOR) 10 MG tablet Take 10 mg by mouth every evening      amLODIPine (NORVASC) 5 MG tablet TAKE 1 TABLET BY MOUTH  DAILY (Patient taking differently: Take 5 mg by mouth daily) 90 tablet 3    pantoprazole (PROTONIX) 40 MG tablet Take 1 tablet by mouth daily TAKE 1 TABLET BY MOUTH DAILY (Patient taking differently: Take 40 mg by mouth daily) 90 tablet 3    acetaminophen (TYLENOL) 500 MG tablet Take 2 tablets by mouth every 6 hours as needed      Cholecalciferol 50 MCG (2000 UT) CAPS Take 2,000 Units by mouth daily      ascorbic acid (VITAMIN C) 500 MG tablet Take 500 mg by mouth daily      aspirin 81 MG EC tablet Take 81 mg by mouth daily      clindamycin (CLINDAGEL) 1 % gel APPLY TO THE AFFECTED AREA FOR ACNE ON FACE TWICE A DAY      escitalopram (LEXAPRO) 5 MG tablet Take 5 mg by mouth every evening      tretinoin (RETIN-A) 0.05 % cream Apply topically nightly      triamcinolone (KENALOG) 0.1 % cream Apply topically 3 times daily      Cetirizine-Pseudoephedrine (ZYRTEC-D PO) Take 1 tablet by mouth daily (Patient not taking: No sig reported)       No current facility-administered medications for this visit. Allergies   Allergen Reactions    Cefaclor Nausea Only    Zdgrf-Icioo-Oqemlow-Pramoxine Other (See Comments)     Patient unsure     Cephalexin Nausea And Vomiting    Clarithromycin Nausea And Vomiting     Patient unsure        ASSESSMENT and PLAN    Zeferino Sullivan was seen today for cough and ear problem. Diagnoses and all orders for this visit:    Atypical chest pain  -     EKG 12 Lead  -     XR CHEST PA LAT (2 VIEWS); Future    Allergic rhinitis, unspecified seasonality, unspecified trigger  -     Discontinue: fluticasone (FLONASE) 50 MCG/ACT nasal spray; 2 sprays by Each Nostril route daily  -     fluticasone (FLONASE) 50 MCG/ACT nasal spray; 2 sprays by Each Nostril route daily    Herpes labialis  -     valACYclovir (VALTREX) 1 g tablet; Take 2 at onset and repeat after 12 hours    ECG 61 bpm sinus rhythm short pr (unchanged) and anterior t wave inversion and nonspecific t wave changes (unchanged). CXR today. ER if return of chest pain and pt agrees. FU with cardiology. Flonase for ETD. Valtrex prn    Medical problems and test results were reviewed with the patient today. FOLLOW UP    Return for Pending test results. REVIEW OF SYSTEMS    Review of Systems   Constitutional:  Positive for fatigue. Negative for chills and fever. HENT:  Positive for congestion, ear pain, postnasal drip and sneezing. Respiratory:  Positive for cough. Negative for shortness of breath and wheezing. Cardiovascular:  Positive for chest pain. Negative for palpitations. Gastrointestinal:  Negative for abdominal pain. Skin:  Positive for rash. PHYSICAL EXAM    /78   Pulse 69   Temp 97.8 °F (36.6 °C)   Ht 5' 7\" (1.702 m)   Wt 157 lb (71.2 kg)   SpO2 96%   BMI 24.59 kg/m²      Physical Exam  Vitals and nursing note reviewed. Constitutional:       Appearance: Normal appearance. She is not ill-appearing. Cardiovascular:      Rate and Rhythm: Normal rate and regular rhythm. Pulmonary:      Effort: Pulmonary effort is normal.      Breath sounds: Normal breath sounds. No wheezing or rhonchi. Musculoskeletal:      Right lower leg: No edema. Left lower leg: No edema. Neurological:      General: No focal deficit present. Mental Status: She is alert and oriented to person, place, and time.    Psychiatric:         Mood and Affect: Mood normal.         Behavior: Behavior normal.

## 2023-02-16 NOTE — TELEPHONE ENCOUNTER
Call from patient indicating that script for flonase one bottle is $11 but script for three bottles will be $6; please send script for three bottles to [facility] [place]

## 2023-02-17 ENCOUNTER — TELEPHONE (OUTPATIENT)
Dept: INTERNAL MEDICINE CLINIC | Facility: CLINIC | Age: 73
End: 2023-02-17

## 2023-02-17 DIAGNOSIS — R07.89 ATYPICAL CHEST PAIN: Primary | ICD-10-CM

## 2023-02-17 NOTE — TELEPHONE ENCOUNTER
Pt was made aware of her chest x-ray and she did agree to referral to cardiology the patient also is aware if chest pain/tightness continues over the weekend she will go to the ER

## 2023-02-17 NOTE — TELEPHONE ENCOUNTER
----- Message from CISCO Mares - CNP sent at 2/16/2023  5:12 PM EST -----  Please call patient. CXR is normal. Please let her know that I would like for her to see cardiology with her history and recent chest pain symptoms - could you please call and get her an appointment. She agreed to go to ER if return of these symptoms.

## 2023-02-20 ENCOUNTER — OFFICE VISIT (OUTPATIENT)
Dept: INTERNAL MEDICINE CLINIC | Facility: CLINIC | Age: 73
End: 2023-02-20
Payer: MEDICARE

## 2023-02-20 VITALS
BODY MASS INDEX: 24.48 KG/M2 | OXYGEN SATURATION: 96 % | HEART RATE: 71 BPM | SYSTOLIC BLOOD PRESSURE: 138 MMHG | DIASTOLIC BLOOD PRESSURE: 76 MMHG | HEIGHT: 67 IN | WEIGHT: 156 LBS

## 2023-02-20 DIAGNOSIS — I10 ESSENTIAL HYPERTENSION: Primary | ICD-10-CM

## 2023-02-20 DIAGNOSIS — K21.9 GASTROESOPHAGEAL REFLUX DISEASE WITHOUT ESOPHAGITIS: ICD-10-CM

## 2023-02-20 DIAGNOSIS — F41.1 GAD (GENERALIZED ANXIETY DISORDER): ICD-10-CM

## 2023-02-20 DIAGNOSIS — R73.09 ABNORMAL GLUCOSE: ICD-10-CM

## 2023-02-20 DIAGNOSIS — I25.10 CORONARY ARTERY DISEASE INVOLVING NATIVE CORONARY ARTERY OF NATIVE HEART WITHOUT ANGINA PECTORIS: ICD-10-CM

## 2023-02-20 DIAGNOSIS — E78.00 PURE HYPERCHOLESTEROLEMIA: ICD-10-CM

## 2023-02-20 PROCEDURE — 3078F DIAST BP <80 MM HG: CPT | Performed by: NURSE PRACTITIONER

## 2023-02-20 PROCEDURE — 1123F ACP DISCUSS/DSCN MKR DOCD: CPT | Performed by: NURSE PRACTITIONER

## 2023-02-20 PROCEDURE — 99214 OFFICE O/P EST MOD 30 MIN: CPT | Performed by: NURSE PRACTITIONER

## 2023-02-20 PROCEDURE — 3075F SYST BP GE 130 - 139MM HG: CPT | Performed by: NURSE PRACTITIONER

## 2023-02-20 RX ORDER — ESCITALOPRAM OXALATE 5 MG/1
5 TABLET ORAL EVERY EVENING
Qty: 90 TABLET | Refills: 3 | Status: SHIPPED | OUTPATIENT
Start: 2023-02-20

## 2023-02-20 ASSESSMENT — ENCOUNTER SYMPTOMS
COUGH: 1
SHORTNESS OF BREATH: 0
WHEEZING: 0

## 2023-02-20 ASSESSMENT — PATIENT HEALTH QUESTIONNAIRE - PHQ9
SUM OF ALL RESPONSES TO PHQ9 QUESTIONS 1 & 2: 0
9. THOUGHTS THAT YOU WOULD BE BETTER OFF DEAD, OR OF HURTING YOURSELF: 0
SUM OF ALL RESPONSES TO PHQ QUESTIONS 1-9: 2
SUM OF ALL RESPONSES TO PHQ QUESTIONS 1-9: 2
8. MOVING OR SPEAKING SO SLOWLY THAT OTHER PEOPLE COULD HAVE NOTICED. OR THE OPPOSITE, BEING SO FIGETY OR RESTLESS THAT YOU HAVE BEEN MOVING AROUND A LOT MORE THAN USUAL: 0
6. FEELING BAD ABOUT YOURSELF - OR THAT YOU ARE A FAILURE OR HAVE LET YOURSELF OR YOUR FAMILY DOWN: 0
7. TROUBLE CONCENTRATING ON THINGS, SUCH AS READING THE NEWSPAPER OR WATCHING TELEVISION: 1
10. IF YOU CHECKED OFF ANY PROBLEMS, HOW DIFFICULT HAVE THESE PROBLEMS MADE IT FOR YOU TO DO YOUR WORK, TAKE CARE OF THINGS AT HOME, OR GET ALONG WITH OTHER PEOPLE: 1
5. POOR APPETITE OR OVEREATING: 0
1. LITTLE INTEREST OR PLEASURE IN DOING THINGS: 0
3. TROUBLE FALLING OR STAYING ASLEEP: 0
2. FEELING DOWN, DEPRESSED OR HOPELESS: 0
SUM OF ALL RESPONSES TO PHQ QUESTIONS 1-9: 2
4. FEELING TIRED OR HAVING LITTLE ENERGY: 1
SUM OF ALL RESPONSES TO PHQ QUESTIONS 1-9: 2

## 2023-02-20 NOTE — PROGRESS NOTES
PROGRESS NOTE      Chief Complaint   Patient presents with    Hypertension    Cough       HPI    Hyperlipidemia with documented statin intolerance and ascvd: Previously on Repatha with tolerance and good control and receiving for free through patient assistance. Pharmacy informed her that she would now be responsible for $50 copay and cannot afford. Crestor 5 mg prescribed by cardiology and seems to be tolerating. Hypertension: good control with amlodipine 5 mg     Anxiety and depression: Lexapro 5 mg - continues to do well. Prediabetes: A1C improved           Past Medical History, Past Surgical History, Family history, Social History, and Medications were all reviewed and updated as necessary.      Current Outpatient Medications   Medication Sig Dispense Refill    escitalopram (LEXAPRO) 5 MG tablet Take 1 tablet by mouth every evening 90 tablet 3    valACYclovir (VALTREX) 1 g tablet Take 2 at onset and repeat after 12 hours 4 tablet 3    fluticasone (FLONASE) 50 MCG/ACT nasal spray 2 sprays by Each Nostril route daily 3 each 3    rosuvastatin (CRESTOR) 10 MG tablet Take 10 mg by mouth every evening      amLODIPine (NORVASC) 5 MG tablet TAKE 1 TABLET BY MOUTH  DAILY (Patient taking differently: Take 5 mg by mouth daily) 90 tablet 3    pantoprazole (PROTONIX) 40 MG tablet Take 1 tablet by mouth daily TAKE 1 TABLET BY MOUTH DAILY (Patient taking differently: Take 40 mg by mouth daily) 90 tablet 3    acetaminophen (TYLENOL) 500 MG tablet Take 2 tablets by mouth every 6 hours as needed      Cholecalciferol 50 MCG (2000 UT) CAPS Take 2,000 Units by mouth daily      ascorbic acid (VITAMIN C) 500 MG tablet Take 500 mg by mouth daily      aspirin 81 MG EC tablet Take 81 mg by mouth daily      clindamycin (CLINDAGEL) 1 % gel APPLY TO THE AFFECTED AREA FOR ACNE ON FACE TWICE A DAY      tretinoin (RETIN-A) 0.05 % cream Apply topically nightly      triamcinolone (KENALOG) 0.1 % cream Apply topically 3 times daily predniSONE 10 MG (21) TBPK Take as directed (Patient not taking: Reported on 2/20/2023) 21 each 0     No current facility-administered medications for this visit. Allergies   Allergen Reactions    Cefaclor Nausea Only    Bcdzz-Boipt-Tzmveat-Pramoxine Other (See Comments)     Patient unsure     Cephalexin Nausea And Vomiting    Clarithromycin Nausea And Vomiting     Patient unsure        ASSESSMENT and Harish Bowser was seen today for hypertension and cough. Diagnoses and all orders for this visit:    Essential hypertension    Coronary artery disease involving native coronary artery of native heart without angina pectoris    Pure hypercholesterolemia  -     Comprehensive Metabolic Panel; Future    Gastroesophageal reflux disease without esophagitis    Abnormal glucose  -     Hemoglobin A1C; Future    KATJA (generalized anxiety disorder)  -     escitalopram (LEXAPRO) 5 MG tablet; Take 1 tablet by mouth every evening    Reviewed labs and discussed significance of findings. LDL ok on Crestor 5 mg. Tolerating and will continue. Would like for her to discuss event of chest pain discussed last visit - no further symptoms. ECG abnormal but unchanged. ER if return of symptoms and pt agrees  A1C improved. Encouraged for lifestyle changes    Medical problems and test results were reviewed with the patient today. FOLLOW UP    Return for 4 mo nonfasting lab and follow up. REVIEW OF SYSTEMS    Review of Systems   Constitutional:  Negative for unexpected weight change. Eyes:  Negative for visual disturbance. Respiratory:  Positive for cough. Negative for shortness of breath and wheezing. Cardiovascular:  Negative for chest pain, palpitations and leg swelling. Neurological:  Negative for headaches.      PHYSICAL EXAM    /76 (Site: Left Upper Arm, Position: Sitting, Cuff Size: Large Adult)   Pulse 71   Ht 5' 7\" (1.702 m)   Wt 156 lb (70.8 kg)   SpO2 96%   BMI 24.43 kg/m²      Physical Exam  Vitals reviewed. Constitutional:       Appearance: Normal appearance. She is not ill-appearing. Pulmonary:      Effort: Pulmonary effort is normal.   Neurological:      Mental Status: She is alert and oriented to person, place, and time. Psychiatric:         Mood and Affect: Mood normal.         Thought Content:  Thought content normal.

## 2023-02-20 NOTE — TELEPHONE ENCOUNTER
Pt came by [facility] office to request refills of her CRESTOR 10 MG 90 day supply [facility].

## 2023-02-20 NOTE — TELEPHONE ENCOUNTER
Requested Prescriptions     Pending Prescriptions Disp Refills    rosuvastatin (CRESTOR) 10 MG tablet 90 tablet 2     Sig: Take 1 tablet by mouth every evening

## 2023-02-21 RX ORDER — ROSUVASTATIN CALCIUM 10 MG/1
10 TABLET, COATED ORAL EVERY EVENING
Qty: 90 TABLET | Refills: 2 | Status: SHIPPED | OUTPATIENT
Start: 2023-02-21

## 2023-02-21 RX ORDER — ESCITALOPRAM OXALATE 5 MG/1
TABLET ORAL
Qty: 90 TABLET | Refills: 3 | OUTPATIENT
Start: 2023-02-21

## 2023-02-28 NOTE — PERIOP NOTE
Patient verified name, , and procedure.    Type: 1a; abbreviated assessment per anesthesia guidelines    Labs per anesthesia: not indicated    Instructed pt that they will be notified the day before their procedure by the GI Lab for time of arrival if their procedure is [place] and Pre-op for [place] cases. Arrival times should be called by 5 pm. If no phone is received the patient should contact their respective hospital. The GI lab telephone number is [phone] and ES Pre-op is [phone].     Follow diet and prep instructions per office including NPO status.  If patient has NOT received instructions from office patient is advised to call surgeon office, verbalizes understanding.    Bath or shower the night before and the am of surgery with non-moisturizing soap. No lotions, oils, powders, cologne on skin. No make up, eye make up or jewelry. Wear loose fitting comfortable, clean clothing.     Must have adult present in building the entire time .     Medications for the day of procedure aspirin 81 mg,  amlodipine, flonase, pantoprazole;  patient to hold vitamins and supplements 7 days prior to surgery and NSAIDS 5 days prior to surgery per anesthesia guidelines.     The following discharge instructions reviewed with patient: medication given during procedure may cause drowsiness for several hours, therefore, do not drive or operate machinery for remainder of the day. You may not drink alcohol on the day of your procedure, please resume regular diet and activity unless otherwise directed. You may experience abdominal distention for several hours that is relieved by the passage of gas. Contact your physician if you have any of the following: fever or chills, severe abdominal pain or excessive amount of bleeding or a large amount when having a bowel movement. Occasional specks of blood with bowel movement would not be unusual.    Patient verbalized understanding

## 2023-03-12 ENCOUNTER — ANESTHESIA EVENT (OUTPATIENT)
Dept: ENDOSCOPY | Age: 73
End: 2023-03-12
Payer: MEDICARE

## 2023-03-12 RX ORDER — HYDROMORPHONE HYDROCHLORIDE 2 MG/ML
0.25 INJECTION, SOLUTION INTRAMUSCULAR; INTRAVENOUS; SUBCUTANEOUS EVERY 5 MIN PRN
Status: CANCELLED | OUTPATIENT
Start: 2023-03-12

## 2023-03-12 RX ORDER — DEXTROSE MONOHYDRATE 100 MG/ML
INJECTION, SOLUTION INTRAVENOUS CONTINUOUS PRN
Status: CANCELLED | OUTPATIENT
Start: 2023-03-12

## 2023-03-12 RX ORDER — ONDANSETRON 2 MG/ML
4 INJECTION INTRAMUSCULAR; INTRAVENOUS
Status: CANCELLED | OUTPATIENT
Start: 2023-03-12 | End: 2023-03-13

## 2023-03-12 RX ORDER — LABETALOL HYDROCHLORIDE 5 MG/ML
10 INJECTION, SOLUTION INTRAVENOUS
Status: CANCELLED | OUTPATIENT
Start: 2023-03-12

## 2023-03-12 RX ORDER — HYDROMORPHONE HYDROCHLORIDE 2 MG/ML
0.5 INJECTION, SOLUTION INTRAMUSCULAR; INTRAVENOUS; SUBCUTANEOUS EVERY 5 MIN PRN
Status: CANCELLED | OUTPATIENT
Start: 2023-03-12

## 2023-03-12 RX ORDER — TRISODIUM CITRATE DIHYDRATE AND CITRIC ACID MONOHYDRATE 500; 334 MG/5ML; MG/5ML
30 SOLUTION ORAL
Status: CANCELLED | OUTPATIENT
Start: 2023-03-12 | End: 2023-03-13

## 2023-03-12 RX ORDER — ALBUTEROL SULFATE 2.5 MG/3ML
2.5 SOLUTION RESPIRATORY (INHALATION)
Status: CANCELLED | OUTPATIENT
Start: 2023-03-12

## 2023-03-12 RX ORDER — LIDOCAINE HYDROCHLORIDE 10 MG/ML
1 INJECTION, SOLUTION INFILTRATION; PERINEURAL
Status: CANCELLED | OUTPATIENT
Start: 2023-03-12 | End: 2023-03-13

## 2023-03-12 RX ORDER — IPRATROPIUM BROMIDE AND ALBUTEROL SULFATE 2.5; .5 MG/3ML; MG/3ML
1 SOLUTION RESPIRATORY (INHALATION)
Status: CANCELLED | OUTPATIENT
Start: 2023-03-12 | End: 2023-03-13

## 2023-03-12 RX ORDER — OXYCODONE HYDROCHLORIDE 5 MG/1
5 TABLET ORAL PRN
Status: CANCELLED | OUTPATIENT
Start: 2023-03-12 | End: 2023-03-12

## 2023-03-12 RX ORDER — MIDAZOLAM HYDROCHLORIDE 2 MG/2ML
2 INJECTION, SOLUTION INTRAMUSCULAR; INTRAVENOUS
Status: CANCELLED | OUTPATIENT
Start: 2023-03-12 | End: 2023-03-13

## 2023-03-12 RX ORDER — OXYCODONE HYDROCHLORIDE 5 MG/1
10 TABLET ORAL PRN
Status: CANCELLED | OUTPATIENT
Start: 2023-03-12 | End: 2023-03-12

## 2023-03-13 ENCOUNTER — HOSPITAL ENCOUNTER (OUTPATIENT)
Age: 73
Setting detail: OUTPATIENT SURGERY
Discharge: HOME | End: 2023-03-13
Attending: SURGERY | Admitting: SURGERY
Payer: MEDICARE

## 2023-03-13 ENCOUNTER — ANESTHESIA (OUTPATIENT)
Dept: ENDOSCOPY | Age: 73
End: 2023-03-13
Payer: MEDICARE

## 2023-03-13 VITALS
DIASTOLIC BLOOD PRESSURE: 72 MMHG | BODY MASS INDEX: 25.11 KG/M2 | HEART RATE: 66 BPM | HEIGHT: 67 IN | TEMPERATURE: 97.8 F | WEIGHT: 160 LBS | OXYGEN SATURATION: 96 % | SYSTOLIC BLOOD PRESSURE: 114 MMHG | RESPIRATION RATE: 18 BRPM

## 2023-03-13 DIAGNOSIS — Z86.010 PERSONAL HISTORY OF COLONIC POLYPS: ICD-10-CM

## 2023-03-13 PROBLEM — D12.3 BENIGN NEOPLASM OF TRANSVERSE COLON: Status: ACTIVE | Noted: 2023-03-13

## 2023-03-13 PROBLEM — D12.5 BENIGN NEOPLASM OF SIGMOID COLON: Status: ACTIVE | Noted: 2023-03-13

## 2023-03-13 PROBLEM — K64.8 INTERNAL HEMORRHOIDS WITHOUT COMPLICATION: Status: ACTIVE | Noted: 2023-03-13

## 2023-03-13 PROBLEM — K57.30 SIGMOID DIVERTICULOSIS: Status: ACTIVE | Noted: 2023-03-13

## 2023-03-13 PROBLEM — D12.2 BENIGN NEOPLASM OF ASCENDING COLON: Status: ACTIVE | Noted: 2023-03-13

## 2023-03-13 PROCEDURE — 2709999900 HC NON-CHARGEABLE SUPPLY: Performed by: SURGERY

## 2023-03-13 PROCEDURE — 2500000003 HC RX 250 WO HCPCS: Performed by: REGISTERED NURSE

## 2023-03-13 PROCEDURE — 6360000002 HC RX W HCPCS: Performed by: REGISTERED NURSE

## 2023-03-13 PROCEDURE — 3700000000 HC ANESTHESIA ATTENDED CARE: Performed by: SURGERY

## 2023-03-13 PROCEDURE — 7100000010 HC PHASE II RECOVERY - FIRST 15 MIN: Performed by: SURGERY

## 2023-03-13 PROCEDURE — 2580000003 HC RX 258: Performed by: REGISTERED NURSE

## 2023-03-13 PROCEDURE — 3700000001 HC ADD 15 MINUTES (ANESTHESIA): Performed by: SURGERY

## 2023-03-13 PROCEDURE — 3609010700 HC COLONOSCOPY POLYPECTOMY REMOVAL SNARE/STOMA: Performed by: SURGERY

## 2023-03-13 PROCEDURE — 2580000003 HC RX 258: Performed by: ANESTHESIOLOGY

## 2023-03-13 PROCEDURE — 45384 COLONOSCOPY W/LESION REMOVAL: CPT | Performed by: SURGERY

## 2023-03-13 PROCEDURE — 45385 COLONOSCOPY W/LESION REMOVAL: CPT | Performed by: SURGERY

## 2023-03-13 PROCEDURE — 7100000011 HC PHASE II RECOVERY - ADDTL 15 MIN: Performed by: SURGERY

## 2023-03-13 PROCEDURE — 88305 TISSUE EXAM BY PATHOLOGIST: CPT

## 2023-03-13 RX ORDER — SODIUM CHLORIDE 0.9 % (FLUSH) 0.9 %
5-40 SYRINGE (ML) INJECTION PRN
Status: DISCONTINUED | OUTPATIENT
Start: 2023-03-13 | End: 2023-03-13 | Stop reason: HOSPADM

## 2023-03-13 RX ORDER — EPHEDRINE SULFATE/0.9% NACL/PF 50 MG/5 ML
SYRINGE (ML) INTRAVENOUS PRN
Status: DISCONTINUED | OUTPATIENT
Start: 2023-03-13 | End: 2023-03-13 | Stop reason: SDUPTHER

## 2023-03-13 RX ORDER — SODIUM CHLORIDE 0.9 % (FLUSH) 0.9 %
5-40 SYRINGE (ML) INJECTION EVERY 12 HOURS SCHEDULED
Status: DISCONTINUED | OUTPATIENT
Start: 2023-03-13 | End: 2023-03-13 | Stop reason: HOSPADM

## 2023-03-13 RX ORDER — PROPOFOL 10 MG/ML
INJECTION, EMULSION INTRAVENOUS PRN
Status: DISCONTINUED | OUTPATIENT
Start: 2023-03-13 | End: 2023-03-13 | Stop reason: SDUPTHER

## 2023-03-13 RX ORDER — LIDOCAINE HYDROCHLORIDE 20 MG/ML
INJECTION, SOLUTION EPIDURAL; INFILTRATION; INTRACAUDAL; PERINEURAL PRN
Status: DISCONTINUED | OUTPATIENT
Start: 2023-03-13 | End: 2023-03-13 | Stop reason: SDUPTHER

## 2023-03-13 RX ORDER — SODIUM CHLORIDE 9 MG/ML
INJECTION, SOLUTION INTRAVENOUS PRN
Status: DISCONTINUED | OUTPATIENT
Start: 2023-03-13 | End: 2023-03-13 | Stop reason: HOSPADM

## 2023-03-13 RX ORDER — SODIUM CHLORIDE, SODIUM LACTATE, POTASSIUM CHLORIDE, CALCIUM CHLORIDE 600; 310; 30; 20 MG/100ML; MG/100ML; MG/100ML; MG/100ML
INJECTION, SOLUTION INTRAVENOUS CONTINUOUS
Status: DISCONTINUED | OUTPATIENT
Start: 2023-03-13 | End: 2023-03-13 | Stop reason: HOSPADM

## 2023-03-13 RX ORDER — PROPOFOL 10 MG/ML
INJECTION, EMULSION INTRAVENOUS CONTINUOUS PRN
Status: DISCONTINUED | OUTPATIENT
Start: 2023-03-13 | End: 2023-03-13 | Stop reason: SDUPTHER

## 2023-03-13 RX ADMIN — PROPOFOL 160 MCG/KG/MIN: 10 INJECTION, EMULSION INTRAVENOUS at 08:41

## 2023-03-13 RX ADMIN — PROPOFOL 50 MG: 10 INJECTION, EMULSION INTRAVENOUS at 08:41

## 2023-03-13 RX ADMIN — PROPOFOL 30 MG: 10 INJECTION, EMULSION INTRAVENOUS at 08:45

## 2023-03-13 RX ADMIN — Medication 5 MG: at 09:00

## 2023-03-13 RX ADMIN — PHENYLEPHRINE HYDROCHLORIDE 100 MCG: 10 INJECTION INTRAVENOUS at 09:03

## 2023-03-13 RX ADMIN — LIDOCAINE HYDROCHLORIDE 20 MG: 20 INJECTION, SOLUTION EPIDURAL; INFILTRATION; INTRACAUDAL; PERINEURAL at 08:41

## 2023-03-13 RX ADMIN — SODIUM CHLORIDE, POTASSIUM CHLORIDE, SODIUM LACTATE AND CALCIUM CHLORIDE: 600; 310; 30; 20 INJECTION, SOLUTION INTRAVENOUS at 07:57

## 2023-03-13 ASSESSMENT — PAIN - FUNCTIONAL ASSESSMENT: PAIN_FUNCTIONAL_ASSESSMENT: 0-10

## 2023-03-13 NOTE — ANESTHESIA PRE PROCEDURE
Department of Anesthesiology  Preprocedure Note       Name:  Silviano Blake   Age:  67 y.o.  :  1950                                          MRN:  [id number]         Date:  3/13/2023      Surgeon: Clement Caldwell):  Roselia Saba MD    Procedure: Procedure(s):  COLORECTAL CANCER SCREENING, NOT HIGH RISK    Medications prior to admission:   Prior to Admission medications    Medication Sig Start Date End Date Taking?  Authorizing Provider   Cyanocobalamin (VITAMIN B12 PO) Take by mouth daily   Yes Historical Provider, MD   rosuvastatin (CRESTOR) 10 MG tablet Take 1 tablet by mouth every evening 23   Rajeev Meyer MD   escitalopram (LEXAPRO) 5 MG tablet Take 1 tablet by mouth every evening 23   CISCO Mojica CNP   valACYclovir (VALTREX) 1 g tablet Take 2 at onset and repeat after 12 hours  Patient not taking: Reported on 2023   CISCO Mojica CNP   fluticasone (FLONASE) 50 MCG/ACT nasal spray 2 sprays by Each Nostril route daily 23   CISCO Mojica CNP   amLODIPine (NORVASC) 5 MG tablet TAKE 1 TABLET BY MOUTH  DAILY  Patient taking differently: Take 5 mg by mouth daily 22   Rajeev Meyer MD   pantoprazole (PROTONIX) 40 MG tablet Take 1 tablet by mouth daily TAKE 1 TABLET BY MOUTH DAILY  Patient taking differently: Take 40 mg by mouth daily 22   CISCO Mojica CNP   acetaminophen (TYLENOL) 500 MG tablet Take 2 tablets by mouth every 6 hours as needed    Historical Provider, MD   Cholecalciferol 50 MCG (2000) CAPS Take 2,000 Units by mouth daily    Historical Provider, MD   ascorbic acid (VITAMIN C) 500 MG tablet Take 500 mg by mouth daily    Ar Automatic Reconciliation   aspirin 81 MG EC tablet Take 81 mg by mouth daily    Ar Automatic Reconciliation   clindamycin (CLINDAGEL) 1 % gel 2 times daily as needed APPLY TO THE AFFECTED AREA FOR ACNE ON FACE TWICE A DAY 21   Ar Automatic Reconciliation   tretinoin (RETIN-A) 0.05 % cream Apply topically nightly 10/14/21   Ar Automatic Reconciliation       Current medications:    Current Facility-Administered Medications   Medication Dose Route Frequency Provider Last Rate Last Admin   • lactated ringers IV soln infusion   IntraVENous Continuous Devan Cassidy  mL/hr at 03/13/23 0757 New Bag at 03/13/23 0757   • sodium chloride flush 0.9 % injection 5-40 mL  5-40 mL IntraVENous 2 times per day Devan Cassidy MD       • sodium chloride flush 0.9 % injection 5-40 mL  5-40 mL IntraVENous PRN Devan Cassidy MD       • 0.9 % sodium chloride infusion   IntraVENous PRN Devan Cassidy MD           Allergies:    Allergies   Allergen Reactions   • Cefaclor Nausea Only   • Ovhrq-Nnxwk-Ezawjbn-Pramoxine Other (See Comments)     Patient unsure    • Cephalexin Nausea And Vomiting   • Clarithromycin Nausea And Vomiting     Patient unsure        Problem List:    Patient Active Problem List   Diagnosis Code   • Polyarthralgia M25.50   • Essential hypertension I10   • History of COVID-19 Z86.16   • Coronary artery disease involving native coronary artery of native heart without angina pectoris I25.10   • Myalgia M79.10   • GERD (gastroesophageal reflux disease) K21.9   • Fibromyalgia M79.7   • Chronic lumbar pain M54.50, G89.29   • Generalized osteoarthritis M15.9   • History of temporal arteritis Z87.39   • H/O heart artery stent Z95.5   • Pure hypercholesterolemia E78.00   • S/P cholecystectomy Z90.49   • Statin myopathy G72.0, T46.6X5A   • Statin intolerance Z78.9   • Chronic depression F32.A   • Osteopenia M85.80   • KATJA (generalized anxiety disorder) F41.1   • Abnormal glucose R73.09   • Overweight E66.3   • Pain in thoracic spine M54.6   • Degeneration of intervertebral disc [id number]   • Eustachian tube disorder, right H69.91   • Personal history of colonic polyps Z86.010       Past Medical History:        Diagnosis Date   • Affective psychosis (HCC)  07/2006    Anxiety     medication    Arthritis     OA generalized    CAD (coronary artery disease) 09/12/2018    PCI- stents x 5    Chest pain 07/2006     no cp but feels\" anxious\" for 10 seconds at a time- no further cp. No FLORES. Followed by [facility]-Dr. Patricia Haro.  Cholelithiasis 09/2006    Chronic depression 10/15/2014    Chronic lumbar pain 10/15/2014    Claudication ([address] 06/2013    Dyslipidemia 10/15/2014    managed with medication     Fibromyalgia 10/15/2014    KATJA (generalized anxiety disorder) 10/15/2014    managed with medication     GERD (gastroesophageal reflux disease) 10/15/2014    managed with medication     History of complete eye exam 04/01/2016    History of dental examination 06/01/2016    History of placement of stent in LAD coronary artery 08/29/2006    Two heart stents. Patient takes daily 81 mg ASA.       Hypercholesterolemia     Hypertension     managed with medication     Low magnesium level     Patient's daily Magnesium supplement decreased to once a week by PCP due to Magnesium of 2.4 on 5/18/17    Malaise and fatigue 05/2008    Overweight 7/12/2021    Palpitations 02/05/2016    Stable     Polyarthralgia 10/15/2014    Shingles 2006    10-12x    Tobacco use disorder 112/2006    URI (upper respiratory infection) 02/10/2023    was seen if PCP office, note indicated had been sick for 10 days, treated with Z pack, prednisone and antitussive       Past Surgical History:        Procedure Laterality Date    BREAST ENHANCEMENT SURGERY      BREAST SURGERY  1980    implants    CHOLECYSTECTOMY  2005    COLONOSCOPY N/A 8/28/2017    COLONOSCOPY/ BMI=26 performed by Barrington Eaton DO at [address] Bilateral     35 yrs silicone    LEFT HEART CATH,PERCUTANEOUS  09/12/2018    PCI    OTHER SURGICAL HISTORY      several skin cancer removals    ME UNLISTED PROCEDURE CARDIAC SURGERY  08/29/2006    7 cardiac stents    TONSILLECTOMY 12    at age 32       Social History:    Social History     Tobacco Use    Smoking status: Former     Packs/day: 0.50     Years: 25.00     Pack years: 12.50     Types: Cigarettes     Quit date:      Years since quittin.2    Smokeless tobacco: Never    Tobacco comments:     Quit smoking: started at age 23   Substance Use Topics    Alcohol use: No                                Counseling given: Not Answered  Tobacco comments: Quit smoking: started at age 23      Vital Signs (Current):   Vitals:    23 0939 23 0744   BP:  116/65   Pulse:  64   Resp:  22   Temp:  97.9 °F (36.6 °C)   TempSrc:  Oral   SpO2:  96%   Weight: 160 lb (72.6 kg)    Height: 5' 7\" (1.702 m)                                               BP Readings from Last 3 Encounters:   23 116/65   23 138/76   23 120/78       NPO Status: Time of last liquid consumption:                         Time of last solid consumption:                         Date of last liquid consumption: 23                        Date of last solid food consumption: 23    BMI:   Wt Readings from Last 3 Encounters:   23 160 lb (72.6 kg)   23 156 lb (70.8 kg)   23 157 lb (71.2 kg)     Body mass index is 25.06 kg/m².     CBC:   Lab Results   Component Value Date/Time    WBC 6.7 10/17/2022 10:55 AM    RBC 4.40 10/17/2022 10:55 AM    HGB 13.9 10/17/2022 10:55 AM    HCT 43.1 10/17/2022 10:55 AM    MCV 98.0 10/17/2022 10:55 AM    RDW 12.0 10/17/2022 10:55 AM     10/17/2022 10:55 AM       CMP:   Lab Results   Component Value Date/Time     2023 08:54 AM    K 4.4 2023 08:54 AM     2023 08:54 AM    CO2 29 2023 08:54 AM    BUN 14 2023 08:54 AM    CREATININE 0.70 2023 08:54 AM    GFRAA >60 2022 09:18 AM    AGRATIO 1.3 2022 09:18 AM    AGRATIO 1.3 2022 09:18 AM    LABGLOM >60 2023 08:54 AM    GLUCOSE 83 2023 08:54 AM    PROT 6.4 02/16/2023 08:54 AM    CALCIUM 8.7 02/16/2023 08:54 AM    BILITOT 0.3 02/16/2023 08:54 AM    ALKPHOS 98 02/16/2023 08:54 AM    ALKPHOS 123 04/20/2022 09:18 AM    ALKPHOS 121 04/20/2022 09:18 AM    AST 12 02/16/2023 08:54 AM    ALT 22 02/16/2023 08:54 AM       POC Tests: No results for input(s): POCGLU, POCNA, POCK, POCCL, POCBUN, POCHEMO, POCHCT in the last 72 hours. Coags: No results found for: PROTIME, INR, APTT    HCG (If Applicable): No results found for: PREGTESTUR, PREGSERUM, HCG, HCGQUANT     ABGs: No results found for: PHART, PO2ART, FKS8FOC, BCJ2HCZ, BEART, B5TWDIPB     Type & Screen (If Applicable):  No results found for: LABABO, LABRH    Drug/Infectious Status (If Applicable):  Lab Results   Component Value Date/Time    HEPCAB <0.1 08/30/2018 08:03 AM       COVID-19 Screening (If Applicable):   Lab Results   Component Value Date/Time    COVID19 Nasopharyngeal 01/02/2022 10:22 AM    COVID19 Detected 01/02/2022 10:22 AM    COVID19 Performed 11/15/2021 01:05 PM    COVID19 Not Detected 11/15/2021 01:05 PM           Anesthesia Evaluation  Patient summary reviewed  Airway: Mallampati: I     Neck ROM: full  Mouth opening: > = 3 FB   Dental:    (+) caps and implants      Pulmonary:Negative Pulmonary ROS breath sounds clear to auscultation                             Cardiovascular:  Exercise tolerance: good (>4 METS),   (+) hypertension:, CAD:, CABG/stent (5 previous stents on bASA):,         Rhythm: regular  Rate: normal                 ROS comment: Nuclear stress 2020 - EF 70%, normal scan     Neuro/Psych:   (+) depression/anxiety              ROS comment: Shingles 3x per patient GI/Hepatic/Renal:   (+) GERD:,           Endo/Other:    (+) : arthritis:., .                 Abdominal:             Vascular: Other Findings:           Anesthesia Plan      TIVA     ASA 3       Induction: intravenous. Anesthetic plan and risks discussed with patient and child/children.       Plan discussed with Román Church MD   3/13/2023

## 2023-03-13 NOTE — DISCHARGE INSTRUCTIONS
Rin Fernandes M.D.  [phone]    Instructions following colonoscopy:    ACTIVITY:  Resume usual, basic activities around the house today. You may be light-headed or sleepy from anesthesia, so be careful going up and down stairs. Avoid driving, operating machinery, or signing documents for 24 hours. No alcohol for the rest of the day. DIET:  No restriction. Please note, some people may have nausea or cramps after this procedure which can result in an upset stomach after eating. Many people have loose stools or diarrhea immediately after colonoscopy. It is also not uncommon to not have a bowel movement for 2-3 days. PAIN:  Some cramping or gas pain is normal after colonoscopy. However, if you experience worsening pain over the course of the day, or pain with associated fever please call the office immediately      [address] IF:  You have a temperature higher than 101.5° Fahrenheit for more than 6 hours. You have severe nausea or vomiting not relieved by medication; or diarrhea. Follow up on pathology. Repeat colonoscopy based on results. Otherwise, continue home medications as previously prescribed.

## 2023-03-13 NOTE — ANESTHESIA POSTPROCEDURE EVALUATION
Department of Anesthesiology  Postprocedure Note    Patient: Isela Goodwin  MRN: [id number]  YOB: 1950  Date of evaluation: 3/13/2023      Procedure Summary     Date: 03/13/23 Room / Location: [facility] ENDO 03 / [facility] ENDOSCOPY    Anesthesia Start: 0166 Anesthesia Stop: 9504    Procedure: COLONOSCOPY POLYPECTOMY REMOVAL SNARE Diagnosis:       Personal history of colonic polyps      (Personal history of colonic polyps [Z86.010])    Surgeons: Lisa Soni MD Responsible Provider: Wade Mcleod MD    Anesthesia Type: TIVA ASA Status: 3          Anesthesia Type: No value filed.     Josephine Phase I: Josephine Score: 10    Josephine Phase II: Josephine Score: 10      Anesthesia Post Evaluation    Patient location during evaluation: PACU  Patient participation: complete - patient participated  Level of consciousness: awake and alert  Pain score: 0  Airway patency: patent  Nausea & Vomiting: no nausea and no vomiting  Complications: no  Cardiovascular status: hemodynamically stable  Respiratory status: acceptable, nonlabored ventilation and spontaneous ventilation  Hydration status: euvolemic  Comments: /72   Pulse 66   Temp 97.8 °F (36.6 °C) (Temporal)   Resp 18   Ht 5' 7\" (1.702 m)   Wt 160 lb (72.6 kg)   SpO2 96%   BMI 25.06 kg/m²     Multimodal analgesia pain management approach

## 2023-03-13 NOTE — OP NOTE
Operative Report    Patient: Ankur Arechiga MRN: [id number]      YOB: 1950  Age: 67 y.o. Sex: female            Preoperative Diagnosis: previous adenomatous polyp    Postoperative Diagnosis: polyp x 6, sigmomid diverticulosis, internal hemorrhoids    Procedure:  83534-Tfvmvzolytw with removal lesion, snare and 17414-Iqcllhogvwx with removal lesion, hot forceps    Anesthesia: JESSE-per anesthesia    Indications:  As outlined in the History and Physical.      Procedure in Detail:  Informed consent was obtained for the procedure. The patient was placed in the left lateral decubitus position and sedation was induced by anesthesia. The scope was inserted into the rectum and advanced under direct vision to the cecum, which was identified by the ileocecal valve and appendiceal orifice. The quality of the colonic preparation was good. A careful inspection was made as the colonoscope was withdrawn, including a retroflexed view of the rectum; findings and interventions are described below. Appropriate photodocumentation was obtained. Findings:     Anus:  Normal  Rectum:       -Protruding lesions:     -Internal Hemorrhoids - grade I  Sigmoid:       -Excavated lesions:     -Diverticulosis      -Protruding lesions:     -Sessile Polyp(s) size 1,2 mm removed by polypectomy (hot biopsy)  Descending Colon:   Normal  Transverse Colon:       -Protruding lesions:     -Sessile Polyp(s) size 2,3,2 mm removed by polypectomy (hot biopsy)  Ascending Colon:       -Protruding lesions:     -Sessile Polyp(s) size 4-5 mm removed by polypectomy (snare cautery) and in 2 pieces  Cecum:   Normal  Terminal Ileum: Not entered    Specimens: Specimens were collected and sent to pathology. Complications: None; patient tolerated the procedure well. \    EBL -insignificant    Recommendations:   - Await pathology.       Signed By:  Dawson Benítez MD     March 13, 2023

## 2023-03-13 NOTE — H&P
History and Physical      Patient: Sanket Angulo    Physician: Alfredo Eastman MD    Referring Physician: Ganga Guillermo, APRN - CNP    Chief Complaint: For colonoscopy    History of Present Illness: Pt presents for colonoscopy. Last exam 2017, Dr Sears Headings, with single tubular adenoma. History:  Past Medical History:   Diagnosis Date    Affective psychosis ([address] 07/2006    Anxiety     medication    Arthritis     OA generalized    CAD (coronary artery disease) 09/12/2018    PCI- stents x 5    Chest pain 07/2006     no cp but feels\" anxious\" for 10 seconds at a time- no further cp. No FLORES. Followed by [facility]- [facility] AT [place]. Cholelithiasis 09/2006    Chronic depression 10/15/2014    Chronic lumbar pain 10/15/2014    Claudication ([facility] [address] 06/2013    Dyslipidemia 10/15/2014    managed with medication     Fibromyalgia 10/15/2014    KATJA (generalized anxiety disorder) 10/15/2014    managed with medication     GERD (gastroesophageal reflux disease) 10/15/2014    managed with medication     History of complete eye exam 04/01/2016    History of dental examination 06/01/2016    History of placement of stent in LAD coronary artery 08/29/2006    Two heart stents. Patient takes daily 81 mg ASA.       Hypercholesterolemia     Hypertension     managed with medication     Low magnesium level     Patient's daily Magnesium supplement decreased to once a week by PCP due to Magnesium of 2.4 on 5/18/17    Malaise and fatigue 05/2008    Overweight 7/12/2021    Palpitations 02/05/2016    Stable     Polyarthralgia 10/15/2014    Shingles 2006    10-12x    Tobacco use disorder 112/2006    URI (upper respiratory infection) 02/10/2023    was seen if PCP office, note indicated had been sick for 10 days, treated with Z pack, prednisone and antitussive     Past Surgical History:   Procedure Laterality Date    [address]    implants    CHOLECYSTECTOMY  2005    COLONOSCOPY N/A 2017    COLONOSCOPY/ BMI=26 performed by Jet Bernard DO at [address] Bilateral     35 yrs silicone    LEFT HEART CATH,PERCUTANEOUS  2018    PCI    OTHER SURGICAL HISTORY      several skin cancer removals    [place] UNLISTED PROCEDURE CARDIAC SURGERY  2006    7 cardiac stents    TONSILLECTOMY  1976    at age 32      Social History     Socioeconomic History    Marital status: Single     Spouse name: None    Number of children: None    Years of education: None    Highest education level: None   Tobacco Use    Smoking status: Former     Packs/day: 0.50     Years: 25.00     Pack years: 12.50     Types: Cigarettes     Quit date:      Years since quittin.2    Smokeless tobacco: Never    Tobacco comments:     Quit smoking: started at age 23   Vaping Use    Vaping Use: Never used   Substance and Sexual Activity    Alcohol use: No    Drug use: No    Sexual activity: Not Currently     Partners: Male     Social Determinants of Health     Financial Resource Strain: High Risk    Difficulty of Paying Living Expenses: Hard   Food Insecurity: Food Insecurity Present    Worried About Running Out of Food in the Last Year: Sometimes true    Ran Out of Food in the Last Year: Sometimes true   Transportation Needs: Unmet Transportation Needs    Lack of Transportation (Non-Medical): Yes   Physical Activity: Sufficiently Active    Days of Exercise per Week: 3 days    Minutes of Exercise per Session: 50 min   Housing Stability: Unknown    Unstable Housing in the Last Year: No      Family History   Problem Relation Age of Onset    Stroke Other     High Cholesterol Other     Glaucoma Other     Diabetes Other     Hypertension Mother     High Cholesterol Mother     Heart Attack Mother     Hypertension Sister     Depression Sister     High Cholesterol Sister     Hypertension Brother     High Cholesterol Brother     Heart Attack Brother     No Known Problems Maternal Aunt     Other Father         gun accident    Breast Cancer Neg Hx     Hypertension Other     Heart Attack Other        Medications:   Prior to Admission medications    Medication Sig Start Date End Date Taking? Authorizing Provider   Cyanocobalamin (VITAMIN B12 PO) Take by mouth daily   Yes Historical Provider, MD   rosuvastatin (CRESTOR) 10 MG tablet Take 1 tablet by mouth every evening 2/21/23   Caio Cancino MD   escitalopram (LEXAPRO) 5 MG tablet Take 1 tablet by mouth every evening 2/20/23   CISCO Santana CNP   valACYclovir (VALTREX) 1 g tablet Take 2 at onset and repeat after 12 hours  Patient not taking: Reported on 2/28/2023 2/16/23   CISCO Santana CNP   fluticasone (FLONASE) 50 MCG/ACT nasal spray 2 sprays by Each Nostril route daily 2/16/23   CISCO Santana CNP   amLODIPine (NORVASC) 5 MG tablet TAKE 1 TABLET BY MOUTH  DAILY  Patient taking differently: Take 5 mg by mouth daily 8/24/22   Caio Cancino MD   pantoprazole (PROTONIX) 40 MG tablet Take 1 tablet by mouth daily TAKE 1 TABLET BY MOUTH DAILY  Patient taking differently: Take 40 mg by mouth daily 8/18/22   CISCO Santana CNP   acetaminophen (TYLENOL) 500 MG tablet Take 2 tablets by mouth every 6 hours as needed    Historical Provider, MD   Cholecalciferol 50 MCG (2000 UT) CAPS Take 2,000 Units by mouth daily    Historical Provider, MD   ascorbic acid (VITAMIN C) 500 MG tablet Take 500 mg by mouth daily    Ar Automatic Reconciliation   aspirin 81 MG EC tablet Take 81 mg by mouth daily    Ar Automatic Reconciliation   clindamycin (CLINDAGEL) 1 % gel 2 times daily as needed APPLY TO THE AFFECTED AREA FOR ACNE ON FACE TWICE A DAY 4/6/21   Ar Automatic Reconciliation   tretinoin (RETIN-A) 0.05 % cream Apply topically nightly 10/14/21   Ar Automatic Reconciliation       Allergies:    Allergies   Allergen Reactions    Cefaclor Nausea Only    Gopvv-Oldxl-Szyvelz-Pramoxine Other (See Comments)     Patient unsure     Cephalexin Nausea And Vomiting    Clarithromycin Nausea And Vomiting     Patient unsure        Physical Exam:     Vital Signs: /65   Pulse 64   Temp 97.9 °F (36.6 °C) (Oral)   Resp 22   Ht 5' 7\" (1.702 m)   Wt 160 lb (72.6 kg)   SpO2 96%   BMI 25.06 kg/m²   .    General: no distress      Heart: regular   Lungs: unlabored   Abdominal: soft   Neurological: Grossly normal        Findings/Diagnosis: Encounter for colorectal cancer screening due to personal history of adenomatous polyp(s)     Plan of Care/Planned Procedure: Colonoscopy, possible polypectomy.  Pt/designee has reviewed the colonoscopy information sheet.  Any questions have been discussed.  They agree to proceed.      Signed:  KAYLEY MONTGOMERY MD   3/13/2023

## 2023-03-13 NOTE — PROGRESS NOTES
VSS. Discharge instructions reviewed with patient and niece and copy of instructions sent home with patient. Questions answered. Patient transferred out via wheelchair by staff. IV discontinued prior to discharge.

## 2023-03-16 NOTE — RESULT ENCOUNTER NOTE
Recall in 3 years: no worries, but needs short followup as 1 was moderate risk and 5 were low risk adenomas

## 2023-03-20 ENCOUNTER — TELEPHONE (OUTPATIENT)
Dept: INTERNAL MEDICINE CLINIC | Facility: CLINIC | Age: 73
End: 2023-03-20

## 2023-03-20 NOTE — TELEPHONE ENCOUNTER
Call from patient complaining of right ear pain, headache, and sore throat; patient has no access to covid test and is unable to do VV; per Pat Bethea, patient was advised to seek assistance at urgent care facility; patient indicated she may go to urgent care but would not sit 3-4 hours at urgent care waiting for treatment

## 2023-03-31 DIAGNOSIS — E78.00 PURE HYPERCHOLESTEROLEMIA: ICD-10-CM

## 2023-03-31 DIAGNOSIS — R73.09 ABNORMAL GLUCOSE: ICD-10-CM

## 2023-03-31 LAB
ALBUMIN SERPL-MCNC: 3.7 G/DL (ref 3.2–4.6)
ALBUMIN/GLOB SERPL: 1.3 (ref 0.4–1.6)
ALP SERPL-CCNC: 112 U/L (ref 50–136)
ALT SERPL-CCNC: 16 U/L (ref 12–65)
ANION GAP SERPL CALC-SCNC: 3 MMOL/L (ref 2–11)
AST SERPL-CCNC: 18 U/L (ref 15–37)
BILIRUB SERPL-MCNC: 0.4 MG/DL (ref 0.2–1.1)
BUN SERPL-MCNC: 10 MG/DL (ref 8–23)
CALCIUM SERPL-MCNC: 8.9 MG/DL (ref 8.3–10.4)
CHLORIDE SERPL-SCNC: 106 MMOL/L (ref 101–110)
CO2 SERPL-SCNC: 30 MMOL/L (ref 21–32)
CREAT SERPL-MCNC: 0.7 MG/DL (ref 0.6–1)
EST. AVERAGE GLUCOSE BLD GHB EST-MCNC: 131 MG/DL
GLOBULIN SER CALC-MCNC: 2.8 G/DL (ref 2.8–4.5)
GLUCOSE SERPL-MCNC: 95 MG/DL (ref 65–100)
HBA1C MFR BLD: 6.2 % (ref 4.8–5.6)
POTASSIUM SERPL-SCNC: 4.1 MMOL/L (ref 3.5–5.1)
PROT SERPL-MCNC: 6.5 G/DL (ref 6.3–8.2)
SODIUM SERPL-SCNC: 139 MMOL/L (ref 133–143)

## 2023-04-02 RX ORDER — TRETINOIN 0.5 MG/G
CREAM TOPICAL NIGHTLY
Qty: 20 G | Refills: 5 | Status: SHIPPED | OUTPATIENT
Start: 2023-04-02

## 2023-04-27 ENCOUNTER — OFFICE VISIT (OUTPATIENT)
Dept: CARDIOLOGY CLINIC | Age: 73
End: 2023-04-27
Payer: COMMERCIAL

## 2023-04-27 VITALS
HEIGHT: 67 IN | DIASTOLIC BLOOD PRESSURE: 80 MMHG | WEIGHT: 160 LBS | SYSTOLIC BLOOD PRESSURE: 126 MMHG | HEART RATE: 68 BPM | BODY MASS INDEX: 25.11 KG/M2

## 2023-04-27 DIAGNOSIS — I10 ESSENTIAL HYPERTENSION: ICD-10-CM

## 2023-04-27 DIAGNOSIS — I25.10 CORONARY ARTERY DISEASE INVOLVING NATIVE CORONARY ARTERY OF NATIVE HEART WITHOUT ANGINA PECTORIS: Primary | ICD-10-CM

## 2023-04-27 PROCEDURE — 99214 OFFICE O/P EST MOD 30 MIN: CPT | Performed by: INTERNAL MEDICINE

## 2023-04-27 PROCEDURE — 1123F ACP DISCUSS/DSCN MKR DOCD: CPT | Performed by: INTERNAL MEDICINE

## 2023-04-27 PROCEDURE — 3074F SYST BP LT 130 MM HG: CPT | Performed by: INTERNAL MEDICINE

## 2023-04-27 PROCEDURE — 3079F DIAST BP 80-89 MM HG: CPT | Performed by: INTERNAL MEDICINE

## 2023-04-27 RX ORDER — ROSUVASTATIN CALCIUM 10 MG/1
10 TABLET, COATED ORAL EVERY EVENING
Qty: 90 TABLET | Refills: 3 | Status: SHIPPED | OUTPATIENT
Start: 2023-04-27

## 2023-04-27 RX ORDER — AMLODIPINE BESYLATE 5 MG/1
5 TABLET ORAL DAILY
Qty: 90 TABLET | Refills: 3 | Status: SHIPPED | OUTPATIENT
Start: 2023-04-27

## 2023-04-27 RX ORDER — ROSUVASTATIN CALCIUM 10 MG/1
10 TABLET, COATED ORAL EVERY EVENING
Qty: 90 TABLET | Refills: 3 | Status: SHIPPED | OUTPATIENT
Start: 2023-04-27 | End: 2023-04-27 | Stop reason: SDUPTHER

## 2023-04-27 NOTE — PROGRESS NOTES
[facility] CARDIOLOGY  [address] [facility], [address]  PHONE: [phone]      23    NAME:  Billi Schirmer  : 1950  MRN: [id number]       SUBJECTIVE:   Billi Schirmer is a 67 y.o. female seen for a follow up visit regarding the following:     Chief Complaint   Patient presents with    Coronary Artery Disease    Hypertension    6 Month Follow-Up         HPI:    No cp or dutton. No orthopnea or pnd. No palpitations or syncope. Past Medical History, Past Surgical History, Family history, Social History, and Medications were all reviewed with the patient today and updated as necessary.      Current Outpatient Medications   Medication Sig Dispense Refill    tretinoin (RETIN-A) 0.05 % cream Apply topically nightly 20 g 5    Cyanocobalamin (VITAMIN B12 PO) Take by mouth daily      rosuvastatin (CRESTOR) 10 MG tablet Take 1 tablet by mouth every evening 90 tablet 2    escitalopram (LEXAPRO) 5 MG tablet Take 1 tablet by mouth every evening 90 tablet 3    valACYclovir (VALTREX) 1 g tablet Take 2 at onset and repeat after 12 hours 4 tablet 3    fluticasone (FLONASE) 50 MCG/ACT nasal spray 2 sprays by Each Nostril route daily 3 each 3    amLODIPine (NORVASC) 5 MG tablet TAKE 1 TABLET BY MOUTH  DAILY (Patient taking differently: Take 1 tablet by mouth daily) 90 tablet 3    pantoprazole (PROTONIX) 40 MG tablet Take 1 tablet by mouth daily TAKE 1 TABLET BY MOUTH DAILY (Patient taking differently: Take 1 tablet by mouth daily) 90 tablet 3    acetaminophen (TYLENOL) 500 MG tablet Take 2 tablets by mouth every 6 hours as needed      Cholecalciferol 50 MCG (2000 UT) CAPS Take 2,000 Units by mouth daily      ascorbic acid (VITAMIN C) 500 MG tablet Take 1 tablet by mouth daily      aspirin 81 MG EC tablet Take 1 tablet by mouth daily      clindamycin (CLINDAGEL) 1 % gel 2 times daily as needed APPLY TO THE AFFECTED AREA FOR ACNE ON FACE TWICE A DAY       No current facility-administered

## 2023-05-16 DIAGNOSIS — K21.9 GASTROESOPHAGEAL REFLUX DISEASE WITHOUT ESOPHAGITIS: Primary | ICD-10-CM

## 2023-05-16 RX ORDER — PANTOPRAZOLE SODIUM 40 MG/1
40 TABLET, DELAYED RELEASE ORAL DAILY
Qty: 90 TABLET | Refills: 3 | Status: SHIPPED | OUTPATIENT
Start: 2023-05-16

## 2023-06-15 DIAGNOSIS — M25.60 JOINT STIFFNESS: ICD-10-CM

## 2023-06-15 LAB
CRP SERPL-MCNC: 0.5 MG/DL (ref 0–0.9)
ERYTHROCYTE [SEDIMENTATION RATE] IN BLOOD: 4 MM/HR (ref 0–30)

## 2023-06-16 LAB — RHEUMATOID FACT SER QL LA: NEGATIVE

## 2023-06-17 LAB — ANA SER QL: NEGATIVE

## 2023-06-19 LAB — CCP IGA+IGG SERPL IA-ACNC: 3 UNITS (ref 0–19)

## 2023-09-07 ENCOUNTER — TELEPHONE (OUTPATIENT)
Dept: INTERNAL MEDICINE CLINIC | Facility: CLINIC | Age: 73
End: 2023-09-07

## 2023-09-07 ENCOUNTER — OFFICE VISIT (OUTPATIENT)
Dept: INTERNAL MEDICINE CLINIC | Facility: CLINIC | Age: 73
End: 2023-09-07
Payer: MEDICARE

## 2023-09-07 ENCOUNTER — HOSPITAL ENCOUNTER (OUTPATIENT)
Dept: GENERAL RADIOLOGY | Age: 73
Discharge: HOME | End: 2023-09-10

## 2023-09-07 VITALS
WEIGHT: 153 LBS | SYSTOLIC BLOOD PRESSURE: 110 MMHG | HEIGHT: 67 IN | DIASTOLIC BLOOD PRESSURE: 60 MMHG | BODY MASS INDEX: 24.01 KG/M2

## 2023-09-07 DIAGNOSIS — R05.3 PERSISTENT COUGH: ICD-10-CM

## 2023-09-07 DIAGNOSIS — J01.00 ACUTE MAXILLARY SINUSITIS, RECURRENCE NOT SPECIFIED: Primary | ICD-10-CM

## 2023-09-07 DIAGNOSIS — H69.81 DYSFUNCTION OF RIGHT EUSTACHIAN TUBE: ICD-10-CM

## 2023-09-07 PROCEDURE — 1123F ACP DISCUSS/DSCN MKR DOCD: CPT | Performed by: NURSE PRACTITIONER

## 2023-09-07 PROCEDURE — 3078F DIAST BP <80 MM HG: CPT | Performed by: NURSE PRACTITIONER

## 2023-09-07 PROCEDURE — 3074F SYST BP LT 130 MM HG: CPT | Performed by: NURSE PRACTITIONER

## 2023-09-07 PROCEDURE — 99213 OFFICE O/P EST LOW 20 MIN: CPT | Performed by: NURSE PRACTITIONER

## 2023-09-07 RX ORDER — AMOXICILLIN AND CLAVULANATE POTASSIUM 875; 125 MG/1; MG/1
1 TABLET, FILM COATED ORAL 2 TIMES DAILY
Qty: 14 TABLET | Refills: 0 | Status: SHIPPED | OUTPATIENT
Start: 2023-09-07 | End: 2023-09-14

## 2023-09-07 RX ORDER — GUAIFENESIN/DEXTROMETHORPHAN 100-10MG/5
5 SYRUP ORAL
Qty: 50 ML | Refills: 0 | Status: SHIPPED | OUTPATIENT
Start: 2023-09-07 | End: 2023-09-17

## 2023-09-07 RX ORDER — PREDNISONE 5 MG/1
TABLET ORAL
Qty: 1 EACH | Refills: 0 | Status: SHIPPED | OUTPATIENT
Start: 2023-09-07

## 2023-09-07 ASSESSMENT — ENCOUNTER SYMPTOMS
SINUS PAIN: 1
WHEEZING: 1
SINUS PRESSURE: 1
COUGH: 1

## 2023-09-07 NOTE — PROGRESS NOTES
PROGRESS NOTE      Chief Complaint   Patient presents with    Cough     3 weeks        HPI    Cough: 3 weeks. Urgent care 2 weeks ago. Negative Covid. Advised Mucinex and antitussive. Now cough is increasingly congested. Tightness and wheezing. No fever, chills. Malaise. Past Medical History, Past Surgical History, Family history, Social History, and Medications were all reviewed and updated as necessary. Current Outpatient Medications   Medication Sig Dispense Refill    predniSONE 5 MG (21) TBPK As directed 1 each 0    pantoprazole (PROTONIX) 40 MG tablet Take 1 tablet by mouth daily 90 tablet 3    tretinoin (RETIN-A) 0.05 % cream Apply topically nightly 20 g 5    escitalopram (LEXAPRO) 5 MG tablet Take 1 tablet by mouth every evening 90 tablet 3    fluticasone (FLONASE) 50 MCG/ACT nasal spray 2 sprays by Each Nostril route daily 3 each 3    rosuvastatin (CRESTOR) 10 MG tablet Take 1 tablet by mouth every evening 90 tablet 3    amLODIPine (NORVASC) 5 MG tablet Take 1 tablet by mouth daily 90 tablet 3    Cyanocobalamin (VITAMIN B12 PO) Take 1 tablet by mouth daily      acetaminophen (TYLENOL) 500 MG tablet Take 2 tablets by mouth every 6 hours as needed      Cholecalciferol 50 MCG (2000 UT) CAPS Take 1 capsule by mouth daily      ascorbic acid (VITAMIN C) 500 MG tablet Take 1 tablet by mouth daily      aspirin 81 MG EC tablet Take 1 tablet by mouth daily      clindamycin (CLINDAGEL) 1 % gel 2 times daily as needed APPLY TO THE AFFECTED AREA FOR ACNE ON FACE TWICE A DAY       No current facility-administered medications for this visit. Allergies   Allergen Reactions    Cefaclor Nausea Only    Chotu-Cklkx-Lmaywlq-Pramoxine Other (See Comments)     Patient unsure     Cephalexin Nausea And Vomiting    Clarithromycin Nausea And Vomiting     Patient unsure        ASSESSMENT and PLAN    Barbra Chiu was seen today for cough.     Diagnoses and all orders for this visit:    Acute maxillary sinusitis, recurrence not

## 2023-09-07 NOTE — TELEPHONE ENCOUNTER
----- Message from Zaira Tucker sent at 9/7/2023 12:32 PM EDT -----  Subject: Message to Provider    QUESTIONS  Information for Provider? Patient needed chest xray but there is no order   in for patient.  ---------------------------------------------------------------------------  --------------  Waleska [employer] INFO  [phone]; OK to leave message on voicemail  ---------------------------------------------------------------------------  --------------  SCRIPT ANSWERS  Relationship to Patient? Covered Entity  Covered Entity Type? Other  Other Covered Entity Type? imaging  Representative Name?  [place]

## 2023-09-19 ENCOUNTER — OFFICE VISIT (OUTPATIENT)
Dept: INTERNAL MEDICINE CLINIC | Facility: CLINIC | Age: 73
End: 2023-09-19
Payer: MEDICARE

## 2023-09-19 VITALS
WEIGHT: 159 LBS | HEIGHT: 67 IN | OXYGEN SATURATION: 97 % | HEART RATE: 67 BPM | SYSTOLIC BLOOD PRESSURE: 116 MMHG | DIASTOLIC BLOOD PRESSURE: 80 MMHG | BODY MASS INDEX: 24.96 KG/M2

## 2023-09-19 DIAGNOSIS — J45.20 MILD INTERMITTENT REACTIVE AIRWAY DISEASE WITHOUT COMPLICATION: ICD-10-CM

## 2023-09-19 DIAGNOSIS — Z87.891 FORMER SMOKER: ICD-10-CM

## 2023-09-19 DIAGNOSIS — K21.9 GASTROESOPHAGEAL REFLUX DISEASE WITHOUT ESOPHAGITIS: ICD-10-CM

## 2023-09-19 DIAGNOSIS — R05.3 PERSISTENT COUGH: ICD-10-CM

## 2023-09-19 DIAGNOSIS — R05.3 PERSISTENT COUGH: Primary | ICD-10-CM

## 2023-09-19 LAB
ALBUMIN SERPL-MCNC: 3.7 G/DL (ref 3.2–4.6)
ALBUMIN/GLOB SERPL: 1.2 (ref 0.4–1.6)
ALP SERPL-CCNC: 113 U/L (ref 50–136)
ALT SERPL-CCNC: 20 U/L (ref 12–65)
ANION GAP SERPL CALC-SCNC: 6 MMOL/L (ref 2–11)
AST SERPL-CCNC: 16 U/L (ref 15–37)
BASOPHILS # BLD: 0.1 K/UL (ref 0–0.2)
BASOPHILS NFR BLD: 1 % (ref 0–2)
BILIRUB SERPL-MCNC: 0.2 MG/DL (ref 0.2–1.1)
BUN SERPL-MCNC: 9 MG/DL (ref 8–23)
CALCIUM SERPL-MCNC: 8.9 MG/DL (ref 8.3–10.4)
CHLORIDE SERPL-SCNC: 107 MMOL/L (ref 101–110)
CO2 SERPL-SCNC: 29 MMOL/L (ref 21–32)
CREAT SERPL-MCNC: 0.8 MG/DL (ref 0.6–1)
DIFFERENTIAL METHOD BLD: NORMAL
EOSINOPHIL # BLD: 0.2 K/UL (ref 0–0.8)
EOSINOPHIL NFR BLD: 2 % (ref 0.5–7.8)
ERYTHROCYTE [DISTWIDTH] IN BLOOD BY AUTOMATED COUNT: 12.2 % (ref 11.9–14.6)
GLOBULIN SER CALC-MCNC: 3.1 G/DL (ref 2.8–4.5)
GLUCOSE SERPL-MCNC: 94 MG/DL (ref 65–100)
HCT VFR BLD AUTO: 43.5 % (ref 35.8–46.3)
HGB BLD-MCNC: 13.9 G/DL (ref 11.7–15.4)
IMM GRANULOCYTES # BLD AUTO: 0 K/UL (ref 0–0.5)
IMM GRANULOCYTES NFR BLD AUTO: 0 % (ref 0–5)
LYMPHOCYTES # BLD: 2.8 K/UL (ref 0.5–4.6)
LYMPHOCYTES NFR BLD: 28 % (ref 13–44)
MCH RBC QN AUTO: 31.4 PG (ref 26.1–32.9)
MCHC RBC AUTO-ENTMCNC: 32 G/DL (ref 31.4–35)
MCV RBC AUTO: 98.2 FL (ref 82–102)
MONOCYTES # BLD: 0.8 K/UL (ref 0.1–1.3)
MONOCYTES NFR BLD: 8 % (ref 4–12)
NEUTS SEG # BLD: 6.1 K/UL (ref 1.7–8.2)
NEUTS SEG NFR BLD: 61 % (ref 43–78)
NRBC # BLD: 0 K/UL (ref 0–0.2)
PLATELET # BLD AUTO: 346 K/UL (ref 150–450)
PMV BLD AUTO: 10 FL (ref 9.4–12.3)
POTASSIUM SERPL-SCNC: 4.5 MMOL/L (ref 3.5–5.1)
PROT SERPL-MCNC: 6.8 G/DL (ref 6.3–8.2)
RBC # BLD AUTO: 4.43 M/UL (ref 4.05–5.2)
SODIUM SERPL-SCNC: 142 MMOL/L (ref 133–143)
WBC # BLD AUTO: 10 K/UL (ref 4.3–11.1)

## 2023-09-19 PROCEDURE — 99214 OFFICE O/P EST MOD 30 MIN: CPT | Performed by: PHYSICIAN ASSISTANT

## 2023-09-19 PROCEDURE — 1123F ACP DISCUSS/DSCN MKR DOCD: CPT | Performed by: PHYSICIAN ASSISTANT

## 2023-09-19 PROCEDURE — 3074F SYST BP LT 130 MM HG: CPT | Performed by: PHYSICIAN ASSISTANT

## 2023-09-19 PROCEDURE — 96372 THER/PROPH/DIAG INJ SC/IM: CPT | Performed by: PHYSICIAN ASSISTANT

## 2023-09-19 PROCEDURE — 3079F DIAST BP 80-89 MM HG: CPT | Performed by: PHYSICIAN ASSISTANT

## 2023-09-19 RX ORDER — BENZONATATE 200 MG/1
200 CAPSULE ORAL 3 TIMES DAILY PRN
Qty: 30 CAPSULE | Refills: 0 | Status: SHIPPED | OUTPATIENT
Start: 2023-09-19 | End: 2023-09-26

## 2023-09-19 RX ORDER — BUDESONIDE AND FORMOTEROL FUMARATE DIHYDRATE 160; 4.5 UG/1; UG/1
2 AEROSOL RESPIRATORY (INHALATION) 2 TIMES DAILY
Qty: 10.2 G | Refills: 0 | Status: SHIPPED | OUTPATIENT
Start: 2023-09-19

## 2023-09-19 RX ORDER — TRIAMCINOLONE ACETONIDE 40 MG/ML
40 INJECTION, SUSPENSION INTRA-ARTICULAR; INTRAMUSCULAR ONCE
Status: COMPLETED | OUTPATIENT
Start: 2023-09-19 | End: 2023-09-19

## 2023-09-19 RX ADMIN — TRIAMCINOLONE ACETONIDE 40 MG: 40 INJECTION, SUSPENSION INTRA-ARTICULAR; INTRAMUSCULAR at 11:17

## 2023-09-19 ASSESSMENT — ENCOUNTER SYMPTOMS
SHORTNESS OF BREATH: 0
COLOR CHANGE: 0
EYE PAIN: 0
DIARRHEA: 0
SORE THROAT: 0
NAUSEA: 0
CONSTIPATION: 0
VOMITING: 0
COUGH: 1
VOICE CHANGE: 0
CHEST TIGHTNESS: 0
ABDOMINAL PAIN: 0
WHEEZING: 0

## 2023-09-19 ASSESSMENT — PATIENT HEALTH QUESTIONNAIRE - PHQ9
4. FEELING TIRED OR HAVING LITTLE ENERGY: 0
8. MOVING OR SPEAKING SO SLOWLY THAT OTHER PEOPLE COULD HAVE NOTICED. OR THE OPPOSITE, BEING SO FIGETY OR RESTLESS THAT YOU HAVE BEEN MOVING AROUND A LOT MORE THAN USUAL: 0
SUM OF ALL RESPONSES TO PHQ QUESTIONS 1-9: 0
SUM OF ALL RESPONSES TO PHQ QUESTIONS 1-9: 0
1. LITTLE INTEREST OR PLEASURE IN DOING THINGS: 0
10. IF YOU CHECKED OFF ANY PROBLEMS, HOW DIFFICULT HAVE THESE PROBLEMS MADE IT FOR YOU TO DO YOUR WORK, TAKE CARE OF THINGS AT HOME, OR GET ALONG WITH OTHER PEOPLE: 0
3. TROUBLE FALLING OR STAYING ASLEEP: 0
7. TROUBLE CONCENTRATING ON THINGS, SUCH AS READING THE NEWSPAPER OR WATCHING TELEVISION: 0
SUM OF ALL RESPONSES TO PHQ9 QUESTIONS 1 & 2: 0
9. THOUGHTS THAT YOU WOULD BE BETTER OFF DEAD, OR OF HURTING YOURSELF: 0
2. FEELING DOWN, DEPRESSED OR HOPELESS: 0
5. POOR APPETITE OR OVEREATING: 0
SUM OF ALL RESPONSES TO PHQ QUESTIONS 1-9: 0
6. FEELING BAD ABOUT YOURSELF - OR THAT YOU ARE A FAILURE OR HAVE LET YOURSELF OR YOUR FAMILY DOWN: 0
SUM OF ALL RESPONSES TO PHQ QUESTIONS 1-9: 0

## 2023-09-19 NOTE — PROGRESS NOTES
PROGRESS NOTE    Chief Complaint   Patient presents with    Cough     Pt stated cough over a month        HPI  Pt has had cough 5 weeks- better, been to urgent care 2 times, pt has been on prednisone and antibx, finished last Saturday    Cough  This is a chronic problem. The current episode started more than 1 month ago. The problem has been gradually improving. Pertinent negatives include no chest pain, ear pain, fever, headaches, rash, sore throat, shortness of breath or wheezing. Past Medical History, Past Surgical History, Family history, Social History, and Medications were all reviewed with the patient today and updated as necessary.        Current Outpatient Medications   Medication Sig Dispense Refill    benzonatate (TESSALON) 200 MG capsule Take 1 capsule by mouth 3 times daily as needed for Cough 30 capsule 0    budesonide-formoterol (SYMBICORT) 160-4.5 MCG/ACT AERO Inhale 2 puffs into the lungs 2 times daily 10.2 g 0    pantoprazole (PROTONIX) 40 MG tablet Take 1 tablet by mouth daily 90 tablet 3    tretinoin (RETIN-A) 0.05 % cream Apply topically nightly 20 g 5    escitalopram (LEXAPRO) 5 MG tablet Take 1 tablet by mouth every evening 90 tablet 3    fluticasone (FLONASE) 50 MCG/ACT nasal spray 2 sprays by Each Nostril route daily 3 each 3    rosuvastatin (CRESTOR) 10 MG tablet Take 1 tablet by mouth every evening 90 tablet 3    amLODIPine (NORVASC) 5 MG tablet Take 1 tablet by mouth daily 90 tablet 3    acetaminophen (TYLENOL) 500 MG tablet Take 2 tablets by mouth every 6 hours as needed      Cholecalciferol 50 MCG (2000 UT) CAPS Take 1 capsule by mouth daily      ascorbic acid (VITAMIN C) 500 MG tablet Take 1 tablet by mouth daily      aspirin 81 MG EC tablet Take 1 tablet by mouth daily      clindamycin (CLINDAGEL) 1 % gel 2 times daily as needed APPLY TO THE AFFECTED AREA FOR ACNE ON FACE TWICE A DAY      Cyanocobalamin (VITAMIN B12 PO) Take 1 tablet by mouth daily       Current

## 2023-10-12 ENCOUNTER — HOSPITAL ENCOUNTER (EMERGENCY)
Age: 73
Discharge: HOME | End: 2023-10-12
Attending: EMERGENCY MEDICINE
Payer: MEDICARE

## 2023-10-12 ENCOUNTER — APPOINTMENT (OUTPATIENT)
Dept: CT IMAGING | Age: 73
End: 2023-10-12
Payer: MEDICARE

## 2023-10-12 VITALS
HEIGHT: 67 IN | RESPIRATION RATE: 15 BRPM | SYSTOLIC BLOOD PRESSURE: 129 MMHG | TEMPERATURE: 97.7 F | DIASTOLIC BLOOD PRESSURE: 68 MMHG | HEART RATE: 65 BPM | WEIGHT: 160 LBS | BODY MASS INDEX: 25.11 KG/M2 | OXYGEN SATURATION: 95 %

## 2023-10-12 DIAGNOSIS — M54.12 CERVICAL RADICULOPATHY: Primary | ICD-10-CM

## 2023-10-12 DIAGNOSIS — N30.00 ACUTE CYSTITIS WITHOUT HEMATURIA: ICD-10-CM

## 2023-10-12 LAB
ALBUMIN SERPL-MCNC: 3.8 G/DL (ref 3.2–4.6)
ALBUMIN/GLOB SERPL: 1.1 (ref 0.4–1.6)
ALP SERPL-CCNC: 127 U/L (ref 50–136)
ALT SERPL-CCNC: 18 U/L (ref 12–65)
ANION GAP SERPL CALC-SCNC: 5 MMOL/L (ref 2–11)
APPEARANCE UR: CLEAR
AST SERPL-CCNC: 19 U/L (ref 15–37)
BACTERIA URNS QL MICRO: ABNORMAL /HPF
BASOPHILS # BLD: 0.1 K/UL (ref 0–0.2)
BASOPHILS NFR BLD: 1 % (ref 0–2)
BILIRUB SERPL-MCNC: 0.2 MG/DL (ref 0.2–1.1)
BILIRUB UR QL: ABNORMAL
BUN SERPL-MCNC: 16 MG/DL (ref 8–23)
CALCIUM SERPL-MCNC: 8.7 MG/DL (ref 8.3–10.4)
CASTS URNS QL MICRO: 0 /LPF
CHLORIDE SERPL-SCNC: 111 MMOL/L (ref 101–110)
CO2 SERPL-SCNC: 25 MMOL/L (ref 21–32)
COLOR UR: YELLOW
CREAT SERPL-MCNC: 0.9 MG/DL (ref 0.6–1)
CRYSTALS URNS QL MICRO: ABNORMAL /LPF
DIFFERENTIAL METHOD BLD: ABNORMAL
EKG ATRIAL RATE: 69 BPM
EKG DIAGNOSIS: NORMAL
EKG P AXIS: 61 DEGREES
EKG P-R INTERVAL: 120 MS
EKG Q-T INTERVAL: 440 MS
EKG QRS DURATION: 80 MS
EKG QTC CALCULATION (BAZETT): 471 MS
EKG R AXIS: 49 DEGREES
EKG T AXIS: 47 DEGREES
EKG VENTRICULAR RATE: 69 BPM
EOSINOPHIL # BLD: 0.3 K/UL (ref 0–0.8)
EOSINOPHIL NFR BLD: 3 % (ref 0.5–7.8)
EPI CELLS #/AREA URNS HPF: ABNORMAL /HPF
ERYTHROCYTE [DISTWIDTH] IN BLOOD BY AUTOMATED COUNT: 12.1 % (ref 11.9–14.6)
GLOBULIN SER CALC-MCNC: 3.4 G/DL (ref 2.8–4.5)
GLUCOSE SERPL-MCNC: 101 MG/DL (ref 65–100)
GLUCOSE UR STRIP.AUTO-MCNC: NEGATIVE MG/DL
HCT VFR BLD AUTO: 43.7 % (ref 35.8–46.3)
HGB BLD-MCNC: 14.1 G/DL (ref 11.7–15.4)
HGB UR QL STRIP: NEGATIVE
IMM GRANULOCYTES # BLD AUTO: 0 K/UL (ref 0–0.5)
IMM GRANULOCYTES NFR BLD AUTO: 0 % (ref 0–5)
KETONES UR QL STRIP.AUTO: ABNORMAL MG/DL
LEUKOCYTE ESTERASE UR QL STRIP.AUTO: NEGATIVE
LYMPHOCYTES # BLD: 3 K/UL (ref 0.5–4.6)
LYMPHOCYTES NFR BLD: 33 % (ref 13–44)
MAGNESIUM SERPL-MCNC: 2.6 MG/DL (ref 1.8–2.4)
MCH RBC QN AUTO: 31.4 PG (ref 26.1–32.9)
MCHC RBC AUTO-ENTMCNC: 32.3 G/DL (ref 31.4–35)
MCV RBC AUTO: 97.3 FL (ref 82–102)
MONOCYTES # BLD: 0.9 K/UL (ref 0.1–1.3)
MONOCYTES NFR BLD: 10 % (ref 4–12)
MUCOUS THREADS URNS QL MICRO: ABNORMAL /LPF
NEUTS SEG # BLD: 4.7 K/UL (ref 1.7–8.2)
NEUTS SEG NFR BLD: 53 % (ref 43–78)
NITRITE UR QL STRIP.AUTO: NEGATIVE
NRBC # BLD: 0 K/UL (ref 0–0.2)
OTHER OBSERVATIONS: ABNORMAL
PH UR STRIP: 5.5 (ref 5–9)
PLATELET # BLD AUTO: 349 K/UL (ref 150–450)
PMV BLD AUTO: 8.9 FL (ref 9.4–12.3)
POTASSIUM SERPL-SCNC: 3.8 MMOL/L (ref 3.5–5.1)
PROT SERPL-MCNC: 7.2 G/DL (ref 6.3–8.2)
PROT UR STRIP-MCNC: ABNORMAL MG/DL
RBC # BLD AUTO: 4.49 M/UL (ref 4.05–5.2)
RBC #/AREA URNS HPF: ABNORMAL /HPF
SODIUM SERPL-SCNC: 141 MMOL/L (ref 133–143)
SP GR UR REFRACTOMETRY: 1.03 (ref 1–1.02)
URINE CULTURE IF INDICATED: ABNORMAL
UROBILINOGEN UR QL STRIP.AUTO: 1 EU/DL (ref 0.2–1)
WBC # BLD AUTO: 9 K/UL (ref 4.3–11.1)
WBC URNS QL MICRO: ABNORMAL /HPF

## 2023-10-12 PROCEDURE — 6360000004 HC RX CONTRAST MEDICATION: Performed by: EMERGENCY MEDICINE

## 2023-10-12 PROCEDURE — 87086 URINE CULTURE/COLONY COUNT: CPT

## 2023-10-12 PROCEDURE — 81001 URINALYSIS AUTO W/SCOPE: CPT

## 2023-10-12 PROCEDURE — 96374 THER/PROPH/DIAG INJ IV PUSH: CPT

## 2023-10-12 PROCEDURE — 83735 ASSAY OF MAGNESIUM: CPT

## 2023-10-12 PROCEDURE — 72125 CT NECK SPINE W/O DYE: CPT

## 2023-10-12 PROCEDURE — 6360000002 HC RX W HCPCS: Performed by: EMERGENCY MEDICINE

## 2023-10-12 PROCEDURE — 71260 CT THORAX DX C+: CPT

## 2023-10-12 PROCEDURE — 99285 EMERGENCY DEPT VISIT HI MDM: CPT

## 2023-10-12 PROCEDURE — 93005 ELECTROCARDIOGRAM TRACING: CPT | Performed by: STUDENT IN AN ORGANIZED HEALTH CARE EDUCATION/TRAINING PROGRAM

## 2023-10-12 PROCEDURE — 80053 COMPREHEN METABOLIC PANEL: CPT

## 2023-10-12 PROCEDURE — 6370000000 HC RX 637 (ALT 250 FOR IP): Performed by: EMERGENCY MEDICINE

## 2023-10-12 PROCEDURE — 93010 ELECTROCARDIOGRAM REPORT: CPT | Performed by: INTERNAL MEDICINE

## 2023-10-12 PROCEDURE — 85025 COMPLETE CBC W/AUTO DIFF WBC: CPT

## 2023-10-12 RX ORDER — CEPHALEXIN 500 MG/1
500 CAPSULE ORAL
Status: COMPLETED | OUTPATIENT
Start: 2023-10-12 | End: 2023-10-12

## 2023-10-12 RX ORDER — DEXAMETHASONE SODIUM PHOSPHATE 10 MG/ML
8 INJECTION INTRAMUSCULAR; INTRAVENOUS
Status: COMPLETED | OUTPATIENT
Start: 2023-10-12 | End: 2023-10-12

## 2023-10-12 RX ORDER — CEPHALEXIN 500 MG/1
500 CAPSULE ORAL 2 TIMES DAILY
Qty: 14 CAPSULE | Refills: 0 | Status: SHIPPED | OUTPATIENT
Start: 2023-10-12 | End: 2023-10-19

## 2023-10-12 RX ORDER — METHYLPREDNISOLONE 4 MG/1
TABLET ORAL
Qty: 1 KIT | Refills: 0 | Status: SHIPPED | OUTPATIENT
Start: 2023-10-12 | End: 2023-10-18

## 2023-10-12 RX ADMIN — DEXAMETHASONE SODIUM PHOSPHATE 8 MG: 10 INJECTION INTRAMUSCULAR; INTRAVENOUS at 19:29

## 2023-10-12 RX ADMIN — CEPHALEXIN 500 MG: 500 CAPSULE ORAL at 19:29

## 2023-10-12 RX ADMIN — IOPAMIDOL 75 ML: 755 INJECTION, SOLUTION INTRAVENOUS at 18:27

## 2023-10-12 ASSESSMENT — PAIN DESCRIPTION - FREQUENCY: FREQUENCY: CONTINUOUS

## 2023-10-12 ASSESSMENT — PAIN DESCRIPTION - PAIN TYPE: TYPE: ACUTE PAIN

## 2023-10-12 ASSESSMENT — PAIN DESCRIPTION - DESCRIPTORS: DESCRIPTORS: ACHING;DISCOMFORT

## 2023-10-12 ASSESSMENT — PAIN - FUNCTIONAL ASSESSMENT: PAIN_FUNCTIONAL_ASSESSMENT: 0-10

## 2023-10-12 ASSESSMENT — PAIN DESCRIPTION - ORIENTATION: ORIENTATION: RIGHT

## 2023-10-12 ASSESSMENT — PAIN DESCRIPTION - LOCATION: LOCATION: BACK

## 2023-10-12 ASSESSMENT — PAIN SCALES - GENERAL: PAINLEVEL_OUTOF10: 7

## 2023-10-12 NOTE — ED TRIAGE NOTES
Reports R shoulder/back pain radiating around to R chest since this morning when woke up. Pt reports recently got over URI x4wks after finishing steroids, antibiotics and cough medicine. Pt denies cough at this time.  (-)fever, dyspnea (only with increased pain this morning)    A&Ox4

## 2023-10-12 NOTE — ED PROVIDER NOTES
Emergency Department Provider Note                   PCP:                Vikash Salas, APRN - CNP               Age: 67 y.o. Sex: female       ICD-10-CM    1. Cervical radiculopathy  M54.12       2. Acute cystitis without hematuria  N30.00           DISPOSITION Discharge - Pending Orders Complete 10/12/2023 07:28:40 PM        MDM  Number of Diagnoses or Management Options  Diagnosis management comments: MEDICAL DECISION MAKING  Complexity of Problems Addressed:  1 or more acute illnesses that pose a threat to life or bodily function. Data Reviewed and Analyzed:  Category 1:   I independently ordered and reviewed each unique test.    Category 2:   I interpreted the CT Scan Degenerative changes to cervical spine. Category 3: Discussion of management or test interpretation. The patient presents with pain in the right scapula today, pain in the right arm for a few days. She also has a history of right-sided pain on/off for some time. A CT chest was ordered which showed atelectasis, no PE or infiltrate. Blood work was unremarkable. UA suggested mild infection, patient started on Keflex. Suspected cervical radiculopathy. CT cervical spine confirms that the patient had degenerative changes. She has also been reporting other symptoms over time that also suggest this issue. Given dose of IV dexamethasone and will continue on steroid taper. Discussed results and recommended close follow up with primary care. Patient was in agreement. Risk of Complications and/or Morbidity of Patient Management:  Prescription drug management performed.                 Orders Placed This Encounter   Procedures    Culture, Urine    CT CERVICAL SPINE WO CONTRAST    CT CHEST PULMONARY EMBOLISM W CONTRAST    CBC with Auto Differential    CMP    Magnesium    Urinalysis with Reflex to Culture    EKG 12 Lead    Saline lock IV        Medications   iopamidol (ISOVUE-370) 76 % injection 75 mL (75 mLs IntraVENous Given

## 2023-10-15 LAB
BACTERIA SPEC CULT: NORMAL
SERVICE CMNT-IMP: NORMAL

## 2023-10-24 ENCOUNTER — OFFICE VISIT (OUTPATIENT)
Dept: INTERNAL MEDICINE CLINIC | Facility: CLINIC | Age: 73
End: 2023-10-24
Payer: MEDICARE

## 2023-10-24 VITALS
DIASTOLIC BLOOD PRESSURE: 68 MMHG | WEIGHT: 160 LBS | OXYGEN SATURATION: 98 % | SYSTOLIC BLOOD PRESSURE: 110 MMHG | HEART RATE: 78 BPM | HEIGHT: 67 IN | BODY MASS INDEX: 25.11 KG/M2

## 2023-10-24 DIAGNOSIS — Z00.00 MEDICARE ANNUAL WELLNESS VISIT, SUBSEQUENT: Primary | ICD-10-CM

## 2023-10-24 DIAGNOSIS — Z78.9 STATIN INTOLERANCE: ICD-10-CM

## 2023-10-24 DIAGNOSIS — R05.8 RECURRENT COUGH: ICD-10-CM

## 2023-10-24 DIAGNOSIS — I10 ESSENTIAL HYPERTENSION: ICD-10-CM

## 2023-10-24 DIAGNOSIS — E78.00 PURE HYPERCHOLESTEROLEMIA: ICD-10-CM

## 2023-10-24 DIAGNOSIS — R73.09 ABNORMAL GLUCOSE: ICD-10-CM

## 2023-10-24 DIAGNOSIS — K21.9 GASTROESOPHAGEAL REFLUX DISEASE WITHOUT ESOPHAGITIS: ICD-10-CM

## 2023-10-24 DIAGNOSIS — Z23 NEED FOR INFLUENZA VACCINATION: ICD-10-CM

## 2023-10-24 PROCEDURE — G0008 ADMIN INFLUENZA VIRUS VAC: HCPCS | Performed by: NURSE PRACTITIONER

## 2023-10-24 PROCEDURE — 90694 VACC AIIV4 NO PRSRV 0.5ML IM: CPT | Performed by: NURSE PRACTITIONER

## 2023-10-24 PROCEDURE — 3078F DIAST BP <80 MM HG: CPT | Performed by: NURSE PRACTITIONER

## 2023-10-24 PROCEDURE — 3074F SYST BP LT 130 MM HG: CPT | Performed by: NURSE PRACTITIONER

## 2023-10-24 PROCEDURE — 1123F ACP DISCUSS/DSCN MKR DOCD: CPT | Performed by: NURSE PRACTITIONER

## 2023-10-24 PROCEDURE — G0439 PPPS, SUBSEQ VISIT: HCPCS | Performed by: NURSE PRACTITIONER

## 2023-10-24 RX ORDER — NITROGLYCERIN 0.4 MG/1
0.4 TABLET SUBLINGUAL EVERY 5 MIN PRN
COMMUNITY

## 2023-10-24 ASSESSMENT — PATIENT HEALTH QUESTIONNAIRE - PHQ9
3. TROUBLE FALLING OR STAYING ASLEEP: 0
SUM OF ALL RESPONSES TO PHQ9 QUESTIONS 1 & 2: 0
1. LITTLE INTEREST OR PLEASURE IN DOING THINGS: 0
SUM OF ALL RESPONSES TO PHQ QUESTIONS 1-9: 2
SUM OF ALL RESPONSES TO PHQ QUESTIONS 1-9: 2
2. FEELING DOWN, DEPRESSED OR HOPELESS: 0
6. FEELING BAD ABOUT YOURSELF - OR THAT YOU ARE A FAILURE OR HAVE LET YOURSELF OR YOUR FAMILY DOWN: 0
SUM OF ALL RESPONSES TO PHQ QUESTIONS 1-9: 2
8. MOVING OR SPEAKING SO SLOWLY THAT OTHER PEOPLE COULD HAVE NOTICED. OR THE OPPOSITE, BEING SO FIGETY OR RESTLESS THAT YOU HAVE BEEN MOVING AROUND A LOT MORE THAN USUAL: 0
4. FEELING TIRED OR HAVING LITTLE ENERGY: 1
10. IF YOU CHECKED OFF ANY PROBLEMS, HOW DIFFICULT HAVE THESE PROBLEMS MADE IT FOR YOU TO DO YOUR WORK, TAKE CARE OF THINGS AT HOME, OR GET ALONG WITH OTHER PEOPLE: 0
7. TROUBLE CONCENTRATING ON THINGS, SUCH AS READING THE NEWSPAPER OR WATCHING TELEVISION: 0
SUM OF ALL RESPONSES TO PHQ QUESTIONS 1-9: 2
5. POOR APPETITE OR OVEREATING: 1
9. THOUGHTS THAT YOU WOULD BE BETTER OFF DEAD, OR OF HURTING YOURSELF: 0

## 2023-10-24 ASSESSMENT — COLUMBIA-SUICIDE SEVERITY RATING SCALE - C-SSRS
3. HAVE YOU BEEN THINKING ABOUT HOW YOU MIGHT KILL YOURSELF?: NO
4. HAVE YOU HAD THESE THOUGHTS AND HAD SOME INTENTION OF ACTING ON THEM?: NO
7. DID THIS OCCUR IN THE LAST THREE MONTHS: NO
5. HAVE YOU STARTED TO WORK OUT OR WORKED OUT THE DETAILS OF HOW TO KILL YOURSELF? DO YOU INTEND TO CARRY OUT THIS PLAN?: NO

## 2023-10-26 ENCOUNTER — OFFICE VISIT (OUTPATIENT)
Dept: INTERNAL MEDICINE CLINIC | Facility: CLINIC | Age: 73
End: 2023-10-26
Payer: MEDICARE

## 2023-10-26 VITALS
SYSTOLIC BLOOD PRESSURE: 114 MMHG | HEART RATE: 74 BPM | WEIGHT: 158 LBS | BODY MASS INDEX: 24.75 KG/M2 | OXYGEN SATURATION: 95 % | DIASTOLIC BLOOD PRESSURE: 76 MMHG

## 2023-10-26 DIAGNOSIS — Z87.891 FORMER SMOKER: ICD-10-CM

## 2023-10-26 DIAGNOSIS — R73.09 ABNORMAL GLUCOSE: ICD-10-CM

## 2023-10-26 DIAGNOSIS — M79.7 FIBROMYALGIA: ICD-10-CM

## 2023-10-26 DIAGNOSIS — J98.01 BRONCHOSPASM: ICD-10-CM

## 2023-10-26 DIAGNOSIS — I25.10 CORONARY ARTERY DISEASE INVOLVING NATIVE CORONARY ARTERY OF NATIVE HEART WITHOUT ANGINA PECTORIS: ICD-10-CM

## 2023-10-26 DIAGNOSIS — K57.30 SIGMOID DIVERTICULOSIS: ICD-10-CM

## 2023-10-26 DIAGNOSIS — Z85.89 HISTORY OF SQUAMOUS CELL CARCINOMA: ICD-10-CM

## 2023-10-26 DIAGNOSIS — M25.50 POLYARTHRALGIA: ICD-10-CM

## 2023-10-26 DIAGNOSIS — Z85.828 HISTORY OF BASAL CELL CANCER: ICD-10-CM

## 2023-10-26 DIAGNOSIS — E78.00 PURE HYPERCHOLESTEROLEMIA: ICD-10-CM

## 2023-10-26 DIAGNOSIS — Z95.5 H/O HEART ARTERY STENT: ICD-10-CM

## 2023-10-26 DIAGNOSIS — R05.3 PERSISTENT COUGH: Primary | ICD-10-CM

## 2023-10-26 DIAGNOSIS — Z90.49 S/P CHOLECYSTECTOMY: ICD-10-CM

## 2023-10-26 DIAGNOSIS — Z87.39 HISTORY OF TEMPORAL ARTERITIS: ICD-10-CM

## 2023-10-26 DIAGNOSIS — K21.9 GASTROESOPHAGEAL REFLUX DISEASE WITHOUT ESOPHAGITIS: ICD-10-CM

## 2023-10-26 DIAGNOSIS — Z98.82 H/O BILATERAL BREAST IMPLANTS: ICD-10-CM

## 2023-10-26 DIAGNOSIS — I10 ESSENTIAL HYPERTENSION: ICD-10-CM

## 2023-10-26 DIAGNOSIS — N63.12 MASS OF UPPER INNER QUADRANT OF RIGHT BREAST: ICD-10-CM

## 2023-10-26 LAB
AMORPH CRY URNS QL MICRO: ABNORMAL
BACTERIA URNS QL MICRO: ABNORMAL /HPF
CASTS URNS QL MICRO: 0 /LPF
CRYSTALS URNS QL MICRO: ABNORMAL /LPF
EPI CELLS #/AREA URNS HPF: 0 /HPF
MUCOUS THREADS URNS QL MICRO: 0 /LPF
RBC #/AREA URNS HPF: 0 /HPF
WBC URNS QL MICRO: ABNORMAL /HPF

## 2023-10-26 PROCEDURE — 1123F ACP DISCUSS/DSCN MKR DOCD: CPT | Performed by: NURSE PRACTITIONER

## 2023-10-26 PROCEDURE — 3074F SYST BP LT 130 MM HG: CPT | Performed by: NURSE PRACTITIONER

## 2023-10-26 PROCEDURE — 3078F DIAST BP <80 MM HG: CPT | Performed by: NURSE PRACTITIONER

## 2023-10-26 PROCEDURE — 99215 OFFICE O/P EST HI 40 MIN: CPT | Performed by: NURSE PRACTITIONER

## 2023-10-26 RX ORDER — METHYLPREDNISOLONE 4 MG/1
TABLET ORAL
Qty: 1 KIT | Refills: 0 | Status: SHIPPED | OUTPATIENT
Start: 2023-10-26 | End: 2023-11-01

## 2023-10-26 RX ORDER — BUDESONIDE AND FORMOTEROL FUMARATE DIHYDRATE 160; 4.5 UG/1; UG/1
2 AEROSOL RESPIRATORY (INHALATION) 2 TIMES DAILY
Qty: 10.2 G | Refills: 0 | Status: SHIPPED | OUTPATIENT
Start: 2023-10-26 | End: 2023-10-27 | Stop reason: ALTCHOICE

## 2023-10-26 RX ORDER — FLUTICASONE FUROATE, UMECLIDINIUM BROMIDE AND VILANTEROL TRIFENATATE 100; 62.5; 25 UG/1; UG/1; UG/1
1 POWDER RESPIRATORY (INHALATION) DAILY
Qty: 1 EACH | Refills: 0 | Status: SHIPPED | OUTPATIENT
Start: 2023-10-26

## 2023-10-26 NOTE — PROGRESS NOTES
PROGRESS NOTE      Chief Complaint   Patient presents with    lab follow up, discuss problems and exam       HPI    Right breast mass: Tender mass right upper breast. Possibly noticed months ago. Bilateral implants. No nipple changes. Due for mammogram    Persistent cough and wheezing: Symbicort helpful but too expensive. Albuterol prn with transient benefit. Finished antibiotics and prednisone. Tight, almost constant cough persists. Recent CT of chest in ER without significant abnormality. Arthralgias\" Hurt all over. \" Shoulders, wrists, hands, neck and back. Sore and stiff in the morning. No significant swelling. Ongoing pain. Prednisone always helpful. History of temporal arteritis. Hyperlipidemia with history of statin intolerance and ascvd: Previously on Repatha with tolerance and good control and receiving for free through patient assistance but assistance ran out. Cardiology recommended retry of Crestor 10 mg and tolerating. . Hypertension: Good control with amlodipine 5 mg     Anxiety and depression: Lexapro 5 mg - tolerant and compliant. Prediabetes: A1C unchanged     Colon polyp: History of tubular adenoma. Osteopenia: DEXA 2022 - no medical management. History of basal cell and squamous cell carcinoma: followed closely by nichelle        Past Medical History, Past Surgical History, Family history, Social History, and Medications were all reviewed and updated as necessary. Current Outpatient Medications   Medication Sig Dispense Refill    fluticasone-umeclidin-vilant (TRELEGY ELLIPTA) 100-62.5-25 MCG/ACT AEPB inhaler Inhale 1 puff into the lungs daily 1 each 0    methylPREDNISolone (MEDROL DOSEPACK) 4 MG tablet Take by mouth.  1 kit 0    nitroGLYCERIN (NITROSTAT) 0.4 MG SL tablet 1 tablet every 5 minutes as needed      pantoprazole (PROTONIX) 40 MG tablet Take 1 tablet by mouth daily 90 tablet 3    tretinoin (RETIN-A) 0.05 % cream Apply topically nightly 20 g 5

## 2023-10-27 PROBLEM — Z85.89 HISTORY OF SQUAMOUS CELL CARCINOMA: Status: ACTIVE | Noted: 2023-10-27

## 2023-10-27 PROBLEM — Z87.891 FORMER SMOKER: Status: ACTIVE | Noted: 2023-10-27

## 2023-10-27 PROBLEM — Z85.828 HISTORY OF BASAL CELL CANCER: Status: ACTIVE | Noted: 2023-10-27

## 2023-10-27 ASSESSMENT — ENCOUNTER SYMPTOMS
SINUS PRESSURE: 0
COLOR CHANGE: 0
BLOOD IN STOOL: 0
SHORTNESS OF BREATH: 0
EYE REDNESS: 0
ABDOMINAL PAIN: 0
BACK PAIN: 0
EYE ITCHING: 0
COUGH: 1
DIARRHEA: 0
WHEEZING: 1
CONSTIPATION: 0
NAUSEA: 0

## 2023-11-08 ENCOUNTER — OFFICE VISIT (OUTPATIENT)
Age: 73
End: 2023-11-08
Payer: MEDICARE

## 2023-11-08 VITALS
HEIGHT: 67 IN | DIASTOLIC BLOOD PRESSURE: 70 MMHG | HEART RATE: 66 BPM | SYSTOLIC BLOOD PRESSURE: 108 MMHG | WEIGHT: 159 LBS | BODY MASS INDEX: 24.96 KG/M2

## 2023-11-08 DIAGNOSIS — E78.00 PURE HYPERCHOLESTEROLEMIA: ICD-10-CM

## 2023-11-08 DIAGNOSIS — I10 ESSENTIAL HYPERTENSION: ICD-10-CM

## 2023-11-08 DIAGNOSIS — I25.10 CORONARY ARTERY DISEASE INVOLVING NATIVE CORONARY ARTERY OF NATIVE HEART WITHOUT ANGINA PECTORIS: Primary | ICD-10-CM

## 2023-11-08 PROCEDURE — 1123F ACP DISCUSS/DSCN MKR DOCD: CPT | Performed by: INTERNAL MEDICINE

## 2023-11-08 PROCEDURE — 3074F SYST BP LT 130 MM HG: CPT | Performed by: INTERNAL MEDICINE

## 2023-11-08 PROCEDURE — 99214 OFFICE O/P EST MOD 30 MIN: CPT | Performed by: INTERNAL MEDICINE

## 2023-11-08 PROCEDURE — 3078F DIAST BP <80 MM HG: CPT | Performed by: INTERNAL MEDICINE

## 2023-11-08 NOTE — PROGRESS NOTES
[facility] CARDIOLOGY  [address] [place] [address]  PHONE: [phone]      23    NAME:  Narendra Lobo  : 1950  MRN: [id number]       SUBJECTIVE:   Narendra Lobo is a 67 y.o. female seen for a follow up visit regarding the following:     Chief Complaint   Patient presents with    Coronary Artery Disease         HPI:    No dutton. No orthopnea or pnd. No palpitations or syncope. Right sided cp and right axillary pain. several er visits. Past Medical History, Past Surgical History, Family history, Social History, and Medications were all reviewed with the patient today and updated as necessary.      Current Outpatient Medications   Medication Sig Dispense Refill    fluticasone-umeclidin-vilant (TRELEGY ELLIPTA) 100-62.5-25 MCG/ACT AEPB inhaler Inhale 1 puff into the lungs daily 1 each 0    nitroGLYCERIN (NITROSTAT) 0.4 MG SL tablet 1 tablet every 5 minutes as needed      pantoprazole (PROTONIX) 40 MG tablet Take 1 tablet by mouth daily 90 tablet 3    tretinoin (RETIN-A) 0.05 % cream Apply topically nightly 20 g 5    escitalopram (LEXAPRO) 5 MG tablet Take 1 tablet by mouth every evening 90 tablet 3    fluticasone (FLONASE) 50 MCG/ACT nasal spray 2 sprays by Each Nostril route daily (Patient taking differently: 2 sprays by Each Nostril route daily as needed) 3 each 3    rosuvastatin (CRESTOR) 10 MG tablet Take 1 tablet by mouth every evening 90 tablet 3    amLODIPine (NORVASC) 5 MG tablet Take 1 tablet by mouth daily 90 tablet 3    Cyanocobalamin (VITAMIN B12 PO) Take 1 tablet by mouth daily      acetaminophen (TYLENOL) 500 MG tablet Take 2 tablets by mouth every 6 hours as needed      Cholecalciferol 50 MCG ( UT) CAPS Take 1 capsule by mouth daily      ascorbic acid (VITAMIN C) 500 MG tablet Take 1 tablet by mouth daily      aspirin 81 MG EC tablet Take 1 tablet by mouth daily      clindamycin (CLINDAGEL) 1 % gel 2 times daily as needed APPLY TO THE AFFECTED AREA FOR

## 2023-11-09 ENCOUNTER — HOSPITAL ENCOUNTER (OUTPATIENT)
Dept: MAMMOGRAPHY | Age: 73
End: 2023-11-09
Payer: MEDICARE

## 2023-11-09 ENCOUNTER — HOSPITAL ENCOUNTER (OUTPATIENT)
Dept: MAMMOGRAPHY | Age: 73
Discharge: HOME | End: 2023-11-09
Payer: MEDICARE

## 2023-11-09 DIAGNOSIS — N63.12 MASS OF UPPER INNER QUADRANT OF RIGHT BREAST: ICD-10-CM

## 2023-11-09 PROCEDURE — G0279 TOMOSYNTHESIS, MAMMO: HCPCS

## 2023-11-09 PROCEDURE — 76642 ULTRASOUND BREAST LIMITED: CPT

## 2023-12-28 ENCOUNTER — HOSPITAL ENCOUNTER (OUTPATIENT)
Dept: GENERAL RADIOLOGY | Age: 73
Discharge: HOME | End: 2023-12-31

## 2023-12-28 ENCOUNTER — OFFICE VISIT (OUTPATIENT)
Dept: INTERNAL MEDICINE CLINIC | Facility: CLINIC | Age: 73
End: 2023-12-28
Payer: MEDICARE

## 2023-12-28 VITALS
HEIGHT: 67 IN | TEMPERATURE: 98.5 F | DIASTOLIC BLOOD PRESSURE: 62 MMHG | SYSTOLIC BLOOD PRESSURE: 110 MMHG | WEIGHT: 160 LBS | HEART RATE: 72 BPM | OXYGEN SATURATION: 95 % | BODY MASS INDEX: 25.11 KG/M2

## 2023-12-28 DIAGNOSIS — B00.1 COLD SORE: ICD-10-CM

## 2023-12-28 DIAGNOSIS — R05.3 PERSISTENT COUGH: Primary | ICD-10-CM

## 2023-12-28 DIAGNOSIS — R05.3 PERSISTENT COUGH: ICD-10-CM

## 2023-12-28 PROCEDURE — 3074F SYST BP LT 130 MM HG: CPT | Performed by: STUDENT IN AN ORGANIZED HEALTH CARE EDUCATION/TRAINING PROGRAM

## 2023-12-28 PROCEDURE — 3078F DIAST BP <80 MM HG: CPT | Performed by: STUDENT IN AN ORGANIZED HEALTH CARE EDUCATION/TRAINING PROGRAM

## 2023-12-28 PROCEDURE — 99214 OFFICE O/P EST MOD 30 MIN: CPT | Performed by: STUDENT IN AN ORGANIZED HEALTH CARE EDUCATION/TRAINING PROGRAM

## 2023-12-28 PROCEDURE — 1123F ACP DISCUSS/DSCN MKR DOCD: CPT | Performed by: STUDENT IN AN ORGANIZED HEALTH CARE EDUCATION/TRAINING PROGRAM

## 2023-12-28 RX ORDER — VALACYCLOVIR HYDROCHLORIDE 1 G/1
2000 TABLET, FILM COATED ORAL 2 TIMES DAILY
Qty: 4 TABLET | Refills: 0 | Status: SHIPPED | OUTPATIENT
Start: 2023-12-28

## 2023-12-28 RX ORDER — ALBUTEROL SULFATE 90 UG/1
2 AEROSOL, METERED RESPIRATORY (INHALATION) 4 TIMES DAILY PRN
Qty: 18 G | Refills: 0 | Status: SHIPPED | OUTPATIENT
Start: 2023-12-28

## 2023-12-28 NOTE — PROGRESS NOTES
FOLLOW UP VISIT    Subjective:    Samy Berry (: 1950) is a 68 y.o., female,   Chief Complaint   Patient presents with    Cough     See in ER on . HPI:    Persistent cough for 2 yrs at least but past 2 weeks productive of yellowish tinge. Blisters and bleeding on R nostril, and fever blister on the mouth. Went to ED 23 for bronchitis and prescribed doxycycline for 7 days which she has completed. Still having chest and head congestion. Sister sick with COVID in hospital now, she tested negative 23 in the ED. Currently using no inhaler. She endorses wheezing. Taking mucinex DM. Not using inhaler currently because too expensive. Has appointment with pulmonary in about 3 weeks. She denies SOB, CP, fever, chills, sinus pain. Continues to have chest congestion.     The following portions of the patient's history were reviewed and updated as appropriate:      Patient Active Problem List    Diagnosis Date Noted    Benign neoplasm of transverse colon 2023    Benign neoplasm of sigmoid colon 2023    Sigmoid diverticulosis 2023    Internal hemorrhoids without complication 24/10/9488    Benign neoplasm of ascending colon 2023    Eustachian tube disorder, right 2022    Degeneration of intervertebral disc 2021    Former smoker 10/27/2023    History of basal cell cancer 10/27/2023    History of squamous cell carcinoma 10/27/2023    Personal history of colonic polyps 2022    Abnormal glucose 03/10/2022    History of COVID-19 2022    Statin myopathy 10/14/2021    Myalgia 2021    Generalized osteoarthritis 2021    History of temporal arteritis 2021    Osteopenia 2021    Overweight 2021    Pain in thoracic spine 2021    S/P cholecystectomy 10/30/2020    Statin intolerance 2020    H/O heart artery stent 2020    Pure hypercholesterolemia 2018    Coronary artery disease involving native

## 2024-01-17 ENCOUNTER — OFFICE VISIT (OUTPATIENT)
Dept: PULMONOLOGY | Age: 74
End: 2024-01-17
Payer: MEDICARE

## 2024-01-17 VITALS
TEMPERATURE: 98 F | SYSTOLIC BLOOD PRESSURE: 130 MMHG | HEART RATE: 82 BPM | HEIGHT: 67 IN | OXYGEN SATURATION: 96 % | RESPIRATION RATE: 20 BRPM | DIASTOLIC BLOOD PRESSURE: 80 MMHG | WEIGHT: 161 LBS | BODY MASS INDEX: 25.27 KG/M2

## 2024-01-17 DIAGNOSIS — Z87.891 HISTORY OF PRIOR CIGARETTE SMOKING: ICD-10-CM

## 2024-01-17 DIAGNOSIS — J31.0 CHRONIC RHINITIS: ICD-10-CM

## 2024-01-17 DIAGNOSIS — R05.3 CHRONIC COUGH: Primary | ICD-10-CM

## 2024-01-17 LAB
EXPIRATORY TIME: NORMAL
FEF 25-75% %PRED-PRE: NORMAL
FEF 25-75% PRED: NORMAL
FEF 25-75-PRE: NORMAL
FEV1 %PRED-PRE: 53 %
FEV1 PRED: NORMAL
FEV1/FVC %PRED-PRE: NORMAL
FEV1/FVC PRED: NORMAL
FEV1/FVC: 70 %
FEV1: 1.32 L
FVC %PRED-PRE: 57 %
FVC PRED: NORMAL
FVC: 1.87 L
PEF %PRED-PRE: NORMAL
PEF PRED: NORMAL
PEF-PRE: NORMAL

## 2024-01-17 PROCEDURE — 3075F SYST BP GE 130 - 139MM HG: CPT | Performed by: INTERNAL MEDICINE

## 2024-01-17 PROCEDURE — 94010 BREATHING CAPACITY TEST: CPT | Performed by: INTERNAL MEDICINE

## 2024-01-17 PROCEDURE — 3079F DIAST BP 80-89 MM HG: CPT | Performed by: INTERNAL MEDICINE

## 2024-01-17 PROCEDURE — 1123F ACP DISCUSS/DSCN MKR DOCD: CPT | Performed by: INTERNAL MEDICINE

## 2024-01-17 PROCEDURE — 99204 OFFICE O/P NEW MOD 45 MIN: CPT | Performed by: INTERNAL MEDICINE

## 2024-01-17 RX ORDER — PREDNISONE 20 MG/1
20 TABLET ORAL DAILY
Qty: 7 TABLET | Refills: 0 | Status: SHIPPED | OUTPATIENT
Start: 2024-01-17 | End: 2024-01-24

## 2024-01-17 RX ORDER — CETIRIZINE HYDROCHLORIDE 10 MG/1
10 TABLET ORAL DAILY
Qty: 30 TABLET | Refills: 5 | Status: SHIPPED | OUTPATIENT
Start: 2024-01-17

## 2024-01-17 RX ORDER — FLUTICASONE PROPIONATE AND SALMETEROL 250; 50 UG/1; UG/1
1 POWDER RESPIRATORY (INHALATION) EVERY 12 HOURS
Qty: 1 EACH | Refills: 11 | Status: SHIPPED | OUTPATIENT
Start: 2024-01-17

## 2024-01-17 RX ORDER — CHLOPHEDIANOL HCL AND PYRILAMINE MALEATE 12.5; 12.5 MG/5ML; MG/5ML
SOLUTION ORAL
Qty: 250 ML | Refills: 0 | Status: SHIPPED | OUTPATIENT
Start: 2024-01-17

## 2024-01-17 ASSESSMENT — PULMONARY FUNCTION TESTS
FEV1_PERCENT_PREDICTED_PRE: 53
FEV1: 1.32
FVC: 1.87
FVC_PERCENT_PREDICTED_PRE: 57
FEV1/FVC: 70

## 2024-01-17 NOTE — PROGRESS NOTES
Exposure History:  Immunization History   Administered Date(s) Administered    COVID-19, MODERNA BLUE border, Primary or Immunocompromised, (age 12y+), IM, 100 mcg/0.5mL 01/22/2021, 02/19/2021, 02/22/2021    COVID-19, MODERNA Bivalent, (age 12y+), IM, 50 mcg/0.5 mL 05/19/2022    Influenza Quadv 09/25/2018    Influenza Trivalent 10/03/2014, 10/01/2015, 10/06/2016    Influenza Virus Vaccine 10/05/2011, 12/17/2013    Influenza, FLUAD, (age 65 y+), Adjuvanted, 0.5mL 09/18/2020, 10/13/2022, 10/24/2023    Influenza, FLUARIX, FLULAVAL, FLUZONE (age 6 mo+) AND AFLURIA, (age 3 y+), PF, 0.5mL 10/23/2019    Influenza, FLUCELVAX, (age 6 mo+), MDCK, PF, 0.5mL 10/05/2017    Influenza, High Dose (Fluzone 65 yrs and older) 09/28/2021    Pneumococcal, PCV-13, PREVNAR 13, (age 6w+), IM, 0.5mL 03/07/2018    Pneumococcal, PPSV23, PNEUMOVAX 23, (age 2y+), SC/IM, 0.5mL 02/10/2017    TD 5LF, TENIVAC, (age 7y+), IM, 0.5mL 09/18/2020    TDaP, ADACEL (age 10y-64y), BOOSTRIX (age 10y+), IM, 0.5mL 05/19/2023    Zoster Recombinant (Shingrix) 03/20/2023, 05/19/2023     Past Medical History:   Diagnosis Date    Affective psychosis (HCC) 07/2006    Anxiety     medication    Arthritis     OA generalized    CAD (coronary artery disease) 09/12/2018    PCI- stents x 5    Chest pain 07/2006     no cp but feels\" anxious\" for 10 seconds at a time- no further cp. No FLORES. Followed by [facility] Cardiology-Dr. Bailon.     Cholelithiasis 09/2006    Chronic depression 10/15/2014    Chronic lumbar pain 10/15/2014    Claudication (HCC) 06/2013    Dyslipidemia 10/15/2014    managed with medication     Fibromyalgia 10/15/2014    KATJA (generalized anxiety disorder) 10/15/2014    managed with medication     GERD (gastroesophageal reflux disease) 10/15/2014    managed with medication     History of complete eye exam 04/01/2016    History of dental examination

## 2024-01-17 NOTE — PATIENT INSTRUCTIONS
You can also try chlorphenermine 4 mg tablets over-the-counter 2-3 times per day for postnasal drip and congestion.    Sometimes they can result in sleepiness so should trial the first dose before bed.      Other names for this medication is chlorTRIM, ChlorTab and [facility] for our allergy.   __________________________________________________________  We understand that insurance companies have different 'preferred' medications. These preferences can change yearly and can be difficult to track. Depending upon your insurance and their preferred medicines, some medications we prescribe may be too expensive. If this happens, we recommend that you find out what inhalers for COPD or asthma are \"preferred\" on your insurance drug formulary by calling the member services phone number on the back of the insurance card.  The cost of your medication can be dramatically different depending on this.      Once the preferred inhalers are known, please send us a message in [employer] or call us back at [phone] with this information.  We will then choose the best option available for you and send that prescription to your pharmacy on file.    ICS Inhalers:  Fluticasone inhaler (Flovent, Arnuity)  Qvar  Pulmicort  Asmanex  Alvesco    ICS/LABA Inhalers:  Fluticasone/Salmeterol inhaler (Advair, Airduo, Wixela)  Symbicort  Dulera  Breo    ICS/LABA/LAMA Inhalers:  Trelegy  Breztri

## 2024-01-31 ENCOUNTER — TELEPHONE (OUTPATIENT)
Dept: PULMONOLOGY | Age: 74
End: 2024-01-31

## 2024-02-01 NOTE — TELEPHONE ENCOUNTER
I called to let patient know that Per DR Guardado   I don't really want her on steroids repeatedly. I gave her 7 days just to get her improved while starting Wixela. I could give her another 7 days, but I'm hoping she can respond to Wixela inhalers instead of needing oral steroids frequently. That will not be a good long term plan..  Wandy rice

## 2024-02-01 NOTE — TELEPHONE ENCOUNTER
DR Guardado, patient called requesting prednisone 20 mg . She states that she was not sure if you still wanted her on it, she said it really helped her. If you do send some it going to [facility]. Wandy rice

## 2024-02-14 ENCOUNTER — OFFICE VISIT (OUTPATIENT)
Dept: INTERNAL MEDICINE CLINIC | Facility: CLINIC | Age: 74
End: 2024-02-14
Payer: MEDICARE

## 2024-02-14 VITALS
WEIGHT: 164 LBS | HEIGHT: 67 IN | BODY MASS INDEX: 25.74 KG/M2 | DIASTOLIC BLOOD PRESSURE: 72 MMHG | SYSTOLIC BLOOD PRESSURE: 114 MMHG

## 2024-02-14 DIAGNOSIS — I25.10 CORONARY ARTERY DISEASE INVOLVING NATIVE CORONARY ARTERY OF NATIVE HEART WITHOUT ANGINA PECTORIS: ICD-10-CM

## 2024-02-14 DIAGNOSIS — I10 ESSENTIAL HYPERTENSION: Primary | ICD-10-CM

## 2024-02-14 DIAGNOSIS — R73.09 ABNORMAL GLUCOSE: ICD-10-CM

## 2024-02-14 DIAGNOSIS — Z86.010 HISTORY OF COLON POLYPS: ICD-10-CM

## 2024-02-14 DIAGNOSIS — E78.00 PURE HYPERCHOLESTEROLEMIA: ICD-10-CM

## 2024-02-14 DIAGNOSIS — B00.1 COLD SORE: ICD-10-CM

## 2024-02-14 DIAGNOSIS — Z78.9 STATIN INTOLERANCE: ICD-10-CM

## 2024-02-14 DIAGNOSIS — F41.1 GAD (GENERALIZED ANXIETY DISORDER): ICD-10-CM

## 2024-02-14 LAB
ALBUMIN SERPL-MCNC: 4.3 G/DL (ref 3.2–4.6)
ALBUMIN/GLOB SERPL: 1.5 (ref 0.4–1.6)
ALP SERPL-CCNC: 117 U/L (ref 50–136)
ALT SERPL-CCNC: 16 U/L (ref 12–65)
ANION GAP SERPL CALC-SCNC: 7 MMOL/L (ref 2–11)
AST SERPL-CCNC: 15 U/L (ref 15–37)
BILIRUB SERPL-MCNC: 0.3 MG/DL (ref 0.2–1.1)
BUN SERPL-MCNC: 12 MG/DL (ref 8–23)
CALCIUM SERPL-MCNC: 9.6 MG/DL (ref 8.3–10.4)
CHLORIDE SERPL-SCNC: 108 MMOL/L (ref 103–113)
CHOLEST SERPL-MCNC: 195 MG/DL
CO2 SERPL-SCNC: 26 MMOL/L (ref 21–32)
CREAT SERPL-MCNC: 0.8 MG/DL (ref 0.6–1)
GLOBULIN SER CALC-MCNC: 2.9 G/DL (ref 2.8–4.5)
GLUCOSE SERPL-MCNC: 108 MG/DL (ref 65–100)
HDLC SERPL-MCNC: 75 MG/DL (ref 40–60)
HDLC SERPL: 2.6
LDLC SERPL CALC-MCNC: 95 MG/DL
POTASSIUM SERPL-SCNC: 4.3 MMOL/L (ref 3.5–5.1)
PROT SERPL-MCNC: 7.2 G/DL (ref 6.3–8.2)
SODIUM SERPL-SCNC: 141 MMOL/L (ref 136–146)
TRIGL SERPL-MCNC: 125 MG/DL (ref 35–150)
VLDLC SERPL CALC-MCNC: 25 MG/DL (ref 6–23)

## 2024-02-14 PROCEDURE — 3074F SYST BP LT 130 MM HG: CPT | Performed by: NURSE PRACTITIONER

## 2024-02-14 PROCEDURE — 3078F DIAST BP <80 MM HG: CPT | Performed by: NURSE PRACTITIONER

## 2024-02-14 PROCEDURE — 1123F ACP DISCUSS/DSCN MKR DOCD: CPT | Performed by: NURSE PRACTITIONER

## 2024-02-14 PROCEDURE — 99214 OFFICE O/P EST MOD 30 MIN: CPT | Performed by: NURSE PRACTITIONER

## 2024-02-14 RX ORDER — VALACYCLOVIR HYDROCHLORIDE 1 G/1
2000 TABLET, FILM COATED ORAL 2 TIMES DAILY
Qty: 4 TABLET | Status: CANCELLED | OUTPATIENT
Start: 2024-02-14

## 2024-02-14 RX ORDER — ESCITALOPRAM OXALATE 10 MG/1
10 TABLET ORAL EVERY EVENING
Qty: 90 TABLET | Refills: 3 | Status: SHIPPED | OUTPATIENT
Start: 2024-02-14

## 2024-02-14 RX ORDER — VALACYCLOVIR HYDROCHLORIDE 1 G/1
2000 TABLET, FILM COATED ORAL 2 TIMES DAILY
Qty: 4 TABLET | Refills: 11 | Status: SHIPPED | OUTPATIENT
Start: 2024-02-14

## 2024-02-14 NOTE — PROGRESS NOTES
PROGRESS NOTE      Chief Complaint   Patient presents with    Headache     C/o headache across forehead for the last few days    Dizziness     Got lightheaded and dizzy yesterday. Felt like she was going to pass out so she layed down on floor. Felt like hand and leg were \"drawing\" up on her. Head was soaking wet    Skin Problem     Fever blisters more frequently       HPI    Anxiety: increased situational stressors and anxiety. Taking and tolerating Lexapro 5 mg    Dizziness: Yesterday after awakening felt suddenly dizzy, lightheaded while on way to bathroom. Became sweaty and nauseated with cramping in hands and feet. Symptoms resolved after couple of minutes. No associated chest pain or pressure, dyspnea, palpitations    Fever blister: Recurrent. Needs refill for Valtrex    Chronic cough: Evaluated by pulmonary- Dr Guardado. Prednisone, Wixela, Ninjacof, Zyrtec. 3 month follow up April for repeat PFTs. Prednisone was helpful.    Hypertension: Amlodipine 5 mg           Past Medical History, Past Surgical History, Family history, Social History, and Medications were all reviewed and updated as necessary.     Current Outpatient Medications   Medication Sig Dispense Refill    valACYclovir (VALTREX) 1 g tablet Take 2 tablets by mouth 2 times daily 4 tablet 11    escitalopram (LEXAPRO) 10 MG tablet Take 1 tablet by mouth every evening 90 tablet 3    cetirizine (ZYRTEC) 10 MG tablet Take 1 tablet by mouth daily 30 tablet 5    valACYclovir (VALTREX) 1 g tablet Take 2 tablets by mouth 2 times daily 4 tablet 0    nitroGLYCERIN (NITROSTAT) 0.4 MG SL tablet 1 tablet every 5 minutes as needed      pantoprazole (PROTONIX) 40 MG tablet Take 1 tablet by mouth daily 90 tablet 3    tretinoin (RETIN-A) 0.05 % cream Apply topically nightly 20 g 5    fluticasone (FLONASE) 50 MCG/ACT nasal spray 2 sprays by Each Nostril route daily 3 each 3    rosuvastatin (CRESTOR) 10 MG tablet Take 1 tablet by mouth every evening 90 tablet 3

## 2024-02-15 ENCOUNTER — TELEPHONE (OUTPATIENT)
Dept: INTERNAL MEDICINE CLINIC | Facility: CLINIC | Age: 74
End: 2024-02-15

## 2024-02-15 LAB
EST. AVERAGE GLUCOSE BLD GHB EST-MCNC: 123 MG/DL
HBA1C MFR BLD: 5.9 % (ref 4.8–5.6)

## 2024-02-15 NOTE — TELEPHONE ENCOUNTER
Patient notified per Zoie's \"Please let Ms. stiles know that her labs look great.  I will see her the end of the month.\"    Patient verbalized understanding

## 2024-02-27 ENCOUNTER — OFFICE VISIT (OUTPATIENT)
Dept: INTERNAL MEDICINE CLINIC | Facility: CLINIC | Age: 74
End: 2024-02-27
Payer: MEDICARE

## 2024-02-27 VITALS
OXYGEN SATURATION: 93 % | BODY MASS INDEX: 25.9 KG/M2 | DIASTOLIC BLOOD PRESSURE: 62 MMHG | HEIGHT: 67 IN | SYSTOLIC BLOOD PRESSURE: 120 MMHG | HEART RATE: 66 BPM | WEIGHT: 165 LBS

## 2024-02-27 DIAGNOSIS — R73.09 ABNORMAL GLUCOSE: ICD-10-CM

## 2024-02-27 DIAGNOSIS — M47.22 OSTEOARTHRITIS OF SPINE WITH RADICULOPATHY, CERVICAL REGION: ICD-10-CM

## 2024-02-27 DIAGNOSIS — M48.02 NEURAL FORAMINAL STENOSIS OF CERVICAL SPINE: ICD-10-CM

## 2024-02-27 DIAGNOSIS — E78.00 PURE HYPERCHOLESTEROLEMIA: ICD-10-CM

## 2024-02-27 DIAGNOSIS — T46.6X5A STATIN MYOPATHY: ICD-10-CM

## 2024-02-27 DIAGNOSIS — M54.12 CERVICAL RADICULOPATHY: Primary | ICD-10-CM

## 2024-02-27 DIAGNOSIS — G72.0 STATIN MYOPATHY: ICD-10-CM

## 2024-02-27 DIAGNOSIS — I10 PRIMARY HYPERTENSION: ICD-10-CM

## 2024-02-27 PROCEDURE — 3078F DIAST BP <80 MM HG: CPT | Performed by: NURSE PRACTITIONER

## 2024-02-27 PROCEDURE — 3074F SYST BP LT 130 MM HG: CPT | Performed by: NURSE PRACTITIONER

## 2024-02-27 PROCEDURE — 1123F ACP DISCUSS/DSCN MKR DOCD: CPT | Performed by: NURSE PRACTITIONER

## 2024-02-27 PROCEDURE — 99214 OFFICE O/P EST MOD 30 MIN: CPT | Performed by: NURSE PRACTITIONER

## 2024-02-27 ASSESSMENT — ENCOUNTER SYMPTOMS
COUGH: 1
WHEEZING: 0
RHINORRHEA: 1
SHORTNESS OF BREATH: 0

## 2024-02-27 ASSESSMENT — PATIENT HEALTH QUESTIONNAIRE - PHQ9
SUM OF ALL RESPONSES TO PHQ9 QUESTIONS 1 & 2: 0
8. MOVING OR SPEAKING SO SLOWLY THAT OTHER PEOPLE COULD HAVE NOTICED. OR THE OPPOSITE, BEING SO FIGETY OR RESTLESS THAT YOU HAVE BEEN MOVING AROUND A LOT MORE THAN USUAL: 0
7. TROUBLE CONCENTRATING ON THINGS, SUCH AS READING THE NEWSPAPER OR WATCHING TELEVISION: 0
SUM OF ALL RESPONSES TO PHQ QUESTIONS 1-9: 3
SUM OF ALL RESPONSES TO PHQ QUESTIONS 1-9: 3
9. THOUGHTS THAT YOU WOULD BE BETTER OFF DEAD, OR OF HURTING YOURSELF: 0
5. POOR APPETITE OR OVEREATING: 0
2. FEELING DOWN, DEPRESSED OR HOPELESS: 0
1. LITTLE INTEREST OR PLEASURE IN DOING THINGS: 0
4. FEELING TIRED OR HAVING LITTLE ENERGY: 3
SUM OF ALL RESPONSES TO PHQ QUESTIONS 1-9: 3
SUM OF ALL RESPONSES TO PHQ QUESTIONS 1-9: 3
6. FEELING BAD ABOUT YOURSELF - OR THAT YOU ARE A FAILURE OR HAVE LET YOURSELF OR YOUR FAMILY DOWN: 0
3. TROUBLE FALLING OR STAYING ASLEEP: 0
10. IF YOU CHECKED OFF ANY PROBLEMS, HOW DIFFICULT HAVE THESE PROBLEMS MADE IT FOR YOU TO DO YOUR WORK, TAKE CARE OF THINGS AT HOME, OR GET ALONG WITH OTHER PEOPLE: 0

## 2024-02-27 NOTE — PROGRESS NOTES
PROGRESS NOTE      Chief Complaint   Patient presents with    Hypertension     4 month f/u. Review labs    Discuss Labs    Arm Pain     Right arm pain since April 2023 and is Getting worse       HPI    Neck pain and right arm radicular pain: Ongoing since getting immunization last March. Described as aching right upper arm and neck with radicular pain to lower arm. Pain severe at times, especially with right side lying. No definite weakness.    Chronic cough. Wheezing.  Former smoker. Chronic rhinitis. Symbicort helpful but too expensive. Albuterol prn with transient benefit.CT of chest negative for bronchiectasis. Evaluated by pulmonary. Reduced spirometry but restrictive pattern.  Started Wixela but reports this caused nervousness (as did Symbicort). Has not notified pulmonary.     Hyperlipidemia with history of statin intolerance and ascvd: Previously on Repatha but could not afford. Retry of Crestor 10 mg and tolerating.     Hypertension: Good control with amlodipine 5 mg     Anxiety and depression:Discussed increased situational stressors last visit. Increased Lexapro to 10 mg last visit. Tolerating     Prediabetes: A1C unchanged    Colon polyp: History of tubular adenoma.                Past Medical History, Past Surgical History, Family history, Social History, and Medications were all reviewed and updated as necessary.     Current Outpatient Medications   Medication Sig Dispense Refill    valACYclovir (VALTREX) 1 g tablet Take 2 tablets by mouth 2 times daily 4 tablet 11    escitalopram (LEXAPRO) 10 MG tablet Take 1 tablet by mouth every evening 90 tablet 3    cetirizine (ZYRTEC) 10 MG tablet Take 1 tablet by mouth daily 30 tablet 5    nitroGLYCERIN (NITROSTAT) 0.4 MG SL tablet 1 tablet every 5 minutes as needed      pantoprazole (PROTONIX) 40 MG tablet Take 1 tablet by mouth daily 90 tablet 3    tretinoin (RETIN-A) 0.05 % cream Apply topically nightly 20 g 5    fluticasone (FLONASE) 50 MCG/ACT nasal spray 2

## 2024-02-28 ENCOUNTER — TELEPHONE (OUTPATIENT)
Dept: INTERNAL MEDICINE CLINIC | Facility: CLINIC | Age: 74
End: 2024-02-28

## 2024-02-28 NOTE — TELEPHONE ENCOUNTER
Pt states that she is not able to do weeks of PT at this time. She is asking if there is someone that she can see that would give her an injection for her shoulder/arm pain instead. She does have an appointment with Dr Brower for March, but said that she can cancel that if she can get in with someone else. She said maybe orthopedics?

## 2024-02-28 NOTE — TELEPHONE ENCOUNTER
----- Message from Sandra Macias sent at 2/28/2024 11:00 AM EST -----  Subject: Message to Provider    QUESTIONS  Information for Provider? Patient is reaching out to PCP, Zoie Catherine stating that [facility] reached   out to her and she scheduled an appointment with them for March the 14th   but patient is upset because they told her that it would be a consultation   for her shoulder and to see if she needed therapy or be directed to   another person for pain management. Patient does not think she needs   therapy and does not want to be passed around to a lo of physicians.   Patient wants to speak with PCP.   ---------------------------------------------------------------------------  --------------  CALL BACK INFO  [phone]; OK to leave message on voicemail  ---------------------------------------------------------------------------  --------------  SCRIPT ANSWERS  Relationship to Patient? Self

## 2024-03-02 NOTE — TELEPHONE ENCOUNTER
Please call patient and let her know that it seemed that her shoulder and arm pain were related to her neck pain and Dr Brower is the best one to evaluate this. She may be candidate for epidural injection and he will determine. She sees him on the 14th

## 2024-03-04 NOTE — TELEPHONE ENCOUNTER
Called & spoke to pt. Informed pt to keep appt. With Dr. Brower in regards to arm/shoulder pain, per Zoie. Pt verbalized understanding.

## 2024-03-14 ENCOUNTER — OFFICE VISIT (OUTPATIENT)
Dept: NEUROSURGERY | Age: 74
End: 2024-03-14
Payer: MEDICARE

## 2024-03-14 VITALS
OXYGEN SATURATION: 95 % | WEIGHT: 165.8 LBS | HEART RATE: 68 BPM | TEMPERATURE: 97.3 F | SYSTOLIC BLOOD PRESSURE: 121 MMHG | HEIGHT: 67 IN | BODY MASS INDEX: 26.02 KG/M2 | DIASTOLIC BLOOD PRESSURE: 69 MMHG

## 2024-03-14 DIAGNOSIS — M75.41 CORACOID IMPINGEMENT OF RIGHT SHOULDER: ICD-10-CM

## 2024-03-14 DIAGNOSIS — M25.511 ACUTE PAIN OF RIGHT SHOULDER: Primary | ICD-10-CM

## 2024-03-14 PROCEDURE — 99204 OFFICE O/P NEW MOD 45 MIN: CPT | Performed by: NEUROLOGICAL SURGERY

## 2024-03-14 PROCEDURE — 3074F SYST BP LT 130 MM HG: CPT | Performed by: NEUROLOGICAL SURGERY

## 2024-03-14 PROCEDURE — 3078F DIAST BP <80 MM HG: CPT | Performed by: NEUROLOGICAL SURGERY

## 2024-03-14 PROCEDURE — 1123F ACP DISCUSS/DSCN MKR DOCD: CPT | Performed by: NEUROLOGICAL SURGERY

## 2024-03-14 NOTE — PROGRESS NOTES
midline with normal sternocleidomastoid and trapezius muscles are normal. Tongue is midline.   Motor:  5/5 strength in upper and lower proximal and distal muscles.  Except for 3/5 external rotation weakness on the right.  Normal bulk and tone. No fasciculations.   Reflexes:   Deep tendon reflexes 2+/4 and symmetrical.   Sensory:   Normal to touch, pinprick    Gait:  Normal gait.   Tremor:   No tremor noted.   Cerebellar:  No cerebellar signs present.              Assessment & Plan      ICD-10-CM    1. Acute pain of right shoulder  M25.511       2. Coracoid impingement of right shoulder  M75.41          Clinically the patient has rotator cuff weakness and arthropathy.  MRI scanning of the right shoulder rule out rotator cuff pathology and return visit after.  The nocturnal discomfort at night is very typical of shoulder issues.  Patients that have cervical spine issues usually are comfortable at night.  There are less we will start with this form of investigation and proceed from there.  ADALID JOSE MD  
 used

## 2024-03-25 RX ORDER — ROSUVASTATIN CALCIUM 10 MG/1
10 TABLET, COATED ORAL NIGHTLY
Qty: 90 TABLET | Refills: 3 | Status: SHIPPED | OUTPATIENT
Start: 2024-03-25

## 2024-03-25 NOTE — TELEPHONE ENCOUNTER
“Verified rx in last OV date 11/08/2023. Pharmacy confirmed. Erx as requested”.    Requested Prescriptions     Pending Prescriptions Disp Refills    rosuvastatin (CRESTOR) 10 MG tablet [Pharmacy Med Name: Rosuvastatin Calcium 10 MG Oral Tablet] 90 tablet 3     Sig: TAKE 1 TABLET BY MOUTH ONCE DAILY IN THE EVENING

## 2024-04-12 ENCOUNTER — HOSPITAL ENCOUNTER (OUTPATIENT)
Age: 74
End: 2024-04-12
Payer: MEDICARE

## 2024-04-12 DIAGNOSIS — M75.41 CORACOID IMPINGEMENT OF RIGHT SHOULDER: ICD-10-CM

## 2024-04-12 DIAGNOSIS — M25.511 ACUTE PAIN OF RIGHT SHOULDER: ICD-10-CM

## 2024-04-12 PROCEDURE — 73221 MRI JOINT UPR EXTREM W/O DYE: CPT

## 2024-04-22 ENCOUNTER — TELEPHONE (OUTPATIENT)
Dept: NEUROSURGERY | Age: 74
End: 2024-04-22

## 2024-04-22 NOTE — TELEPHONE ENCOUNTER
Romeo,     Do you think you could do a peer to peer to on this pt? MRI Shoulder DOS: 4.13.2024. Dr. Brower pt. I have peer to peer info. Not sure why radiology proceeded with this since he had not been approved.     Please let me know.     Sara,    I am cc' ing you on this so you will be aware.

## 2024-04-23 ENCOUNTER — TELEPHONE (OUTPATIENT)
Dept: INTERNAL MEDICINE CLINIC | Facility: CLINIC | Age: 74
End: 2024-04-23

## 2024-04-23 NOTE — TELEPHONE ENCOUNTER
----- Message from Filomena Herron sent at 4/22/2024  3:21 PM EDT -----  Subject: Message to Provider    QUESTIONS  Information for Provider? Patient called in her mri has been denied due to   the incorrect paper work being filed with the insurance , she had the test   done on April 12th. [phone] is the number for [facility], they   are denied the services for the test. please follow up with the patient   asap something doesn't sound correct. with the company that is calling her   for billing. she stated she has [employer] insurance she would like for Zoie   to contact her asap.   ---------------------------------------------------------------------------  --------------  CALL BACK INFO  [phone]; OK to leave message on voicemail  ---------------------------------------------------------------------------  --------------  SCRIPT ANSWERS  Relationship to Patient? Self

## 2024-05-02 ENCOUNTER — NURSE ONLY (OUTPATIENT)
Dept: PULMONOLOGY | Age: 74
End: 2024-05-02
Payer: MEDICARE

## 2024-05-02 ENCOUNTER — OFFICE VISIT (OUTPATIENT)
Dept: PULMONOLOGY | Age: 74
End: 2024-05-02
Payer: MEDICARE

## 2024-05-02 VITALS
RESPIRATION RATE: 19 BRPM | SYSTOLIC BLOOD PRESSURE: 123 MMHG | TEMPERATURE: 97.4 F | DIASTOLIC BLOOD PRESSURE: 73 MMHG | BODY MASS INDEX: 25.9 KG/M2 | HEART RATE: 62 BPM | WEIGHT: 165 LBS | OXYGEN SATURATION: 95 % | HEIGHT: 67 IN

## 2024-05-02 DIAGNOSIS — R05.3 CHRONIC COUGH: Primary | ICD-10-CM

## 2024-05-02 DIAGNOSIS — J45.40 MODERATE PERSISTENT ASTHMA WITHOUT COMPLICATION: Primary | ICD-10-CM

## 2024-05-02 DIAGNOSIS — J31.0 CHRONIC RHINITIS: ICD-10-CM

## 2024-05-02 LAB
FEV 1 , POC: 1.25 L
FEV1 % PRED, POC: 53 %
FEV1/FVC, POC: 69
FVC % PRED, POC: 57 %
FVC, POC: 1.81

## 2024-05-02 PROCEDURE — 94729 DIFFUSING CAPACITY: CPT | Performed by: INTERNAL MEDICINE

## 2024-05-02 PROCEDURE — 3074F SYST BP LT 130 MM HG: CPT | Performed by: INTERNAL MEDICINE

## 2024-05-02 PROCEDURE — 1123F ACP DISCUSS/DSCN MKR DOCD: CPT | Performed by: INTERNAL MEDICINE

## 2024-05-02 PROCEDURE — 99214 OFFICE O/P EST MOD 30 MIN: CPT | Performed by: INTERNAL MEDICINE

## 2024-05-02 PROCEDURE — 94726 PLETHYSMOGRAPHY LUNG VOLUMES: CPT | Performed by: INTERNAL MEDICINE

## 2024-05-02 PROCEDURE — 94060 EVALUATION OF WHEEZING: CPT | Performed by: INTERNAL MEDICINE

## 2024-05-02 PROCEDURE — 3078F DIAST BP <80 MM HG: CPT | Performed by: INTERNAL MEDICINE

## 2024-05-02 RX ORDER — ALBUTEROL SULFATE 90 UG/1
2 AEROSOL, METERED RESPIRATORY (INHALATION) EVERY 6 HOURS PRN
Qty: 1 EACH | Refills: 11 | Status: SHIPPED | OUTPATIENT
Start: 2024-05-02

## 2024-05-02 RX ORDER — CETIRIZINE HYDROCHLORIDE 10 MG/1
10 TABLET ORAL DAILY
Qty: 30 TABLET | Refills: 5 | Status: SHIPPED | OUTPATIENT
Start: 2024-05-02

## 2024-05-02 RX ORDER — FLUTICASONE PROPIONATE AND SALMETEROL 250; 50 UG/1; UG/1
1 POWDER RESPIRATORY (INHALATION) 2 TIMES DAILY
Qty: 60 EACH | Refills: 11 | Status: SHIPPED | OUTPATIENT
Start: 2024-05-02

## 2024-05-02 RX ORDER — FLUTICASONE PROPIONATE AND SALMETEROL 250; 50 UG/1; UG/1
1 POWDER RESPIRATORY (INHALATION) 2 TIMES DAILY
COMMUNITY
Start: 2024-04-05 | End: 2024-05-02 | Stop reason: SDUPTHER

## 2024-05-02 ASSESSMENT — PULMONARY FUNCTION TESTS
FEV1_PERCENT_PREDICTED_POC: 53
FVC_PERCENT_PREDICTED_POC: 57
FVC_POC: 1.81
FEV1/FVC_POC: 69

## 2024-05-02 NOTE — PROGRESS NOTES
Unlikely Intermediate Likely   <25 ppb 25-50 ppb >50ppb     Exercise Oximetry:    Echo: No results found for this or any previous visit from the past 3650 days.    [facility] Reference Info:                                                                                                                Exposure History:  Immunization History   Administered Date(s) Administered    COVID-19, MODERNA BLUE border, Primary or Immunocompromised, (age 12y+), IM, 100 mcg/0.5mL 01/22/2021    COVID-19, MODERNA Bivalent, (age 12y+), IM, 50 mcg/0.5 mL 05/19/2022    Influenza Quadv 09/25/2018    Influenza Trivalent 10/03/2014, 10/01/2015, 10/06/2016    Influenza Virus Vaccine 10/05/2011, 12/17/2013    Influenza, FLUAD, (age 65 y+), Adjuvanted, 0.5mL 09/18/2020, 10/13/2022, 10/24/2023    Influenza, FLUARIX, FLULAVAL, FLUZONE (age 6 mo+) AND AFLURIA, (age 3 y+), PF, 0.5mL 10/23/2019    Influenza, FLUCELVAX, (age 6 mo+), MDCK, PF, 0.5mL 10/05/2017    Influenza, High Dose (Fluzone 65 yrs and older) 09/28/2021    Pneumococcal, PCV-13, PREVNAR 13, (age 6w+), IM, 0.5mL 03/07/2018    Pneumococcal, PPSV23, PNEUMOVAX 23, (age 2y+), SC/IM, 0.5mL 02/10/2017    TD 5LF, TENIVAC, (age 7y+), IM, 0.5mL 09/18/2020    TDaP, ADACEL (age 10y-64y), BOOSTRIX (age 10y+), IM, 0.5mL 05/19/2023    Zoster Recombinant (Shingrix) 03/20/2023, 05/19/2023     Past Medical History:   Diagnosis Date    Affective psychosis (HCC) 07/2006    Anxiety     medication    Arthritis     OA generalized    CAD (coronary artery disease) 09/12/2018    PCI- stents x 5    Chest pain 07/2006     no cp but feels\" anxious\" for 10 seconds at a time- no further cp. No FLORES. Followed by [facility]iology-Dr. Bailon.     Cholelithiasis 09/2006    Chronic depression 10/15/2014    Chronic lumbar pain 10/15/2014    Claudication (HCC) 06/2013    Dyslipidemia 10/15/2014    managed with medication     Fibromyalgia 10/15/2014    KATJA (generalized anxiety disorder) 10/15/2014    managed with

## 2024-05-03 ENCOUNTER — TELEPHONE (OUTPATIENT)
Dept: NEUROSURGERY | Age: 74
End: 2024-05-03

## 2024-05-03 NOTE — TELEPHONE ENCOUNTER
Romeo, pt needs referral to [facility] for her shoulder issue. I'm r/sing philip pts but dont feel like she needs to come in for a visit to go over her shoulder MRI? What do you think?

## 2024-05-06 DIAGNOSIS — M25.511 ACUTE PAIN OF RIGHT SHOULDER: Primary | ICD-10-CM

## 2024-05-07 RX ORDER — AMLODIPINE BESYLATE 5 MG/1
5 TABLET ORAL DAILY
Qty: 90 TABLET | Refills: 3 | Status: SHIPPED | OUTPATIENT
Start: 2024-05-07

## 2024-05-07 NOTE — TELEPHONE ENCOUNTER
MEDICATION REFILL REQUEST      Name of Medication:  Amlodipine  Dose:  5 mg  Frequency:  qd  Quantity:  90  Days' supply:  90 with 3 refills      Pharmacy Name/Location:  [facility], [id number]

## 2024-05-07 NOTE — TELEPHONE ENCOUNTER
Requested Prescriptions     Pending Prescriptions Disp Refills    amLODIPine (NORVASC) 5 MG tablet 90 tablet 3     Sig: Take 1 tablet by mouth daily     Verified rx. Last OV 11/08/24. Erx to pharm on file.

## 2024-05-20 ENCOUNTER — OFFICE VISIT (OUTPATIENT)
Age: 74
End: 2024-05-20
Payer: MEDICARE

## 2024-05-20 VITALS
BODY MASS INDEX: 26.36 KG/M2 | HEART RATE: 68 BPM | SYSTOLIC BLOOD PRESSURE: 120 MMHG | HEIGHT: 66 IN | DIASTOLIC BLOOD PRESSURE: 64 MMHG | WEIGHT: 164 LBS

## 2024-05-20 DIAGNOSIS — I25.10 CORONARY ARTERY DISEASE INVOLVING NATIVE CORONARY ARTERY OF NATIVE HEART WITHOUT ANGINA PECTORIS: Primary | ICD-10-CM

## 2024-05-20 DIAGNOSIS — I10 PRIMARY HYPERTENSION: ICD-10-CM

## 2024-05-20 DIAGNOSIS — E78.00 PURE HYPERCHOLESTEROLEMIA: ICD-10-CM

## 2024-05-20 PROBLEM — J44.9 CHRONIC OBSTRUCTIVE PULMONARY DISEASE, UNSPECIFIED (HCC): Status: ACTIVE | Noted: 2024-05-20

## 2024-05-20 PROCEDURE — 3074F SYST BP LT 130 MM HG: CPT | Performed by: INTERNAL MEDICINE

## 2024-05-20 PROCEDURE — 1123F ACP DISCUSS/DSCN MKR DOCD: CPT | Performed by: INTERNAL MEDICINE

## 2024-05-20 PROCEDURE — 3078F DIAST BP <80 MM HG: CPT | Performed by: INTERNAL MEDICINE

## 2024-05-20 PROCEDURE — 99214 OFFICE O/P EST MOD 30 MIN: CPT | Performed by: INTERNAL MEDICINE

## 2024-05-20 NOTE — PROGRESS NOTES
[facility] CARDIOLOGY  [address] [place] [address], [facility] [address]  [place] [address]  PHONE: [phone]      24    NAME:  Mary Anton  : 1950  MRN: [id number]       SUBJECTIVE:   Mray Anton is a 73 y.o. female seen for a follow up visit regarding the following:     Chief Complaint   Patient presents with    Coronary Artery Disease         HPI:    No cp or dutton. No orthopnea or pnd. No palpitations or syncope.      Past Medical History, Past Surgical History, Family history, Social History, and Medications were all reviewed with the patient today and updated as necessary.     Current Outpatient Medications   Medication Sig Dispense Refill    amLODIPine (NORVASC) 5 MG tablet Take 1 tablet by mouth daily 90 tablet 3    cetirizine (ZYRTEC) 10 MG tablet Take 1 tablet by mouth daily 30 tablet 5    fluticasone-salmeterol (ADVAIR) 250-50 MCG/ACT AEPB diskus inhaler Inhale 1 puff into the lungs 2 times daily 60 each 11    albuterol sulfate HFA (PROVENTIL;VENTOLIN;PROAIR) 108 (90 Base) MCG/ACT inhaler Inhale 2 puffs into the lungs every 6 hours as needed for Wheezing 1 each 11    rosuvastatin (CRESTOR) 10 MG tablet TAKE 1 TABLET BY MOUTH ONCE DAILY IN THE EVENING 90 tablet 3    valACYclovir (VALTREX) 1 g tablet Take 2 tablets by mouth 2 times daily 4 tablet 11    escitalopram (LEXAPRO) 10 MG tablet Take 1 tablet by mouth every evening 90 tablet 3    nitroGLYCERIN (NITROSTAT) 0.4 MG SL tablet 1 tablet every 5 minutes as needed      pantoprazole (PROTONIX) 40 MG tablet Take 1 tablet by mouth daily 90 tablet 3    tretinoin (RETIN-A) 0.05 % cream Apply topically nightly 20 g 5    fluticasone (FLONASE) 50 MCG/ACT nasal spray 2 sprays by Each Nostril route daily 3 each 3    Cyanocobalamin (VITAMIN B12 PO) Take 1 tablet by mouth daily      acetaminophen (TYLENOL) 500 MG tablet Take 2 tablets by mouth every 6 hours as needed      Cholecalciferol 50 MCG (2000 UT) CAPS Take 1 capsule by mouth daily      ascorbic acid

## 2024-06-19 ENCOUNTER — OFFICE VISIT (OUTPATIENT)
Dept: ORTHOPEDIC SURGERY | Age: 74
End: 2024-06-19
Payer: MEDICARE

## 2024-06-19 DIAGNOSIS — M25.511 RIGHT SHOULDER PAIN, UNSPECIFIED CHRONICITY: ICD-10-CM

## 2024-06-19 DIAGNOSIS — M19.011 OSTEOARTHRITIS OF RIGHT SHOULDER, UNSPECIFIED OSTEOARTHRITIS TYPE: Primary | ICD-10-CM

## 2024-06-19 PROCEDURE — 1123F ACP DISCUSS/DSCN MKR DOCD: CPT | Performed by: ORTHOPAEDIC SURGERY

## 2024-06-19 PROCEDURE — 99204 OFFICE O/P NEW MOD 45 MIN: CPT | Performed by: ORTHOPAEDIC SURGERY

## 2024-06-19 PROCEDURE — 20610 DRAIN/INJ JOINT/BURSA W/O US: CPT | Performed by: ORTHOPAEDIC SURGERY

## 2024-06-19 RX ORDER — METHYLPREDNISOLONE ACETATE 40 MG/ML
80 INJECTION, SUSPENSION INTRA-ARTICULAR; INTRALESIONAL; INTRAMUSCULAR; SOFT TISSUE ONCE
Status: COMPLETED | OUTPATIENT
Start: 2024-06-19 | End: 2024-06-19

## 2024-06-19 RX ADMIN — METHYLPREDNISOLONE ACETATE 80 MG: 40 INJECTION, SUSPENSION INTRA-ARTICULAR; INTRALESIONAL; INTRAMUSCULAR; SOFT TISSUE at 14:09

## 2024-06-20 ENCOUNTER — APPOINTMENT (RX ONLY)
Dept: URBAN - METROPOLITAN AREA CLINIC 330 | Facility: CLINIC | Age: 74
Setting detail: DERMATOLOGY
End: 2024-06-20

## 2024-06-20 ENCOUNTER — TELEPHONE (OUTPATIENT)
Dept: ORTHOPEDIC SURGERY | Age: 74
End: 2024-06-20

## 2024-06-20 DIAGNOSIS — L91.0 HYPERTROPHIC SCAR: ICD-10-CM

## 2024-06-20 DIAGNOSIS — L71.8 OTHER ROSACEA: ICD-10-CM | Status: WELL CONTROLLED

## 2024-06-20 DIAGNOSIS — L57.0 ACTINIC KERATOSIS: ICD-10-CM

## 2024-06-20 DIAGNOSIS — L85.3 XEROSIS CUTIS: ICD-10-CM

## 2024-06-20 DIAGNOSIS — D22 MELANOCYTIC NEVI: ICD-10-CM | Status: STABLE

## 2024-06-20 DIAGNOSIS — L72.8 OTHER FOLLICULAR CYSTS OF THE SKIN AND SUBCUTANEOUS TISSUE: ICD-10-CM

## 2024-06-20 DIAGNOSIS — Z85.820 PERSONAL HISTORY OF MALIGNANT MELANOMA OF SKIN: ICD-10-CM

## 2024-06-20 DIAGNOSIS — L82.0 INFLAMED SEBORRHEIC KERATOSIS: ICD-10-CM

## 2024-06-20 DIAGNOSIS — Z85.828 PERSONAL HISTORY OF OTHER MALIGNANT NEOPLASM OF SKIN: ICD-10-CM

## 2024-06-20 DIAGNOSIS — L70.0 ACNE VULGARIS: ICD-10-CM | Status: STABLE

## 2024-06-20 PROBLEM — D48.5 NEOPLASM OF UNCERTAIN BEHAVIOR OF SKIN: Status: ACTIVE | Noted: 2024-06-20

## 2024-06-20 PROBLEM — D22.5 MELANOCYTIC NEVI OF TRUNK: Status: ACTIVE | Noted: 2024-06-20

## 2024-06-20 PROCEDURE — ? MEDICAL PHOTOGRAPHY REVIEW

## 2024-06-20 PROCEDURE — ? LIQUID NITROGEN

## 2024-06-20 PROCEDURE — ? OTHER

## 2024-06-20 PROCEDURE — 17110 DESTRUCTION B9 LES UP TO 14: CPT | Mod: 59

## 2024-06-20 PROCEDURE — 11301 SHAVE SKIN LESION 0.6-1.0 CM: CPT

## 2024-06-20 PROCEDURE — ? FULL BODY SKIN EXAM

## 2024-06-20 PROCEDURE — ? TREATMENT REGIMEN

## 2024-06-20 PROCEDURE — 99214 OFFICE O/P EST MOD 30 MIN: CPT | Mod: 25

## 2024-06-20 PROCEDURE — ? COUNSELING

## 2024-06-20 PROCEDURE — ? PRESCRIPTION

## 2024-06-20 PROCEDURE — ? SHAVE REMOVAL

## 2024-06-20 PROCEDURE — ? MDM - TREATMENT GOALS

## 2024-06-20 PROCEDURE — 17000 DESTRUCT PREMALG LESION: CPT | Mod: 59

## 2024-06-20 PROCEDURE — 17003 DESTRUCT PREMALG LES 2-14: CPT | Mod: 59

## 2024-06-20 PROCEDURE — ? PRESCRIPTION MEDICATION MANAGEMENT

## 2024-06-20 RX ORDER — CLINDAMYCIN PHOSPHATE 10 MG/G
GEL TOPICAL
Qty: 30 | Refills: 2 | Status: ERX | COMMUNITY
Start: 2024-06-20

## 2024-06-20 RX ADMIN — CLINDAMYCIN PHOSPHATE: 10 GEL TOPICAL at 00:00

## 2024-06-20 ASSESSMENT — LOCATION DETAILED DESCRIPTION DERM
LOCATION DETAILED: RIGHT INFERIOR FOREHEAD
LOCATION DETAILED: LEFT ANTERIOR SHOULDER
LOCATION DETAILED: RIGHT MEDIAL UPPER BACK
LOCATION DETAILED: LEFT SUPERIOR CENTRAL BUCCAL CHEEK
LOCATION DETAILED: NASAL DORSUM
LOCATION DETAILED: RIGHT LATERAL POSTERIOR ANKLE
LOCATION DETAILED: LEFT MEDIAL MALAR CHEEK
LOCATION DETAILED: INFERIOR THORACIC SPINE
LOCATION DETAILED: LEFT ANTERIOR DISTAL UPPER ARM
LOCATION DETAILED: RIGHT MEDIAL SUPERIOR CHEST
LOCATION DETAILED: RIGHT CENTRAL MALAR CHEEK
LOCATION DETAILED: RIGHT INFERIOR LATERAL FOREHEAD
LOCATION DETAILED: LEFT CENTRAL MALAR CHEEK
LOCATION DETAILED: RIGHT INFERIOR CENTRAL MALAR CHEEK
LOCATION DETAILED: LEFT INFERIOR CENTRAL MALAR CHEEK
LOCATION DETAILED: LEFT INFERIOR LATERAL MALAR CHEEK
LOCATION DETAILED: LEFT SUPERIOR LATERAL BUCCAL CHEEK
LOCATION DETAILED: RIGHT CENTRAL ZYGOMA
LOCATION DETAILED: LEFT ANTERIOR DISTAL THIGH

## 2024-06-20 ASSESSMENT — LOCATION SIMPLE DESCRIPTION DERM
LOCATION SIMPLE: RIGHT UPPER BACK
LOCATION SIMPLE: NOSE
LOCATION SIMPLE: LEFT CHEEK
LOCATION SIMPLE: LEFT UPPER ARM
LOCATION SIMPLE: RIGHT ZYGOMA
LOCATION SIMPLE: CHEST
LOCATION SIMPLE: RIGHT ANKLE
LOCATION SIMPLE: RIGHT FOREHEAD
LOCATION SIMPLE: UPPER BACK
LOCATION SIMPLE: LEFT THIGH
LOCATION SIMPLE: LEFT SHOULDER
LOCATION SIMPLE: RIGHT CHEEK

## 2024-06-20 ASSESSMENT — LOCATION ZONE DERM
LOCATION ZONE: ARM
LOCATION ZONE: NOSE
LOCATION ZONE: FACE
LOCATION ZONE: LEG
LOCATION ZONE: TRUNK

## 2024-06-20 ASSESSMENT — SEVERITY ASSESSMENT OVERALL AMONG ALL PATIENTS
IN YOUR EXPERIENCE, AMONG ALL PATIENTS YOU HAVE SEEN WITH THIS CONDITION, HOW SEVERE IS THIS PATIENT'S CONDITION?: MINIMAL

## 2024-06-20 NOTE — PROCEDURE: PRESCRIPTION MEDICATION MANAGEMENT
Continue Regimen: metronidazole 0.75 % topical cream \\nApply to face twice daily for rosacea\\nNo refills needed today.
Render In Strict Bullet Format?: No
Detail Level: Zone

## 2024-06-20 NOTE — PROCEDURE: MIPS QUALITY
Detail Level: Detailed
Quality 47: Advance Care Plan: Advance care planning not documented, reason not otherwise specified.
Quality 402: Tobacco Use And Help With Quitting Among Adolescents: Patient screened for tobacco and never smoked
Quality 226: Preventive Care And Screening: Tobacco Use: Screening And Cessation Intervention: Patient screened for tobacco use and is an ex/non-smoker
Quality 431: Preventive Care And Screening: Unhealthy Alcohol Use - Screening: Patient not identified as an unhealthy alcohol user when screened for unhealthy alcohol use using a systematic screening method
Quality 130: Documentation Of Current Medications In The Medical Record: Current Medications Documented

## 2024-06-20 NOTE — PROCEDURE: TREATMENT REGIMEN
Samples Given: Vanicream 1% hydrocortisone aaa bid prn for flares, Cerave moisturizing cream
Detail Level: Zone

## 2024-06-20 NOTE — TELEPHONE ENCOUNTER
Spoke with pt and she states that her arm hurt worse and I let her know that this is sometimes to be expected with an injection. I let her know that it should start to get better over the next 1-2 days. I let her know that she can take tylenol and ice. She expressed understanding and thanked me for calling.

## 2024-06-20 NOTE — PROCEDURE: LIQUID NITROGEN
Medical Necessity Information: It is in your best interest to select a reason for this procedure from the list below. All of these items fulfill various CMS LCD requirements except the new and changing color options.
Add 52 Modifier (Optional): no
Show Aperture Variable?: Yes
Number Of Freeze-Thaw Cycles: 2 freeze-thaw cycles
Medical Necessity Clause: This procedure was medically necessary because the lesions that were treated were:
Spray Paint Text: The liquid nitrogen was applied to the skin utilizing a spray paint frosting technique.
Duration Of Freeze Thaw-Cycle (Seconds): 5-10
Post-Care Instructions: I reviewed with the patient in detail post-care instructions. Patient is to wear sunprotection, and avoid picking at any of the treated lesions. Pt may apply Vaseline to crusted or scabbing areas.
Application Tool (Optional): Liquid Nitrogen Sprayer
Consent: The patient's consent was obtained including but not limited to risks of crusting, scabbing, blistering, scarring, darker or lighter pigmentary change, recurrence, incomplete removal and infection.
Aperture Size (Optional): C
Detail Level: Detailed
Number Of Freeze-Thaw Cycles: 3 freeze-thaw cycles
Duration Of Freeze Thaw-Cycle (Seconds): 2

## 2024-06-20 NOTE — PROCEDURE: COUNSELING
Detail Level: Generalized
Detail Level: Simple
Detail Level: Detailed
Minocycline Pregnancy And Lactation Text: This medication is Pregnancy Category D and not consider safe during pregnancy. It is also excreted in breast milk.
Erythromycin Counseling:  I discussed with the patient the risks of erythromycin including but not limited to GI upset, allergic reaction, drug rash, diarrhea, increase in liver enzymes, and yeast infections.
Azelaic Acid Counseling: Patient counseled that medicine may cause skin irritation and to avoid applying near the eyes.  In the event of skin irritation, the patient was advised to reduce the amount of the drug applied or use it less frequently.   The patient verbalized understanding of the proper use and possible adverse effects of azelaic acid.  All of the patient's questions and concerns were addressed.
Tazorac Pregnancy And Lactation Text: This medication is not safe during pregnancy. It is unknown if this medication is excreted in breast milk.
Benzoyl Peroxide Counseling: Patient counseled that medicine may cause skin irritation and bleach clothing.  In the event of skin irritation, the patient was advised to reduce the amount of the drug applied or use it less frequently.   The patient verbalized understanding of the proper use and possible adverse effects of benzoyl peroxide.  All of the patient's questions and concerns were addressed.
Topical Clindamycin Pregnancy And Lactation Text: This medication is Pregnancy Category B and is considered safe during pregnancy. It is unknown if it is excreted in breast milk.
Spironolactone Pregnancy And Lactation Text: This medication can cause feminization of the male fetus and should be avoided during pregnancy. The active metabolite is also found in breast milk.
Isotretinoin Counseling: Patient should get monthly blood tests, not donate blood, not drive at night if vision affected, not share medication, and not undergo elective surgery for 6 months after tx completed. Side effects reviewed, pt to contact office should one occur.
Dapsone Counseling: I discussed with the patient the risks of dapsone including but not limited to hemolytic anemia, agranulocytosis, rashes, methemoglobinemia, kidney failure, peripheral neuropathy, headaches, GI upset, and liver toxicity.  Patients who start dapsone require monitoring including baseline LFTs and weekly CBCs for the first month, then every month thereafter.  The patient verbalized understanding of the proper use and possible adverse effects of dapsone.  All of the patient's questions and concerns were addressed.
Winlevi Counseling:  I discussed with the patient the risks of topical clascoterone including but not limited to erythema, scaling, itching, and stinging. Patient voiced their understanding.
Tetracycline Counseling: Patient counseled regarding possible photosensitivity and increased risk for sunburn.  Patient instructed to avoid sunlight, if possible.  When exposed to sunlight, patients should wear protective clothing, sunglasses, and sunscreen.  The patient was instructed to call the office immediately if the following severe adverse effects occur:  hearing changes, easy bruising/bleeding, severe headache, or vision changes.  The patient verbalized understanding of the proper use and possible adverse effects of tetracycline.  All of the patient's questions and concerns were addressed. Patient understands to avoid pregnancy while on therapy due to potential birth defects.
Topical Retinoid Pregnancy And Lactation Text: This medication is Pregnancy Category C. It is unknown if this medication is excreted in breast milk.
Azithromycin Counseling:  I discussed with the patient the risks of azithromycin including but not limited to GI upset, allergic reaction, drug rash, diarrhea, and yeast infections.
Benzoyl Peroxide Pregnancy And Lactation Text: This medication is Pregnancy Category C. It is unknown if benzoyl peroxide is excreted in breast milk.
Bactrim Counseling:  I discussed with the patient the risks of sulfa antibiotics including but not limited to GI upset, allergic reaction, drug rash, diarrhea, dizziness, photosensitivity, and yeast infections.  Rarely, more serious reactions can occur including but not limited to aplastic anemia, agranulocytosis, methemoglobinemia, blood dyscrasias, liver or kidney failure, lung infiltrates or desquamative/blistering drug rashes.
High Dose Vitamin A Pregnancy And Lactation Text: High dose vitamin A therapy is contraindicated during pregnancy and breast feeding.
Birth Control Pills Counseling: Birth Control Pill Counseling: I discussed with the patient the potential side effects of OCPs including but not limited to increased risk of stroke, heart attack, thrombophlebitis, deep venous thrombosis, hepatic adenomas, breast changes, GI upset, headaches, and depression.  The patient verbalized understanding of the proper use and possible adverse effects of OCPs. All of the patient's questions and concerns were addressed.
Aklief counseling:  Patient advised to apply a pea-sized amount only at bedtime and wait 30 minutes after washing their face before applying.  If too drying, patient may add a non-comedogenic moisturizer.  The most commonly reported side effects including irritation, redness, scaling, dryness, stinging, burning, itching, and increased risk of sunburn.  The patient verbalized understanding of the proper use and possible adverse effects of retinoids.  All of the patient's questions and concerns were addressed.
Doxycycline Counseling:  Patient counseled regarding possible photosensitivity and increased risk for sunburn.  Patient instructed to avoid sunlight, if possible.  When exposed to sunlight, patients should wear protective clothing, sunglasses, and sunscreen.  The patient was instructed to call the office immediately if the following severe adverse effects occur:  hearing changes, easy bruising/bleeding, severe headache, or vision changes.  The patient verbalized understanding of the proper use and possible adverse effects of doxycycline.  All of the patient's questions and concerns were addressed.
Include Pregnancy/Lactation Warning?: No
Birth Control Pills Pregnancy And Lactation Text: This medication should be avoided if pregnant and for the first 30 days post-partum.
Azelaic Acid Pregnancy And Lactation Text: This medication is considered safe during pregnancy and breast feeding.
Sarecycline Counseling: Patient advised regarding possible photosensitivity and discoloration of the teeth, skin, lips, tongue and gums.  Patient instructed to avoid sunlight, if possible.  When exposed to sunlight, patients should wear protective clothing, sunglasses, and sunscreen.  The patient was instructed to call the office immediately if the following severe adverse effects occur:  hearing changes, easy bruising/bleeding, severe headache, or vision changes.  The patient verbalized understanding of the proper use and possible adverse effects of sarecycline.  All of the patient's questions and concerns were addressed.
Topical Clindamycin Counseling: Patient counseled that this medication may cause skin irritation or allergic reactions.  In the event of skin irritation, the patient was advised to reduce the amount of the drug applied or use it less frequently.   The patient verbalized understanding of the proper use and possible adverse effects of clindamycin.  All of the patient's questions and concerns were addressed.
Aklief Pregnancy And Lactation Text: It is unknown if this medication is safe to use during pregnancy.  It is unknown if this medication is excreted in breast milk.  Breastfeeding women should use the topical cream on the smallest area of the skin for the shortest time needed while breastfeeding.  Do not apply to nipple and areola.
Erythromycin Pregnancy And Lactation Text: This medication is Pregnancy Category B and is considered safe during pregnancy. It is also excreted in breast milk.
Topical Sulfur Applications Counseling: Topical Sulfur Counseling: Patient counseled that this medication may cause skin irritation or allergic reactions.  In the event of skin irritation, the patient was advised to reduce the amount of the drug applied or use it less frequently.   The patient verbalized understanding of the proper use and possible adverse effects of topical sulfur application.  All of the patient's questions and concerns were addressed.
Isotretinoin Pregnancy And Lactation Text: This medication is Pregnancy Category X and is considered extremely dangerous during pregnancy. It is unknown if it is excreted in breast milk.
Spironolactone Counseling: Patient advised regarding risks of diarrhea, abdominal pain, hyperkalemia, birth defects (for female patients), liver toxicity and renal toxicity. The patient may need blood work to monitor liver and kidney function and potassium levels while on therapy. The patient verbalized understanding of the proper use and possible adverse effects of spironolactone.  All of the patient's questions and concerns were addressed.
Topical Retinoid counseling:  Patient advised to apply a pea-sized amount only at bedtime and wait 30 minutes after washing their face before applying.  If too drying, patient may add a non-comedogenic moisturizer. The patient verbalized understanding of the proper use and possible adverse effects of retinoids.  All of the patient's questions and concerns were addressed.
Azithromycin Pregnancy And Lactation Text: This medication is considered safe during pregnancy and is also secreted in breast milk.
High Dose Vitamin A Counseling: Side effects reviewed, pt to contact office should one occur.
Topical Sulfur Applications Pregnancy And Lactation Text: This medication is Pregnancy Category C and has an unknown safety profile during pregnancy. It is unknown if this topical medication is excreted in breast milk.
Dapsone Pregnancy And Lactation Text: This medication is Pregnancy Category C and is not considered safe during pregnancy or breast feeding.
Winlevi Pregnancy And Lactation Text: This medication is considered safe during pregnancy and breastfeeding.
Bactrim Pregnancy And Lactation Text: This medication is Pregnancy Category D and is known to cause fetal risk.  It is also excreted in breast milk.
Doxycycline Pregnancy And Lactation Text: This medication is Pregnancy Category D and not consider safe during pregnancy. It is also excreted in breast milk but is considered safe for shorter treatment courses.
Minocycline Counseling: Patient advised regarding possible photosensitivity and discoloration of the teeth, skin, lips, tongue and gums.  Patient instructed to avoid sunlight, if possible.  When exposed to sunlight, patients should wear protective clothing, sunglasses, and sunscreen.  The patient was instructed to call the office immediately if the following severe adverse effects occur:  hearing changes, easy bruising/bleeding, severe headache, or vision changes.  The patient verbalized understanding of the proper use and possible adverse effects of minocycline.  All of the patient's questions and concerns were addressed.
Tazorac Counseling:  Patient advised that medication is irritating and drying.  Patient may need to apply sparingly and wash off after an hour before eventually leaving it on overnight.  The patient verbalized understanding of the proper use and possible adverse effects of tazorac.  All of the patient's questions and concerns were addressed.

## 2024-06-20 NOTE — TELEPHONE ENCOUNTER
She is calling to see if something can be sent in for pain. She doesn't want pain medicine, but like a good anti inflammatory would be great.

## 2024-06-20 NOTE — PROCEDURE: OTHER
Detail Level: Simple
Note Text (......Xxx Chief Complaint.): This diagnosis correlates with the
Render Risk Assessment In Note?: yes
Other (Free Text): Recommended patient discontinue daily use of Clindamycin gel and only use prn.

## 2024-06-24 ENCOUNTER — RX ONLY (OUTPATIENT)
Age: 74
Setting detail: RX ONLY
End: 2024-06-24

## 2024-06-24 RX ORDER — TRETIONIN 1 MG/G
CREAM TOPICAL
Qty: 45 | Refills: 1 | Status: ERX | COMMUNITY
Start: 2024-06-24

## 2024-06-24 NOTE — PROGRESS NOTES
"7/28/2021       RE: Carlito Wiseman  [id number] [place] [facility] [address]     Dear Colleague,    Thank you for referring your patient, Carlito Wiseman, to the [facility]. Please see a copy of my visit note below.    History and Physical Examination     SUBJECTIVE: Chief complaint: Carlito (pronounced \"ka-CONNIE-ooh\") presents for preventive health review.     Past Medical History:  1.  HLA-B27 (+)  2.  History of uveitis, diagnosed in 2009, 2011, 2017, and 2021; successfully treated with glucocorticoids.  3.  Status post arthroscopic right knee ACL reconstruction, 1991, following high school soccer injury  4.  History of partial left Achilles tear following pickup basketball injury, managed conservatively  5.  History of microscopic hematuria with negative CT in 2011; he does not recall having undergone cystoscopy.  6.  Status post vasectomy, 2015  7.  Impaired fasting glucose  8.  History of intermittent lower back pain, responsive to conservative measures; LS-spine x-rays in 2014 were normal; an MRI in 10/2017 showed disc bulging at L4-5 and L5-S1 with S1 nerve root compression and left greater than right neural foraminal narrowing.  9.  History of neutropenia, long-standing, with absolute neutrophil readings of 1700 in 2010, 1600 in 2012, 900 in 2014, 1060 in 2017, 1300 in 2019, and 2330 in 2020; history of normal hemoglobin and platelet levels.        Adverse Drug Reactions: None.     Current Medications:  Daily multivitamin supplement  Vitamin D, 50 mcg daily  Fish oil, 500 mg daily    Habits:  Tobacco: Never  Alcohol: 2 servings per week  Caffeine: 3-4 servings per day     Social History:  to Amanda and father of daughters Daniela, age 17, a rising senior at [employer] who enjoys softball, soccer, who plans to attend college [place] or [place] to study statistics, finance, or law; and " PROGRESS NOTE      Chief Complaint   Patient presents with    Hyperlipidemia     4 mo f/u    Urinary Pain     Patient states started a couple days ago, itching and burning when she urinates.       HPI    Asthma: Pulmonary 2024  Thought more likely asthma vs asthma / COPD overlap. Started Advair and albuterol and will repeat spirometry in 3 months.     Allergic rhinitis: Zyrtec with benefit.     Hyperlipidemia, ASCVD with statin intolerance. Tolerating now Crestor 10 mg (unable to afford Repatha). Followed by cardiology - Dr Bailon. 6 month follow up     Hypertension: Amlodipine 5 mg. Blood pressure in good range.      Anxiety and depression: Lexapro increased to 10 mg. 5 mg      Prediabetes: due for labs     Colon polyp: History of tubular adenoma.     History of basal cell and squamous cell carcinoma: Recent derm visit - Dr Haile. 1 year follow up    Right shoulder pain: severe OA. Evaluated by neurosurgery - initially thought radicular pain but determined pain from shoulder origin. Evaluated by Dr Ewing. MRI. Steroid shot with some benefit but daily pain. Plans 3 month follow up. History of temporal arteritis.    Grief: Sister  after long illness          Past Medical History, Past Surgical History, Family history, Social History, and Medications were all reviewed and updated as necessary.     Current Outpatient Medications   Medication Sig Dispense Refill    pantoprazole (PROTONIX) 40 MG tablet Take 1 tablet by mouth daily 90 tablet 3    fluticasone (FLONASE) 50 MCG/ACT nasal spray 2 sprays by Each Nostril route daily 3 each 3    nitrofurantoin, macrocrystal-monohydrate, (MACROBID) 100 MG capsule Take 1 capsule by mouth 2 times daily for 7 days 14 capsule 0    amLODIPine (NORVASC) 5 MG tablet Take 1 tablet by mouth daily 90 tablet 3    cetirizine (ZYRTEC) 10 MG tablet Take 1 tablet by mouth daily 30 tablet 5    fluticasone-salmeterol (ADVAIR) 250-50 MCG/ACT AEPB diskus inhaler Inhale 1 puff into the lungs  Brigid, age 15, a rising sophomore at [employer] who enjoys track and tennis; and son Valentin, age 9, a survivor of neuroblastoma at age 2 months who enjoys reading, various sports, and collecting sports trading cards who will begin fourth grade at [employer] this fall.  Carlito was born in [place] and completed middle and high schools in [place], after which he attended [employer], where he studied on an [employer] scholarship.  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  Following his naval career, he accepted a position at [employer], where he worked until , when he accepted a position for [employer] in [place].  He returned to the [facility] in 2017 for a position in [place] for [employer] before returning to [employer] in 2019, where he currently serves as .  Away from work, he enjoys family activities, socializing, and travel.  He exercises most days of the week, working out 3 days per week with a  on strength and aerobic sessions, with occasional yoga sessions and high-intensity interval workouts with his wife at [facility].     Family History:  Mother is 70, with history of colonic polyps and breast cancer, diagnosed at age 59.  Father is 70, with history of colonic polyps and prostate cancer, diagnosed at age 60.  A sister 10 years his constance is in good health.  Son is a survivor of neuroblastoma.  Daughters are in good health.  Maternal grandfather  prematurely from an unspecified illness, with history of excessive tobacco and alcohol use.  Maternal grandmother  prematurely from lung cancer, with history of tobacco use.  Paternal grandfather  from tobacco-related lung cancer.  Paternal grandmother is in good health at age 97.     Review of Systems:  Carlito was treated for his fourth episode of uveitis in .  Regular snoring, without morning headaches, or  "unrefreshed sensation upon awakening; he at times notes mild daytime somnolence and his wife has noted occasional \"gasping\".  Occasional lower back stiffness  that typically responds to stretching; symptoms do not interfere with usual activities.  Episode of upper abdominal discomfort approximately one month ago, following 3 weeks of travel during which he did not exercise regularly and followed a \"poor\" diet that deviated from his usual healthful diet.  Following a fatty meal, he noted significant bloating and a protrusion in the right upper quadrant with dull discomfort rated at 1/10 in intensity.  Symptoms improved over several hours and had subsided upon awakening the following morning.  He noted no jaundice, scleral icterus, or change in appearance of urine or stools.  No history of colonoscopy.  Covid-19 (Pfizer) vaccinations administered in 4/2021 and 5/2021.  Most recent tetanus (Tdap) vaccination was administered in 12/2012.  Typhoid IM vaccination administered in 11/2013.  Medical records shows documentation of a single hepatitis A vaccination in 7/1998; Carlito believes that he completed a hepatitis A vaccination series while serving in [employer].  Yellow fever vaccination administered in 7/2006.  IPV administered in 5/1996.  MMR administered in 2/1996.  Remainder of complete review of systems was negative.       OBJECTIVE:     Vital signs: Height 71.25 inches.  Weight 218 pounds.  Blood pressure 134/87 on average of 3 automated readings.  Heart rate 69.  Respiratory rate 18.  Temperature 98.3 degrees.  O2 saturation 97% on room air.  General: Alert, neatly dressed and groomed, in no acute distress.  HEENT: Atraumatic and normocephalic. Eyelids, pupils, and conjunctivae appeared normal. Lips, teeth and gums appear normal.  Oropharynx showed moist mucous membranes, without exudate or erythema.  Relatively \"crowded\" soft palate with narrow airway.  Neck: Supple, without thyromegaly, mass, or bruit. No cervical " or supraclavicular lymphadenopathy.  Large neck circumference.  Back: No spinal or costovertebral angle tenderness.  Chest: Clear to auscultation and percussion. Normal respiratory effort.  Cardiovascular: No jugular venous distention. Regular rate and rhythm, normal S1, S2 without murmur.  Abdomen: Bowel sounds positive; soft, nontender, without rebound, guarding, hepatosplenomegaly or mass.  Extremities: No cyanosis or edema.  Genitalia: Normal male genitalia, without scrotal mass or hernia. No inguinal lymphadenopathy.  Rectal: Normal tone, with smooth, nontender, moderately enlarged prostate. No rectal mass.  Skin: Examination was deferred; full evaluation was completed later in day through dermatology clinic.  Neurologic: Cranial nerves II-XII were grossly intact. Sensory and motor examinations were normal. Normal gait.  Mini-cog score was 5/5.  Psychiatric: Alert and oriented ×3. Normal affect. Judgment and insight intact.  PHQ-2 score was 0.     Creatinine 1.04, alkaline phosphatase 64, ALT 32, cholesterol 204, HDL 53, , triglycerides 159, cholesterol/HDL 3.8, PSA 0.97, TSH 1.30, glucose 87, white blood cell count 3900, ANC 1400, hemoglobin 15.1, platelets 252,000; urinalysis showed small blood, with 1 rbc/hpf; 25-hydroxy vitamin D, HIV, and hepatitis C screens pending.     EKG was unremarkable.  Spirometry showed an FEV1 of 3.60, with an FVC of 4.21; readings were 93% and 86% of predicted values, respectively.     DEXA showed normal bone density, with most negative and valid Z-score of -0.3 at the level of the right femoral neck and right total hip.  Body composition analysis showed 26.7% fat (38th percentile); readings from 7/2019 showed 27.1% fat (39th percentile); body mass index was 29.2, unchanged from 7/201926.7.     ASSESSMENT:     1.  Family history of colonic polyps.  I again recommended screening colonoscopy, which he agrees to arrange.     2.  Narrow airway with history of snoring.  He  "notes occasional mild daytime somnolence and his wife has observed occasional gasping.  He agrees to schedule sleep clinic consultation to evaluate possible sleep apnea.     3.  Neutropenia.  Long-standing and stable (a 2020 reading that was higher than historical values was performed at an outside clinic); previous peripheral blood morphology was remarkable only for mild neutropenia.  In the setting of uveitis and (+) HLA-B27, I suspect an autoimmune component.  Plan will be continued monitoring without additional evaluation at this time.      4.  History of recurrent uveitis.  In the setting of positive testing for HLA-B27 in 2019, I recommended rheumatology consultation for discussion of options for prophylaxis.    5.  Dyslipidemia.  Using AHA/ACC guidelines, his estimated 10-year risk of a vascular event is 5.3% (optimal for his cohort is 3.5%), due in part to elevated blood pressure.  Plan will be conservative management given the 24, weight loss, and other measures to address elevated blood pressure.  We reviewed the elements of a healthful diet.  He was congratulated for his regimen of regular exercise and encouraged to modify type of exercise to facilitate weight loss.      6.  Elevated blood pressure.  Acceptable blood pressure on home readings.  We reviewed usual goals and non-pharmacologic measures to facilitate blood pressure reduction.    7.  Preventive care.  I again recommended screening colonoscopy, as noted above.  Immunization status is up-to-date.  I recommended continued daily use of a 50  g vitamin D3 supplement, and we reviewed the importance of selecting a multivitamin supplement that does not contain iron.  He was congratulated for his regimen of regular exercise and be advised to discuss with his  the option of subthreshold workouts for \"fat-burning\" effect and increased repetition with lower resistance strength training to reduce muscle bulk given weight and resume effect on blood " pressure and vascular disease risk.       PLAN: See above.     ~SRT  Answers for HPI/ROS submitted by the patient on 7/28/2021  DARLENE 7 TOTAL SCORE: 0  General Symptoms: No  Skin Symptoms: No  HENT Symptoms: No  EYE SYMPTOMS: No  HEART SYMPTOMS: No  LUNG SYMPTOMS: No  INTESTINAL SYMPTOMS: Yes  URINARY SYMPTOMS: No  REPRODUCTIVE SYMPTOMS: No  SKELETAL SYMPTOMS: Yes  BLOOD SYMPTOMS: No  NERVOUS SYSTEM SYMPTOMS: No  MENTAL HEALTH SYMPTOMS: No  Heart burn or indigestion: No  Nausea: No  Vomiting: No  Abdominal pain: Yes  Bloating: No  Constipation: No  Diarrhea: No  Blood in stool: No  Black stools: No  Rectal or Anal pain: No  Fecal incontinence: No  Yellowing of skin or eyes: No  Vomit with blood: No  Change in stools: No  Back pain: Yes  Muscle aches: No  Neck pain: No  Swollen joints: No  Joint pain: No  Bone pain: No  Muscle cramps: No  Muscle weakness: No  Joint stiffness: Yes  Bone fracture: No          Again, thank you for allowing me to participate in the care of your patient.      Sincerely,    Jim Ojeda MD

## 2024-06-27 ENCOUNTER — OFFICE VISIT (OUTPATIENT)
Dept: INTERNAL MEDICINE CLINIC | Facility: CLINIC | Age: 74
End: 2024-06-27
Payer: MEDICARE

## 2024-06-27 VITALS
HEIGHT: 67 IN | BODY MASS INDEX: 25.62 KG/M2 | SYSTOLIC BLOOD PRESSURE: 124 MMHG | WEIGHT: 163.2 LBS | DIASTOLIC BLOOD PRESSURE: 78 MMHG

## 2024-06-27 DIAGNOSIS — J30.9 ALLERGIC RHINITIS, UNSPECIFIED SEASONALITY, UNSPECIFIED TRIGGER: ICD-10-CM

## 2024-06-27 DIAGNOSIS — N30.90 CYSTITIS: ICD-10-CM

## 2024-06-27 DIAGNOSIS — Z87.39 HISTORY OF TEMPORAL ARTERITIS: ICD-10-CM

## 2024-06-27 DIAGNOSIS — R25.2 LEG CRAMPS: ICD-10-CM

## 2024-06-27 DIAGNOSIS — K21.9 GASTROESOPHAGEAL REFLUX DISEASE WITHOUT ESOPHAGITIS: ICD-10-CM

## 2024-06-27 DIAGNOSIS — E78.00 PURE HYPERCHOLESTEROLEMIA: ICD-10-CM

## 2024-06-27 DIAGNOSIS — I10 PRIMARY HYPERTENSION: ICD-10-CM

## 2024-06-27 DIAGNOSIS — R73.09 ABNORMAL GLUCOSE: ICD-10-CM

## 2024-06-27 DIAGNOSIS — R30.0 DYSURIA: ICD-10-CM

## 2024-06-27 DIAGNOSIS — R30.0 DYSURIA: Primary | ICD-10-CM

## 2024-06-27 DIAGNOSIS — J68.3 REACTIVE AIRWAYS DYSFUNCTION SYNDROME (HCC): ICD-10-CM

## 2024-06-27 LAB
ALBUMIN SERPL-MCNC: 3.9 G/DL (ref 3.2–4.6)
ALBUMIN/GLOB SERPL: 1.3 (ref 1–1.9)
ALP SERPL-CCNC: 119 U/L (ref 35–104)
ALT SERPL-CCNC: 11 U/L (ref 12–65)
ANION GAP SERPL CALC-SCNC: 9 MMOL/L (ref 9–18)
AST SERPL-CCNC: 19 U/L (ref 15–37)
BILIRUB SERPL-MCNC: 0.5 MG/DL (ref 0–1.2)
BILIRUBIN, URINE, POC: NEGATIVE
BLOOD URINE, POC: ABNORMAL
BUN SERPL-MCNC: 13 MG/DL (ref 8–23)
CALCIUM SERPL-MCNC: 9.2 MG/DL (ref 8.8–10.2)
CHLORIDE SERPL-SCNC: 101 MMOL/L (ref 98–107)
CHOLEST SERPL-MCNC: 195 MG/DL (ref 0–200)
CO2 SERPL-SCNC: 28 MMOL/L (ref 20–28)
CREAT SERPL-MCNC: 0.73 MG/DL (ref 0.6–1.1)
ERYTHROCYTE [SEDIMENTATION RATE] IN BLOOD: 3 MM/HR (ref 0–30)
EST. AVERAGE GLUCOSE BLD GHB EST-MCNC: 135 MG/DL
GLOBULIN SER CALC-MCNC: 3 G/DL (ref 2.3–3.5)
GLUCOSE SERPL-MCNC: 102 MG/DL (ref 70–99)
GLUCOSE URINE, POC: NEGATIVE
HBA1C MFR BLD: 6.3 % (ref 0–5.6)
HDLC SERPL-MCNC: 64 MG/DL (ref 40–60)
HDLC SERPL: 3.1 (ref 0–5)
KETONES, URINE, POC: NEGATIVE
LDLC SERPL CALC-MCNC: 99 MG/DL (ref 0–100)
LEUKOCYTE ESTERASE, URINE, POC: ABNORMAL
MAGNESIUM SERPL-MCNC: 2.1 MG/DL (ref 1.8–2.4)
NITRITE, URINE, POC: NEGATIVE
PH, URINE, POC: 6 (ref 4.6–8)
POTASSIUM SERPL-SCNC: 5 MMOL/L (ref 3.5–5.1)
PROT SERPL-MCNC: 6.9 G/DL (ref 6.3–8.2)
PROTEIN,URINE, POC: ABNORMAL
SODIUM SERPL-SCNC: 138 MMOL/L (ref 136–145)
SPECIFIC GRAVITY, URINE, POC: 1.03 (ref 1–1.03)
TRIGL SERPL-MCNC: 163 MG/DL (ref 0–150)
TSH, 3RD GENERATION: 1.83 UIU/ML (ref 0.27–4.2)
URINALYSIS CLARITY, POC: CLEAR
URINALYSIS COLOR, POC: ABNORMAL
UROBILINOGEN, POC: NORMAL
VLDLC SERPL CALC-MCNC: 33 MG/DL (ref 6–23)

## 2024-06-27 PROCEDURE — 1123F ACP DISCUSS/DSCN MKR DOCD: CPT | Performed by: NURSE PRACTITIONER

## 2024-06-27 PROCEDURE — 81002 URINALYSIS NONAUTO W/O SCOPE: CPT | Performed by: NURSE PRACTITIONER

## 2024-06-27 PROCEDURE — 99214 OFFICE O/P EST MOD 30 MIN: CPT | Performed by: NURSE PRACTITIONER

## 2024-06-27 PROCEDURE — 3078F DIAST BP <80 MM HG: CPT | Performed by: NURSE PRACTITIONER

## 2024-06-27 PROCEDURE — 3074F SYST BP LT 130 MM HG: CPT | Performed by: NURSE PRACTITIONER

## 2024-06-27 RX ORDER — FLUTICASONE PROPIONATE 50 MCG
2 SPRAY, SUSPENSION (ML) NASAL DAILY
Qty: 3 EACH | Refills: 3 | Status: SHIPPED | OUTPATIENT
Start: 2024-06-27

## 2024-06-27 RX ORDER — PANTOPRAZOLE SODIUM 40 MG/1
40 TABLET, DELAYED RELEASE ORAL DAILY
Qty: 90 TABLET | Refills: 3 | Status: SHIPPED | OUTPATIENT
Start: 2024-06-27

## 2024-06-27 RX ORDER — NITROFURANTOIN 25; 75 MG/1; MG/1
100 CAPSULE ORAL 2 TIMES DAILY
Qty: 14 CAPSULE | Refills: 0 | Status: SHIPPED | OUTPATIENT
Start: 2024-06-27 | End: 2024-07-04

## 2024-06-27 ASSESSMENT — ENCOUNTER SYMPTOMS
SHORTNESS OF BREATH: 0
BACK PAIN: 1
CONSTIPATION: 1

## 2024-06-29 LAB
BACTERIA SPEC CULT: NORMAL
CRP SERPL-MCNC: 9 MG/L (ref 0–10)
SERVICE CMNT-IMP: NORMAL

## 2024-07-12 ENCOUNTER — TELEPHONE (OUTPATIENT)
Dept: ORTHOPEDIC SURGERY | Age: 74
End: 2024-07-12

## 2024-07-12 NOTE — TELEPHONE ENCOUNTER
Pt has  called  back  as  a followup   from her injection   And her  arm is  worse and  he  said she could have another  injection  and she wonders  if she needs an xraay.  She can't  eat, sleep and is in constant  pain

## 2024-07-16 NOTE — TELEPHONE ENCOUNTER
Spoke with pt regarding options of pain relief of shoulder. Pt was offered compounding cream or referral to pain management that pt denied. Pt will return for 3 month f/u for injection. Pt expressed understanding.

## 2024-08-05 ENCOUNTER — TELEPHONE (OUTPATIENT)
Dept: ORTHOPEDIC SURGERY | Age: 74
End: 2024-08-05

## 2024-08-05 DIAGNOSIS — M25.511 RIGHT SHOULDER PAIN, UNSPECIFIED CHRONICITY: Primary | ICD-10-CM

## 2024-08-05 DIAGNOSIS — M19.011 OSTEOARTHRITIS OF RIGHT SHOULDER, UNSPECIFIED OSTEOARTHRITIS TYPE: ICD-10-CM

## 2024-08-05 NOTE — TELEPHONE ENCOUNTER
She would like to speak to someone regarding her shoulder pain. It has not gotten better since the injection. Please call. She is wondering if something can be sent in to get her by until she comes back in.

## 2024-08-05 NOTE — TELEPHONE ENCOUNTER
Spoke with pt and told her the only thing we can do is call in some compound cream to help with her pain. Pt expressed that she would like to do that and try the cream. Pt was told we will put in the rx for the cream and the company will call her in the next day or so. Pt expressed understanding.

## 2024-08-09 ENCOUNTER — OFFICE VISIT (OUTPATIENT)
Dept: PULMONOLOGY | Age: 74
End: 2024-08-09

## 2024-08-09 VITALS
BODY MASS INDEX: 25.43 KG/M2 | RESPIRATION RATE: 20 BRPM | WEIGHT: 162 LBS | OXYGEN SATURATION: 96 % | HEIGHT: 67 IN | DIASTOLIC BLOOD PRESSURE: 80 MMHG | HEART RATE: 64 BPM | TEMPERATURE: 98 F | SYSTOLIC BLOOD PRESSURE: 140 MMHG

## 2024-08-09 DIAGNOSIS — J45.40 MODERATE PERSISTENT ASTHMA WITHOUT COMPLICATION: Primary | ICD-10-CM

## 2024-08-09 DIAGNOSIS — J31.0 CHRONIC RHINITIS: ICD-10-CM

## 2024-08-09 LAB
EXPIRATORY TIME: NORMAL
FEF 25-75% %PRED-PRE: NORMAL
FEF 25-75% PRED: NORMAL
FEF 25-75-PRE: NORMAL
FEV1 %PRED-PRE: 67 %
FEV1 PRED: NORMAL
FEV1/FVC %PRED-PRE: NORMAL
FEV1/FVC PRED: NORMAL
FEV1/FVC: 69 %
FEV1: 1.55 L
FVC %PRED-PRE: 74 %
FVC PRED: NORMAL
FVC: 2.24 L
PEF %PRED-PRE: NORMAL
PEF PRED: NORMAL
PEF-PRE: NORMAL

## 2024-08-09 RX ORDER — ALBUTEROL SULFATE 90 UG/1
2 AEROSOL, METERED RESPIRATORY (INHALATION) EVERY 6 HOURS PRN
Qty: 1 EACH | Refills: 11 | Status: SHIPPED | OUTPATIENT
Start: 2024-08-09

## 2024-08-09 RX ORDER — CETIRIZINE HYDROCHLORIDE 10 MG/1
10 TABLET ORAL DAILY
Qty: 30 TABLET | Refills: 5 | Status: SHIPPED | OUTPATIENT
Start: 2024-08-09

## 2024-08-09 RX ORDER — FLUTICASONE PROPIONATE AND SALMETEROL 250; 50 UG/1; UG/1
1 POWDER RESPIRATORY (INHALATION) 2 TIMES DAILY
Qty: 60 EACH | Refills: 11 | Status: SHIPPED | OUTPATIENT
Start: 2024-08-09

## 2024-08-09 ASSESSMENT — PULMONARY FUNCTION TESTS
FEV1: 1.55
FVC: 2.24
FEV1/FVC: 69
FVC_PERCENT_PREDICTED_PRE: 74
FEV1_PERCENT_PREDICTED_PRE: 67

## 2024-08-09 NOTE — PROGRESS NOTES
Polyarthralgia 10/15/2014    Shingles     10-12x    Tobacco use disorder 112    URI (upper respiratory infection) 02/10/2023    was seen if PCP office, note indicated had been sick for 10 days, treated with Z pack, prednisone and antitussive        Tobacco Use      Smoking status: Former        Packs/day: 0.00        Years: 0.5 packs/day for 25.0 years (12.5 ttl pk-yrs)        Types: Cigarettes        Start date:         Quit date:         Years since quittin.6      Smokeless tobacco: Never      Tobacco comments: Quit smoking: started at age 19    Allergies   Allergen Reactions    Cefaclor Nausea Only    Zamsz-Sgzri-Zkomecg-Pramoxine Other (See Comments)     Patient unsure     Cephalexin Nausea And Vomiting    Clarithromycin Nausea And Vomiting     Patient unsure      Current Outpatient Medications   Medication Instructions    acetaminophen (TYLENOL) 500 MG tablet 2 tablets, Oral, EVERY 6 HOURS PRN    albuterol sulfate HFA (PROVENTIL;VENTOLIN;PROAIR) 108 (90 Base) MCG/ACT inhaler 2 puffs, Inhalation, EVERY 6 HOURS PRN    amLODIPine (NORVASC) 5 mg, Oral, DAILY    ascorbic acid (VITAMIN C) 500 mg, Oral, DAILY    aspirin 81 mg, Oral, DAILY    cetirizine (ZYRTEC) 10 mg, Oral, DAILY    clindamycin (CLINDAGEL) 1 % gel 2 TIMES DAILY PRN, APPLY TO THE AFFECTED AREA FOR ACNE ON FACE TWICE A DAY    Cyanocobalamin (VITAMIN B12 PO) 1 tablet, Oral, DAILY RESP    escitalopram (LEXAPRO) 10 mg, Oral, EVERY EVENING    fluticasone (FLONASE) 50 MCG/ACT nasal spray 2 sprays, Each Nostril, DAILY    fluticasone-salmeterol (ADVAIR) 250-50 MCG/ACT AEPB diskus inhaler 1 puff, Inhalation, 2 TIMES DAILY    nitroGLYCERIN (NITROSTAT) 0.4 MG SL tablet 1 tablet every 5 minutes as needed    pantoprazole (PROTONIX) 40 mg, Oral, DAILY    rosuvastatin (CRESTOR) 10 mg, Oral, NIGHTLY    tretinoin (RETIN-A) 0.05 % cream Topical, NIGHTLY    vitamin D (CHOLECALCIFEROL) 2,000 Units, Oral, DAILY

## 2024-08-23 ENCOUNTER — TELEPHONE (OUTPATIENT)
Dept: ORTHOPEDIC SURGERY | Age: 74
End: 2024-08-23

## 2024-08-23 DIAGNOSIS — M19.011 OSTEOARTHRITIS OF RIGHT SHOULDER, UNSPECIFIED OSTEOARTHRITIS TYPE: ICD-10-CM

## 2024-08-23 DIAGNOSIS — M25.511 RIGHT SHOULDER PAIN, UNSPECIFIED CHRONICITY: Primary | ICD-10-CM

## 2024-08-23 NOTE — TELEPHONE ENCOUNTER
Spoke with pt and informed her that we will send the referral to pain management for her. Pt expressed understanding.

## 2024-09-18 ENCOUNTER — HOSPITAL ENCOUNTER (OUTPATIENT)
Age: 74
Discharge: HOME | End: 2024-09-20
Payer: MEDICARE

## 2024-09-18 DIAGNOSIS — M54.12 BRACHIAL NEURITIS: ICD-10-CM

## 2024-09-18 PROCEDURE — 72141 MRI NECK SPINE W/O DYE: CPT

## 2024-10-10 ENCOUNTER — OFFICE VISIT (OUTPATIENT)
Dept: INTERNAL MEDICINE CLINIC | Facility: CLINIC | Age: 74
End: 2024-10-10
Payer: MEDICARE

## 2024-10-10 VITALS
BODY MASS INDEX: 24.89 KG/M2 | HEIGHT: 67 IN | DIASTOLIC BLOOD PRESSURE: 64 MMHG | WEIGHT: 158.6 LBS | SYSTOLIC BLOOD PRESSURE: 112 MMHG

## 2024-10-10 DIAGNOSIS — I10 PRIMARY HYPERTENSION: ICD-10-CM

## 2024-10-10 DIAGNOSIS — E78.00 PURE HYPERCHOLESTEROLEMIA: ICD-10-CM

## 2024-10-10 DIAGNOSIS — Z23 NEED FOR INFLUENZA VACCINATION: ICD-10-CM

## 2024-10-10 DIAGNOSIS — Z78.9 STATIN INTOLERANCE: ICD-10-CM

## 2024-10-10 DIAGNOSIS — Z86.0100 HISTORY OF COLON POLYPS: ICD-10-CM

## 2024-10-10 DIAGNOSIS — K21.9 GASTROESOPHAGEAL REFLUX DISEASE WITHOUT ESOPHAGITIS: ICD-10-CM

## 2024-10-10 DIAGNOSIS — Z00.00 MEDICARE ANNUAL WELLNESS VISIT, SUBSEQUENT: Primary | ICD-10-CM

## 2024-10-10 DIAGNOSIS — J44.9 CHRONIC OBSTRUCTIVE PULMONARY DISEASE, UNSPECIFIED COPD TYPE (HCC): ICD-10-CM

## 2024-10-10 DIAGNOSIS — R73.09 ABNORMAL GLUCOSE: ICD-10-CM

## 2024-10-10 LAB
ALBUMIN SERPL-MCNC: 4.1 G/DL (ref 3.2–4.6)
ALBUMIN/GLOB SERPL: 1.4 (ref 1–1.9)
ALP SERPL-CCNC: 119 U/L (ref 35–104)
ALT SERPL-CCNC: 10 U/L (ref 8–45)
ANION GAP SERPL CALC-SCNC: 14 MMOL/L (ref 9–18)
AST SERPL-CCNC: 19 U/L (ref 15–37)
BACTERIA URNS QL MICRO: NEGATIVE /HPF
BASOPHILS # BLD: 0.1 K/UL (ref 0–0.2)
BASOPHILS NFR BLD: 1 % (ref 0–2)
BILIRUB SERPL-MCNC: 0.4 MG/DL (ref 0–1.2)
BUN SERPL-MCNC: 11 MG/DL (ref 8–23)
CALCIUM SERPL-MCNC: 9.3 MG/DL (ref 8.8–10.2)
CHLORIDE SERPL-SCNC: 103 MMOL/L (ref 98–107)
CHOLEST SERPL-MCNC: 202 MG/DL (ref 0–200)
CO2 SERPL-SCNC: 23 MMOL/L (ref 20–28)
CREAT SERPL-MCNC: 0.64 MG/DL (ref 0.6–1.1)
DIFFERENTIAL METHOD BLD: NORMAL
EOSINOPHIL # BLD: 0.2 K/UL (ref 0–0.8)
EOSINOPHIL NFR BLD: 3 % (ref 0.5–7.8)
EPI CELLS #/AREA URNS HPF: ABNORMAL /HPF (ref 0–5)
ERYTHROCYTE [DISTWIDTH] IN BLOOD BY AUTOMATED COUNT: 11.9 % (ref 11.9–14.6)
EST. AVERAGE GLUCOSE BLD GHB EST-MCNC: 133 MG/DL
GLOBULIN SER CALC-MCNC: 2.9 G/DL (ref 2.3–3.5)
GLUCOSE SERPL-MCNC: 91 MG/DL (ref 70–99)
HBA1C MFR BLD: 6.3 % (ref 0–5.6)
HCT VFR BLD AUTO: 44.1 % (ref 35.8–46.3)
HDLC SERPL-MCNC: 58 MG/DL (ref 40–60)
HDLC SERPL: 3.5 (ref 0–5)
HGB BLD-MCNC: 14.1 G/DL (ref 11.7–15.4)
HYALINE CASTS URNS QL MICRO: ABNORMAL /LPF
IMM GRANULOCYTES # BLD AUTO: 0 K/UL (ref 0–0.5)
IMM GRANULOCYTES NFR BLD AUTO: 0 % (ref 0–5)
LDLC SERPL CALC-MCNC: 113 MG/DL (ref 0–100)
LYMPHOCYTES # BLD: 2 K/UL (ref 0.5–4.6)
LYMPHOCYTES NFR BLD: 33 % (ref 13–44)
MCH RBC QN AUTO: 31.4 PG (ref 26.1–32.9)
MCHC RBC AUTO-ENTMCNC: 32 G/DL (ref 31.4–35)
MCV RBC AUTO: 98.2 FL (ref 82–102)
MONOCYTES # BLD: 0.6 K/UL (ref 0.1–1.3)
MONOCYTES NFR BLD: 9 % (ref 4–12)
NEUTS SEG # BLD: 3.4 K/UL (ref 1.7–8.2)
NEUTS SEG NFR BLD: 54 % (ref 43–78)
NRBC # BLD: 0 K/UL (ref 0–0.2)
PLATELET # BLD AUTO: 377 K/UL (ref 150–450)
PMV BLD AUTO: 10.1 FL (ref 9.4–12.3)
POTASSIUM SERPL-SCNC: 4.7 MMOL/L (ref 3.5–5.1)
PROT SERPL-MCNC: 7 G/DL (ref 6.3–8.2)
RBC # BLD AUTO: 4.49 M/UL (ref 4.05–5.2)
RBC #/AREA URNS HPF: ABNORMAL /HPF (ref 0–5)
SODIUM SERPL-SCNC: 140 MMOL/L (ref 136–145)
TRIGL SERPL-MCNC: 155 MG/DL (ref 0–150)
TSH, 3RD GENERATION: 1.98 UIU/ML (ref 0.27–4.2)
VLDLC SERPL CALC-MCNC: 31 MG/DL (ref 6–23)
WBC # BLD AUTO: 6.3 K/UL (ref 4.3–11.1)
WBC URNS QL MICRO: ABNORMAL /HPF (ref 0–4)

## 2024-10-10 PROCEDURE — 3078F DIAST BP <80 MM HG: CPT | Performed by: NURSE PRACTITIONER

## 2024-10-10 PROCEDURE — 1123F ACP DISCUSS/DSCN MKR DOCD: CPT | Performed by: NURSE PRACTITIONER

## 2024-10-10 PROCEDURE — G0439 PPPS, SUBSEQ VISIT: HCPCS | Performed by: NURSE PRACTITIONER

## 2024-10-10 PROCEDURE — 90653 IIV ADJUVANT VACCINE IM: CPT | Performed by: NURSE PRACTITIONER

## 2024-10-10 PROCEDURE — G0008 ADMIN INFLUENZA VIRUS VAC: HCPCS | Performed by: NURSE PRACTITIONER

## 2024-10-10 PROCEDURE — 3074F SYST BP LT 130 MM HG: CPT | Performed by: NURSE PRACTITIONER

## 2024-10-10 RX ORDER — TRAMADOL HYDROCHLORIDE 50 MG/1
50 TABLET ORAL DAILY PRN
COMMUNITY
Start: 2024-09-10

## 2024-10-10 RX ORDER — PREGABALIN 50 MG/1
50 CAPSULE ORAL 2 TIMES DAILY
COMMUNITY
Start: 2024-09-10

## 2024-10-10 ASSESSMENT — PATIENT HEALTH QUESTIONNAIRE - PHQ9
6. FEELING BAD ABOUT YOURSELF - OR THAT YOU ARE A FAILURE OR HAVE LET YOURSELF OR YOUR FAMILY DOWN: NOT AT ALL
SUM OF ALL RESPONSES TO PHQ QUESTIONS 1-9: 3
SUM OF ALL RESPONSES TO PHQ9 QUESTIONS 1 & 2: 0
8. MOVING OR SPEAKING SO SLOWLY THAT OTHER PEOPLE COULD HAVE NOTICED. OR THE OPPOSITE, BEING SO FIGETY OR RESTLESS THAT YOU HAVE BEEN MOVING AROUND A LOT MORE THAN USUAL: NOT AT ALL
3. TROUBLE FALLING OR STAYING ASLEEP: NOT AT ALL
5. POOR APPETITE OR OVEREATING: NOT AT ALL
4. FEELING TIRED OR HAVING LITTLE ENERGY: NEARLY EVERY DAY
9. THOUGHTS THAT YOU WOULD BE BETTER OFF DEAD, OR OF HURTING YOURSELF: NOT AT ALL
2. FEELING DOWN, DEPRESSED OR HOPELESS: NOT AT ALL
SUM OF ALL RESPONSES TO PHQ QUESTIONS 1-9: 3
10. IF YOU CHECKED OFF ANY PROBLEMS, HOW DIFFICULT HAVE THESE PROBLEMS MADE IT FOR YOU TO DO YOUR WORK, TAKE CARE OF THINGS AT HOME, OR GET ALONG WITH OTHER PEOPLE: NOT DIFFICULT AT ALL
7. TROUBLE CONCENTRATING ON THINGS, SUCH AS READING THE NEWSPAPER OR WATCHING TELEVISION: NOT AT ALL
SUM OF ALL RESPONSES TO PHQ QUESTIONS 1-9: 3
SUM OF ALL RESPONSES TO PHQ QUESTIONS 1-9: 3
1. LITTLE INTEREST OR PLEASURE IN DOING THINGS: NOT AT ALL

## 2024-10-10 ASSESSMENT — LIFESTYLE VARIABLES
HOW OFTEN DO YOU HAVE A DRINK CONTAINING ALCOHOL: NEVER
HOW MANY STANDARD DRINKS CONTAINING ALCOHOL DO YOU HAVE ON A TYPICAL DAY: PATIENT DOES NOT DRINK

## 2024-10-10 NOTE — PROGRESS NOTES
Medicare Annual Wellness Visit    Mary Anton is here for Medicare AWV    Assessment & Plan   Medicare annual wellness visit, subsequent  Primary hypertension  -     Urinalysis, Micro; Future  Chronic obstructive pulmonary disease, unspecified COPD type (HCC)  Pure hypercholesterolemia  -     Comprehensive Metabolic Panel; Future  -     Lipid Panel; Future  -     TSH; Future  Abnormal glucose  -     Hemoglobin A1C; Future  Gastroesophageal reflux disease without esophagitis  Statin intolerance  History of colon polyps  -     CBC with Auto Differential; Future  Need for influenza vaccination  -     Influenza, FLUAD Trivalent, (age 65 y+), IM, Preservative Free, 0.5mL  Discussed BMI and healthy weight and diet, weight bearing exercise, smoking avoidance, sun protection and medication compliance. Reviewed appropriate health maintenance screening. The patient was counseled regarding regular monthly SBE, screening procedures and recommended schedules for immunizations, mammography, Pap smears, GI hemoccult testing, colonoscopy and BMD.  Flu shot today. Due also for Covid booster and RSV. Coloscopy due 2026. ACP on file    Recommendations for Preventive Services Due: see orders and patient instructions/AVS.  Recommended screening schedule for the next 5-10 years is provided to the patient in written form: see Patient Instructions/AVS.     Return for follow up scheduled.     Subjective       Patient's complete Health Risk Assessment and screening values have been reviewed and are found in Flowsheets. The following problems were reviewed today and where indicated follow up appointments were made and/or referrals ordered.    Positive Risk Factor Screenings with Interventions:       Cognitive:   Clock Drawing Test (CDT): (!) Abnormal  Words recalled: 3 Words Recalled  Total Score: 3  Total Score Interpretation: Normal Mini-Cog  Interventions:  Patient declines any further evaluation or treatment        Controlled

## 2024-10-14 PROBLEM — J45.40 MODERATE PERSISTENT ASTHMA WITHOUT COMPLICATION: Status: ACTIVE | Noted: 2024-05-20

## 2024-10-14 NOTE — PROGRESS NOTES
PROGRESS NOTE      Chief Complaint   Patient presents with    Hypertension     Follow up extended Part 2    Hyperlipidemia     Follow up extended Part 2    Discuss Labs     Follow up extended Part 2       HPI    Asthma: Moderate, persistent. Followed by pulmonary - Dr Guardado. Advair bid (not as compliant recently) with prn albuterol. Improved. 6 months follow up with PFTs 2025.      Allergic rhinitis: Zyrtec      Hyperlipidemia, ASCVD with statin intolerance. Tolerating now Crestor 10 mg (unable to afford Repatha). LDL not to goal 113. Followed by cardiology - Dr Bailon and will see next month     Hypertension: Amlodipine 5 mg. Good control of blood pressure     Anxiety and depression: Lexapro 10 mg     Prediabetes: 6.3%     Colon polyp: History of tubular adenoma.      History of basal cell and squamous cell carcinoma: Followed by derm - Dr Haile     Right shoulder pain: severe OA. Evaluated by neurosurgery - initially thought radicular pain but determined pain from shoulder origin. Evaluated by Dr Ewing. MRI. Second steroid shot with some benefit. Referred to pain management - considering epidural injection. Taking tramadol. Stopped Lyrica 2 days ago (?loose stool and unsure if it was helping)  MRI shoulder 2024  IMPRESSION:  1. Moderate degenerative arthropathy of the acromioclavicular joint with  impingement.  2. Massive osteophyte formation about the humeral head largest noted at the  glenohumeral joint causing moderate edema of the inferior glenoid.  3. Articular surface partial tear of the supraspinatus.  4. Suspect intrasubstance insertional tear of the subscapularis.    Grief: Sister  after long illness. Family friend recently                 Past Medical History, Past Surgical History, Family history, Social History, and Medications were all reviewed and updated as necessary.     Current Outpatient Medications   Medication Sig Dispense Refill    traMADol (ULTRAM) 50 MG tablet Take 1 tablet by

## 2024-10-15 ENCOUNTER — OFFICE VISIT (OUTPATIENT)
Dept: ORTHOPEDIC SURGERY | Age: 74
End: 2024-10-15
Payer: MEDICARE

## 2024-10-15 DIAGNOSIS — M19.011 OSTEOARTHRITIS OF RIGHT SHOULDER, UNSPECIFIED OSTEOARTHRITIS TYPE: Primary | ICD-10-CM

## 2024-10-15 PROCEDURE — 20610 DRAIN/INJ JOINT/BURSA W/O US: CPT | Performed by: PHYSICIAN ASSISTANT

## 2024-10-15 RX ORDER — METHYLPREDNISOLONE ACETATE 40 MG/ML
80 INJECTION, SUSPENSION INTRA-ARTICULAR; INTRALESIONAL; INTRAMUSCULAR; SOFT TISSUE ONCE
Status: COMPLETED | OUTPATIENT
Start: 2024-10-15 | End: 2024-10-15

## 2024-10-15 RX ADMIN — METHYLPREDNISOLONE ACETATE 80 MG: 40 INJECTION, SUSPENSION INTRA-ARTICULAR; INTRALESIONAL; INTRAMUSCULAR; SOFT TISSUE at 11:34

## 2024-10-15 NOTE — PROGRESS NOTES
Name: Mary Anton  YOB: 1950  Gender: female  MRN: [id number]    CC:   Chief Complaint   Patient presents with    Follow-up     R Shoulder Recheck          HPI: They return today to have their right shoulder injection.    Allergies   Allergen Reactions    Cefaclor Nausea Only    Xajxg-Kkjlh-Lgiiwiu-Pramoxine Other (See Comments)     Patient unsure     Cephalexin Nausea And Vomiting    Clarithromycin Nausea And Vomiting     Patient unsure      Past Medical History:   Diagnosis Date    Affective psychosis (HCC) 07/2006    Anxiety     medication    Arthritis     OA generalized    CAD (coronary artery disease) 09/12/2018    PCI- stents x 5    Chest pain 07/2006     no cp but feels\" anxious\" for 10 seconds at a time- no further cp. No FLORES. Followed by [facility]-Dr. Bailon.     Cholelithiasis 09/2006    Chronic depression 10/15/2014    Chronic lumbar pain 10/15/2014    Claudication (HCC) 06/2013    Dyslipidemia 10/15/2014    managed with medication     Fibromyalgia 10/15/2014    KATJA (generalized anxiety disorder) 10/15/2014    managed with medication     GERD (gastroesophageal reflux disease) 10/15/2014    managed with medication     History of complete eye exam 04/01/2016    History of dental examination 06/01/2016    History of placement of stent in LAD coronary artery 08/29/2006    Two heart stents. Patient takes daily 81 mg ASA.      Hypercholesterolemia     Hypertension     managed with medication     Low magnesium level     Patient's daily Magnesium supplement decreased to once a week by PCP due to Magnesium of 2.4 on 5/18/17    Malaise and fatigue 05/2008    Overweight 7/12/2021    Palpitations 02/05/2016    Stable     Polyarthralgia 10/15/2014    Shingles 2006    10-12x    Tobacco use disorder 112/2006    URI (upper respiratory infection) 02/10/2023    was seen if PCP office, note indicated had been sick for 10 days, treated with Z pack, prednisone and antitussive     Past Surgical

## 2024-10-22 ENCOUNTER — OFFICE VISIT (OUTPATIENT)
Dept: INTERNAL MEDICINE CLINIC | Facility: CLINIC | Age: 74
End: 2024-10-22
Payer: MEDICARE

## 2024-10-22 VITALS
WEIGHT: 158.8 LBS | HEART RATE: 70 BPM | DIASTOLIC BLOOD PRESSURE: 70 MMHG | SYSTOLIC BLOOD PRESSURE: 136 MMHG | BODY MASS INDEX: 24.87 KG/M2 | OXYGEN SATURATION: 96 %

## 2024-10-22 DIAGNOSIS — M19.011 PRIMARY OSTEOARTHRITIS OF RIGHT SHOULDER: ICD-10-CM

## 2024-10-22 DIAGNOSIS — K21.9 GASTROESOPHAGEAL REFLUX DISEASE WITHOUT ESOPHAGITIS: ICD-10-CM

## 2024-10-22 DIAGNOSIS — E78.00 PURE HYPERCHOLESTEROLEMIA: ICD-10-CM

## 2024-10-22 DIAGNOSIS — R73.09 ABNORMAL GLUCOSE: ICD-10-CM

## 2024-10-22 DIAGNOSIS — I25.10 CORONARY ARTERY DISEASE INVOLVING NATIVE CORONARY ARTERY OF NATIVE HEART WITHOUT ANGINA PECTORIS: ICD-10-CM

## 2024-10-22 DIAGNOSIS — I10 PRIMARY HYPERTENSION: Primary | ICD-10-CM

## 2024-10-22 DIAGNOSIS — J45.40 MODERATE PERSISTENT ASTHMA WITHOUT COMPLICATION: ICD-10-CM

## 2024-10-22 DIAGNOSIS — Z85.89 HISTORY OF SQUAMOUS CELL CARCINOMA: ICD-10-CM

## 2024-10-22 DIAGNOSIS — F41.1 GAD (GENERALIZED ANXIETY DISORDER): ICD-10-CM

## 2024-10-22 DIAGNOSIS — Z85.828 HISTORY OF BASAL CELL CANCER: ICD-10-CM

## 2024-10-22 DIAGNOSIS — Z95.5 H/O HEART ARTERY STENT: ICD-10-CM

## 2024-10-22 DIAGNOSIS — M75.41 CORACOID IMPINGEMENT OF RIGHT SHOULDER: ICD-10-CM

## 2024-10-22 DIAGNOSIS — M15.9 GENERALIZED OSTEOARTHRITIS: ICD-10-CM

## 2024-10-22 PROCEDURE — 3075F SYST BP GE 130 - 139MM HG: CPT | Performed by: NURSE PRACTITIONER

## 2024-10-22 PROCEDURE — G2211 COMPLEX E/M VISIT ADD ON: HCPCS | Performed by: NURSE PRACTITIONER

## 2024-10-22 PROCEDURE — 3078F DIAST BP <80 MM HG: CPT | Performed by: NURSE PRACTITIONER

## 2024-10-22 PROCEDURE — 99215 OFFICE O/P EST HI 40 MIN: CPT | Performed by: NURSE PRACTITIONER

## 2024-10-22 PROCEDURE — 1123F ACP DISCUSS/DSCN MKR DOCD: CPT | Performed by: NURSE PRACTITIONER

## 2024-10-22 ASSESSMENT — ENCOUNTER SYMPTOMS
EYE ITCHING: 0
COUGH: 0
DIARRHEA: 1
BACK PAIN: 0
CONSTIPATION: 0
BLOOD IN STOOL: 0
NAUSEA: 0
COLOR CHANGE: 0
SHORTNESS OF BREATH: 0
EYE REDNESS: 0
ABDOMINAL PAIN: 0

## 2024-11-12 ENCOUNTER — RX ONLY (OUTPATIENT)
Age: 74
Setting detail: RX ONLY
End: 2024-11-12

## 2024-11-12 RX ORDER — CLINDAMYCIN PHOSPHATE 10 MG/G
GEL TOPICAL
Qty: 30 | Refills: 2 | Status: ERX

## 2024-12-06 ENCOUNTER — HOSPITAL ENCOUNTER (EMERGENCY)
Age: 74
Discharge: HOME | End: 2024-12-06
Attending: STUDENT IN AN ORGANIZED HEALTH CARE EDUCATION/TRAINING PROGRAM
Payer: MEDICARE

## 2024-12-06 VITALS
DIASTOLIC BLOOD PRESSURE: 77 MMHG | WEIGHT: 160 LBS | SYSTOLIC BLOOD PRESSURE: 129 MMHG | HEIGHT: 67 IN | TEMPERATURE: 97.3 F | HEART RATE: 68 BPM | RESPIRATION RATE: 20 BRPM | OXYGEN SATURATION: 96 % | BODY MASS INDEX: 25.11 KG/M2

## 2024-12-06 DIAGNOSIS — N89.8 VAGINAL LESION: Primary | ICD-10-CM

## 2024-12-06 DIAGNOSIS — N30.00 ACUTE CYSTITIS WITHOUT HEMATURIA: ICD-10-CM

## 2024-12-06 LAB
APPEARANCE UR: CLEAR
BACTERIA URNS QL MICRO: ABNORMAL /HPF
BILIRUB UR QL: NEGATIVE
COLOR UR: YELLOW
EPI CELLS #/AREA URNS HPF: ABNORMAL /HPF
GLUCOSE UR STRIP.AUTO-MCNC: NEGATIVE MG/DL
HGB UR QL STRIP: NEGATIVE
KETONES UR QL STRIP.AUTO: NEGATIVE MG/DL
LEUKOCYTE ESTERASE UR QL STRIP.AUTO: ABNORMAL
MUCOUS THREADS URNS QL MICRO: 0 /LPF
NITRITE UR QL STRIP.AUTO: NEGATIVE
OTHER OBSERVATIONS: ABNORMAL
PH UR STRIP: 6 (ref 5–9)
PROT UR STRIP-MCNC: NEGATIVE MG/DL
RBC #/AREA URNS HPF: 0 /HPF
SERVICE CMNT-IMP: NORMAL
SP GR UR REFRACTOMETRY: 1.02 (ref 1–1.02)
UROBILINOGEN UR QL STRIP.AUTO: 1 EU/DL (ref 0.2–1)
WBC URNS QL MICRO: ABNORMAL /HPF
WET PREP GENITAL: NORMAL

## 2024-12-06 PROCEDURE — 81001 URINALYSIS AUTO W/SCOPE: CPT

## 2024-12-06 PROCEDURE — 99283 EMERGENCY DEPT VISIT LOW MDM: CPT

## 2024-12-06 PROCEDURE — 87210 SMEAR WET MOUNT SALINE/INK: CPT

## 2024-12-06 RX ORDER — CEPHALEXIN 500 MG/1
500 CAPSULE ORAL 4 TIMES DAILY
Qty: 28 CAPSULE | Refills: 0 | Status: SHIPPED | OUTPATIENT
Start: 2024-12-06 | End: 2024-12-13

## 2024-12-06 ASSESSMENT — PAIN SCALES - GENERAL: PAINLEVEL_OUTOF10: 6

## 2024-12-06 ASSESSMENT — ENCOUNTER SYMPTOMS
NAUSEA: 0
SHORTNESS OF BREATH: 0
VOMITING: 0
EYE PAIN: 0
ABDOMINAL PAIN: 0
EYE DISCHARGE: 0
WHEEZING: 0
SORE THROAT: 0

## 2024-12-06 ASSESSMENT — PAIN DESCRIPTION - DESCRIPTORS: DESCRIPTORS: BURNING;ITCHING

## 2024-12-06 ASSESSMENT — PAIN - FUNCTIONAL ASSESSMENT: PAIN_FUNCTIONAL_ASSESSMENT: 0-10

## 2024-12-06 ASSESSMENT — PAIN DESCRIPTION - ORIENTATION: ORIENTATION: LOWER

## 2024-12-06 ASSESSMENT — PAIN DESCRIPTION - LOCATION: LOCATION: ABDOMEN;GROIN

## 2024-12-06 NOTE — ED NOTES
Patient mobility status  with no difficulty.     I have reviewed discharge instructions with the patient.  The patient verbalized understanding.    Patient left ED via Discharge Method: ambulatory to Home with  self .    Opportunity for questions and clarification provided.     Patient given 1 scripts.

## 2024-12-06 NOTE — ED PROVIDER NOTES
Emergency Department Provider Note       PCP: Zoie Catherine, APRN - CNP   Age: 74 y.o.   Sex: female     DISPOSITION Decision To Discharge 12/06/2024 01:12:05 PM    ICD-10-CM    1. Vaginal lesion  N89.8 Nilda Gilmore MD, OB/GYN, [facility]      2. Acute cystitis without hematuria  N30.00           Medical Decision Making     74-year-old female presents to the ED for dysuria and vaginal discomfort.  Vital signs within norm limits.  No abdominal tenderness on physical exam.  Patient does have flesh-colored/pale lesion along her inferior left vulva that she states that she has never been aware of before in the past.  Patient does not get regular testing with OB/UN, cannot recall any last time she had any cervical testing/Pap smear.  UA has 1+ bacteria, absent clue cells noted on wet prep.  Will send her home with short course of antibiotics.  I stressed the most important aspect of this visit is that she follows up with OB/GYN for possible punch biopsy or additional testing of the lesion of her vulva for which she voices understanding.  I told her it could simply be HPV/genital warts, but we need to rule out more serious concerns like malignancy for which she voices understanding.  Symptom-free at time of discharge.    Patient was instructed to follow-up with PCP in the next 24 to 48 hours and to follow-up with all specialists given with discharge as soon as possible.  Patient is nontoxic-appearing and has no complaints at time of discharge.  Instructed to return to the emergency department immediately if current symptoms worsen, or any new/concerning symptoms develop for which they voice understanding.  All questions answered at time of discharge.       1 or more acute illnesses that pose a threat to life or bodily function.   Patient was discharged risks and benefits of hospitalization were considered.  Shared medical decision making was utilized in creating the patients health plan  \"COVID19\" in the last 72 hours.     Voice dictation software was used during the making of this note.  This software is not perfect and grammatical and other typographical errors may be present.  This note has not been completely proofread for errors.     Oscar Amaro MD  12/06/24 [id number]

## 2024-12-09 ENCOUNTER — TRANSCRIBE ORDERS (OUTPATIENT)
Dept: SCHEDULING | Age: 74
End: 2024-12-09

## 2024-12-09 DIAGNOSIS — Z12.31 VISIT FOR SCREENING MAMMOGRAM: Primary | ICD-10-CM

## 2024-12-11 ENCOUNTER — OFFICE VISIT (OUTPATIENT)
Dept: OBGYN CLINIC | Age: 74
End: 2024-12-11

## 2024-12-11 VITALS
BODY MASS INDEX: 26.21 KG/M2 | HEIGHT: 67 IN | SYSTOLIC BLOOD PRESSURE: 124 MMHG | WEIGHT: 167 LBS | DIASTOLIC BLOOD PRESSURE: 84 MMHG

## 2024-12-11 DIAGNOSIS — Z12.4 PAP SMEAR FOR CERVICAL CANCER SCREENING: ICD-10-CM

## 2024-12-11 DIAGNOSIS — Z11.51 SCREENING FOR HPV (HUMAN PAPILLOMAVIRUS): ICD-10-CM

## 2024-12-11 DIAGNOSIS — N95.2 VAGINAL ATROPHY: Primary | ICD-10-CM

## 2024-12-11 DIAGNOSIS — N90.89 VULVAR LESION: ICD-10-CM

## 2024-12-11 NOTE — PROGRESS NOTES
The patient is here for  problems including vag atrophy  menp symptoms           vulvar lesion     HISTORY:  Current Meds reviewed, Surgical and Medical history seen     No LMP recorded. Patient is postmenopausal.SEE BELOW      Current Outpatient Medications on File Prior to Visit   Medication Sig Dispense Refill    cephALEXin (KEFLEX) 500 MG capsule Take 1 capsule by mouth 4 times daily for 7 days 28 capsule 0    pregabalin (LYRICA) 50 MG capsule Take 1 capsule by mouth 2 times daily.      traMADol (ULTRAM) 50 MG tablet Take 1 tablet by mouth daily as needed for Pain.      albuterol sulfate HFA (PROVENTIL;VENTOLIN;PROAIR) 108 (90 Base) MCG/ACT inhaler Inhale 2 puffs into the lungs every 6 hours as needed for Wheezing 1 each 11    cetirizine (ZYRTEC) 10 MG tablet Take 1 tablet by mouth daily 30 tablet 5    fluticasone-salmeterol (ADVAIR) 250-50 MCG/ACT AEPB diskus inhaler Inhale 1 puff into the lungs 2 times daily 60 each 11    fluticasone (FLONASE) 50 MCG/ACT nasal spray 2 sprays by Each Nostril route daily 3 each 3    amLODIPine (NORVASC) 5 MG tablet Take 1 tablet by mouth daily 90 tablet 3    rosuvastatin (CRESTOR) 10 MG tablet TAKE 1 TABLET BY MOUTH ONCE DAILY IN THE EVENING 90 tablet 3    escitalopram (LEXAPRO) 10 MG tablet Take 1 tablet by mouth every evening 90 tablet 3    nitroGLYCERIN (NITROSTAT) 0.4 MG SL tablet 1 tablet every 5 minutes as needed      tretinoin (RETIN-A) 0.05 % cream Apply topically nightly 20 g 5    Cyanocobalamin (VITAMIN B12 PO) Take 1 tablet by mouth daily      acetaminophen (TYLENOL) 500 MG tablet Take 2 tablets by mouth every 6 hours as needed      Cholecalciferol 50 MCG (2000 UT) CAPS Take 1 capsule by mouth daily      ascorbic acid (VITAMIN C) 500 MG tablet Take 1 tablet by mouth daily      aspirin 81 MG EC tablet Take 1 tablet by mouth daily      clindamycin (CLINDAGEL) 1 % gel 2 times daily as needed APPLY TO THE AFFECTED AREA FOR ACNE ON FACE TWICE A DAY      pantoprazole

## 2024-12-12 ENCOUNTER — RX ONLY (RX ONLY)
Age: 74
End: 2024-12-12

## 2024-12-12 RX ORDER — TRETIONIN 1 MG/G
CREAM TOPICAL
Qty: 45 | Refills: 2 | Status: ERX | COMMUNITY
Start: 2024-12-12

## 2024-12-18 LAB
COLLECTION METHOD: NORMAL
CYTOLOGIST CVX/VAG CYTO: NORMAL
CYTOLOGY CVX/VAG DOC THIN PREP: NORMAL
HPV APTIMA: NEGATIVE
HPV GENOTYPE REFLEX: NORMAL
Lab: NORMAL
Lab: NORMAL
OTHER PT INFO: NORMAL
PAP SOURCE: NORMAL
PATH REPORT.FINAL DX SPEC: NORMAL
PREV CYTO INFO: NEGATIVE
PREV TREATMENT RESULTS: NORMAL
PREV TREATMENT: NORMAL
STAT OF ADQ CVX/VAG CYTO-IMP: NORMAL

## 2024-12-23 ENCOUNTER — TELEPHONE (OUTPATIENT)
Dept: OBGYN CLINIC | Age: 74
End: 2024-12-23

## 2024-12-23 DIAGNOSIS — D07.1 VIN III (VULVAR INTRAEPITHELIAL NEOPLASIA III): Primary | ICD-10-CM

## 2025-01-14 ENCOUNTER — OFFICE VISIT (OUTPATIENT)
Age: 75
End: 2025-01-14
Payer: MEDICARE

## 2025-01-14 VITALS
WEIGHT: 163 LBS | BODY MASS INDEX: 25.58 KG/M2 | SYSTOLIC BLOOD PRESSURE: 126 MMHG | DIASTOLIC BLOOD PRESSURE: 76 MMHG | HEART RATE: 63 BPM | HEIGHT: 67 IN

## 2025-01-14 DIAGNOSIS — I10 PRIMARY HYPERTENSION: Primary | ICD-10-CM

## 2025-01-14 DIAGNOSIS — E78.00 PURE HYPERCHOLESTEROLEMIA: ICD-10-CM

## 2025-01-14 DIAGNOSIS — I25.10 CORONARY ARTERY DISEASE INVOLVING NATIVE CORONARY ARTERY OF NATIVE HEART WITHOUT ANGINA PECTORIS: ICD-10-CM

## 2025-01-14 PROCEDURE — 1126F AMNT PAIN NOTED NONE PRSNT: CPT | Performed by: INTERNAL MEDICINE

## 2025-01-14 PROCEDURE — 3074F SYST BP LT 130 MM HG: CPT | Performed by: INTERNAL MEDICINE

## 2025-01-14 PROCEDURE — 1123F ACP DISCUSS/DSCN MKR DOCD: CPT | Performed by: INTERNAL MEDICINE

## 2025-01-14 PROCEDURE — 99214 OFFICE O/P EST MOD 30 MIN: CPT | Performed by: INTERNAL MEDICINE

## 2025-01-14 PROCEDURE — 93000 ELECTROCARDIOGRAM COMPLETE: CPT | Performed by: INTERNAL MEDICINE

## 2025-01-14 PROCEDURE — 3078F DIAST BP <80 MM HG: CPT | Performed by: INTERNAL MEDICINE

## 2025-01-14 RX ORDER — AMLODIPINE BESYLATE 5 MG/1
5 TABLET ORAL DAILY
Qty: 90 TABLET | Refills: 3 | Status: SHIPPED | OUTPATIENT
Start: 2025-01-14

## 2025-01-14 RX ORDER — ROSUVASTATIN CALCIUM 20 MG/1
20 TABLET, COATED ORAL NIGHTLY
Qty: 90 TABLET | Refills: 3 | Status: SHIPPED | OUTPATIENT
Start: 2025-01-14

## 2025-01-14 NOTE — PROGRESS NOTES
81 MG EC tablet Take 1 tablet by mouth daily      clindamycin (CLINDAGEL) 1 % gel 2 times daily as needed APPLY TO THE AFFECTED AREA FOR ACNE ON FACE TWICE A DAY      pregabalin (LYRICA) 50 MG capsule Take 1 capsule by mouth 2 times daily.      traMADol (ULTRAM) 50 MG tablet Take 1 tablet by mouth daily as needed for Pain.       No current facility-administered medications for this visit.               Social History     Tobacco Use    Smoking status: Former     Current packs/day: 0.00     Average packs/day: 0.5 packs/day for 25.0 years (12.5 ttl pk-yrs)     Types: Cigarettes     Start date: 1971     Quit date:      Years since quittin.0    Smokeless tobacco: Never    Tobacco comments:     Quit smoking: started at age 19   Substance Use Topics    Alcohol use: No              PHYSICAL EXAM:   /76   Pulse 63   Ht 1.702 m (5' 7.01\")   Wt 73.9 kg (163 lb)   BMI 25.52 kg/m²    Constitutional: Oriented to person, place, and time. Appears well-developed and well-nourished.   Head: Normocephalic and atraumatic.   Neck: Neck supple.   Cardiovascular: Normal rate and regular rhythm with no murmur -No JVP  Pulmonary/Chest: Breath sounds normal.   Abdominal: Soft.   Musculoskeletal: No edema.   Neurological: Alert and oriented to person, place, and time.   Skin: Skin is warm and dry.   Psychiatric: Normal mood and affect.   Vitals reviewed.      Wt Readings from Last 3 Encounters:   25 73.9 kg (163 lb)   24 75.8 kg (167 lb)   24 72.6 kg (160 lb)   EKG-Sinus Rhythm   -Negative T-waves- Anteroseptal/anterior ischemia.  hr 63    No change from previous    Medical problems and test results were reviewed with the patient today.       ASSESSMENT and PLAN    1. Primary hypertension  Stable.Continue norvasc    - EKG 12 Lead    2. Coronary artery disease involving native coronary artery of native heart without angina pectoris  Stable.Continue asa    - EKG 12 Lead    3. Pure

## 2025-01-28 NOTE — PROCEDURE: EXCISION
[Feeling Fatigued] : feeling fatigued [Lower Ext Edema] : lower extremity edema [Negative] : Psychiatric [Fever] : no fever [Chills] : no chills [SOB] : no shortness of breath [Dyspnea on exertion] : not dyspnea during exertion [Syncope] : no syncope Where Do You Want The Question To Include Opioid Counseling Located?: Case Summary Tab

## 2025-02-18 ENCOUNTER — OFFICE VISIT (OUTPATIENT)
Dept: ORTHOPEDIC SURGERY | Age: 75
End: 2025-02-18
Payer: MEDICARE

## 2025-02-18 DIAGNOSIS — M19.011 OSTEOARTHRITIS OF RIGHT SHOULDER, UNSPECIFIED OSTEOARTHRITIS TYPE: Primary | ICD-10-CM

## 2025-02-18 PROCEDURE — 20610 DRAIN/INJ JOINT/BURSA W/O US: CPT | Performed by: PHYSICIAN ASSISTANT

## 2025-02-18 RX ORDER — METHYLPREDNISOLONE ACETATE 40 MG/ML
80 INJECTION, SUSPENSION INTRA-ARTICULAR; INTRALESIONAL; INTRAMUSCULAR; SOFT TISSUE ONCE
Status: COMPLETED | OUTPATIENT
Start: 2025-02-18 | End: 2025-02-18

## 2025-02-18 RX ADMIN — METHYLPREDNISOLONE ACETATE 80 MG: 40 INJECTION, SUSPENSION INTRA-ARTICULAR; INTRALESIONAL; INTRAMUSCULAR; SOFT TISSUE at 13:37

## 2025-02-18 NOTE — PROGRESS NOTES
History:   Procedure Laterality Date    BREAST ENHANCEMENT SURGERY      BREAST SURGERY  1980    implants    CHOLECYSTECTOMY  2005    COLONOSCOPY N/A 2017    COLONOSCOPY/ BMI=26 performed by Marcelo Ordonez DO at [facility]    COLONOSCOPY N/A 3/13/2023    COLONOSCOPY POLYPECTOMY REMOVAL SNARE performed by Nasir Sr MD at [facility] ENDOSCOPY    IMPLANT BREAST SILICONE/EQ Bilateral     35 yrs silicone    LEFT HEART CATH,PERCUTANEOUS  2018    PCI    OTHER SURGICAL HISTORY      several skin cancer removals    CA UNLISTED PROCEDURE CARDIAC SURGERY  2006    7 cardiac stents    TONSILLECTOMY  1976    at age 26     Family History   Problem Relation Age of Onset    Stroke Other     High Cholesterol Other     Glaucoma Other     Diabetes Other     Hypertension Other     Heart Attack Other     Hypertension Mother     High Cholesterol Mother     Heart Attack Mother     Hypertension Sister     Depression Sister     High Cholesterol Sister     Hypertension Brother     High Cholesterol Brother     Heart Attack Brother     No Known Problems Maternal Aunt     Other Father         gun accident    Breast Cancer Neg Hx      Social History     Socioeconomic History    Marital status: Single     Spouse name: Not on file    Number of children: Not on file    Years of education: Not on file    Highest education level: Not on file   Occupational History    Not on file   Tobacco Use    Smoking status: Former     Current packs/day: 0.00     Average packs/day: 0.5 packs/day for 25.0 years (12.5 ttl pk-yrs)     Types: Cigarettes     Start date: 1971     Quit date:      Years since quittin.1    Smokeless tobacco: Never    Tobacco comments:     Quit smoking: started at age 19   Vaping Use    Vaping status: Never Used   Substance and Sexual Activity    Alcohol use: No    Drug use: No    Sexual activity: Not Currently     Partners: Male   Other Topics Concern    Not on file   Social History Narrative    Not on

## 2025-02-25 ENCOUNTER — APPOINTMENT (OUTPATIENT)
Dept: GENERAL RADIOLOGY | Age: 75
End: 2025-02-25
Payer: MEDICARE

## 2025-02-25 ENCOUNTER — HOSPITAL ENCOUNTER (EMERGENCY)
Age: 75
Discharge: HOME | End: 2025-02-25
Payer: MEDICARE

## 2025-02-25 VITALS
DIASTOLIC BLOOD PRESSURE: 74 MMHG | TEMPERATURE: 98.5 F | HEIGHT: 67 IN | WEIGHT: 160 LBS | HEART RATE: 63 BPM | RESPIRATION RATE: 16 BRPM | OXYGEN SATURATION: 95 % | BODY MASS INDEX: 25.11 KG/M2 | SYSTOLIC BLOOD PRESSURE: 136 MMHG

## 2025-02-25 DIAGNOSIS — R05.2 SUBACUTE COUGH: Primary | ICD-10-CM

## 2025-02-25 DIAGNOSIS — R30.0 DYSURIA: ICD-10-CM

## 2025-02-25 LAB
ALBUMIN SERPL-MCNC: 4.6 G/DL (ref 3.2–4.6)
ALBUMIN/GLOB SERPL: 1.6 (ref 1–1.9)
ALP SERPL-CCNC: 163 U/L (ref 35–104)
ALT SERPL-CCNC: 7 U/L (ref 12–65)
ANION GAP SERPL CALC-SCNC: 11 MMOL/L (ref 7–16)
APPEARANCE UR: CLEAR
AST SERPL-CCNC: 17 U/L (ref 15–37)
BACTERIA URNS QL MICRO: 0 /HPF
BASOPHILS # BLD: 0.06 K/UL (ref 0–0.2)
BASOPHILS NFR BLD: 0.6 % (ref 0–2)
BILIRUB SERPL-MCNC: 0.3 MG/DL (ref 0–1.2)
BILIRUB UR QL: NEGATIVE
BUN SERPL-MCNC: 15 MG/DL (ref 8–23)
CALCIUM SERPL-MCNC: 9.4 MG/DL (ref 8.8–10.2)
CHLORIDE SERPL-SCNC: 103 MMOL/L (ref 98–107)
CO2 SERPL-SCNC: 26 MMOL/L (ref 20–29)
COLOR UR: YELLOW
CREAT SERPL-MCNC: 0.66 MG/DL (ref 0.8–1.3)
DIFFERENTIAL METHOD BLD: ABNORMAL
EOSINOPHIL # BLD: 0.17 K/UL (ref 0–0.8)
EOSINOPHIL NFR BLD: 1.7 % (ref 0.5–7.8)
EPI CELLS #/AREA URNS HPF: NORMAL /HPF
ERYTHROCYTE [DISTWIDTH] IN BLOOD BY AUTOMATED COUNT: 12.5 % (ref 11.9–14.6)
GLOBULIN SER CALC-MCNC: 2.9 G/DL (ref 2.3–3.5)
GLUCOSE SERPL-MCNC: 105 MG/DL (ref 65–100)
GLUCOSE UR STRIP.AUTO-MCNC: NEGATIVE MG/DL
HCT VFR BLD AUTO: 42.7 % (ref 35.8–46.3)
HGB BLD-MCNC: 14.2 G/DL (ref 11.7–15.4)
HGB UR QL STRIP: NEGATIVE
IMM GRANULOCYTES # BLD AUTO: 0.04 K/UL (ref 0–0.5)
IMM GRANULOCYTES NFR BLD AUTO: 0.4 % (ref 0–5)
KETONES UR QL STRIP.AUTO: NEGATIVE MG/DL
LEUKOCYTE ESTERASE UR QL STRIP.AUTO: ABNORMAL
LYMPHOCYTES # BLD: 2.94 K/UL (ref 0.5–4.6)
LYMPHOCYTES NFR BLD: 29.2 % (ref 13–44)
MCH RBC QN AUTO: 31.1 PG (ref 26.1–32.9)
MCHC RBC AUTO-ENTMCNC: 33.3 G/DL (ref 31.4–35)
MCV RBC AUTO: 93.4 FL (ref 82–102)
MONOCYTES # BLD: 0.81 K/UL (ref 0.1–1.3)
MONOCYTES NFR BLD: 8 % (ref 4–12)
MUCOUS THREADS URNS QL MICRO: 0 /LPF
NEUTS SEG # BLD: 6.06 K/UL (ref 1.7–8.2)
NEUTS SEG NFR BLD: 60.1 % (ref 43–78)
NITRITE UR QL STRIP.AUTO: NEGATIVE
NRBC # BLD: 0 K/UL (ref 0–0.2)
OTHER OBSERVATIONS: NORMAL
PH UR STRIP: 6 (ref 5–9)
PLATELET # BLD AUTO: 408 K/UL (ref 150–450)
PMV BLD AUTO: 8.7 FL (ref 9.4–12.3)
POTASSIUM SERPL-SCNC: 4.1 MMOL/L (ref 3.5–5.1)
PROT SERPL-MCNC: 7.5 G/DL (ref 6.3–8.2)
PROT UR STRIP-MCNC: NEGATIVE MG/DL
RBC # BLD AUTO: 4.57 M/UL (ref 4.05–5.2)
RBC #/AREA URNS HPF: 0 /HPF
SODIUM SERPL-SCNC: 140 MMOL/L (ref 133–143)
SP GR UR REFRACTOMETRY: 1.02 (ref 1–1.02)
UROBILINOGEN UR QL STRIP.AUTO: 1 EU/DL (ref 0.2–1)
WBC # BLD AUTO: 10.1 K/UL (ref 4.3–11.1)
WBC URNS QL MICRO: NORMAL /HPF

## 2025-02-25 PROCEDURE — 81001 URINALYSIS AUTO W/SCOPE: CPT

## 2025-02-25 PROCEDURE — 71046 X-RAY EXAM CHEST 2 VIEWS: CPT

## 2025-02-25 PROCEDURE — 85025 COMPLETE CBC W/AUTO DIFF WBC: CPT

## 2025-02-25 PROCEDURE — 80053 COMPREHEN METABOLIC PANEL: CPT

## 2025-02-25 PROCEDURE — 99284 EMERGENCY DEPT VISIT MOD MDM: CPT

## 2025-02-25 RX ORDER — METHYLPREDNISOLONE 4 MG/1
TABLET ORAL
Qty: 1 KIT | Refills: 0 | Status: SHIPPED | OUTPATIENT
Start: 2025-02-25 | End: 2025-03-03

## 2025-02-25 RX ORDER — BENZONATATE 100 MG/1
200 CAPSULE ORAL 2 TIMES DAILY PRN
Qty: 20 CAPSULE | Refills: 0 | Status: SHIPPED | OUTPATIENT
Start: 2025-02-25 | End: 2025-03-04

## 2025-02-25 ASSESSMENT — LIFESTYLE VARIABLES
HOW MANY STANDARD DRINKS CONTAINING ALCOHOL DO YOU HAVE ON A TYPICAL DAY: PATIENT DOES NOT DRINK
HOW MANY STANDARD DRINKS CONTAINING ALCOHOL DO YOU HAVE ON A TYPICAL DAY: PATIENT DOES NOT DRINK
HOW OFTEN DO YOU HAVE A DRINK CONTAINING ALCOHOL: NEVER

## 2025-02-25 ASSESSMENT — PAIN - FUNCTIONAL ASSESSMENT
PAIN_FUNCTIONAL_ASSESSMENT: NONE - DENIES PAIN
PAIN_FUNCTIONAL_ASSESSMENT: NONE - DENIES PAIN

## 2025-02-25 NOTE — DISCHARGE INSTRUCTIONS
Instructions:   General Return Precautions: Overall today, I did not find any life-threatening causes for your symptoms. However, this does not mean that something serious could not develop. If you experience any new or worsening symptoms, it is important that you come back for further evaluation. Not returning for re-evaluation could lead to severe complications, permanent impairment, and/or death. If you are experiencing symptoms of a heart attack, which include but are not limited to chest pain, pressure, that radiates to the neck, arms, back, shortness of breath especially with normal exertion, burping or heartburn please call 911 and return for evaluation. Always be aware of possible stroke symptoms which can be easily remembered by BE FAST Balance (trouble standing/walking), Eyes (vision changes), Face (one sided facial drooping), Arm (weakness/numbness on one side), Speech (speech alterations), Time (Sooner the better).   ---    You were given Tessalon Perles in conjunction with the steroid Dosepak.  Please take these as prescribed to help with the productive cough.  We did not see any evidence of urinary tract infection or pneumonia on exam.  Please be aware of some concerning symptoms return for including but not limited to worsening shortness of breath, chest pain, trouble breathing blood in the urine severe abdominal pain or other concerning symptoms to you.    Follow-up:  Please follow-up with your primary care physician for further evaluation if symptoms persist or return here for further evaluation if needed.    Thank you for choosing to trust us here at [facility] ED with your health today. It was my pleasure to care for you. Please see above for some basic discharge instructions along with things to watch out for which should prompt a return to the Emergency Department for re-evaluation. Please continue to take care of yourself and we hope you recover quickly.

## 2025-02-25 NOTE — ED PROVIDER NOTES
Linear atelectasis or scarring in the left lung. Pleural and  parenchymal changes of the lung apices. Coarse bronchovascular markings at the  right lung base. These findings are stable since previous evaluation.  The heart  size is normal.  The bony thorax is intact.        Impression    No acute findings in the chest      Electronically signed by Ernesto Miles   Urinalysis w rflx microscopic   Result Value Ref Range    Color, UA YELLOW      Appearance CLEAR      Specific Gravity, UA 1.025 (H) 1.001 - 1.023      pH, Urine 6.0 5.0 - 9.0      Protein, UA Negative NEG mg/dL    Glucose, Ur Negative NEG mg/dL    Ketones, Urine Negative NEG mg/dL    Bilirubin, Urine Negative NEG      Blood, Urine Negative NEG      Urobilinogen, Urine 1.0 0.2 - 1.0 EU/dL    Nitrite, Urine Negative NEG      Leukocyte Esterase, Urine TRACE (A) NEG     Urinalysis, Micro   Result Value Ref Range    WBC, UA 3-5 0 /hpf    RBC, UA 0 0 /hpf    Epithelial Cells, UA 0-3 0 /hpf    BACTERIA, URINE 0 0 /hpf    Mucus, UA 0 0 /lpf    Other observations RESULTS VERIFIED MANUALLY     CBC with Auto Differential   Result Value Ref Range    WBC 10.1 4.3 - 11.1 K/uL    RBC 4.57 4.05 - 5.20 M/uL    Hemoglobin 14.2 11.7 - 15.4 g/dL    Hematocrit 42.7 35.8 - 46.3 %    MCV 93.4 82.0 - 102.0 FL    MCH 31.1 26.1 - 32.9 PG    MCHC 33.3 31.4 - 35.0 g/dL    RDW 12.5 11.9 - 14.6 %    Platelets 408 150 - 450 K/uL    MPV 8.7 (L) 9.4 - 12.3 FL    nRBC 0.00 0.0 - 0.2 K/uL    Differential Type AUTOMATED      Neutrophils % 60.1 43.0 - 78.0 %    Lymphocytes % 29.2 13.0 - 44.0 %    Monocytes % 8.0 4.0 - 12.0 %    Eosinophils % 1.7 0.5 - 7.8 %    Basophils % 0.6 0.0 - 2.0 %    Immature Granulocytes % 0.4 0.0 - 5.0 %    Neutrophils Absolute 6.06 1.70 - 8.20 K/UL    Lymphocytes Absolute 2.94 0.50 - 4.60 K/UL    Monocytes Absolute 0.81 0.10 - 1.30 K/UL    Eosinophils Absolute 0.17 0.00 - 0.80 K/UL    Basophils Absolute 0.06 0.00 - 0.20 K/UL    Immature Granulocytes Absolute 0.04

## 2025-02-25 NOTE — ED NOTES
Patient mobility status  with no difficulty.     I have reviewed discharge instructions with the patient.  The patient verbalized understanding.    Patient left ED via Discharge Method: ambulatory to Home with  herself .    Opportunity for questions and clarification provided.     Patient given 2 scripts.

## 2025-03-01 ENCOUNTER — HOSPITAL ENCOUNTER (OUTPATIENT)
Dept: MAMMOGRAPHY | Age: 75
End: 2025-03-01
Payer: MEDICARE

## 2025-03-01 DIAGNOSIS — Z12.31 VISIT FOR SCREENING MAMMOGRAM: ICD-10-CM

## 2025-03-01 PROCEDURE — 77063 BREAST TOMOSYNTHESIS BI: CPT

## 2025-04-09 ENCOUNTER — OFFICE VISIT (OUTPATIENT)
Dept: PULMONOLOGY | Age: 75
End: 2025-04-09
Payer: MEDICARE

## 2025-04-09 ENCOUNTER — RESULTS FOLLOW-UP (OUTPATIENT)
Dept: INTERNAL MEDICINE CLINIC | Facility: CLINIC | Age: 75
End: 2025-04-09

## 2025-04-09 VITALS
RESPIRATION RATE: 16 BRPM | TEMPERATURE: 97.2 F | DIASTOLIC BLOOD PRESSURE: 70 MMHG | HEIGHT: 67 IN | HEART RATE: 74 BPM | SYSTOLIC BLOOD PRESSURE: 115 MMHG | OXYGEN SATURATION: 93 % | BODY MASS INDEX: 25.58 KG/M2 | WEIGHT: 163 LBS

## 2025-04-09 DIAGNOSIS — J31.0 CHRONIC RHINITIS: ICD-10-CM

## 2025-04-09 DIAGNOSIS — R73.09 ABNORMAL GLUCOSE: ICD-10-CM

## 2025-04-09 DIAGNOSIS — E78.00 PURE HYPERCHOLESTEROLEMIA: ICD-10-CM

## 2025-04-09 DIAGNOSIS — J45.40 MODERATE PERSISTENT ASTHMA WITHOUT COMPLICATION: Primary | ICD-10-CM

## 2025-04-09 LAB
ALBUMIN SERPL-MCNC: 3.5 G/DL (ref 3.2–4.6)
ALBUMIN/GLOB SERPL: 1.1 (ref 1–1.9)
ALP SERPL-CCNC: 104 U/L (ref 35–104)
ALT SERPL-CCNC: 7 U/L (ref 8–45)
ANION GAP SERPL CALC-SCNC: 11 MMOL/L (ref 7–16)
AST SERPL-CCNC: 24 U/L (ref 15–37)
BILIRUB SERPL-MCNC: 0.4 MG/DL (ref 0–1.2)
BUN SERPL-MCNC: 9 MG/DL (ref 8–23)
CALCIUM SERPL-MCNC: 8.9 MG/DL (ref 8.8–10.2)
CHLORIDE SERPL-SCNC: 105 MMOL/L (ref 98–107)
CHOLEST SERPL-MCNC: 189 MG/DL (ref 0–200)
CO2 SERPL-SCNC: 26 MMOL/L (ref 20–29)
CREAT SERPL-MCNC: 0.71 MG/DL (ref 0.6–1.1)
EST. AVERAGE GLUCOSE BLD GHB EST-MCNC: 128 MG/DL
GLOBULIN SER CALC-MCNC: 3.2 G/DL (ref 2.3–3.5)
GLUCOSE SERPL-MCNC: 95 MG/DL (ref 70–99)
HBA1C MFR BLD: 6.1 % (ref 0–5.6)
HDLC SERPL-MCNC: 60 MG/DL (ref 40–60)
HDLC SERPL: 3.1 (ref 0–5)
LDLC SERPL CALC-MCNC: 98 MG/DL (ref 0–100)
POTASSIUM SERPL-SCNC: 4.2 MMOL/L (ref 3.5–5.1)
PROT SERPL-MCNC: 6.7 G/DL (ref 6.3–8.2)
SODIUM SERPL-SCNC: 141 MMOL/L (ref 136–145)
TRIGL SERPL-MCNC: 151 MG/DL (ref 0–150)
VLDLC SERPL CALC-MCNC: 30 MG/DL (ref 6–23)

## 2025-04-09 PROCEDURE — 1123F ACP DISCUSS/DSCN MKR DOCD: CPT | Performed by: INTERNAL MEDICINE

## 2025-04-09 PROCEDURE — 3074F SYST BP LT 130 MM HG: CPT | Performed by: INTERNAL MEDICINE

## 2025-04-09 PROCEDURE — G2211 COMPLEX E/M VISIT ADD ON: HCPCS | Performed by: INTERNAL MEDICINE

## 2025-04-09 PROCEDURE — 1159F MED LIST DOCD IN RCRD: CPT | Performed by: INTERNAL MEDICINE

## 2025-04-09 PROCEDURE — 3078F DIAST BP <80 MM HG: CPT | Performed by: INTERNAL MEDICINE

## 2025-04-09 PROCEDURE — 1125F AMNT PAIN NOTED PAIN PRSNT: CPT | Performed by: INTERNAL MEDICINE

## 2025-04-09 PROCEDURE — 99214 OFFICE O/P EST MOD 30 MIN: CPT | Performed by: INTERNAL MEDICINE

## 2025-04-09 RX ORDER — FLUTICASONE PROPIONATE AND SALMETEROL 250; 50 UG/1; UG/1
1 POWDER RESPIRATORY (INHALATION) 2 TIMES DAILY
Qty: 60 EACH | Refills: 11 | Status: SHIPPED | OUTPATIENT
Start: 2025-04-09

## 2025-04-09 RX ORDER — CETIRIZINE HYDROCHLORIDE 10 MG/1
10 TABLET ORAL DAILY
Qty: 30 TABLET | Refills: 5 | Status: SHIPPED | OUTPATIENT
Start: 2025-04-09

## 2025-04-09 RX ORDER — ALBUTEROL SULFATE 90 UG/1
2 INHALANT RESPIRATORY (INHALATION) EVERY 6 HOURS PRN
Qty: 1 EACH | Refills: 11 | Status: SHIPPED | OUTPATIENT
Start: 2025-04-09

## 2025-04-09 RX ORDER — FLUTICASONE PROPIONATE 50 MCG
2 SPRAY, SUSPENSION (ML) NASAL DAILY
Qty: 3 EACH | Refills: 3 | Status: SHIPPED | OUTPATIENT
Start: 2025-04-09

## 2025-04-09 NOTE — TELEPHONE ENCOUNTER
Requested Prescriptions     Pending Prescriptions Disp Refills    Evolocumab 140 MG/ML SOAJ 2 Adjustable Dose Pre-filled Pen Syringe 11     Sig: Inject 140 mg into the skin every 14 days        Spoke with patient and informed of Dr Bailon's response. She's states the she tried getting it filled 3 years ago and it was too expensive.   Advised pt that we can send rx and if too expensive, she will need to call me then we can try pt assistance program.   Patient voiced understanding.

## 2025-04-09 NOTE — PATIENT INSTRUCTIONS
You can also try chlorphenermine 4 mg tablets over-the-counter 2-3 times per day for postnasal drip and congestion.    Sometimes they can result in sleepiness so should trial the first dose before bed.      Other names for this medication is chlorTRIM, ChlorTab and [facility] four hour allergy.

## 2025-04-09 NOTE — PROGRESS NOTES
DAILY    aspirin 81 mg, DAILY    cetirizine (ZYRTEC) 10 mg, Oral, DAILY    clindamycin (CLINDAGEL) 1 % gel 2 TIMES DAILY PRN    Cyanocobalamin (VITAMIN B12 PO) 1 tablet, DAILY RESP    escitalopram (LEXAPRO) 10 mg, Oral, EVERY EVENING    fluticasone (FLONASE) 50 MCG/ACT nasal spray 2 sprays, Each Nostril, DAILY    fluticasone-salmeterol (ADVAIR) 250-50 MCG/ACT AEPB diskus inhaler 1 puff, Inhalation, 2 TIMES DAILY    nitroGLYCERIN (NITROSTAT) 0.4 MG SL tablet 1 tablet every 5 minutes as needed    pantoprazole (PROTONIX) 40 mg, Oral, DAILY    rosuvastatin (CRESTOR) 20 mg, Oral, NIGHTLY    tretinoin (RETIN-A) 0.05 % cream Topical, NIGHTLY    vitamin D (CHOLECALCIFEROL) 2,000 Units, DAILY

## 2025-04-09 NOTE — TELEPHONE ENCOUNTER
MEDICATION REFILL REQUEST      Name of Medication:  Rosuvastatin  Dose:  10 mg  Frequency:  QD  Quantity:  90  Days' supply:  90 with 3 refills  Pharmacy Name/Location:  [phone]    Dr zazueta changed the med above to 20 mg but it was to strong and giving her bad cramps,so she went back to 10 mg and she needs a new RX called in for the 10 mg   Any questions call pt

## 2025-04-10 ENCOUNTER — TELEPHONE (OUTPATIENT)
Age: 75
End: 2025-04-10

## 2025-04-10 NOTE — TELEPHONE ENCOUNTER
Called pt, offered [facility] and pt assistance forms. Left forms at . Pt voiced understanding said she would  forms 4/11/25.

## 2025-04-10 NOTE — TELEPHONE ENCOUNTER
Patients Repatha will be 144 dollars . She can't afford that They told her it is approved through insurance but still will cost that much Please call

## 2025-04-11 ENCOUNTER — TELEPHONE (OUTPATIENT)
Age: 75
End: 2025-04-11

## 2025-04-11 NOTE — TELEPHONE ENCOUNTER
Pt came to [facility] check in and filled out forms for Repatha Assistance. Placed in  mailbox.

## 2025-04-21 SDOH — ECONOMIC STABILITY: INCOME INSECURITY: IN THE LAST 12 MONTHS, WAS THERE A TIME WHEN YOU WERE NOT ABLE TO PAY THE MORTGAGE OR RENT ON TIME?: YES

## 2025-04-21 SDOH — ECONOMIC STABILITY: TRANSPORTATION INSECURITY
IN THE PAST 12 MONTHS, HAS LACK OF TRANSPORTATION KEPT YOU FROM MEETINGS, WORK, OR FROM GETTING THINGS NEEDED FOR DAILY LIVING?: NO

## 2025-04-21 SDOH — ECONOMIC STABILITY: FOOD INSECURITY: WITHIN THE PAST 12 MONTHS, THE FOOD YOU BOUGHT JUST DIDN'T LAST AND YOU DIDN'T HAVE MONEY TO GET MORE.: NEVER TRUE

## 2025-04-21 SDOH — ECONOMIC STABILITY: FOOD INSECURITY: WITHIN THE PAST 12 MONTHS, YOU WORRIED THAT YOUR FOOD WOULD RUN OUT BEFORE YOU GOT MONEY TO BUY MORE.: NEVER TRUE

## 2025-04-21 SDOH — ECONOMIC STABILITY: TRANSPORTATION INSECURITY
IN THE PAST 12 MONTHS, HAS THE LACK OF TRANSPORTATION KEPT YOU FROM MEDICAL APPOINTMENTS OR FROM GETTING MEDICATIONS?: NO

## 2025-04-21 ASSESSMENT — PATIENT HEALTH QUESTIONNAIRE - PHQ9
3. TROUBLE FALLING OR STAYING ASLEEP: NOT AT ALL
9. THOUGHTS THAT YOU WOULD BE BETTER OFF DEAD, OR OF HURTING YOURSELF: NOT AT ALL
10. IF YOU CHECKED OFF ANY PROBLEMS, HOW DIFFICULT HAVE THESE PROBLEMS MADE IT FOR YOU TO DO YOUR WORK, TAKE CARE OF THINGS AT HOME, OR GET ALONG WITH OTHER PEOPLE: SOMEWHAT DIFFICULT
2. FEELING DOWN, DEPRESSED OR HOPELESS: NOT AT ALL
5. POOR APPETITE OR OVEREATING: NOT AT ALL
5. POOR APPETITE OR OVEREATING: NOT AT ALL
4. FEELING TIRED OR HAVING LITTLE ENERGY: NOT AT ALL
3. TROUBLE FALLING OR STAYING ASLEEP: NOT AT ALL
1. LITTLE INTEREST OR PLEASURE IN DOING THINGS: NOT AT ALL
7. TROUBLE CONCENTRATING ON THINGS, SUCH AS READING THE NEWSPAPER OR WATCHING TELEVISION: NOT AT ALL
6. FEELING BAD ABOUT YOURSELF - OR THAT YOU ARE A FAILURE OR HAVE LET YOURSELF OR YOUR FAMILY DOWN: NOT AT ALL
8. MOVING OR SPEAKING SO SLOWLY THAT OTHER PEOPLE COULD HAVE NOTICED. OR THE OPPOSITE - BEING SO FIDGETY OR RESTLESS THAT YOU HAVE BEEN MOVING AROUND A LOT MORE THAN USUAL: NOT AT ALL
1. LITTLE INTEREST OR PLEASURE IN DOING THINGS: NOT AT ALL
SUM OF ALL RESPONSES TO PHQ QUESTIONS 1-9: 0
6. FEELING BAD ABOUT YOURSELF - OR THAT YOU ARE A FAILURE OR HAVE LET YOURSELF OR YOUR FAMILY DOWN: NOT AT ALL
SUM OF ALL RESPONSES TO PHQ QUESTIONS 1-9: 0
SUM OF ALL RESPONSES TO PHQ QUESTIONS 1-9: 0
8. MOVING OR SPEAKING SO SLOWLY THAT OTHER PEOPLE COULD HAVE NOTICED. OR THE OPPOSITE, BEING SO FIGETY OR RESTLESS THAT YOU HAVE BEEN MOVING AROUND A LOT MORE THAN USUAL: NOT AT ALL
10. IF YOU CHECKED OFF ANY PROBLEMS, HOW DIFFICULT HAVE THESE PROBLEMS MADE IT FOR YOU TO DO YOUR WORK, TAKE CARE OF THINGS AT HOME, OR GET ALONG WITH OTHER PEOPLE: SOMEWHAT DIFFICULT
2. FEELING DOWN, DEPRESSED OR HOPELESS: NOT AT ALL
9. THOUGHTS THAT YOU WOULD BE BETTER OFF DEAD, OR OF HURTING YOURSELF: NOT AT ALL
7. TROUBLE CONCENTRATING ON THINGS, SUCH AS READING THE NEWSPAPER OR WATCHING TELEVISION: NOT AT ALL
SUM OF ALL RESPONSES TO PHQ QUESTIONS 1-9: 0
4. FEELING TIRED OR HAVING LITTLE ENERGY: NOT AT ALL
SUM OF ALL RESPONSES TO PHQ QUESTIONS 1-9: 0

## 2025-04-23 ENCOUNTER — OFFICE VISIT (OUTPATIENT)
Dept: INTERNAL MEDICINE CLINIC | Facility: CLINIC | Age: 75
End: 2025-04-23
Payer: MEDICARE

## 2025-04-23 ENCOUNTER — TELEPHONE (OUTPATIENT)
Age: 75
End: 2025-04-23

## 2025-04-23 VITALS
DIASTOLIC BLOOD PRESSURE: 80 MMHG | SYSTOLIC BLOOD PRESSURE: 122 MMHG | WEIGHT: 162.8 LBS | BODY MASS INDEX: 25.55 KG/M2 | HEIGHT: 67 IN

## 2025-04-23 DIAGNOSIS — I10 PRIMARY HYPERTENSION: Primary | ICD-10-CM

## 2025-04-23 DIAGNOSIS — I25.10 CORONARY ARTERY DISEASE INVOLVING NATIVE CORONARY ARTERY OF NATIVE HEART WITHOUT ANGINA PECTORIS: ICD-10-CM

## 2025-04-23 DIAGNOSIS — F41.1 GAD (GENERALIZED ANXIETY DISORDER): ICD-10-CM

## 2025-04-23 DIAGNOSIS — E78.00 PURE HYPERCHOLESTEROLEMIA: ICD-10-CM

## 2025-04-23 DIAGNOSIS — D07.1 VIN III (VULVAR INTRAEPITHELIAL NEOPLASIA III): ICD-10-CM

## 2025-04-23 DIAGNOSIS — J45.40 MODERATE PERSISTENT ASTHMA WITHOUT COMPLICATION: ICD-10-CM

## 2025-04-23 DIAGNOSIS — K21.9 GASTROESOPHAGEAL REFLUX DISEASE WITHOUT ESOPHAGITIS: ICD-10-CM

## 2025-04-23 DIAGNOSIS — J31.0 CHRONIC RHINITIS: ICD-10-CM

## 2025-04-23 DIAGNOSIS — R73.09 ABNORMAL GLUCOSE: ICD-10-CM

## 2025-04-23 DIAGNOSIS — M15.9 GENERALIZED OSTEOARTHRITIS: ICD-10-CM

## 2025-04-23 DIAGNOSIS — R21 RASH: ICD-10-CM

## 2025-04-23 PROCEDURE — 1159F MED LIST DOCD IN RCRD: CPT | Performed by: NURSE PRACTITIONER

## 2025-04-23 PROCEDURE — 99215 OFFICE O/P EST HI 40 MIN: CPT | Performed by: NURSE PRACTITIONER

## 2025-04-23 PROCEDURE — 1123F ACP DISCUSS/DSCN MKR DOCD: CPT | Performed by: NURSE PRACTITIONER

## 2025-04-23 PROCEDURE — G2211 COMPLEX E/M VISIT ADD ON: HCPCS | Performed by: NURSE PRACTITIONER

## 2025-04-23 PROCEDURE — 1160F RVW MEDS BY RX/DR IN RCRD: CPT | Performed by: NURSE PRACTITIONER

## 2025-04-23 PROCEDURE — 3074F SYST BP LT 130 MM HG: CPT | Performed by: NURSE PRACTITIONER

## 2025-04-23 PROCEDURE — 3079F DIAST BP 80-89 MM HG: CPT | Performed by: NURSE PRACTITIONER

## 2025-04-23 RX ORDER — ESCITALOPRAM OXALATE 10 MG/1
15 TABLET ORAL EVERY EVENING
Qty: 135 TABLET | Refills: 3 | Status: SHIPPED | OUTPATIENT
Start: 2025-04-23

## 2025-04-23 RX ORDER — PANTOPRAZOLE SODIUM 40 MG/1
40 TABLET, DELAYED RELEASE ORAL DAILY
Qty: 90 TABLET | Refills: 3 | Status: SHIPPED | OUTPATIENT
Start: 2025-04-23

## 2025-04-23 RX ORDER — TRIAMCINOLONE ACETONIDE 0.25 MG/G
CREAM TOPICAL
Qty: 15 G | Refills: 0 | Status: SHIPPED | OUTPATIENT
Start: 2025-04-23

## 2025-04-23 ASSESSMENT — ENCOUNTER SYMPTOMS: SHORTNESS OF BREATH: 0

## 2025-04-23 NOTE — TELEPHONE ENCOUNTER
Pt came by [facility] office to drop off her pt assistance application for repatha. Pt included her personal info need for the application. Please fax for pt

## 2025-04-23 NOTE — PROGRESS NOTES
PROGRESS NOTE      Chief Complaint   Patient presents with    Hypertension     6 month follow-up with lab review    Discuss Medications     Wants to discuss increasing Lexapro & pantoprazole she has been on that medication for a long time    Foot Pain     Patient reports recent right foot pain. Patient says it feels like a knife is sticking it at times and there is a knot on the side of the foot.    Rash     Patient reports rash/skin tag beside her breast on the right side & would like for it be looked at today.       Assessment & Plan  Asthma:  - Currently using albuterol as needed and Advair as a maintenance inhaler  - Pulmonologist recommended using albuterol before going out and adding over-the-counter chlorpheniramine if coughing persists due to drainage  - Advised to continue using Advair daily and albuterol as needed  - Chlorpheniramine may cause drowsiness    Shoulder arthritis:  - Received 3 steroid injections, which provided some relief  - Reports worsening symptoms, including difficulty raising her arm and significant pain  - Surgical options discussed but deemed unlikely to be beneficial  - Will continue with steroid injections for pain management    Vulvar lesion- VINIII  - Lesion excised by Dr. Hicks, who recommended a follow-up in 6 months  - Advised to follow up with Dr. Hicks as scheduled    Rash:  - Recurring rash on her breast that itches and comes and goes over several months  - Prescription for triamcinolone cream provided to apply to the affected area until her appointment with Dr. Meza on 06/23/2025  - If the rash does not improve, she should seek an earlier appointment    Hyperlipidemia:  - LDL levels improved to 98 mg/dL  - Experienced cramps on Crestor 20 mg and is currently on Crestor 10 mg, which will run out soon  - Working on getting paperwork done to resume Repatha, which previously worked well for her  - Repatha indicated due to cardiovascular disease    Anxiety:  - Reports

## 2025-04-28 ENCOUNTER — TELEPHONE (OUTPATIENT)
Age: 75
End: 2025-04-28

## 2025-04-28 NOTE — TELEPHONE ENCOUNTER
[place] with [employer] called stating she needs the following :      Repatha  In regard to Page #2 of patient asst form  Just above the sig line there is a ' consent ' bubble that needs to be checked  Per Romeo this form will need to be printed  The patient will come by and sign  Tele [phone]  Fax  [phone]  Case # [id number]    Please call and advise

## 2025-05-14 ENCOUNTER — APPOINTMENT (OUTPATIENT)
Dept: URBAN - METROPOLITAN AREA CLINIC 330 | Facility: CLINIC | Age: 75
Setting detail: DERMATOLOGY
End: 2025-05-14

## 2025-05-14 DIAGNOSIS — Z85.820 PERSONAL HISTORY OF MALIGNANT MELANOMA OF SKIN: ICD-10-CM

## 2025-05-14 DIAGNOSIS — L82.0 INFLAMED SEBORRHEIC KERATOSIS: ICD-10-CM

## 2025-05-14 DIAGNOSIS — L71.8 OTHER ROSACEA: ICD-10-CM

## 2025-05-14 PROCEDURE — ? LIQUID NITROGEN

## 2025-05-14 PROCEDURE — ? PRESCRIPTION

## 2025-05-14 PROCEDURE — 17110 DESTRUCTION B9 LES UP TO 14: CPT

## 2025-05-14 PROCEDURE — ? MDM - TREATMENT GOALS

## 2025-05-14 PROCEDURE — ? COUNSELING

## 2025-05-14 PROCEDURE — 99213 OFFICE O/P EST LOW 20 MIN: CPT | Mod: 25

## 2025-05-14 PROCEDURE — ? PRESCRIPTION MEDICATION MANAGEMENT

## 2025-05-14 RX ORDER — DOXYCYCLINE MONOHYDRATE 50 MG/1
CAPSULE ORAL
Qty: 30 | Refills: 0 | Status: ERX | COMMUNITY
Start: 2025-05-14

## 2025-05-14 RX ADMIN — DOXYCYCLINE MONOHYDRATE: 50 CAPSULE ORAL at 00:00

## 2025-05-14 ASSESSMENT — LOCATION ZONE DERM
LOCATION ZONE: NECK
LOCATION ZONE: FACE
LOCATION ZONE: NOSE

## 2025-05-14 ASSESSMENT — LOCATION SIMPLE DESCRIPTION DERM
LOCATION SIMPLE: RIGHT ZYGOMA
LOCATION SIMPLE: LEFT FOREHEAD
LOCATION SIMPLE: NOSE
LOCATION SIMPLE: NECK
LOCATION SIMPLE: RIGHT CHEEK
LOCATION SIMPLE: RIGHT FOREHEAD
LOCATION SIMPLE: LEFT TEMPLE
LOCATION SIMPLE: LEFT CHEEK

## 2025-05-14 ASSESSMENT — LOCATION DETAILED DESCRIPTION DERM
LOCATION DETAILED: LEFT LATERAL FOREHEAD
LOCATION DETAILED: LEFT SUPERIOR FOREHEAD
LOCATION DETAILED: RIGHT CENTRAL MALAR CHEEK
LOCATION DETAILED: LEFT CENTRAL LATERAL NECK
LOCATION DETAILED: RIGHT LATERAL FOREHEAD
LOCATION DETAILED: LEFT LATERAL MANDIBULAR CHEEK
LOCATION DETAILED: LEFT LATERAL TEMPLE
LOCATION DETAILED: RIGHT CENTRAL MANDIBULAR CHEEK
LOCATION DETAILED: RIGHT INFERIOR LATERAL MALAR CHEEK
LOCATION DETAILED: NASAL DORSUM
LOCATION DETAILED: RIGHT LATERAL ZYGOMA
LOCATION DETAILED: LEFT CENTRAL MALAR CHEEK
LOCATION DETAILED: RIGHT SUPERIOR FOREHEAD

## 2025-05-14 NOTE — HPI: RASH
What Type Of Note Output Would You Prefer (Optional)?: Bullet Format
Is The Patient Presenting As Previously Scheduled?: Yes
Is This A New Presentation, Or A Follow-Up?: Rash
Additional History: Pt has a documented history of rosacea but states she was not aware she had rosacea. Pt has been using clindamycin gel daily on her face for years.

## 2025-05-14 NOTE — PROCEDURE: PRESCRIPTION MEDICATION MANAGEMENT
Initiate Treatment: doxycycline monohydrate 50 mg capsule \\nTake one pill once a day with food
Render In Strict Bullet Format?: No
Detail Level: Zone

## 2025-05-14 NOTE — PROCEDURE: REASSURANCE
When Should The Patient Follow-Up For Their Next Full-Body Skin Exam?: 1 Year
Quality 137: Melanoma: Continuity Of Care - Recall System: Recall system not utilized, reason not otherwise specified
Detail Level: Detailed

## 2025-05-15 NOTE — PROGRESS NOTES
Name: Mary Anton  YOB: 1950  Gender: female  MRN: [id number]    CC:   Chief Complaint   Patient presents with    Follow-up     R Shoulder        HPI: Patient presents for follow-up of right shoulder pain.  Patient has had right shoulder injection on 2-18-25.  Patient presents today requesting repeat.  No new injuries comparison to previous visit.    Allergies   Allergen Reactions    Cefaclor Nausea Only    Peukq-Owhso-Vsffjke-Pramoxine Other (See Comments)     Patient unsure     Cephalexin Nausea And Vomiting    Clarithromycin Nausea And Vomiting     Patient unsure      Past Medical History:   Diagnosis Date    Affective psychosis 07/2006    Anxiety     medication    Arthritis     OA generalized    CAD (coronary artery disease) 09/12/2018    PCI- stents x 5    Chest pain 07/2006     no cp but feels\" anxious\" for 10 seconds at a time- no further cp. No FLORES. Followed by [facility]-Dr. Bailon.     Cholelithiasis 09/2006    Chronic depression 10/15/2014    Chronic lumbar pain 10/15/2014    Claudication 06/2013    Dyslipidemia 10/15/2014    managed with medication     Fibromyalgia 10/15/2014    KATJA (generalized anxiety disorder) 10/15/2014    managed with medication     GERD (gastroesophageal reflux disease) 10/15/2014    managed with medication     History of complete eye exam 04/01/2016    History of dental examination 06/01/2016    History of placement of stent in LAD coronary artery 08/29/2006    Two heart stents. Patient takes daily 81 mg ASA.      Hypercholesterolemia     Hypertension     managed with medication     Low magnesium level     Patient's daily Magnesium supplement decreased to once a week by PCP due to Magnesium of 2.4 on 5/18/17    Malaise and fatigue 05/2008    Overweight 7/12/2021    Palpitations 02/05/2016    Stable     Polyarthralgia 10/15/2014    Shingles 2006    10-12x    Tobacco use disorder 112/2006    URI (upper respiratory infection) 02/10/2023    was seen if

## 2025-05-19 NOTE — TELEPHONE ENCOUNTER
Said she has a rocky that's approved on her Repatha Please call     Repatha   [facility] [address] in [place]

## 2025-05-19 NOTE — TELEPHONE ENCOUNTER
Requested Prescriptions     Pending Prescriptions Disp Refills    Evolocumab (REPATHA) SOAJ pen 2 Adjustable Dose Pre-filled Pen Syringe 11     Sig: Inject 1 mL into the skin every 14 days

## 2025-05-20 ENCOUNTER — OFFICE VISIT (OUTPATIENT)
Dept: ORTHOPEDIC SURGERY | Age: 75
End: 2025-05-20

## 2025-05-20 DIAGNOSIS — M19.011 OSTEOARTHRITIS OF RIGHT SHOULDER, UNSPECIFIED OSTEOARTHRITIS TYPE: Primary | ICD-10-CM

## 2025-05-20 RX ORDER — METHYLPREDNISOLONE ACETATE 40 MG/ML
80 INJECTION, SUSPENSION INTRA-ARTICULAR; INTRALESIONAL; INTRAMUSCULAR; SOFT TISSUE ONCE
Status: COMPLETED | OUTPATIENT
Start: 2025-05-20 | End: 2025-05-20

## 2025-05-20 RX ADMIN — METHYLPREDNISOLONE ACETATE 80 MG: 40 INJECTION, SUSPENSION INTRA-ARTICULAR; INTRALESIONAL; INTRAMUSCULAR; SOFT TISSUE at 11:03

## 2025-06-23 ENCOUNTER — APPOINTMENT (OUTPATIENT)
Dept: URBAN - METROPOLITAN AREA CLINIC 330 | Facility: CLINIC | Age: 75
Setting detail: DERMATOLOGY
End: 2025-06-23

## 2025-06-23 DIAGNOSIS — D22 MELANOCYTIC NEVI: ICD-10-CM | Status: STABLE

## 2025-06-23 DIAGNOSIS — L81.4 OTHER MELANIN HYPERPIGMENTATION: ICD-10-CM

## 2025-06-23 DIAGNOSIS — D485 NEOPLASM OF UNCERTAIN BEHAVIOR OF SKIN: ICD-10-CM

## 2025-06-23 DIAGNOSIS — Z85.820 PERSONAL HISTORY OF MALIGNANT MELANOMA OF SKIN: ICD-10-CM

## 2025-06-23 DIAGNOSIS — L57.0 ACTINIC KERATOSIS: ICD-10-CM

## 2025-06-23 DIAGNOSIS — L71.8 OTHER ROSACEA: ICD-10-CM | Status: INADEQUATELY CONTROLLED

## 2025-06-23 PROBLEM — D48.5 NEOPLASM OF UNCERTAIN BEHAVIOR OF SKIN: Status: ACTIVE | Noted: 2025-06-23

## 2025-06-23 PROBLEM — D22.5 MELANOCYTIC NEVI OF TRUNK: Status: ACTIVE | Noted: 2025-06-23

## 2025-06-23 PROCEDURE — ? BIOPSY BY SHAVE METHOD

## 2025-06-23 PROCEDURE — ? LIQUID NITROGEN

## 2025-06-23 PROCEDURE — ? COUNSELING

## 2025-06-23 PROCEDURE — ? PRESCRIPTION

## 2025-06-23 PROCEDURE — ? FULL BODY SKIN EXAM

## 2025-06-23 PROCEDURE — ? MEDICAL PHOTOGRAPHY REVIEW

## 2025-06-23 PROCEDURE — ? PRESCRIPTION MEDICATION MANAGEMENT

## 2025-06-23 PROCEDURE — ? MDM - TREATMENT GOALS

## 2025-06-23 RX ORDER — HYDROQUINONE 40 MG/G
CREAM TOPICAL
Qty: 28.35 | Refills: 3 | Status: ERX | COMMUNITY
Start: 2025-06-23

## 2025-06-23 RX ORDER — DOXYCYCLINE MONOHYDRATE 50 MG/1
CAPSULE ORAL
Qty: 30 | Refills: 0 | Status: ERX

## 2025-06-23 RX ORDER — METRONIDAZOLE 7.5 MG/G
CREAM TOPICAL
Qty: 45 | Refills: 3 | Status: ERX

## 2025-06-23 RX ADMIN — HYDROQUINONE: 40 CREAM TOPICAL at 00:00

## 2025-06-23 ASSESSMENT — LOCATION DETAILED DESCRIPTION DERM
LOCATION DETAILED: INFERIOR THORACIC SPINE
LOCATION DETAILED: LEFT INFERIOR CENTRAL MALAR CHEEK
LOCATION DETAILED: LEFT MEDIAL MALAR CHEEK
LOCATION DETAILED: RIGHT INFERIOR FOREHEAD
LOCATION DETAILED: RIGHT CENTRAL MALAR CHEEK
LOCATION DETAILED: NASAL DORSUM
LOCATION DETAILED: LEFT CENTRAL MALAR CHEEK

## 2025-06-23 ASSESSMENT — LOCATION SIMPLE DESCRIPTION DERM
LOCATION SIMPLE: UPPER BACK
LOCATION SIMPLE: RIGHT CHEEK
LOCATION SIMPLE: NOSE
LOCATION SIMPLE: RIGHT FOREHEAD
LOCATION SIMPLE: LEFT CHEEK

## 2025-06-23 ASSESSMENT — LOCATION ZONE DERM
LOCATION ZONE: TRUNK
LOCATION ZONE: FACE
LOCATION ZONE: NOSE

## 2025-06-23 NOTE — PROCEDURE: LIQUID NITROGEN
Consent: The patient's consent was obtained including but not limited to risks of crusting, scabbing, blistering, scarring, darker or lighter pigmentary change, recurrence, incomplete removal and infection.
Show Aperture Variable?: Yes
Render Post-Care Instructions In Note?: no
Duration Of Freeze Thaw-Cycle (Seconds): 2
Post-Care Instructions: I reviewed with the patient in detail post-care instructions. Patient is to wear sunprotection, and avoid picking at any of the treated lesions. Pt may apply Vaseline to crusted or scabbing areas.
Number Of Freeze-Thaw Cycles: 3 freeze-thaw cycles
Detail Level: Detailed

## 2025-06-23 NOTE — PROCEDURE: PRESCRIPTION MEDICATION MANAGEMENT
Continue Regimen: metronidazole 0.75 % topical cream \\nApply to face daily  for rosacea\\n\\ndoxycycline monohydrate 50 mg capsule \\nTake one pill once a day with food\\n\\n\\nRefilled today .
Render In Strict Bullet Format?: No
Detail Level: Zone
Plan: Discussed cosmetic LN2 treatment($150) vs Bleaching cream, patient rather to try the bleaching cream.
Detail Level: Simple
Initiate Treatment: hydroquinone 4 % topical cream \\nApply to face twice daily. Use good Rx coupon.

## 2025-06-23 NOTE — PROCEDURE: BIOPSY BY SHAVE METHOD
Detail Level: Detailed
Depth Of Biopsy: dermis
Was A Bandage Applied: Yes
Size Of Lesion In Cm: 0
Accession #: Skin Pathology
Biopsy Type: H and E
Biopsy Method: Dermablade
Anesthesia Type: 1% lidocaine with epinephrine and a 1:10 solution of 8.4% sodium bicarbonate
Anesthesia Volume In Cc: 0.5
Hemostasis: Aluminum Chloride
Wound Care: Petrolatum
Dressing: bandage
Destruction After The Procedure: No
Type Of Destruction Used: Curettage
Curettage Text: The wound bed was treated with curettage after the biopsy was performed.
Cryotherapy Text: The wound bed was treated with cryotherapy after the biopsy was performed.
Electrodesiccation Text: The wound bed was treated with electrodesiccation after the biopsy was performed.
Electrodesiccation And Curettage Text: The wound bed was treated with electrodesiccation and curettage after the biopsy was performed.
Silver Nitrate Text: The wound bed was treated with silver nitrate after the biopsy was performed.
Lab: [id number]
Lab Facility: [id number]
Path Notes (To The Dermatopathologist): Tiny specimen
Medical Necessity Information: It is in your best interest to select a reason for this procedure from the list below. All of these items fulfill various CMS LCD requirements except the new and changing color options.
Consent: Written consent was obtained and risks were reviewed including but not limited to scarring, infection, bleeding, scabbing, incomplete removal, nerve damage and allergy to anesthesia.
Post-Care Instructions: I reviewed with the patient in detail post-care instructions. Patient is to keep the biopsy site dry overnight, and then apply bacitracin twice daily until healed. Patient may apply hydrogen peroxide soaks to remove any crusting.
Notification Instructions: Patient will be notified of biopsy results. However, patient instructed to call the office if not contacted within 2 weeks.
Billing Type: Third-Party Bill
Information: Selecting Yes will display possible errors in your note based on the variables you have selected. This validation is only offered as a suggestion for you. PLEASE NOTE THAT THE VALIDATION TEXT WILL BE REMOVED WHEN YOU FINALIZE YOUR NOTE. IF YOU WANT TO FAX A PRELIMINARY NOTE YOU WILL NEED TO TOGGLE THIS TO 'NO' IF YOU DO NOT WANT IT IN YOUR FAXED NOTE.

## 2025-06-30 ENCOUNTER — APPOINTMENT (OUTPATIENT)
Dept: URBAN - METROPOLITAN AREA CLINIC 330 | Facility: CLINIC | Age: 75
Setting detail: DERMATOLOGY
End: 2025-06-30

## 2025-06-30 PROBLEM — C44.91 BASAL CELL CARCINOMA OF SKIN, UNSPECIFIED: Status: ACTIVE | Noted: 2025-06-30

## 2025-06-30 PROCEDURE — ? REFERRAL

## 2025-07-08 ENCOUNTER — APPOINTMENT (OUTPATIENT)
Dept: URBAN - METROPOLITAN AREA CLINIC 330 | Facility: CLINIC | Age: 75
Setting detail: DERMATOLOGY
End: 2025-07-08

## 2025-07-08 VITALS — DIASTOLIC BLOOD PRESSURE: 67 MMHG | SYSTOLIC BLOOD PRESSURE: 113 MMHG | HEART RATE: 61 BPM

## 2025-07-08 PROBLEM — C44.319 BASAL CELL CARCINOMA OF SKIN OF OTHER PARTS OF FACE: Status: ACTIVE | Noted: 2025-07-08

## 2025-07-08 PROCEDURE — ? PRESCRIPTION

## 2025-07-08 PROCEDURE — ? MOHS SURGERY

## 2025-07-08 RX ORDER — MUPIROCIN 20 MG/G
OINTMENT TOPICAL BID
Qty: 22 | Refills: 2 | Status: ERX | COMMUNITY
Start: 2025-07-08

## 2025-07-08 RX ADMIN — MUPIROCIN: 20 OINTMENT TOPICAL at 00:00

## 2025-07-08 NOTE — PROCEDURE: MOHS SURGERY
Mohs Case Number: [id number]
Date Of Previous Biopsy (Optional): 6/23/25
Previous Accession (Optional): [id number]
Biopsy Photograph Reviewed: Yes
Consent Type: Consent 1 (Standard)
Eye Shield Used: No
Initial Size Of Lesion: 1.3
X Size Of Lesion In Cm (Optional): 0.9
Number Of Stages: 2
Primary Defect Length In Cm (Final Defect Size - Required For Flaps/Grafts): 3.4
Primary Defect Width In Cm (Final Defect Size - Required For Flaps/Grafts): 2.2
Primary Defect Depth In Cm (Optional But Required For Some Insurers): 0
Repair Type: Intermediate Layered Repair
Which Instrument Did You Use For Dermabrasion?: Wire Brush
Which Eyelid Repair Cpt Are You Using?: 49958
Oculoplastic Surgeon Procedure Text (A): After obtaining clear surgical margins the patient was sent to oculoplastics for surgical repair.  The patient understands they will receive post-surgical care and follow-up from the referring physician's office.
Otolaryngologist Procedure Text (A): After obtaining clear surgical margins the patient was sent to otolaryngology for surgical repair.  The patient understands they will receive post-surgical care and follow-up from the referring physician's office.
Plastic Surgeon Procedure Text (A): After obtaining clear surgical margins the patient was sent to plastics for surgical repair.  The patient understands they will receive post-surgical care and follow-up from the referring physician's office.
Mid-Level Procedure Text (A): After obtaining clear surgical margins the patient was sent to a mid-level provider for surgical repair.  The patient understands they will receive post-surgical care and follow-up from the mid-level provider.
Provider Procedure Text (A): After obtaining clear surgical margins the defect was repaired by another provider.
Asc Procedure Text (A): After obtaining clear surgical margins the patient was sent to an ASC for surgical repair.  The patient understands they will receive post-surgical care and follow-up from the ASC physician.
Simple / Intermediate / Complex Repair - Final Wound Length In Cm: 6
Deep Sutures: 3-0 Monocryl
Epidermal Sutures: 5-0 Fast Absorbing Gut
Suturegard Retention Suture: 2-0 Nylon
Retention Suture Bite Size: 3 mm
Length To Time In Minutes Device Was In Place: 10
Number Of Hemigard Strips Per Side: 1
Undermining Type: Entire Wound
Debridement Text: The wound edges were debrided prior to proceeding with the closure to facilitate wound healing.
Helical Rim Text: The closure involved the helical rim.
Vermilion Border Text: The closure involved the vermilion border.
Nostril Rim Text: The closure involved the nostril rim.
Retention Suture Text: Retention sutures were placed to support the closure and prevent dehiscence.
Location Indication Override (Is Already Calculated Based On Selected Body Location): Area M
Area H Indication Text: Tumors in this location are included in Area H (eyelids, eyebrows, nose, lips, chin, ear, pre-auricular, post-auricular, temple, genitalia, hands, feet, ankles and areola).  Tissue conservation is critical in these anatomic locations.
Area M Indication Text: Tumors in this location are included in Area M (cheek, forehead, scalp, neck, jawline and pretibial skin).  Mohs surgery is indicated for tumors in these anatomic locations.
Area L Indication Text: Tumors in this location are included in Area L (trunk and extremities).  Mohs surgery is indicated for larger tumors, or tumors with aggressive histologic features, in these anatomic locations.
Tumor Debulked?: dermablade
Depth Of Tumor Invasion (For Histology): tumor not visualized
Perineural Invasion (For Histology - Be Specific If Possible): absent
Surgical Defect Length In Cm (Optional): 3.0
Surgical Defect Width In Cm (Optional): 2.0
Special Stains Stage 1 - Results: Base On Clearance Noted Above
Stage 2: Additional Anesthesia Type: 1% lidocaine with epinephrine
Staging Info: By selecting yes to the question above you will include information on AJCC 8 tumor staging in your Mohs note. Information on tumor staging will be automatically added for SCCs on the head and neck. AJCC 8 includes tumor size, tumor depth, perineural involvement and bone invasion.
Tumor Depth: Less than 6mm from granular layer and no invasion beyond the subcutaneous fat
Was The Patient On Physician Recommended Anticoagulation Therapy?: Please Select the Appropriate Response
Medical Necessity Statement: Based on my medical judgement, Mohs surgery is the most appropriate treatment for this cancer compared to other treatments.
Alternatives Discussed Intro (Do Not Add Period): I discussed alternative treatments to Mohs surgery and specifically discussed the risks and benefits of
Consent 1/Introductory Paragraph: The rationale for Mohs was explained to the patient and consent was obtained. The risks, benefits and alternatives to therapy were discussed in detail. Specifically, the risks of infection, scarring, bleeding, prolonged wound healing, incomplete removal, allergy to anesthesia, nerve injury and recurrence were addressed. Prior to the procedure, the treatment site was clearly identified and confirmed by the patient. All components of Universal Protocol/PAUSE Rule completed.
Consent 2/Introductory Paragraph: Mohs surgery was explained to the patient and consent was obtained. The risks, benefits and alternatives to therapy were discussed in detail. Specifically, the risks of infection, scarring, bleeding, prolonged wound healing, incomplete removal, allergy to anesthesia, nerve injury and recurrence were addressed. Prior to the procedure, the treatment site was clearly identified and confirmed by the patient. All components of Universal Protocol/PAUSE Rule completed.
Consent 3/Introductory Paragraph: I gave the patient a chance to ask questions they had about the procedure.  Following this I explained the Mohs procedure and consent was obtained. The risks, benefits and alternatives to therapy were discussed in detail. Specifically, the risks of infection, scarring, bleeding, prolonged wound healing, incomplete removal, allergy to anesthesia, nerve injury and recurrence were addressed. Prior to the procedure, the treatment site was clearly identified and confirmed by the patient. All components of Universal Protocol/PAUSE Rule completed.
Consent (Temporal Branch)/Introductory Paragraph: The rationale for Mohs was explained to the patient and consent was obtained. The risks, benefits and alternatives to therapy were discussed in detail. Specifically, the risks of damage to the temporal branch of the facial nerve, infection, scarring, bleeding, prolonged wound healing, incomplete removal, allergy to anesthesia, and recurrence were addressed. Prior to the procedure, the treatment site was clearly identified and confirmed by the patient. All components of Universal Protocol/PAUSE Rule completed.
Consent (Marginal Mandibular)/Introductory Paragraph: The rationale for Mohs was explained to the patient and consent was obtained. The risks, benefits and alternatives to therapy were discussed in detail. Specifically, the risks of damage to the marginal mandibular branch of the facial nerve, infection, scarring, bleeding, prolonged wound healing, incomplete removal, allergy to anesthesia, and recurrence were addressed. Prior to the procedure, the treatment site was clearly identified and confirmed by the patient. All components of Universal Protocol/PAUSE Rule completed.
Consent (Spinal Accessory)/Introductory Paragraph: The rationale for Mohs was explained to the patient and consent was obtained. The risks, benefits and alternatives to therapy were discussed in detail. Specifically, the risks of damage to the spinal accessory nerve, infection, scarring, bleeding, prolonged wound healing, incomplete removal, allergy to anesthesia, and recurrence were addressed. Prior to the procedure, the treatment site was clearly identified and confirmed by the patient. All components of Universal Protocol/PAUSE Rule completed.
Consent (Near Eyelid Margin)/Introductory Paragraph: The rationale for Mohs was explained to the patient and consent was obtained. The risks, benefits and alternatives to therapy were discussed in detail. Specifically, the risks of ectropion or eyelid deformity, infection, scarring, bleeding, prolonged wound healing, incomplete removal, allergy to anesthesia, nerve injury and recurrence were addressed. Prior to the procedure, the treatment site was clearly identified and confirmed by the patient. All components of Universal Protocol/PAUSE Rule completed.
Consent (Ear)/Introductory Paragraph: The rationale for Mohs was explained to the patient and consent was obtained. The risks, benefits and alternatives to therapy were discussed in detail. Specifically, the risks of ear deformity, infection, scarring, bleeding, prolonged wound healing, incomplete removal, allergy to anesthesia, nerve injury and recurrence were addressed. Prior to the procedure, the treatment site was clearly identified and confirmed by the patient. All components of Universal Protocol/PAUSE Rule completed.
Consent (Nose)/Introductory Paragraph: The rationale for Mohs was explained to the patient and consent was obtained. The risks, benefits and alternatives to therapy were discussed in detail. Specifically, the risks of nasal deformity, changes in the flow of air through the nose, infection, scarring, bleeding, prolonged wound healing, incomplete removal, allergy to anesthesia, nerve injury and recurrence were addressed. Prior to the procedure, the treatment site was clearly identified and confirmed by the patient. All components of Universal Protocol/PAUSE Rule completed.
Consent (Lip)/Introductory Paragraph: The rationale for Mohs was explained to the patient and consent was obtained. The risks, benefits and alternatives to therapy were discussed in detail. Specifically, the risks of lip deformity, changes in the oral aperture, infection, scarring, bleeding, prolonged wound healing, incomplete removal, allergy to anesthesia, nerve injury and recurrence were addressed. Prior to the procedure, the treatment site was clearly identified and confirmed by the patient. All components of Universal Protocol/PAUSE Rule completed.
Consent (Scalp)/Introductory Paragraph: The rationale for Mohs was explained to the patient and consent was obtained. The risks, benefits and alternatives to therapy were discussed in detail. Specifically, the risks of changes in hair growth pattern secondary to repair, infection, scarring, bleeding, prolonged wound healing, incomplete removal, allergy to anesthesia, nerve injury and recurrence were addressed. Prior to the procedure, the treatment site was clearly identified and confirmed by the patient. All components of Universal Protocol/PAUSE Rule completed.
Detail Level: Detailed
Postop Diagnosis: same
Surgeon: Trina Scott MD
Hemostasis: Electrocautery
Estimated Blood Loss (Cc): minimal
Brow Lift Text: A midfrontal incision was made medially to the defect to allow access to the tissues just superior to the left eyebrow. Following careful dissection inferiorly in a supraperiosteal plane to the level of the left eyebrow, several 3-0 monocryl sutures were used to resuspend the eyebrow orbicularis oculi muscular unit to the superior frontal bone periosteum. This resulted in an appropriate reapproximation of static eyebrow symmetry and correction of the left brow ptosis.
Dermal Closure: buried vertical mattress
Epidermal Closure: running
Suturegard Intro: Intraoperative tissue expansion was performed, utilizing the SUTUREGARD device, in order to reduce wound tension.
Suturegard Body: The suture ends were repeatedly re-tightened and re-clamped to achieve the desired tissue expansion.
Hemigard Intro: Due to skin fragility and wound tension, it was decided to use HEMIGARD adhesive retention suture devices to permit a linear closure. The skin was cleaned and dried for a 6cm distance away from the wound. Excessive hair, if present, was removed to allow for adhesion.
Hemigard Postcare Instructions: The HEMIGARD strips are to remain completely dry for at least 5-7 days.
Donor Site Anesthesia Type: same as repair anesthesia
Epidermal Closure Graft Donor Site (Optional): simple interrupted
Graft Donor Site Bandage (Optional-Leave Blank If You Don't Want In Note): Steri-strips and a pressure bandage were applied to the donor site.
Closure 2 Information: This tab is for additional flaps and grafts, including complex repair and grafts and complex repair and flaps. You can also specify a different location for the additional defect, if the location is the same you do not need to select a new one. We will insert the automated text for the repair you select below just as we do for solitary flaps and grafts. Please note that at this time if you select a location with a different insurance zone you will need to override the ICD10 and CPT if appropriate.
Closure 3 Information: This tab is for additional flaps and grafts above and beyond our usual structured repairs.  Please note if you enter information here it will not currently bill and you will need to add the billing information manually.
Wound Care: Petrolatum
Dressing: pressure dressing
Dressing (No Sutures): dry sterile dressing
Unna Boot Text: An Unna boot was placed to help immobilize the limb and facilitate more rapid healing.
Home Suture Removal Text: Patient was provided instructions on removing sutures and will remove their sutures at home.  If they have any questions or difficulties they will call the office.
Post-Care Instructions: I reviewed with the patient in detail post-care instructions. Patient is not to engage in any heavy lifting, exercise, or swimming for the next 14 days. Should the patient develop any fevers, chills, bleeding, severe pain patient will contact the office immediately.
Pain Refusal Text: I offered to prescribe pain medication but the patient refused to take this medication.
Mauc Instructions: By selecting yes to the question below the MAUC number will be added into the note.  This will be calculated automatically based on the diagnosis chosen, the size entered, the body zone selected (H,M,L) and the specific indications you chose. You will also have the option to override the Mohs AUC if you disagree with the automatically calculated number and this option is found in the Case Summary tab.
Where Do You Want The Question To Include Opioid Counseling Located?: Case Summary Tab
Eye Protection Verbiage: Before proceeding with the stage, a plastic scleral shield was inserted. The globe was anesthetized with a few drops of proparacaine hydrochloride ophthalmic solution, USP 0.5%. Then, an appropriate sized scleral shield was chosen and coated with lacrilube ointment. The shield was gently inserted and left in place for the duration of each stage. After the stage was completed, the shield was gently removed.
Mohs Method Verbiage: An incision at a 90 degree angle following the standard Mohs approach was done and the specimen was harvested as a microscopic controlled layer.
Surgeon/Pathologist Verbiage (Will Incorporate Name Of Surgeon From Intro If Not Blank): operated in two distinct and integrated capacities as the surgeon and pathologist.
Mohs Histo Method Verbiage: Each section was then chromacoded and processed in the Mohs lab using the Mohs protocol and submitted for tissue processing
Subsequent Stages Histo Method Verbiage: Using a similar technique to that described above, a thin layer of tissue was removed from all areas where tumor was visible on the previous stage.  The tissue was again oriented, mapped, dyed, and processed as above.
Mohs Rapid Report Verbiage: The area of clinically evident tumor was marked with skin marking ink and appropriately hatched.  The initial incision was made following the Mohs approach through the skin.  The specimen was taken to the lab, divided into the necessary number of pieces, chromacoded and processed according to the Mohs protocol.  This was repeated in successive stages until a tumor free defect was achieved.
Complex Repair Preamble Text (Leave Blank If You Do Not Want): Extensive wide undermining was performed.
Intermediate Repair Preamble Text (Leave Blank If You Do Not Want): Undermining was performed with blunt dissection.
Graft Cartilage Fenestration Text: The cartilage was fenestrated with a 2mm punch biopsy to help facilitate graft survival and healing.
Non-Graft Cartilage Fenestration Text: The cartilage was fenestrated with a 2mm punch biopsy to help facilitate healing.
Secondary Intention Text (Leave Blank If You Do Not Want): The defect will heal with secondary intention.
No Repair - Repaired With Adjacent Surgical Defect Text (Leave Blank If You Do Not Want): After obtaining clear surgical margins the defect was repaired concurrently with another surgical defect which was in close approximation.
Unique Flap 1 Name:  Pedicled Propellar Flap
Unique Flap 1 Text: A decision was made to reconstruct the defect utilizing an  pedicled propellar flap.  The donor pedicle which included the  was injected with anesthesia.  The flap was excised through the skin and subcutaneous tissue down to the layer of the underlying musculature.  The flap was carefully excised within this deep plane to maintain its blood supply.  The edges of the donor site were undermined.   The donor site was closed in a primary fashion.  The flap was then rotated into position and sutured and the pedicle was tucked underneath the skin surface.  Once the flap was sutured into place, adequate blood supply was confirmed with blanching and refill.
Adjacent Tissue Transfer Text: The defect edges were debeveled with a #15 scalpel blade. Given the location of the defect and the proximity to free margins an adjacent tissue transfer was deemed most appropriate. Using a sterile surgical marker, an appropriate flap was drawn incorporating the defect and placing the expected incisions within the relaxed skin tension lines where possible. The area thus outlined was incised deep to adipose tissue with a #15 scalpel blade. The skin margins were undermined to an appropriate distance in all directions utilizing iris scissors.
Advancement Flap (Single) Text: The defect edges were debeveled with a #15 scalpel blade. Given the location of the defect and the proximity to free margins a single advancement flap was deemed most appropriate. Using a sterile surgical marker, an appropriate advancement flap was drawn incorporating the defect and placing the expected incisions within the relaxed skin tension lines where possible. The area thus outlined was incised deep to adipose tissue with a #15 scalpel blade. The skin margins were undermined to an appropriate distance in all directions utilizing iris scissors. Following this, the designed flap was advanced into the primary defect and sutured into place.
Advancement Flap (Double) Text: The defect edges were debeveled with a #15 scalpel blade. Given the location of the defect and the proximity to free margins a double advancement flap was deemed most appropriate. Using a sterile surgical marker, the appropriate advancement flaps were drawn incorporating the defect and placing the expected incisions within the relaxed skin tension lines where possible. The area thus outlined was incised deep to adipose tissue with a #15 scalpel blade. The skin margins were undermined to an appropriate distance in all directions utilizing iris scissors. Following this, the designed flaps were advanced into the primary defect and sutured into place.
Advancement-Rotation Flap Text: The defect edges were debeveled with a #15 scalpel blade. Given the location of the defect, shape of the defect and the proximity to free margins an advancement-rotation flap was deemed most appropriate. Using a sterile surgical marker, an appropriate flap was drawn incorporating the defect and placing the expected incisions within the relaxed skin tension lines where possible. The area thus outlined was incised deep to adipose tissue with a #15 scalpel blade. The skin margins were undermined to an appropriate distance in all directions utilizing iris scissors. Following this, the designed flap was placed into the primary defect and sutured into place.
Alar Island Pedicle Flap Text: The defect edges were debeveled with a #15 scalpel blade. Given the location of the defect, shape of the defect and the proximity to the alar rim an island pedicle advancement flap was deemed most appropriate. Using a sterile surgical marker, an appropriate advancement flap was drawn incorporating the defect, outlining the appropriate donor tissue and placing the expected incisions within the nasal ala running parallel to the alar rim. The area thus outlined was incised with a #15 scalpel blade. The skin margins were undermined minimally to an appropriate distance in all directions around the primary defect and laterally outward around the island pedicle utilizing iris scissors.  There was minimal undermining beneath the pedicle flap. Following this, the designed flap was placed in the primary defect and sutured into place.
A-T Advancement Flap Text: The defect edges were debeveled with a #15 scalpel blade. Given the location of the defect, shape of the defect and the proximity to free margins an A-T advancement flap was deemed most appropriate. Using a sterile surgical marker, an appropriate advancement flap was drawn incorporating the defect and placing the expected incisions within the relaxed skin tension lines where possible. The area thus outlined was incised deep to adipose tissue with a #15 scalpel blade. The skin margins were undermined to an appropriate distance in all directions utilizing iris scissors. Following this, the designed flap was advanced into the primary defect and sutured into place.
Banner Transposition Flap Text: The defect edges were debeveled with a #15 scalpel blade. Given the location of the defect and the proximity to free margins a Banner transposition flap was deemed most appropriate. Using a sterile surgical marker, an appropriate flap was drawn around the defect. The area thus outlined was incised deep to adipose tissue with a #15 scalpel blade. The skin margins were undermined to an appropriate distance in all directions utilizing iris scissors. Following this, the designed flap was placed in the primary defect and sutured into place.
Bilateral Helical Rim Advancement Flap Text: The defect edges were debeveled with a #15 blade scalpel.  Given the location of the defect and the proximity to free margins (helical rim) a bilateral helical rim advancement flap was deemed most appropriate. Using a sterile surgical marker, the appropriate advancement flaps were drawn incorporating the defect and placing the expected incisions between the helical rim and antihelix where possible.  The area thus outlined was incised through and through with a #15 scalpel blade.  With a skin hook and iris scissors, the flaps were gently and sharply undermined and freed up. Following this, the designed flaps were placed into the primary defect and sutured into place.
Bilateral Rotation Flap Text: The defect edges were debeveled with a #15 scalpel blade. Given the location of the defect, shape of the defect and the proximity to free margins a bilateral rotation flap was deemed most appropriate. Using a sterile surgical marker, an appropriate rotation flap was drawn incorporating the defect and placing the expected incisions within the relaxed skin tension lines where possible. The area thus outlined was incised deep to adipose tissue with a #15 scalpel blade. The skin margins were undermined to an appropriate distance in all directions utilizing iris scissors. Following this, the designed flap was carried over into the primary defect and sutured into place.
Bilobed Flap Text: The defect edges were debeveled with a #15 scalpel blade. Given the location of the defect and the proximity to free margins a bilobe flap was deemed most appropriate. Using a sterile surgical marker, an appropriate bilobe flap drawn around the defect. The area thus outlined was incised deep to adipose tissue with a #15 scalpel blade. The skin margins were undermined to an appropriate distance in all directions utilizing iris scissors.  Following this, the designed flap was placed into the primary defect and sutured into place.
Bilobed Transposition Flap Text: The defect edges were debeveled with a #15 scalpel blade. Given the location of the defect and the proximity to free margins a bilobed transposition flap was deemed most appropriate. Using a sterile surgical marker, an appropriate bilobe flap drawn around the defect. The area thus outlined was incised deep to adipose tissue with a #15 scalpel blade. The skin margins were undermined to an appropriate distance in all directions utilizing iris scissors.  Following this, the designed flap was placed into the primary defect and sutured into place.
Bi-Rhombic Flap Text: The defect edges were debeveled with a #15 scalpel blade. Given the location of the defect and the proximity to free margins a bi-rhombic flap was deemed most appropriate. Using a sterile surgical marker, an appropriate rhombic flap was drawn incorporating the defect. The area thus outlined was incised deep to adipose tissue with a #15 scalpel blade. The skin margins were undermined to an appropriate distance in all directions utilizing iris scissors. Following this, the designed flap was placed into the primary defect and sutured into place.
Burow's Advancement Flap Text: The defect edges were debeveled with a #15 scalpel blade. Given the location of the defect and the proximity to free margins a Burow's advancement flap was deemed most appropriate. Using a sterile surgical marker, the appropriate advancement flap was drawn incorporating the defect and placing the expected incisions within the relaxed skin tension lines where possible. The area thus outlined was incised deep to adipose tissue with a #15 scalpel blade. The skin margins were undermined to an appropriate distance in all directions utilizing iris scissors. Following this, the designed flap was advanced into the primary defect and sutured into place.
Chonodrocutaneous Helical Advancement Flap Text: The defect edges were debeveled with a #15 scalpel blade. Given the location of the defect and the proximity to free margins a chondrocutaneous helical advancement flap was deemed most appropriate. Using a sterile surgical marker, the appropriate advancement flap was drawn incorporating the defect and placing the expected incisions within the relaxed skin tension lines where possible. The area thus outlined was incised deep to adipose tissue with a #15 scalpel blade. The skin margins were undermined to an appropriate distance in all directions utilizing iris scissors. Following this, the designed flap was advanced into the primary defect and sutured into place.
Crescentic Advancement Flap Text: The defect edges were debeveled with a #15 scalpel blade. Given the location of the defect and the proximity to free margins a crescentic advancement flap was deemed most appropriate. Using a sterile surgical marker, the appropriate advancement flap was drawn incorporating the defect and placing the expected incisions within the relaxed skin tension lines where possible. The area thus outlined was incised deep to adipose tissue with a #15 scalpel blade. The skin margins were undermined to an appropriate distance in all directions utilizing iris scissors. Following this, the designed flap was advanced into the primary defect and sutured into place.
Dorsal Nasal Flap Text: The defect edges were debeveled with a #15 scalpel blade. Given the location of the defect and the proximity to free margins a dorsal nasal flap was deemed most appropriate. Using a sterile surgical marker, an appropriate dorsal nasal flap was drawn around the defect. The area thus outlined was incised deep to adipose tissue with a #15 scalpel blade. The skin margins were undermined to an appropriate distance in all directions utilizing iris scissors. Following this, the designed flap was placed in the primary defect and sutured into place.
Double Island Pedicle Flap Text: The defect edges were debeveled with a #15 scalpel blade. Given the location of the defect, shape of the defect and the proximity to free margins a double island pedicle advancement flap was deemed most appropriate. Using a sterile surgical marker, an appropriate advancement flap was drawn incorporating the defect, outlining the appropriate donor tissue and placing the expected incisions within the relaxed skin tension lines where possible. The area thus outlined was incised deep to adipose tissue with a #15 scalpel blade. The skin margins were undermined to an appropriate distance in all directions around the primary defect and laterally outward around the island pedicle utilizing iris scissors.  There was minimal undermining beneath the pedicle flap. Following this, the flap was placed into the primary defect and sutured into place.
Double O-Z Flap Text: The defect edges were debeveled with a #15 scalpel blade. Given the location of the defect, shape of the defect and the proximity to free margins a Double O-Z flap was deemed most appropriate. Using a sterile surgical marker, an appropriate transposition flap was drawn incorporating the defect and placing the expected incisions within the relaxed skin tension lines where possible. The area thus outlined was incised deep to adipose tissue with a #15 scalpel blade. The skin margins were undermined to an appropriate distance in all directions utilizing iris scissors. Following this, the designed flap was placed into the primary defect and sutured into place.
Double O-Z Plasty Text: The defect edges were debeveled with a #15 scalpel blade. Given the location of the defect, shape of the defect and the proximity to free margins a Double O-Z plasty (double transposition flap) was deemed most appropriate. Using a sterile surgical marker, the appropriate transposition flaps were drawn incorporating the defect and placing the expected incisions within the relaxed skin tension lines where possible. The area thus outlined was incised deep to adipose tissue with a #15 scalpel blade. The skin margins were undermined to an appropriate distance in all directions utilizing iris scissors.  Hemostasis was achieved with electrocautery.  The flaps were then transposed into place, one clockwise and the other counterclockwise, and anchored with interrupted buried subcutaneous sutures.
Double Z Plasty Text: The lesion was extirpated to the level of the fat with a #15 scalpel blade. Given the location of the defect, shape of the defect and the proximity to free margins a double Z-plasty was deemed most appropriate for repair. Using a sterile surgical marker, the appropriate transposition arms of the double Z-plasty were drawn incorporating the defect and placing the expected incisions within the relaxed skin tension lines where possible. The area thus outlined was incised deep to adipose tissue with a #15 scalpel blade. The skin margins were undermined to an appropriate distance in all directions utilizing iris scissors. The opposing transposition arms were then transposed and carried over into place in opposite direction and anchored with interrupted buried subcutaneous sutures.
Ear Star Wedge Flap Text: The defect edges were debeveled with a #15 blade scalpel.  Given the location of the defect and the proximity to free margins (helical rim) an ear star wedge flap was deemed most appropriate. Using a sterile surgical marker, the appropriate flap was drawn incorporating the defect and placing the expected incisions between the helical rim and antihelix where possible.  The area thus outlined was incised through and through with a #15 scalpel blade. Following this, the designed flap was placed in the primary defect and sutured into place.
Flip-Flop Flap Text: The defect edges were debeveled with a #15 blade scalpel.  Given the location of the defect and the proximity to free margins a flip-flop flap was deemed most appropriate. Using a sterile surgical marker, the appropriate flap was drawn incorporating the defect and placing the expected incisions between the helical rim and antihelix where possible.  The area thus outlined was incised through and through with a #15 scalpel blade. Following this, the designed flap was carried over into the primary defect and sutured into place.
Hatchet Flap Text: The defect edges were debeveled with a #15 scalpel blade. Given the location of the defect, shape of the defect and the proximity to free margins a hatchet flap was deemed most appropriate. Using a sterile surgical marker, an appropriate hatchet flap was drawn incorporating the defect and placing the expected incisions within the relaxed skin tension lines where possible. The area thus outlined was incised deep to adipose tissue with a #15 scalpel blade. The skin margins were undermined to an appropriate distance in all directions utilizing iris scissors. Following this, the designed flap was placed into the primary defect and sutured into place.
Helical Rim Advancement Flap Text: The defect edges were debeveled with a #15 blade scalpel.  Given the location of the defect and the proximity to free margins (helical rim) a double helical rim advancement flap was deemed most appropriate. Using a sterile surgical marker, the appropriate advancement flaps were drawn incorporating the defect and placing the expected incisions between the helical rim and antihelix where possible.  The area thus outlined was incised through and through with a #15 scalpel blade.  With a skin hook and iris scissors, the flaps were gently and sharply undermined and freed up. Folllowing this, the designed flaps were placed into the primary defect and sutured into place.
H Plasty Text: Given the location of the defect, shape of the defect and the proximity to free margins a H-plasty was deemed most appropriate for repair. Using a sterile surgical marker, the appropriate advancement arms of the H-plasty were drawn incorporating the defect and placing the expected incisions within the relaxed skin tension lines where possible. The area thus outlined was incised deep to adipose tissue with a #15 scalpel blade. The skin margins were undermined to an appropriate distance in all directions utilizing iris scissors.  The opposing advancement arms were then advanced into place in opposite direction and anchored with interrupted buried subcutaneous sutures.
Island Pedicle Flap Text: The defect edges were debeveled with a #15 scalpel blade. Given the location of the defect, shape of the defect and the proximity to free margins an island pedicle advancement flap was deemed most appropriate. Using a sterile surgical marker, an appropriate advancement flap was drawn incorporating the defect, outlining the appropriate donor tissue and placing the expected incisions within the relaxed skin tension lines where possible. The area thus outlined was incised deep to adipose tissue with a #15 scalpel blade. The skin margins were undermined to an appropriate distance in all directions around the primary defect and laterally outward around the island pedicle utilizing iris scissors.  There was minimal undermining beneath the pedicle flap. Following this, the flap was placed into the primary defect and sutured into place.
Island Pedicle Flap With Canthal Suspension Text: The defect edges were debeveled with a #15 scalpel blade. Given the location of the defect, shape of the defect and the proximity to free margins an island pedicle advancement flap was deemed most appropriate. Using a sterile surgical marker, an appropriate advancement flap was drawn incorporating the defect, outlining the appropriate donor tissue and placing the expected incisions within the relaxed skin tension lines where possible. The area thus outlined was incised deep to adipose tissue with a #15 scalpel blade. The skin margins were undermined to an appropriate distance in all directions around the primary defect and laterally outward around the island pedicle utilizing iris scissors.  There was minimal undermining beneath the pedicle flap. A suspension suture was placed in the canthal tendon to prevent tension and prevent ectropion. Following this, the designed flap was placed into the primary defect and sutured into place.
Island Pedicle Flap-Requiring Vessel Identification Text: The defect edges were debeveled with a #15 scalpel blade. Given the location of the defect, shape of the defect and the proximity to free margins an island pedicle advancement flap was deemed most appropriate. Using a sterile surgical marker, an appropriate advancement flap was drawn, based on the axial vessel mentioned above, incorporating the defect, outlining the appropriate donor tissue and placing the expected incisions within the relaxed skin tension lines where possible. The area thus outlined was incised deep to adipose tissue with a #15 scalpel blade. The skin margins were undermined to an appropriate distance in all directions around the primary defect and laterally outward around the island pedicle utilizing iris scissors.  There was minimal undermining beneath the pedicle flap. Following this, the designed flap was placed in the primary defect and sutured into place.
Keystone Flap Text: The defect edges were debeveled with a #15 scalpel blade. Given the location of the defect, shape of the defect a keystone flap was deemed most appropriate. Using a sterile surgical marker, an appropriate keystone flap was drawn incorporating the defect, outlining the appropriate donor tissue and placing the expected incisions within the relaxed skin tension lines where possible. The area thus outlined was incised deep to adipose tissue with a #15 scalpel blade. The skin margins were undermined to an appropriate distance in all directions around the primary defect and laterally outward around the flap utilizing iris scissors. Following this, the designed flap was placed in the primary defect and sutured into place.
Melolabial Transposition Flap Text: The defect edges were debeveled with a #15 scalpel blade. Given the location of the defect and the proximity to free margins a melolabial flap was deemed most appropriate. Using a sterile surgical marker, an appropriate melolabial transposition flap was drawn incorporating the defect. The area thus outlined was incised deep to adipose tissue with a #15 scalpel blade. The skin margins were undermined to an appropriate distance in all directions utilizing iris scissors. Following this, the designed flap was placed into the primary defect and sutured into place.
Mercedes Flap Text: The defect edges were debeveled with a #15 scalpel blade. Given the location of the defect, shape of the defect and the proximity to free margins a Mercedes flap was deemed most appropriate. Using a sterile surgical marker, an appropriate advancement flap was drawn incorporating the defect and placing the expected incisions within the relaxed skin tension lines where possible. The area thus outlined was incised deep to adipose tissue with a #15 scalpel blade. The skin margins were undermined to an appropriate distance in all directions utilizing iris scissors. Following this, the designed flap was advanced into the primary defect and sutured into place.
Modified Advancement Flap Text: The defect edges were debeveled with a #15 scalpel blade. Given the location of the defect, shape of the defect and the proximity to free margins a modified advancement flap was deemed most appropriate. Using a sterile surgical marker, an appropriate advancement flap was drawn incorporating the defect and placing the expected incisions within the relaxed skin tension lines where possible. The area thus outlined was incised deep to adipose tissue with a #15 scalpel blade. The skin margins were undermined to an appropriate distance in all directions utilizing iris scissors. Following this, the designed flap was advanced into the primary defect and sutured into place.
Mucosal Advancement Flap Text: Given the location of the defect, shape of the defect and the proximity to free margins a mucosal advancement flap was deemed most appropriate. Incisions were made with a 15 blade scalpel in the appropriate fashion along the cutaneous vermilion border and the mucosal lip. The remaining actinically damaged mucosal tissue was excised.  The mucosal advancement flap was then elevated to the gingival sulcus with care taken to preserve the neurovascular structures and advanced into the primary defect. Care was taken to ensure that precise realignment of the vermilion border was achieved.
Muscle Hinge Flap Text: The defect edges were debeveled with a #15 scalpel blade.  Given the size, depth and location of the defect and the proximity to free margins a muscle hinge flap was deemed most appropriate. Using a sterile surgical marker, an appropriate hinge flap was drawn incorporating the defect. The area thus outlined was incised with a #15 scalpel blade. The skin margins were undermined to an appropriate distance in all directions utilizing iris scissors. Following this, the designed flap was placed in the primary defect and sutured into place.
Mustarde Flap Text: The defect edges were debeveled with a #15 scalpel blade.  Given the size, depth and location of the defect and the proximity to free margins a Mustarde flap was deemed most appropriate. Using a sterile surgical marker, an appropriate flap was drawn incorporating the defect. The area thus outlined was incised with a #15 scalpel blade. The skin margins were undermined to an appropriate distance in all directions utilizing iris scissors. Following this, the designed flap was placed in the primary defect and sutured into place.
Nasal Turnover Hinge Flap Text: The defect edges were debeveled with a #15 scalpel blade.  Given the size, depth, location of the defect and the defect being full thickness a nasal turnover hinge flap was deemed most appropriate. Using a sterile surgical marker, an appropriate hinge flap was drawn incorporating the defect. The area thus outlined was incised with a #15 scalpel blade. The flap was designed to recreate the nasal mucosal lining and the alar rim. The skin margins were undermined to an appropriate distance in all directions utilizing iris scissors. Following this, the designed flap was placed into the primary defect and sutured into place
Nasalis-Muscle-Based Myocutaneous Island Pedicle Flap Text: Using a #15 blade, an incision was made around the donor flap to the level of the nasalis muscle. Wide lateral undermining was then performed in both the subcutaneous plane above the nasalis muscle, and in a submuscular plane just above periosteum. This allowed the formation of a free nasalis muscle axial pedicle (based on the angular artery) which was still attached to the actual cutaneous flap, increasing its mobility and vascular viability. Hemostasis was obtained with pinpoint electrocoagulation. The flap was mobilized into position and the pivotal anchor points positioned and stabilized with buried interrupted sutures. Subcutaneous and dermal tissues were closed in a multilayered fashion with sutures. Tissue redundancies were excised, and the epidermal edges were apposed without significant tension and sutured with sutures.
Nasalis Myocutaneous Flap Text: Using a #15 blade, an incision was made around the donor flap to the level of the nasalis muscle. Wide lateral undermining was then performed in both the subcutaneous plane above the nasalis muscle, and in a submuscular plane just above periosteum. This allowed the formation of a free nasalis muscle axial pedicle which was still attached to the actual cutaneous flap, increasing its mobility and vascular viability. Hemostasis was obtained with pinpoint electrocoagulation. The flap was mobilized into position and the pivotal anchor points positioned and stabilized with buried interrupted sutures. Subcutaneous and dermal tissues were closed in a multilayered fashion with sutures. Tissue redundancies were excised, and the epidermal edges were apposed without significant tension and sutured with sutures.
Nasolabial Transposition Flap Text: The defect edges were debeveled with a #15 scalpel blade.  Given the size, depth and location of the defect and the proximity to free margins a nasolabial transposition flap was deemed most appropriate. Using a sterile surgical marker, an appropriate flap was drawn incorporating the defect. The area thus outlined was incised with a #15 scalpel blade. The skin margins were undermined to an appropriate distance in all directions utilizing iris scissors. Following this, the designed flap was carried into the primary defect and sutured into place.
Orbicularis Oris Muscle Flap Text: The defect edges were debeveled with a #15 scalpel blade.  Given that the defect affected the competency of the oral sphincter an orbicularis oris muscle flap was deemed most appropriate to restore this competency and normal muscle function.  Using a sterile surgical marker, an appropriate flap was drawn incorporating the defect. The area thus outlined was incised with a #15 scalpel blade. Following this, the designed flap was placed into the primary defect and sutured into place.
O-T Advancement Flap Text: The defect edges were debeveled with a #15 scalpel blade. Given the location of the defect, shape of the defect and the proximity to free margins an O-T advancement flap was deemed most appropriate. Using a sterile surgical marker, an appropriate advancement flap was drawn incorporating the defect and placing the expected incisions within the relaxed skin tension lines where possible. The area thus outlined was incised deep to adipose tissue with a #15 scalpel blade. The skin margins were undermined to an appropriate distance in all directions utilizing iris scissors. Following this, the designed flap was advanced into the primary defect and sutured into place.
O-T Plasty Text: The defect edges were debeveled with a #15 scalpel blade. Given the location of the defect, shape of the defect and the proximity to free margins an O-T plasty was deemed most appropriate. Using a sterile surgical marker, an appropriate O-T plasty was drawn incorporating the defect and placing the expected incisions within the relaxed skin tension lines where possible. The area thus outlined was incised deep to adipose tissue with a #15 scalpel blade. The skin margins were undermined to an appropriate distance in all directions utilizing iris scissors. Following this, the designed flap was placed into the primary defect and sutured into place.
O-L Flap Text: The defect edges were debeveled with a #15 scalpel blade. Given the location of the defect, shape of the defect and the proximity to free margins an O-L flap was deemed most appropriate. Using a sterile surgical marker, an appropriate advancement flap was drawn incorporating the defect and placing the expected incisions within the relaxed skin tension lines where possible. The area thus outlined was incised deep to adipose tissue with a #15 scalpel blade. The skin margins were undermined to an appropriate distance in all directions utilizing iris scissors. Following this, the designed flap was advanced into the primary defect and sutured into place.
O-Z Flap Text: The defect edges were debeveled with a #15 scalpel blade. Given the location of the defect, shape of the defect and the proximity to free margins an O-Z flap was deemed most appropriate. Using a sterile surgical marker, an appropriate transposition flap was drawn incorporating the defect and placing the expected incisions within the relaxed skin tension lines where possible. The area thus outlined was incised deep to adipose tissue with a #15 scalpel blade. The skin margins were undermined to an appropriate distance in all directions utilizing iris scissors. Following this, the designed flap was placed into the primary defect and sutured into place.
O-Z Plasty Text: The defect edges were debeveled with a #15 scalpel blade. Given the location of the defect, shape of the defect and the proximity to free margins an O-Z plasty (double transposition flap) was deemed most appropriate. Using a sterile surgical marker, the appropriate transposition flaps were drawn incorporating the defect and placing the expected incisions within the relaxed skin tension lines where possible. The area thus outlined was incised deep to adipose tissue with a #15 scalpel blade. The skin margins were undermined to an appropriate distance in all directions utilizing iris scissors.  Hemostasis was achieved with electrocautery.  The flaps were then transposed into place, one clockwise and the other counterclockwise, and anchored with interrupted buried subcutaneous sutures.
Peng Advancement Flap Text: The defect edges were debeveled with a #15 scalpel blade. Given the location of the defect, shape of the defect and the proximity to free margins a Peng advancement flap was deemed most appropriate. Using a sterile surgical marker, an appropriate advancement flap was drawn incorporating the defect and placing the expected incisions within the relaxed skin tension lines where possible. The area thus outlined was incised deep to adipose tissue with a #15 scalpel blade. The skin margins were undermined to an appropriate distance in all directions utilizing iris scissors. Following this, the designed flap was advanced into the primary defect and sutured into place.
Rectangular Flap Text: The defect edges were debeveled with a #15 scalpel blade. Given the location of the defect and the proximity to free margins a rectangular flap was deemed most appropriate. Using a sterile surgical marker, an appropriate rectangular flap was drawn incorporating the defect. The area thus outlined was incised deep to adipose tissue with a #15 scalpel blade. The skin margins were undermined to an appropriate distance in all directions utilizing iris scissors. Following this, the designed flap was carried over into the primary defect and sutured into place.
Rhombic Flap Text: The defect edges were debeveled with a #15 scalpel blade. Given the location of the defect and the proximity to free margins a rhombic flap was deemed most appropriate. Using a sterile surgical marker, an appropriate rhombic flap was drawn incorporating the defect. The area thus outlined was incised deep to adipose tissue with a #15 scalpel blade. The skin margins were undermined to an appropriate distance in all directions utilizing iris scissors. Following this, the designed flap was placed into the primary defect and sutured into place.
Rhomboid Transposition Flap Text: The defect edges were debeveled with a #15 scalpel blade. Given the location of the defect and the proximity to free margins a rhomboid transposition flap was deemed most appropriate. Using a sterile surgical marker, an appropriate rhomboid flap was drawn incorporating the defect. The area thus outlined was incised deep to adipose tissue with a #15 scalpel blade. The skin margins were undermined to an appropriate distance in all directions utilizing iris scissors. Following this, the designed flap was placed into the primary defect and sutured into place.
Rotation Flap Text: The defect edges were debeveled with a #15 scalpel blade. Given the location of the defect, shape of the defect and the proximity to free margins a rotation flap was deemed most appropriate. Using a sterile surgical marker, an appropriate rotation flap was drawn incorporating the defect and placing the expected incisions within the relaxed skin tension lines where possible. The area thus outlined was incised deep to adipose tissue with a #15 scalpel blade. The skin margins were undermined to an appropriate distance in all directions utilizing iris scissors. Following this, the designed flap was placed into the primary defect and sutured into place.
Spiral Flap Text: The defect edges were debeveled with a #15 scalpel blade. Given the location of the defect, shape of the defect and the proximity to free margins a spiral flap was deemed most appropriate. Using a sterile surgical marker, an appropriate rotation flap was drawn incorporating the defect and placing the expected incisions within the relaxed skin tension lines where possible. The area thus outlined was incised deep to adipose tissue with a #15 scalpel blade. The skin margins were undermined to an appropriate distance in all directions utilizing iris scissors. Following this, the designed flap was placed into the primary defect and sutured into place.
Staged Advancement Flap Text: The defect edges were debeveled with a #15 scalpel blade. Given the location of the defect, shape of the defect and the proximity to free margins a staged advancement flap was deemed most appropriate. Using a sterile surgical marker, an appropriate advancement flap was drawn incorporating the defect and placing the expected incisions within the relaxed skin tension lines where possible. The area thus outlined was incised deep to adipose tissue with a #15 scalpel blade. The skin margins were undermined to an appropriate distance in all directions utilizing iris scissors. Following this, the designed flap was placed into the primary defect and sutured into place.
Star Wedge Flap Text: The defect edges were debeveled with a #15 scalpel blade. Given the location of the defect, shape of the defect and the proximity to free margins a star wedge flap was deemed most appropriate. Using a sterile surgical marker, an appropriate rotation flap was drawn incorporating the defect and placing the expected incisions within the relaxed skin tension lines where possible. The area thus outlined was incised deep to adipose tissue with a #15 scalpel blade. The skin margins were undermined to an appropriate distance in all directions utilizing iris scissors. Following this, the designed flap was placed into the primary defect and sutured into place.
Transposition Flap Text: The defect edges were debeveled with a #15 scalpel blade. Given the location of the defect and the proximity to free margins a transposition flap was deemed most appropriate. Using a sterile surgical marker, an appropriate transposition flap was drawn incorporating the defect. The area thus outlined was incised deep to adipose tissue with a #15 scalpel blade. The skin margins were undermined to an appropriate distance in all directions utilizing iris scissors. Following this, the designed flap was placed into the primary defect and sutured into place.
Trilobed Flap Text: The defect edges were debeveled with a #15 scalpel blade. Given the location of the defect and the proximity to free margins a trilobed flap was deemed most appropriate. Using a sterile surgical marker, an appropriate trilobed flap was drawn around the defect. The area thus outlined was incised deep to adipose tissue with a #15 scalpel blade. The skin margins were undermined to an appropriate distance in all directions utilizing iris scissors. Following this, the designed flap was placed in the primary defect and sutured into place.
V-Y Flap Text: The defect edges were debeveled with a #15 scalpel blade. Given the location of the defect, shape of the defect and the proximity to free margins a V-Y flap was deemed most appropriate. Using a sterile surgical marker, an appropriate advancement flap was drawn incorporating the defect and placing the expected incisions within the relaxed skin tension lines where possible. The area thus outlined was incised deep to adipose tissue with a #15 scalpel blade. The skin margins were undermined to an appropriate distance in all directions utilizing iris scissors. Following this, the designed flap was advanced into the primary defect and sutured into place.
V-Y Plasty Text: The defect edges were debeveled with a #15 scalpel blade. Given the location of the defect, shape of the defect and the proximity to free margins an V-Y advancement flap was deemed most appropriate. Using a sterile surgical marker, an appropriate advancement flap was drawn incorporating the defect and placing the expected incisions within the relaxed skin tension lines where possible. The area thus outlined was incised deep to adipose tissue with a #15 scalpel blade. The skin margins were undermined to an appropriate distance in all directions utilizing iris scissors. Following this, the designed flap was advanced into the primary defect and sutured into place.
W Plasty Text: The lesion was extirpated to the level of the fat with a #15 scalpel blade. Given the location of the defect, shape of the defect and the proximity to free margins a W-plasty was deemed most appropriate for repair. Using a sterile surgical marker, the appropriate transposition arms of the W-plasty were drawn incorporating the defect and placing the expected incisions within the relaxed skin tension lines where possible. The area thus outlined was incised deep to adipose tissue with a #15 scalpel blade. The skin margins were undermined to an appropriate distance in all directions utilizing iris scissors.  The opposing transposition arms were then transposed into place in opposite direction and anchored with interrupted buried subcutaneous sutures.
Z Plasty Text: The lesion was extirpated to the level of the fat with a #15 scalpel blade. Given the location of the defect, shape of the defect and the proximity to free margins a Z-plasty was deemed most appropriate for repair. Using a sterile surgical marker, the appropriate transposition arms of the Z-plasty were drawn incorporating the defect and placing the expected incisions within the relaxed skin tension lines where possible. The area thus outlined was incised deep to adipose tissue with a #15 scalpel blade. The skin margins were undermined to an appropriate distance in all directions utilizing iris scissors.  The opposing transposition arms were then transposed into place in opposite direction and anchored with interrupted buried subcutaneous sutures.
Zygomaticofacial Flap Text: Given the location of the defect, shape of the defect and the proximity to free margins a zygomaticofacial flap was deemed most appropriate for repair. Using a sterile surgical marker, the appropriate flap was drawn incorporating the defect and placing the expected incisions within the relaxed skin tension lines where possible. The area thus outlined was incised deep to adipose tissue with a #15 scalpel blade with preservation of a vascular pedicle.  The skin margins were undermined to an appropriate distance in all directions utilizing iris scissors.  The flap was then placed into the defect and anchored with interrupted buried subcutaneous sutures.
Abbe Flap (Lower To Upper Lip) Text: The defect of the upper lip was assessed and measured.  Given the location and size of the defect, an Abbe flap was deemed most appropriate. Using a sterile surgical marker, an appropriate Abbe flap was measured and drawn on the lower lip. Local anesthesia was then infiltrated. A scalpel was then used to incise the upper lip through and through the skin, vermilion, muscle and mucosa, leaving the flap pedicled on the opposite side.  The flap was then rotated and transferred to the lower lip defect.  The flap was then sutured into place with a three layer technique, closing the orbicularis oris muscle layer with subcutaneous buried sutures, followed by a mucosal layer and an epidermal layer.
Abbe Flap (Upper To Lower Lip) Text: The defect of the lower lip was assessed and measured.  Given the location and size of the defect, an Abbe flap was deemed most appropriate. Using a sterile surgical marker, an appropriate Abbe flap was measured and drawn on the upper lip. Local anesthesia was then infiltrated.  A scalpel was then used to incise the upper lip through and through the skin, vermilion, muscle and mucosa, leaving the flap pedicled on the opposite side.  The flap was then rotated and transferred to the lower lip defect.  The flap was then sutured into place with a three layer technique, closing the orbicularis oris muscle layer with subcutaneous buried sutures, followed by a mucosal layer and an epidermal layer.
Cheek Interpolation Flap Text: A decision was made to reconstruct the defect utilizing an interpolation axial flap and a staged reconstruction.  A telfa template was made of the defect.  This telfa template was then used to outline the Cheek Interpolation flap.  The donor area for the pedicle flap was then injected with anesthesia.  The flap was excised through the skin and subcutaneous tissue down to the layer of the underlying musculature.  The interpolation flap was carefully excised within this deep plane to maintain its blood supply.  The edges of the donor site were undermined.   The donor site was closed in a primary fashion.  The pedicle was then rotated into position and sutured.  Once the tube was sutured into place, adequate blood supply was confirmed with blanching and refill.  The pedicle was then wrapped with xeroform gauze and dressed appropriately with a telfa and gauze bandage to ensure continued blood supply and protect the attached pedicle.
Cheek-To-Nose Interpolation Flap Text: A decision was made to reconstruct the defect utilizing an interpolation axial flap and a staged reconstruction.  A telfa template was made of the defect.  This telfa template was then used to outline the Cheek-To-Nose Interpolation flap.  The donor area for the pedicle flap was then injected with anesthesia.  The flap was excised through the skin and subcutaneous tissue down to the layer of the underlying musculature.  The interpolation flap was carefully excised within this deep plane to maintain its blood supply.  The edges of the donor site were undermined.   The donor site was closed in a primary fashion.  The pedicle was then rotated into position and sutured.  Once the tube was sutured into place, adequate blood supply was confirmed with blanching and refill.  The pedicle was then wrapped with xeroform gauze and dressed appropriately with a telfa and gauze bandage to ensure continued blood supply and protect the attached pedicle.
Estlander Flap (Lower To Upper Lip) Text: The defect of the lower lip was assessed and measured.  Given the location and size of the defect, an Estlander flap was deemed most appropriate. Using a sterile surgical marker, an appropriate Estlander flap was measured and drawn on the upper lip. Local anesthesia was then infiltrated. A scalpel was then used to incise the lateral aspect of the flap, through skin, muscle and mucosa, leaving the flap pedicled medially.  The flap was then rotated and positioned to fill the lower lip defect.  The flap was then sutured into place with a three layer technique, closing the orbicularis oris muscle layer with subcutaneous buried sutures, followed by a mucosal layer and an epidermal layer.
Interpolation Flap Text: A decision was made to reconstruct the defect utilizing an interpolation axial flap and a staged reconstruction.  A telfa template was made of the defect.  This telfa template was then used to outline the interpolation flap.  The donor area for the pedicle flap was then injected with anesthesia.  The flap was excised through the skin and subcutaneous tissue down to the layer of the underlying musculature.  The interpolation flap was carefully excised within this deep plane to maintain its blood supply.  The edges of the donor site were undermined.   The donor site was closed in a primary fashion.  The pedicle was then rotated into position and sutured.  Once the tube was sutured into place, adequate blood supply was confirmed with blanching and refill.  The pedicle was then wrapped with xeroform gauze and dressed appropriately with a telfa and gauze bandage to ensure continued blood supply and protect the attached pedicle.
Melolabial Interpolation Flap Text: A decision was made to reconstruct the defect utilizing an interpolation axial flap and a staged reconstruction.  A telfa template was made of the defect.  This telfa template was then used to outline the melolabial interpolation flap.  The donor area for the pedicle flap was then injected with anesthesia.  The flap was excised through the skin and subcutaneous tissue down to the layer of the underlying musculature.  The pedicle flap was carefully excised within this deep plane to maintain its blood supply.  The edges of the donor site were undermined.   The donor site was closed in a primary fashion.  The pedicle was then rotated into position and sutured.  Once the tube was sutured into place, adequate blood supply was confirmed with blanching and refill.  The pedicle was then wrapped with xeroform gauze and dressed appropriately with a telfa and gauze bandage to ensure continued blood supply and protect the attached pedicle.
Mastoid Interpolation Flap Text: A decision was made to reconstruct the defect utilizing an interpolation axial flap and a staged reconstruction.  A telfa template was made of the defect.  This telfa template was then used to outline the mastoid interpolation flap.  The donor area for the pedicle flap was then injected with anesthesia.  The flap was excised through the skin and subcutaneous tissue down to the layer of the underlying musculature.  The pedicle flap was carefully excised within this deep plane to maintain its blood supply.  The edges of the donor site were undermined.   The donor site was closed in a primary fashion.  The pedicle was then rotated into position and sutured.  Once the tube was sutured into place, adequate blood supply was confirmed with blanching and refill.  The pedicle was then wrapped with xeroform gauze and dressed appropriately with a telfa and gauze bandage to ensure continued blood supply and protect the attached pedicle.
Paramedian Forehead Flap Text: A decision was made to reconstruct the defect utilizing an interpolation axial flap and a staged reconstruction.  A telfa template was made of the defect.  This telfa template was then used to outline the paramedian forehead pedicle flap.  The donor area for the pedicle flap was then injected with anesthesia.  The flap was excised through the skin and subcutaneous tissue down to the layer of the underlying musculature.  The pedicle flap was carefully excised within this deep plane to maintain its blood supply.  The edges of the donor site were undermined.   The donor site was closed in a primary fashion.  The pedicle was then rotated into position and sutured.  Once the tube was sutured into place, adequate blood supply was confirmed with blanching and refill.  The pedicle was then wrapped with xeroform gauze and dressed appropriately with a telfa and gauze bandage to ensure continued blood supply and protect the attached pedicle.
Posterior Auricular Interpolation Flap Text: A decision was made to reconstruct the defect utilizing an interpolation axial flap and a staged reconstruction.  A telfa template was made of the defect.  This telfa template was then used to outline the posterior auricular interpolation flap.  The donor area for the pedicle flap was then injected with anesthesia.  The flap was excised through the skin and subcutaneous tissue down to the layer of the underlying musculature.  The pedicle flap was carefully excised within this deep plane to maintain its blood supply.  The edges of the donor site were undermined.   The donor site was closed in a primary fashion.  The pedicle was then rotated into position and sutured.  Once the tube was sutured into place, adequate blood supply was confirmed with blanching and refill.  The pedicle was then wrapped with xeroform gauze and dressed appropriately with a telfa and gauze bandage to ensure continued blood supply and protect the attached pedicle.
Cheiloplasty (Complex) Text: A decision was made to reconstruct the defect with a  cheiloplasty.  The defect was undermined extensively.  Additional orbicularis oris muscle was excised with a 15 blade scalpel.  The defect was converted into a full thickness wedge to facilite a better cosmetic result.  Small vessels were then tied off with 5-0 monocyrl. The orbicularis oris, superficial fascia, adipose and dermis were then reapproximated.  After the deeper layers were approximated the epidermis was reapproximated with particular care given to realign the vermilion border.
Cheiloplasty (Less Than 50%) Text: A decision was made to reconstruct the defect with a  cheiloplasty.  The defect was undermined extensively.  Additional orbicularis oris muscle was excised with a 15 blade scalpel.  The defect was converted into a full thickness wedge, of less than 50% of the vertical height of the lip, to facilite a better cosmetic result.  Small vessels were then tied off with 5-0 monocyrl. The orbicularis oris, superficial fascia, adipose and dermis were then reapproximated.  After the deeper layers were approximated the epidermis was reapproximated with particular care given to realign the vermilion border.
Ear Wedge Repair Text: A wedge excision was completed by carrying down an excision through the full thickness of the ear and cartilage with an inward facing Burow's triangle. The wound was then closed in a layered fashion.
Full Thickness Lip Wedge Repair (Flap) Text: Given the location of the defect and the proximity to free margins a full thickness wedge repair was deemed most appropriate. Using a sterile surgical marker, the appropriate repair was drawn incorporating the defect and placing the expected incisions perpendicular to the vermilion border.  The vermilion border was also meticulously outlined to ensure appropriate reapproximation during the repair.  The area thus outlined was incised through and through with a #15 scalpel blade.  The muscularis and dermis were reaproximated with deep sutures following hemostasis. Care was taken to realign the vermilion border before proceeding with the superficial closure.  Once the vermilion was realigned the superfical and mucosal closure was finished.
Burow's Graft Text: The defect edges were debeveled with a #15 scalpel blade. Given the location of the defect, shape of the defect, the proximity to free margins and the presence of a standing cone deformity a Burow's skin graft was deemed most appropriate. The standing cone was removed and this tissue was then trimmed to the shape of the primary defect. The adipose tissue was also removed until only dermis and epidermis were left.  The skin margins of the secondary defect were undermined to an appropriate distance in all directions utilizing iris scissors.  The secondary defect was closed with interrupted buried subcutaneous sutures.  The skin edges were then re-apposed with running  sutures.  The skin graft was then placed in the primary defect and oriented appropriately.
Cartilage Graft Text: The defect edges were debeveled with a #15 scalpel blade. Given the location of the defect, shape of the defect, the fact the defect involved a full thickness cartilage defect a cartilage graft was deemed most appropriate.  An appropriate donor site was identified, cleansed, and anesthetized. The cartilage graft was then harvested and transferred to the recipient site, oriented appropriately and then sutured into place.  The secondary defect was then repaired using a primary closure.
Composite Graft Text: The defect edges were debeveled with a #15 scalpel blade. Given the location of the defect, shape of the defect, the proximity to free margins and the fact the defect was full thickness a composite graft was deemed most appropriate.  The defect was outline and then transferred to the donor site.  A full thickness graft was then excised from the donor site. The graft was then placed in the primary defect, oriented appropriately and then sutured into place.  The secondary defect was then repaired using a primary closure.
Epidermal Autograft Text: The defect edges were debeveled with a #15 scalpel blade. Given the location of the defect, shape of the defect and the proximity to free margins an epidermal autograft was deemed most appropriate. Using a sterile surgical marker, the primary defect shape was transferred to the donor site. The epidermal graft was then harvested.  The skin graft was then placed in the primary defect and oriented appropriately.
Dermal Autograft Text: The defect edges were debeveled with a #15 scalpel blade. Given the location of the defect, shape of the defect and the proximity to free margins a dermal autograft was deemed most appropriate. Using a sterile surgical marker, the primary defect shape was transferred to the donor site. The area thus outlined was incised deep to adipose tissue with a #15 scalpel blade.  The harvested graft was then trimmed of adipose and epidermal tissue until only dermis was left.  The skin graft was then placed in the primary defect and oriented appropriately.
Ftsg Text: The defect edges were debeveled with a #15 scalpel blade. Given the location of the defect, shape of the defect and the proximity to free margins a full thickness skin graft was deemed most appropriate. Using a sterile surgical marker, the primary defect shape was transferred to the donor site. The area thus outlined was incised deep to adipose tissue with a #15 scalpel blade.  The harvested graft was then trimmed of adipose tissue until only dermis and epidermis was left.  The skin margins of the secondary defect were undermined to an appropriate distance in all directions utilizing iris scissors.  The secondary defect was closed with interrupted buried subcutaneous sutures.  The skin edges were then re-apposed with running  sutures.  The skin graft was then placed in the primary defect and oriented appropriately.
Pinch Graft Text: The defect edges were debeveled with a #15 scalpel blade. Given the location of the defect, shape of the defect and the proximity to free margins a pinch graft was deemed most appropriate. Using a sterile surgical marker, the primary defect shape was transferred to the donor site. The area thus outlined was incised deep to adipose tissue with a #15 scalpel blade.  The harvested graft was then trimmed of adipose tissue until only dermis and epidermis was left. The skin margins of the secondary defect were undermined to an appropriate distance in all directions utilizing iris scissors.  The secondary defect was closed with interrupted buried subcutaneous sutures.  The skin edges were then re-apposed with running  sutures.  The skin graft was then placed in the primary defect and oriented appropriately.
Skin Substitute Text: The defect edges were debeveled with a #15 scalpel blade. Given the location of the defect, shape of the defect and the proximity to free margins a skin substitute graft was deemed most appropriate.  The graft material was trimmed to fit the size of the defect. The graft was then placed in the primary defect and oriented appropriately.
Split-Thickness Skin Graft Text: The defect edges were debeveled with a #15 scalpel blade. Given the location of the defect, shape of the defect and the proximity to free margins a split thickness skin graft was deemed most appropriate. Using a sterile surgical marker, the primary defect shape was transferred to the donor site. The split thickness graft was then harvested.  The skin graft was then placed in the primary defect and oriented appropriately.
Tissue Cultured Epidermal Autograft Text: The defect edges were debeveled with a #15 scalpel blade. Given the location of the defect, shape of the defect and the proximity to free margins a tissue cultured epidermal autograft was deemed most appropriate.  The graft was then trimmed to fit the size of the defect.  The graft was then placed in the primary defect and oriented appropriately.
Xenograft Text: The defect edges were debeveled with a #15 scalpel blade. Given the location of the defect, shape of the defect and the proximity to free margins a xenograft was deemed most appropriate.  The graft was then trimmed to fit the size of the defect.  The graft was then placed in the primary defect and oriented appropriately.
Complex Repair And Flap Additional Text (Will Appearing After The Standard Complex Repair Text): The complex repair was not sufficient to completely close the primary defect. The remaining additional defect was repaired with the flap mentioned below.
Complex Repair And Graft Additional Text (Will Appearing After The Standard Complex Repair Text): The complex repair was not sufficient to completely close the primary defect. The remaining additional defect was repaired with the graft mentioned below.
Eyelid Full Thickness Repair - 35189: The eyelid defect was full thickness which required a wedge repair of the eyelid. Special care was taken to ensure that the eyelid margin was realligned when placing sutures.
Eyelid Partial Thickness Repair - 77060: The eyelid defect was partial thickness which required a wedge repair of the eyelid. Special care was taken to ensure that the eyelid margin was realligned when placing sutures.
Intermediate Repair And Flap Additional Text (Will Appearing After The Standard Complex Repair Text): The intermediate repair was not sufficient to completely close the primary defect. The remaining additional defect was repaired with the flap mentioned below.
Intermediate Repair And Graft Additional Text (Will Appearing After The Standard Complex Repair Text): The intermediate repair was not sufficient to completely close the primary defect. The remaining additional defect was repaired with the graft mentioned below.
Localized Dermabrasion With 15 Blade Text: The patient was draped in routine manner.  Localized dermabrasion using a 15 blade was performed in routine manner to papillary dermis. This spot dermabrasion is being performed to complete skin cancer reconstruction. It also will eliminate the other sun damaged precancerous cells that are known to be part of the regional effect of a lifetime's worth of sun exposure. This localized dermabrasion is therapeutic and should not be considered cosmetic in any regard.
Localized Dermabrasion With Sand Papertext: The patient was draped in routine manner.  Localized dermabrasion using sterile sand paper was performed in routine manner to papillary dermis. This spot dermabrasion is being performed to complete skin cancer reconstruction. It also will eliminate the other sun damaged precancerous cells that are known to be part of the regional effect of a lifetime's worth of sun exposure. This localized dermabrasion is therapeutic and should not be considered cosmetic in any regard.
Localized Dermabrasion With Wire Brush Text: The patient was draped in routine manner.  Localized dermabrasion using 3 x 17 mm wire brush was performed in routine manner to papillary dermis. This spot dermabrasion is being performed to complete skin cancer reconstruction. It also will eliminate the other sun damaged precancerous cells that are known to be part of the regional effect of a lifetime's worth of sun exposure. This localized dermabrasion is therapeutic and should not be considered cosmetic in any regard.
Purse String (Simple) Text: Given the location of the defect and the characteristics of the surrounding skin a purse string closure was deemed most appropriate.  Undermining was performed circumferentially around the surgical defect.  A purse string suture was then placed and tightened.
Purse String (Intermediate) Text: Given the location of the defect and the characteristics of the surrounding skin a purse string intermediate closure was deemed most appropriate.  Undermining was performed circumferentially around the surgical defect.  A purse string suture was then placed and tightened.
Partial Purse String (Simple) Text: Given the location of the defect and the characteristics of the surrounding skin a simple purse string closure was deemed most appropriate.  Undermining was performed circumferentially around the surgical defect.  A purse string suture was then placed and tightened. Wound tension only allowed a partial closure of the circular defect.
Partial Purse String (Intermediate) Text: Given the location of the defect and the characteristics of the surrounding skin an intermediate purse string closure was deemed most appropriate.  Undermining was performed circumferentially around the surgical defect.  A purse string suture was then placed and tightened. Wound tension only allowed a partial closure of the circular defect.
Tarsorrhaphy Text: A tarsorrhaphy was performed using Frost sutures.
Manual Repair Warning Statement: We plan on removing the manually selected variable below in favor of our much easier automatic structured text blocks found in the previous tab. We decided to do this to help make the flow better and give you the full power of structured data. Manual selection is never going to be ideal in our platform and I would encourage you to avoid using manual selection from this point on, especially since I will be sunsetting this feature. It is important that you do one of two things with the customized text below. First, you can save all of the text in a word file so you can have it for future reference. Second, transfer the text to the appropriate area in the Library tab. Lastly, if there is a flap or graft type which we do not have you need to let us know right away so I can add it in before the variable is hidden. No need to panic, we plan to give you roughly 6 months to make the change.
Same Histology In Subsequent Stages Text: The pattern and morphology of the tumor is as described in the first stage.
No Residual Tumor Seen Histology Text: There were no malignant cells seen in the sections examined.
Inflammation Suggestive Of Cancer Camouflage Histology Text: There was a dense lymphocytic infiltrate which prevented adequate histologic evaluation of adjacent structures.
Bcc Histology Text: There were numerous aggregates of basaloid cells.
Bcc Infiltrative Histology Text: There were numerous aggregates of basaloid cells demonstrating an infiltrative pattern.
Mart-1 - Positive Histology Text: MART-1 staining demonstrates areas of higher density and clustering of melanocytes with Pagetoid spread upwards within the epidermis. The surgical margins are positive for tumor cells.
Mart-1 - Negative Histology Text: MART-1 staining demonstrates a normal density and pattern of melanocytes along the dermal-epidermal junction. The surgical margins are negative for tumor cells.
Information: Selecting Yes will display possible errors in your note based on the variables you have selected. This validation is only offered as a suggestion for you. PLEASE NOTE THAT THE VALIDATION TEXT WILL BE REMOVED WHEN YOU FINALIZE YOUR NOTE. IF YOU WANT TO FAX A PRELIMINARY NOTE YOU WILL NEED TO TOGGLE THIS TO 'NO' IF YOU DO NOT WANT IT IN YOUR FAXED NOTE.
Bill 59 Modifier?: No - Continue to Bill 79 Modifier

## 2025-07-15 ENCOUNTER — TELEPHONE (OUTPATIENT)
Age: 75
End: 2025-07-15

## 2025-07-15 ENCOUNTER — OFFICE VISIT (OUTPATIENT)
Age: 75
End: 2025-07-15
Payer: MEDICARE

## 2025-07-15 VITALS
HEART RATE: 66 BPM | SYSTOLIC BLOOD PRESSURE: 112 MMHG | BODY MASS INDEX: 25.55 KG/M2 | HEIGHT: 66 IN | DIASTOLIC BLOOD PRESSURE: 60 MMHG | WEIGHT: 159 LBS

## 2025-07-15 DIAGNOSIS — I10 PRIMARY HYPERTENSION: ICD-10-CM

## 2025-07-15 DIAGNOSIS — E78.00 PURE HYPERCHOLESTEROLEMIA: ICD-10-CM

## 2025-07-15 DIAGNOSIS — R73.09 ABNORMAL GLUCOSE: ICD-10-CM

## 2025-07-15 DIAGNOSIS — I25.10 CORONARY ARTERY DISEASE INVOLVING NATIVE CORONARY ARTERY OF NATIVE HEART WITHOUT ANGINA PECTORIS: Primary | ICD-10-CM

## 2025-07-15 LAB
ALBUMIN SERPL-MCNC: 3.6 G/DL (ref 3.2–4.6)
ALBUMIN/GLOB SERPL: 1.3 (ref 1–1.9)
ALP SERPL-CCNC: 113 U/L (ref 35–104)
ALT SERPL-CCNC: 12 U/L (ref 8–45)
ANION GAP SERPL CALC-SCNC: 11 MMOL/L (ref 7–16)
AST SERPL-CCNC: 22 U/L (ref 15–37)
BILIRUB SERPL-MCNC: 0.4 MG/DL (ref 0–1.2)
BUN SERPL-MCNC: 10 MG/DL (ref 8–23)
CALCIUM SERPL-MCNC: 9 MG/DL (ref 8.8–10.2)
CHLORIDE SERPL-SCNC: 101 MMOL/L (ref 98–107)
CHOLEST SERPL-MCNC: 154 MG/DL (ref 0–200)
CO2 SERPL-SCNC: 26 MMOL/L (ref 20–29)
CREAT SERPL-MCNC: 0.7 MG/DL (ref 0.6–1.1)
EST. AVERAGE GLUCOSE BLD GHB EST-MCNC: 131 MG/DL
GLOBULIN SER CALC-MCNC: 2.8 G/DL (ref 2.3–3.5)
GLUCOSE SERPL-MCNC: 73 MG/DL (ref 70–99)
HBA1C MFR BLD: 6.2 % (ref 0–5.6)
HDLC SERPL-MCNC: 60 MG/DL (ref 40–60)
HDLC SERPL: 2.6 (ref 0–5)
LDLC SERPL CALC-MCNC: 51 MG/DL (ref 0–100)
POTASSIUM SERPL-SCNC: 4.8 MMOL/L (ref 3.5–5.1)
PROT SERPL-MCNC: 6.4 G/DL (ref 6.3–8.2)
SODIUM SERPL-SCNC: 138 MMOL/L (ref 136–145)
TRIGL SERPL-MCNC: 215 MG/DL (ref 0–150)
VLDLC SERPL CALC-MCNC: 43 MG/DL (ref 6–23)

## 2025-07-15 PROCEDURE — 99214 OFFICE O/P EST MOD 30 MIN: CPT | Performed by: INTERNAL MEDICINE

## 2025-07-15 PROCEDURE — 1123F ACP DISCUSS/DSCN MKR DOCD: CPT | Performed by: INTERNAL MEDICINE

## 2025-07-15 PROCEDURE — 3074F SYST BP LT 130 MM HG: CPT | Performed by: INTERNAL MEDICINE

## 2025-07-15 PROCEDURE — 3078F DIAST BP <80 MM HG: CPT | Performed by: INTERNAL MEDICINE

## 2025-07-15 PROCEDURE — 1125F AMNT PAIN NOTED PAIN PRSNT: CPT | Performed by: INTERNAL MEDICINE

## 2025-07-15 RX ORDER — AMLODIPINE BESYLATE 5 MG/1
5 TABLET ORAL DAILY
Qty: 90 TABLET | Refills: 3 | Status: SHIPPED | OUTPATIENT
Start: 2025-07-15

## 2025-07-15 RX ORDER — NITROGLYCERIN 0.4 MG/1
0.4 TABLET SUBLINGUAL EVERY 5 MIN PRN
Qty: 25 TABLET | Refills: 5 | Status: SHIPPED | OUTPATIENT
Start: 2025-07-15

## 2025-07-15 NOTE — PROGRESS NOTES
[facility] CARDIOLOGY  [address] [place] [address], [facility] [address]  [place] [address]  PHONE: [phone]      07/15/25    NAME:  Mary Anton  : 1950  MRN: [id number]       SUBJECTIVE:   Mary Anton is a 74 y.o. female seen for a follow up visit regarding the following:     Chief Complaint   Patient presents with    Coronary Artery Disease         HPI:    No cp or dutton. No orthopnea or pnd. No palpitations or syncope.      Past Medical History, Past Surgical History, Family history, Social History, and Medications were all reviewed with the patient today and updated as necessary.     Current Outpatient Medications   Medication Sig Dispense Refill    Evolocumab (REPATHA) SOAJ pen Inject 1 mL into the skin every 14 days 2 Adjustable Dose Pre-filled Pen Syringe 11    escitalopram (LEXAPRO) 10 MG tablet Take 1.5 tablets by mouth every evening 135 tablet 3    pantoprazole (PROTONIX) 40 MG tablet Take 1 tablet by mouth daily 90 tablet 3    triamcinolone (KENALOG) 0.025 % cream Apply topically 2 times daily. 15 g 0    albuterol sulfate HFA (PROVENTIL;VENTOLIN;PROAIR) 108 (90 Base) MCG/ACT inhaler Inhale 2 puffs into the lungs every 6 hours as needed for Wheezing 1 each 11    fluticasone (FLONASE) 50 MCG/ACT nasal spray 2 sprays by Each Nostril route daily 3 each 3    cetirizine (ZYRTEC) 10 MG tablet Take 1 tablet by mouth daily 30 tablet 5    fluticasone-salmeterol (ADVAIR) 250-50 MCG/ACT AEPB diskus inhaler Inhale 1 puff into the lungs 2 times daily 60 each 11    amLODIPine (NORVASC) 5 MG tablet Take 1 tablet by mouth daily 90 tablet 3    nitroGLYCERIN (NITROSTAT) 0.4 MG SL tablet 1 tablet every 5 minutes as needed      tretinoin (RETIN-A) 0.05 % cream Apply topically nightly 20 g 5    Cyanocobalamin (VITAMIN B12 PO) Take 1 tablet by mouth daily      acetaminophen (TYLENOL) 500 MG tablet Take 2 tablets by mouth every 6 hours as needed      Cholecalciferol 50 MCG (2000 UT) CAPS Take 1 capsule by mouth daily

## 2025-07-19 LAB
ALP BONE CFR SERPL: 34 % (ref 14–68)
ALP INTEST CFR SERPL: 6 % (ref 0–18)
ALP LIVER CFR SERPL: 60 % (ref 18–85)
ALP SERPL-CCNC: 121 IU/L (ref 44–121)

## 2025-08-26 ENCOUNTER — OFFICE VISIT (OUTPATIENT)
Dept: INTERNAL MEDICINE CLINIC | Facility: CLINIC | Age: 75
End: 2025-08-26

## 2025-08-26 VITALS
BODY MASS INDEX: 25.58 KG/M2 | HEIGHT: 66 IN | WEIGHT: 159.2 LBS | SYSTOLIC BLOOD PRESSURE: 124 MMHG | DIASTOLIC BLOOD PRESSURE: 82 MMHG

## 2025-08-26 DIAGNOSIS — I10 PRIMARY HYPERTENSION: Primary | ICD-10-CM

## 2025-08-26 DIAGNOSIS — G72.0 STATIN MYOPATHY: ICD-10-CM

## 2025-08-26 DIAGNOSIS — J31.0 CHRONIC RHINITIS: ICD-10-CM

## 2025-08-26 DIAGNOSIS — E78.00 PURE HYPERCHOLESTEROLEMIA: ICD-10-CM

## 2025-08-26 DIAGNOSIS — R73.09 ABNORMAL GLUCOSE: ICD-10-CM

## 2025-08-26 DIAGNOSIS — I25.10 CORONARY ARTERY DISEASE INVOLVING NATIVE CORONARY ARTERY OF NATIVE HEART WITHOUT ANGINA PECTORIS: ICD-10-CM

## 2025-08-26 DIAGNOSIS — M15.0 PRIMARY OSTEOARTHRITIS INVOLVING MULTIPLE JOINTS: ICD-10-CM

## 2025-08-26 DIAGNOSIS — Z85.828 HISTORY OF BASAL CELL CANCER: ICD-10-CM

## 2025-08-26 DIAGNOSIS — Z85.89 HISTORY OF SQUAMOUS CELL CARCINOMA: ICD-10-CM

## 2025-08-26 DIAGNOSIS — D07.1 VIN III (VULVAR INTRAEPITHELIAL NEOPLASIA III): ICD-10-CM

## 2025-08-26 DIAGNOSIS — T46.6X5A STATIN MYOPATHY: ICD-10-CM

## 2025-08-26 RX ORDER — CELECOXIB 200 MG/1
200 CAPSULE ORAL DAILY
Qty: 30 CAPSULE | Refills: 5 | Status: SHIPPED | OUTPATIENT
Start: 2025-08-26

## 2025-08-26 ASSESSMENT — ENCOUNTER SYMPTOMS
SHORTNESS OF BREATH: 0
BACK PAIN: 1

## 2025-08-28 DIAGNOSIS — J31.0 CHRONIC RHINITIS: ICD-10-CM

## 2025-08-28 RX ORDER — CETIRIZINE HYDROCHLORIDE 10 MG/1
10 TABLET ORAL DAILY
Qty: 30 TABLET | Refills: 0 | Status: SHIPPED | OUTPATIENT
Start: 2025-08-28

## (undated) DEVICE — TRAY PREP DRY W/ PREM GLV 2 APPL 6 SPNG 2 UNDPD 1 OVERWRAP

## (undated) DEVICE — BLADE SCALP SURG BARD-PARK 10 --

## (undated) DEVICE — SINGLE PORT MANIFOLD: Brand: NEPTUNE 2

## (undated) DEVICE — CONNECTOR TBNG OD5-7MM O2 END DISP

## (undated) DEVICE — MEDI-VAC YANKAUER SUCTION HANDLE W/BULBOUS TIP: Brand: CARDINAL HEALTH

## (undated) DEVICE — STANDARD HYPODERMIC NEEDLE,POLYPROPYLENE HUB: Brand: MONOJECT

## (undated) DEVICE — MASTISOL ADHESIVE LIQ 2/3ML

## (undated) DEVICE — LUBE JELLY FOIL PACK 1.4 OZ: Brand: MEDLINE INDUSTRIES, INC.

## (undated) DEVICE — CONTAINER PREFIL FRMLN 40ML --

## (undated) DEVICE — SUTURE ETHBND EXCEL SZ 1 L30IN NONABSORBABLE GRN L36MM CT-1 X425H

## (undated) DEVICE — DRAPE,LAP,CHOLE,W/TROUGHS,STERILE: Brand: MEDLINE

## (undated) DEVICE — REM POLYHESIVE ADULT PATIENT RETURN ELECTRODE: Brand: VALLEYLAB

## (undated) DEVICE — SYRINGE MED 10ML LUERLOCK TIP W/O SFTY DISP

## (undated) DEVICE — FORCEPS BX L240CM JAW DIA2.8MM L CAP W/ NDL MIC MESH TOOTH

## (undated) DEVICE — KENDALL SCD EXPRESS SLEEVES, KNEE LENGTH, MEDIUM: Brand: KENDALL SCD

## (undated) DEVICE — ELECTRODE PT RET AD L9FT HI MOIST COND ADH HYDRGEL CORDED

## (undated) DEVICE — SHEET, T, LAPAROTOMY, STERILE: Brand: MEDLINE

## (undated) DEVICE — SPONGE: SPECIALTY TONSIL XR MED 100/CS: Brand: MEDICAL ACTION INDUSTRIES

## (undated) DEVICE — 2000CC GUARDIAN II: Brand: GUARDIAN

## (undated) DEVICE — YANKAUER,BULB TIP,W/O VENT,RIGID,STERILE: Brand: MEDLINE

## (undated) DEVICE — SYR LR LCK 1ML GRAD NSAF 30ML --

## (undated) DEVICE — SYRINGE MED 3ML CLR PLAS STD N CTRL LUERLOCK TIP DISP

## (undated) DEVICE — SOLUTION IRRIG 1000ML 09% SOD CHL USP PIC PLAS CONTAINER

## (undated) DEVICE — #1020 STERI DRAPE 41MM X 41MM SMALL: Brand: STERI-DRAPE™

## (undated) DEVICE — SYR 10ML LUER LOK 1/5ML GRAD --

## (undated) DEVICE — INTENDED FOR TISSUE SEPARATION, AND OTHER PROCEDURES THAT REQUIRE A SHARP SURGICAL BLADE TO PUNCTURE OR CUT.: Brand: BARD-PARKER SAFETY BLADES SIZE 15, STERILE

## (undated) DEVICE — 3M™ TEGADERM™ TRANSPARENT FILM DRESSING FRAME STYLE, 1626W, 4 IN X 4-3/4 IN (10 CM X 12 CM), 50/CT 4CT/CASE: Brand: 3M™ TEGADERM™

## (undated) DEVICE — PAD,NON-ADHERENT,3X8,STERILE,LF,1/PK: Brand: MEDLINE

## (undated) DEVICE — SYRINGE, LUER SLIP, STERILE, 60ML: Brand: MEDLINE

## (undated) DEVICE — BLADE ASSEMB CLP HAIR FINE --

## (undated) DEVICE — MINOR SPLIT GENERAL: Brand: MEDLINE INDUSTRIES, INC.

## (undated) DEVICE — FORCEPS BX L L240CM DIA2.4MM RAD JAW 4 HOT FOR POLYP DISP

## (undated) DEVICE — (D)STRIP SKN CLSR 0.5X4IN WHT --

## (undated) DEVICE — Z CONVERTED USE 2421973 CONTAINER SPEC 60/30ML 10% FRMLN POLYPR PREFIL

## (undated) DEVICE — TRAP SPEC POLYP REM STRNR CLN DSGN MAGNIFYING WIND DISP

## (undated) DEVICE — SUTURE VCRL SZ 4-0 L18IN ABSRB UD L19MM PS-2 3/8 CIR PRIM J496H

## (undated) DEVICE — NEEDLE SYR 18GA L1.5IN RED PLAS HUB S STL BLNT FILL W/O

## (undated) DEVICE — SOLUTION IV 1000ML 0.9% SOD CHL

## (undated) DEVICE — SUTURE VCRL SZ 2-0 L27IN ABSRB UD L26MM SH 1/2 CIR J417H

## (undated) DEVICE — CONTAINER FORMALIN PREFILLED 10% NBF 60ML

## (undated) DEVICE — STRIP,CLOSURE,WOUND,MEDI-STRIP,1/2X4: Brand: MEDLINE

## (undated) DEVICE — GAUZE,SPONGE,4"X4",12PLY,WOVEN,NS,LF: Brand: MEDLINE

## (undated) DEVICE — SNARE POLYP SM W13MMXL240CM SHTH DIA2.4MM OVL FLX DISP

## (undated) DEVICE — KENDALL RADIOLUCENT FOAM MONITORING ELECTRODE RECTANGULAR SHAPE: Brand: KENDALL

## (undated) DEVICE — APPLIER CLP SM BLK TI AUTO HNDL DISP W/ 20 CLP PREM SURGICLP

## (undated) DEVICE — GOWN,REINFORCED,POLY,AURORA,XXLARGE,STR: Brand: MEDLINE

## (undated) DEVICE — PREP SKN CHLRAPRP APL 26ML STR --

## (undated) DEVICE — SUTURE VCRL SZ 3-0 L27IN ABSRB UD L26MM SH 1/2 CIR J416H

## (undated) DEVICE — SUTURE VCRL SZ 3-0 L27IN ABSRB VLT L26MM SH 1/2 CIR J316H

## (undated) DEVICE — AIRLIFE™ OXYGEN TUBING 7 FEET (2.1 M) CRUSH RESISTANT OXYGEN TUBING, VINYL TIPPED: Brand: AIRLIFE™

## (undated) DEVICE — CANNULA NSL ORAL AD FOR CAPNOFLEX CO2 O2 AIRLFE

## (undated) DEVICE — SURGICAL PROCEDURE PACK BASIC ST FRANCIS

## (undated) DEVICE — SYRINGE IRRIG 60ML SFT PLIABLE BLB EZ TO GRP 1 HND USE W/

## (undated) DEVICE — E-Z CLEAN, NON-STICK, PTFE COATED, MEGA FINE ELECTROSURGICAL NEEDLE ELECTRODE, SHARP, 2.5 INCH (6.3 CM): Brand: MEGADYNE

## (undated) DEVICE — NEEDLE HYPO 21GA L1.5IN INTRAMUSCULAR S STL LATCH BVL UP